# Patient Record
Sex: MALE | Race: BLACK OR AFRICAN AMERICAN | NOT HISPANIC OR LATINO | URBAN - METROPOLITAN AREA
[De-identification: names, ages, dates, MRNs, and addresses within clinical notes are randomized per-mention and may not be internally consistent; named-entity substitution may affect disease eponyms.]

---

## 2017-06-25 ENCOUNTER — EMERGENCY (EMERGENCY)
Facility: HOSPITAL | Age: 57
LOS: 1 days | Discharge: HOME | End: 2017-06-25
Attending: EMERGENCY MEDICINE | Admitting: EMERGENCY MEDICINE
Payer: MEDICAID

## 2017-06-25 VITALS
HEART RATE: 64 BPM | OXYGEN SATURATION: 100 % | DIASTOLIC BLOOD PRESSURE: 94 MMHG | SYSTOLIC BLOOD PRESSURE: 157 MMHG | RESPIRATION RATE: 18 BRPM | TEMPERATURE: 99 F | WEIGHT: 145.06 LBS

## 2017-06-25 DIAGNOSIS — S09.90XA UNSPECIFIED INJURY OF HEAD, INITIAL ENCOUNTER: ICD-10-CM

## 2017-06-25 DIAGNOSIS — J45.909 UNSPECIFIED ASTHMA, UNCOMPLICATED: ICD-10-CM

## 2017-06-25 PROCEDURE — 99284 EMERGENCY DEPT VISIT MOD MDM: CPT | Mod: 25

## 2017-06-25 PROCEDURE — 99053 MED SERV 10PM-8AM 24 HR FAC: CPT

## 2017-06-25 PROCEDURE — 70450 CT HEAD/BRAIN W/O DYE: CPT | Mod: 26

## 2017-06-25 NOTE — ED ADULT TRIAGE NOTE - CHIEF COMPLAINT QUOTE
Pt reports "my asthma is bothering me" over the past two weeks. No wheezing auscultated. Pt also reports trip and fall last night while walking. Pt reports LOC, abrasion noted on head.

## 2017-06-26 RX ORDER — ALBUTEROL 90 UG/1
2 AEROSOL, METERED ORAL ONCE
Qty: 0 | Refills: 0 | Status: COMPLETED | OUTPATIENT
Start: 2017-06-26 | End: 2017-06-26

## 2017-06-26 RX ORDER — TETANUS TOXOID, REDUCED DIPHTHERIA TOXOID AND ACELLULAR PERTUSSIS VACCINE, ADSORBED 5; 2.5; 8; 8; 2.5 [IU]/.5ML; [IU]/.5ML; UG/.5ML; UG/.5ML; UG/.5ML
0.5 SUSPENSION INTRAMUSCULAR ONCE
Qty: 0 | Refills: 0 | Status: COMPLETED | OUTPATIENT
Start: 2017-06-26 | End: 2017-06-26

## 2017-06-26 RX ADMIN — ALBUTEROL 2 PUFF(S): 90 AEROSOL, METERED ORAL at 01:00

## 2017-06-26 RX ADMIN — TETANUS TOXOID, REDUCED DIPHTHERIA TOXOID AND ACELLULAR PERTUSSIS VACCINE, ADSORBED 0.5 MILLILITER(S): 5; 2.5; 8; 8; 2.5 SUSPENSION INTRAMUSCULAR at 00:59
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
## 2017-06-26 NOTE — ED PROVIDER NOTE - CARE PLAN
Principal Discharge DX:	Abrasion of scalp, initial encounter  Secondary Diagnosis:	Head injury  Secondary Diagnosis:	Asthma

## 2017-08-30 ENCOUNTER — EMERGENCY (EMERGENCY)
Facility: HOSPITAL | Age: 57
LOS: 1 days | Discharge: HOME | End: 2017-08-30
Admitting: EMERGENCY MEDICINE
Payer: MEDICAID

## 2017-08-30 VITALS
OXYGEN SATURATION: 95 % | HEART RATE: 63 BPM | SYSTOLIC BLOOD PRESSURE: 112 MMHG | DIASTOLIC BLOOD PRESSURE: 81 MMHG | RESPIRATION RATE: 16 BRPM

## 2017-08-30 DIAGNOSIS — R06.02 SHORTNESS OF BREATH: ICD-10-CM

## 2017-08-30 DIAGNOSIS — R74.0 NONSPECIFIC ELEVATION OF LEVELS OF TRANSAMINASE AND LACTIC ACID DEHYDROGENASE [LDH]: ICD-10-CM

## 2017-08-30 DIAGNOSIS — R60.0 LOCALIZED EDEMA: ICD-10-CM

## 2017-08-30 DIAGNOSIS — J45.909 UNSPECIFIED ASTHMA, UNCOMPLICATED: ICD-10-CM

## 2017-08-30 DIAGNOSIS — J18.9 PNEUMONIA, UNSPECIFIED ORGANISM: ICD-10-CM

## 2017-08-30 LAB
ALBUMIN SERPL ELPH-MCNC: 3.6 G/DL — SIGNIFICANT CHANGE UP (ref 3.4–5)
ALP SERPL-CCNC: 87 U/L — SIGNIFICANT CHANGE UP (ref 40–120)
ALT FLD-CCNC: 53 U/L — HIGH (ref 12–42)
ANION GAP SERPL CALC-SCNC: 6 MMOL/L — LOW (ref 9–16)
AST SERPL-CCNC: 77 U/L — HIGH (ref 15–37)
BILIRUB SERPL-MCNC: 0.5 MG/DL — SIGNIFICANT CHANGE UP (ref 0.2–1.2)
BUN SERPL-MCNC: 13 MG/DL — SIGNIFICANT CHANGE UP (ref 7–23)
CALCIUM SERPL-MCNC: 9.4 MG/DL — SIGNIFICANT CHANGE UP (ref 8.5–10.5)
CHLORIDE SERPL-SCNC: 107 MMOL/L — SIGNIFICANT CHANGE UP (ref 96–108)
CK MB BLD-MCNC: 0.89 % — SIGNIFICANT CHANGE UP
CK MB CFR SERPL CALC: 3.8 NG/ML — HIGH (ref 0.5–3.6)
CO2 SERPL-SCNC: 26 MMOL/L — SIGNIFICANT CHANGE UP (ref 22–31)
CREAT SERPL-MCNC: 0.89 MG/DL — SIGNIFICANT CHANGE UP (ref 0.5–1.3)
GLUCOSE SERPL-MCNC: 100 MG/DL — HIGH (ref 70–99)
HCT VFR BLD CALC: 42.5 % — SIGNIFICANT CHANGE UP (ref 39–50)
HGB BLD-MCNC: 14 G/DL — SIGNIFICANT CHANGE UP (ref 13–17)
MCHC RBC-ENTMCNC: 30.1 PG — SIGNIFICANT CHANGE UP (ref 27–34)
MCHC RBC-ENTMCNC: 32.9 G/DL — SIGNIFICANT CHANGE UP (ref 32–36)
MCV RBC AUTO: 91.4 FL — SIGNIFICANT CHANGE UP (ref 80–100)
NT-PROBNP SERPL-SCNC: 581 PG/ML — HIGH
PLATELET # BLD AUTO: 239 K/UL — SIGNIFICANT CHANGE UP (ref 150–400)
POTASSIUM SERPL-MCNC: 4.2 MMOL/L — SIGNIFICANT CHANGE UP (ref 3.5–5.3)
POTASSIUM SERPL-SCNC: 4.2 MMOL/L — SIGNIFICANT CHANGE UP (ref 3.5–5.3)
PROT SERPL-MCNC: 8.7 G/DL — HIGH (ref 6.4–8.2)
RBC # BLD: 4.65 M/UL — SIGNIFICANT CHANGE UP (ref 4.2–5.8)
RBC # FLD: 12.8 % — SIGNIFICANT CHANGE UP (ref 10.3–16.9)
SODIUM SERPL-SCNC: 139 MMOL/L — SIGNIFICANT CHANGE UP (ref 132–145)
TROPONIN I SERPL-MCNC: <0.017 NG/ML — LOW (ref 0.02–0.06)
WBC # BLD: 3.1 K/UL — LOW (ref 3.8–10.5)
WBC # FLD AUTO: 3.1 K/UL — LOW (ref 3.8–10.5)

## 2017-08-30 PROCEDURE — 99053 MED SERV 10PM-8AM 24 HR FAC: CPT

## 2017-08-30 PROCEDURE — 93010 ELECTROCARDIOGRAM REPORT: CPT

## 2017-08-30 PROCEDURE — 71020: CPT | Mod: 26

## 2017-08-30 PROCEDURE — 99285 EMERGENCY DEPT VISIT HI MDM: CPT | Mod: 25

## 2017-08-30 RX ORDER — ALBUTEROL 90 UG/1
2 AEROSOL, METERED ORAL
Qty: 1 | Refills: 0
Start: 2017-08-30 | End: 2017-09-29

## 2017-08-30 RX ORDER — BROMPHENIRAMINE MALEATE, PSEUDOEPHEDRINE HYDROCHLORIDE, AND DEXTROMETHORPHAN HYDROBROMIDE 2; 10; 30 MG/5ML; MG/5ML; MG/5ML
5 SOLUTION ORAL
Qty: 120 | Refills: 0
Start: 2017-08-30

## 2017-08-30 RX ORDER — IPRATROPIUM/ALBUTEROL SULFATE 18-103MCG
3 AEROSOL WITH ADAPTER (GRAM) INHALATION ONCE
Qty: 0 | Refills: 0 | Status: COMPLETED | OUTPATIENT
Start: 2017-08-30 | End: 2017-08-30

## 2017-08-30 RX ADMIN — Medication 3 MILLILITER(S): at 06:49

## 2017-08-30 NOTE — ED PROVIDER NOTE - CARE PLAN
Principal Discharge DX:	Atypical pneumonia Principal Discharge DX:	Atypical pneumonia  Secondary Diagnosis:	Transaminitis

## 2017-08-30 NOTE — ED ADULT TRIAGE NOTE - CHIEF COMPLAINT QUOTE
patient here with chest pain and SOB; "I has asthma"; diminished lungs sounds in triage; does not have albuterol

## 2017-08-30 NOTE — ED ADULT NURSE NOTE - OBJECTIVE STATEMENT
Pt presents to ED with c/o SOB, misternal CP with productive cough - white sputum. Patient with diminished BS bilat, slight pedal edema.

## 2017-08-30 NOTE — ED PROVIDER NOTE - OBJECTIVE STATEMENT
56 yo M with PMHx of HIV, last CD4/VL unknown, on HAART, asthma, no h/o intubation, BIBA for SOB and CP x 2d.  Pt reports having cough productive of whitish sputum for the past two days with associated sharp pain in the mid sternal region.  Pain is rate 5/10 with no radiation and precipitated by cough and deep inspiration. Noted worsening SOB at rest today with tightness and chills.  States that "feels like my asthma is acting up." Denies chills, wheezing, hemoptysis, orthopnea, palpitations, peripheral edema, stridors, focal weakness, sore throat, tinnitus, HA, dizziness, N/V/D/C, abdominal pain, change in urinary/bowel function, night sweats, and malaise. No recent travel or sick contact noted. No prior stress test noted

## 2017-08-30 NOTE — ED PROVIDER NOTE - MEDICAL DECISION MAKING DETAILS
Pt p/w 2d of productive cough, cp, and increasing SOB, AFVSS and non-toxic appearing, EKG with sinus bradycardia, cxr with ?RML atypical infiltrate, labs unremarkable otherwise, given neb tx and doxy for atypical PNA coverage given history of HIV/immunocompromised. Results, ddx, and f/u plans discussed with pt at bedside, d/c'd home to f/u with PMD and pulmonary, strict return precautions discussed, prompt return to ER for any worsening or new sx, pt verbalized understanding. Pt p/w 2d of productive cough, cp, and increasing SOB, AFVSS and non-toxic appearing, EKG with sinus bradycardia, cxr with ?RML atypical infiltrate, labs with slight elevation in LFTs and CK, index and trop negative, given neb tx and doxy for atypical PNA coverage given history of HIV/immunocompromised. Results, ddx, and f/u plans discussed with pt at bedside, d/c'd home to f/u with PMD and pulmonary, strict return precautions discussed, prompt return to ER for any worsening or new sx, pt verbalized understanding.

## 2017-08-30 NOTE — ED PROVIDER NOTE - DIAGNOSTIC INTERPRETATION
Xray (wet reads) interpreted by TK ALBERTO   CXR - Cardiac silhouette, mediastinal and hilar contours wnl, no acute consolidation, infiltrate, effusion, or PTX. No bony abnormalities noted Xray (wet reads) interpreted by TK ALBERTO   CXR - Cardiac silhouette, mediastinal and hilar contours wnl, +RML atypical infiltrate, no consolidation, effusion, or PTX. No bony abnormalities noted

## 2017-08-30 NOTE — ED PROVIDER NOTE - PHYSICAL EXAMINATION
Gen - Unkempt M, NAD, non-toxic appearing, speaking in full sentences   Skin - warm, dry, intact  HEENT - AT/NC, PERRL, EOMI, no conjunctival injection, o/p clear with no erythema, edema, or exudate, uvula midline, airway patent, neck supple, no JVD or carotid bruits b/l, no palpable nodes   CV - S1S2, R/R/R  Resp - respiration non-labored, slightly diminished bs b/l, no r/r/w   GI - NABS, soft, ND, NT, no rebound or guarding, no CVAT b/l   MS - w/w/p, 1+ pitting edema to foot and ankle b/l, calves supple and NT, distal pulses symmetric b/l   Neuro - AxOx3, no focal neuro deficits, CN II-XII grossly intact, ambulatory without gait disturbance

## 2021-05-12 NOTE — ED ADULT NURSE NOTE - BREATH SOUNDS, MLM
Patient calling stating he was told to stop taking the Calcitriol .25 mg for a period of time but he does not know when to start retaking it or if at all. Transferred to RN   Diminished

## 2022-06-27 ENCOUNTER — EMERGENCY (EMERGENCY)
Facility: HOSPITAL | Age: 62
LOS: 1 days | Discharge: HOME | End: 2022-06-27
Attending: EMERGENCY MEDICINE | Admitting: EMERGENCY MEDICINE

## 2022-06-27 VITALS
DIASTOLIC BLOOD PRESSURE: 68 MMHG | WEIGHT: 134.48 LBS | HEART RATE: 89 BPM | HEIGHT: 66 IN | SYSTOLIC BLOOD PRESSURE: 146 MMHG | TEMPERATURE: 99 F | OXYGEN SATURATION: 99 % | RESPIRATION RATE: 17 BRPM

## 2022-06-27 PROBLEM — Z21 ASYMPTOMATIC HUMAN IMMUNODEFICIENCY VIRUS [HIV] INFECTION STATUS: Chronic | Status: ACTIVE | Noted: 2017-08-30

## 2022-06-27 PROBLEM — J45.909 UNSPECIFIED ASTHMA, UNCOMPLICATED: Chronic | Status: ACTIVE | Noted: 2017-08-30

## 2022-06-27 LAB — GLUCOSE BLDC GLUCOMTR-MCNC: 121 MG/DL — HIGH (ref 70–99)

## 2022-06-27 PROCEDURE — 99285 EMERGENCY DEPT VISIT HI MDM: CPT

## 2022-06-27 RX ORDER — SODIUM CHLORIDE 9 MG/ML
1000 INJECTION INTRAMUSCULAR; INTRAVENOUS; SUBCUTANEOUS ONCE
Refills: 0 | Status: DISCONTINUED | OUTPATIENT
Start: 2022-06-27 | End: 2022-06-27

## 2022-06-27 NOTE — ED PROVIDER NOTE - PATIENT PORTAL LINK FT
You can access the FollowMyHealth Patient Portal offered by [facility] by registering at the following website: [email or web address]. By joining [facility]’s FollowMyHealth portal, you will also be able to view your health information using other applications (apps) compatible with our system.

## 2022-06-27 NOTE — ED ADULT TRIAGE NOTE - HEIGHT IN CM
167.64 Abdomen soft, non-tender and non-distended without organomegaly or masses. Normal bowel sounds.

## 2022-06-27 NOTE — ED PROVIDER NOTE - OBJECTIVE STATEMENT
63 yo homeless male, pmhx asthma, HIV ( has not taken meds in 2 months), c/o 2 day hx of weakness. Pt states he fell yesterday in park striking head, ? LOC. Pt denies n/v/headache.

## 2022-06-27 NOTE — ED ADULT TRIAGE NOTE - CHIEF COMPLAINT QUOTE
walk in to ED states he feels weak, lightheaded, fatigue, and has not taken HIV medications in 2 months. pt also states he fell while in the park this morning and hit his head. denies LOC.

## 2022-06-28 DIAGNOSIS — R53.1 WEAKNESS: ICD-10-CM

## 2022-06-28 DIAGNOSIS — Z59.00 HOMELESSNESS UNSPECIFIED: ICD-10-CM

## 2022-06-28 DIAGNOSIS — Z21 ASYMPTOMATIC HUMAN IMMUNODEFICIENCY VIRUS [HIV] INFECTION STATUS: ICD-10-CM

## 2022-06-28 DIAGNOSIS — J45.909 UNSPECIFIED ASTHMA, UNCOMPLICATED: ICD-10-CM

## 2022-06-28 SDOH — ECONOMIC STABILITY - HOUSING INSECURITY: HOMELESSNESS UNSPECIFIED: Z59.00

## 2022-07-11 ENCOUNTER — EMERGENCY (EMERGENCY)
Facility: HOSPITAL | Age: 62
LOS: 1 days | Discharge: HOME | End: 2022-07-11
Attending: EMERGENCY MEDICINE | Admitting: EMERGENCY MEDICINE

## 2022-07-11 VITALS
TEMPERATURE: 98 F | DIASTOLIC BLOOD PRESSURE: 72 MMHG | HEART RATE: 99 BPM | WEIGHT: 119.93 LBS | OXYGEN SATURATION: 98 % | HEIGHT: 66 IN | SYSTOLIC BLOOD PRESSURE: 112 MMHG | RESPIRATION RATE: 18 BRPM

## 2022-07-11 DIAGNOSIS — R05.9 COUGH, UNSPECIFIED: ICD-10-CM

## 2022-07-11 DIAGNOSIS — I48.92 UNSPECIFIED ATRIAL FLUTTER: ICD-10-CM

## 2022-07-11 DIAGNOSIS — B20 HUMAN IMMUNODEFICIENCY VIRUS [HIV] DISEASE: ICD-10-CM

## 2022-07-11 DIAGNOSIS — Z87.891 PERSONAL HISTORY OF NICOTINE DEPENDENCE: ICD-10-CM

## 2022-07-11 DIAGNOSIS — U07.1 COVID-19: ICD-10-CM

## 2022-07-11 DIAGNOSIS — J45.909 UNSPECIFIED ASTHMA, UNCOMPLICATED: ICD-10-CM

## 2022-07-11 DIAGNOSIS — I48.91 UNSPECIFIED ATRIAL FIBRILLATION: ICD-10-CM

## 2022-07-11 LAB
ALBUMIN SERPL ELPH-MCNC: 3.2 G/DL — LOW (ref 3.4–5)
ALP SERPL-CCNC: 96 U/L — SIGNIFICANT CHANGE UP (ref 40–120)
ALT FLD-CCNC: 36 U/L — SIGNIFICANT CHANGE UP (ref 12–42)
ANION GAP SERPL CALC-SCNC: 9 MMOL/L — SIGNIFICANT CHANGE UP (ref 9–16)
AST SERPL-CCNC: 68 U/L — HIGH (ref 15–37)
BASOPHILS # BLD AUTO: 0.02 K/UL — SIGNIFICANT CHANGE UP (ref 0–0.2)
BASOPHILS NFR BLD AUTO: 0.7 % — SIGNIFICANT CHANGE UP (ref 0–2)
BILIRUB SERPL-MCNC: 0.7 MG/DL — SIGNIFICANT CHANGE UP (ref 0.2–1.2)
BUN SERPL-MCNC: 13 MG/DL — SIGNIFICANT CHANGE UP (ref 7–23)
CALCIUM SERPL-MCNC: 8.9 MG/DL — SIGNIFICANT CHANGE UP (ref 8.5–10.5)
CHLORIDE SERPL-SCNC: 100 MMOL/L — SIGNIFICANT CHANGE UP (ref 96–108)
CO2 SERPL-SCNC: 25 MMOL/L — SIGNIFICANT CHANGE UP (ref 22–31)
CREAT SERPL-MCNC: 1.06 MG/DL — SIGNIFICANT CHANGE UP (ref 0.5–1.3)
EGFR: 79 ML/MIN/1.73M2 — SIGNIFICANT CHANGE UP
EOSINOPHIL # BLD AUTO: 0.15 K/UL — SIGNIFICANT CHANGE UP (ref 0–0.5)
EOSINOPHIL NFR BLD AUTO: 4.9 % — SIGNIFICANT CHANGE UP (ref 0–6)
GIANT PLATELETS BLD QL SMEAR: PRESENT — SIGNIFICANT CHANGE UP
GLUCOSE SERPL-MCNC: 85 MG/DL — SIGNIFICANT CHANGE UP (ref 70–99)
HCT VFR BLD CALC: 31.9 % — LOW (ref 39–50)
HGB BLD-MCNC: 10.1 G/DL — LOW (ref 13–17)
HYPOCHROMIA BLD QL: SLIGHT — SIGNIFICANT CHANGE UP
IMM GRANULOCYTES NFR BLD AUTO: 0.3 % — SIGNIFICANT CHANGE UP (ref 0–1.5)
LG PLATELETS BLD QL AUTO: SLIGHT — SIGNIFICANT CHANGE UP
LYMPHOCYTES # BLD AUTO: 1.02 K/UL — SIGNIFICANT CHANGE UP (ref 1–3.3)
LYMPHOCYTES # BLD AUTO: 33.4 % — SIGNIFICANT CHANGE UP (ref 13–44)
MACROCYTES BLD QL: SLIGHT — SIGNIFICANT CHANGE UP
MANUAL SMEAR VERIFICATION: SIGNIFICANT CHANGE UP
MCHC RBC-ENTMCNC: 26 PG — LOW (ref 27–34)
MCHC RBC-ENTMCNC: 31.7 GM/DL — LOW (ref 32–36)
MCV RBC AUTO: 82 FL — SIGNIFICANT CHANGE UP (ref 80–100)
MICROCYTES BLD QL: SLIGHT — SIGNIFICANT CHANGE UP
MONOCYTES # BLD AUTO: 0.3 K/UL — SIGNIFICANT CHANGE UP (ref 0–0.9)
MONOCYTES NFR BLD AUTO: 9.8 % — SIGNIFICANT CHANGE UP (ref 2–14)
NEUTROPHILS # BLD AUTO: 1.55 K/UL — LOW (ref 1.8–7.4)
NEUTROPHILS NFR BLD AUTO: 50.9 % — SIGNIFICANT CHANGE UP (ref 43–77)
NRBC # BLD: 0 /100 WBCS — SIGNIFICANT CHANGE UP (ref 0–0)
NT-PROBNP SERPL-SCNC: 4146 PG/ML — HIGH
OVALOCYTES BLD QL SMEAR: SLIGHT — SIGNIFICANT CHANGE UP
PLAT MORPH BLD: ABNORMAL
PLATELET # BLD AUTO: 284 K/UL — SIGNIFICANT CHANGE UP (ref 150–400)
POLYCHROMASIA BLD QL SMEAR: SLIGHT — SIGNIFICANT CHANGE UP
POTASSIUM SERPL-MCNC: 4.3 MMOL/L — SIGNIFICANT CHANGE UP (ref 3.5–5.3)
POTASSIUM SERPL-SCNC: 4.3 MMOL/L — SIGNIFICANT CHANGE UP (ref 3.5–5.3)
PROT SERPL-MCNC: 8.4 G/DL — HIGH (ref 6.4–8.2)
RBC # BLD: 3.89 M/UL — LOW (ref 4.2–5.8)
RBC # FLD: 22.2 % — HIGH (ref 10.3–14.5)
RBC BLD AUTO: ABNORMAL
SARS-COV-2 RNA SPEC QL NAA+PROBE: DETECTED
SODIUM SERPL-SCNC: 134 MMOL/L — SIGNIFICANT CHANGE UP (ref 132–145)
TARGETS BLD QL SMEAR: SLIGHT — SIGNIFICANT CHANGE UP
TROPONIN I, HIGH SENSITIVITY RESULT: 21.9 NG/L — SIGNIFICANT CHANGE UP
WBC # BLD: 3.05 K/UL — LOW (ref 3.8–10.5)
WBC # FLD AUTO: 3.05 K/UL — LOW (ref 3.8–10.5)

## 2022-07-11 PROCEDURE — 71045 X-RAY EXAM CHEST 1 VIEW: CPT | Mod: 26

## 2022-07-11 PROCEDURE — 99053 MED SERV 10PM-8AM 24 HR FAC: CPT

## 2022-07-11 PROCEDURE — 99285 EMERGENCY DEPT VISIT HI MDM: CPT | Mod: 25

## 2022-07-11 PROCEDURE — 93010 ELECTROCARDIOGRAM REPORT: CPT

## 2022-07-11 RX ORDER — DEXAMETHASONE 0.5 MG/5ML
10 ELIXIR ORAL ONCE
Refills: 0 | Status: COMPLETED | OUTPATIENT
Start: 2022-07-11 | End: 2022-07-11

## 2022-07-11 RX ORDER — ASPIRIN/CALCIUM CARB/MAGNESIUM 324 MG
1 TABLET ORAL
Qty: 30 | Refills: 0
Start: 2022-07-11 | End: 2022-08-09

## 2022-07-11 RX ORDER — BICTEGRAVIR SODIUM, EMTRICITABINE, AND TENOFOVIR ALAFENAMIDE FUMARATE 30; 120; 15 MG/1; MG/1; MG/1
1 TABLET ORAL
Qty: 30 | Refills: 0
Start: 2022-07-11 | End: 2022-08-09

## 2022-07-11 RX ADMIN — Medication 100 MILLIGRAM(S): at 07:48

## 2022-07-11 RX ADMIN — Medication 10 MILLIGRAM(S): at 07:48

## 2022-07-11 NOTE — ED PROVIDER NOTE - NS ED ROS FT
Constitutional:  No fever, No chills, No night sweats  Eyes:  No visual changes, No discharge, No redness  ENMT:  No epistaxis, no nasal congestion, no throat pain, no difficulty swallowing  CV:  No chest pain, No palpitations, No peripheral edema  Resp:  +cough, +shortness of breath  GI:  No abdominal pain, No vomiting, No diarrhea  MSK:  No neck pain or stiffness, No joint swelling or pain, No back pain  Neuro: no loss of consciousness, no gait abnormality, no headache, no sensory deficits, and no weakness.  Skin:  No abrasions, no lesions, no rashes  Psych:  No known mental health issues

## 2022-07-11 NOTE — ED ADULT NURSE NOTE - NSIMPLEMENTINTERV_GEN_ALL_ED
Implemented All Universal Safety Interventions:  [place] to call system. Call bell, personal items and telephone within reach. Instruct patient to call for assistance. Room bathroom lighting operational. Non-slip footwear when patient is off stretcher. Physically safe environment: no spills, clutter or unnecessary equipment. Stretcher in lowest position, wheels locked, appropriate side rails in place.

## 2022-07-11 NOTE — ED PROVIDER NOTE - CLINICAL SUMMARY MEDICAL DECISION MAKING FREE TEXT BOX
Patient with HIV noncompliant with meds, history of smoking, presents with 2 weeks of cough.  On exam, patient is afebrile, vital signs are stable.  Patient is satting well on room air.  Coughing in ED.  No unilateral leg swelling or calf tenderness.  Differential includes pneumonia, viral URI, ACS, other.  Plan for labs, chest x-ray, reassess.

## 2022-07-11 NOTE — ED PROVIDER NOTE - OBJECTIVE STATEMENT
63yo M hx of HIV (unknown CD4 count) presents with cough x2 weeks. 63yo M hx of HIV (unknown CD4 count) presents with cough x2 weeks.  Patient reports possible fever and subjective chills.  Patient states he was told he "might have cancer from smoking."  Patient states he has not taken his HIV meds in the past 2 months because he ran out of his prescription.  Does not know CD4 count or viral load.  No hemoptysis or leg swelling.  Feels short of breath and lightheaded.

## 2022-07-11 NOTE — ED PROVIDER NOTE - PROGRESS NOTE DETAILS
Pt COVID positive.  Satting well on RA.  No indication for admission.    Out of window for MAB or paxlovid.  HIV meds refilled. Pt COVID positive.  States he is not vaccinated for COVID.  Satting well on RA.  No indication for admission.    Out of window for MAB or paxlovid.  HIV meds refilled.  Pt given PO decadron in ED.  Tolerating PO in ED.    EKG w Atrial fibrillation vs. flutter; rate wnl.  No known hx of arrhythmia.  CHADSVASC score 0-1 (BNP elevated, but no known hx of CHF; no hx of HTN, DM, CVA, or vascular dz).  Will hold off on anticoagulation for now; will start pt on baby ASA daily.

## 2022-07-11 NOTE — ED PROVIDER NOTE - CARE PLAN
1 Principal Discharge DX:	2019 novel coronavirus disease (COVID-19)  Secondary Diagnosis:	Atrial flutter

## 2022-07-11 NOTE — ED ADULT NURSE NOTE - OBJECTIVE STATEMENT
Pt seen and examined by ER provider for cough. Pt is alert and oriented x3 with no acute distress. COVID swab collected and sent. Safety and comfort measures are in place. Will continue to provide.

## 2022-07-11 NOTE — ED PROVIDER NOTE - PHYSICAL EXAMINATION
Constitutional: Chronically ill-appearing  HEENT: head normocephalic and atraumatic. moist mucous membranes  Eyes: extraocular movements intact, normal conjunctiva  Neck: supple, normal ROM  Cardiovascular: regular rate   Pulmonary: no respiratory distress, Coughing  Gastrointestinal: abdomen flat and nondistended  Skin: warm, dry, normal for ethnicity  Musculoskeletal: no edema, no deformity  Neurological: oriented x4, no focal neurologic deficit.   Psychiatric: calm and cooperative

## 2022-07-11 NOTE — ED PROVIDER NOTE - PATIENT PORTAL LINK FT
You can access the FollowMyHealth Patient Portal offered by [facility] by registering at the following website: [email or web address]. By joining [facility]’s FollowMyHealth portal, you will also be able to view your health information using other applications (apps) compatible with our system.

## 2022-07-11 NOTE — ED PROVIDER NOTE - NSFOLLOWUPINSTRUCTIONS_ED_ALL_ED_FT
You have COVID.  Return to the ER for severe shortness of breath or other concerning symptoms.    You have an abnormal heart rhythm called ATRIAL FLUTTER.  You need to take a baby aspirin daily.    Please take your HIV meds EVERY DAY.  Follow up with your primary doctor for further care.

## 2022-07-29 ENCOUNTER — INPATIENT (INPATIENT)
Facility: HOSPITAL | Age: 62
LOS: 5 days | Discharge: HOME | DRG: 202 | End: 2022-08-04
Attending: STUDENT IN AN ORGANIZED HEALTH CARE EDUCATION/TRAINING PROGRAM | Admitting: INTERNAL MEDICINE
Payer: MEDICAID

## 2022-07-29 VITALS
OXYGEN SATURATION: 98 % | HEIGHT: 66 IN | RESPIRATION RATE: 18 BRPM | HEART RATE: 82 BPM | DIASTOLIC BLOOD PRESSURE: 77 MMHG | TEMPERATURE: 98 F | WEIGHT: 139.99 LBS | SYSTOLIC BLOOD PRESSURE: 114 MMHG

## 2022-07-29 DIAGNOSIS — I48.91 UNSPECIFIED ATRIAL FIBRILLATION: ICD-10-CM

## 2022-07-29 DIAGNOSIS — R74.8 ABNORMAL LEVELS OF OTHER SERUM ENZYMES: ICD-10-CM

## 2022-07-29 DIAGNOSIS — R07.9 CHEST PAIN, UNSPECIFIED: ICD-10-CM

## 2022-07-29 DIAGNOSIS — B20 HUMAN IMMUNODEFICIENCY VIRUS [HIV] DISEASE: ICD-10-CM

## 2022-07-29 DIAGNOSIS — R05.9 COUGH, UNSPECIFIED: ICD-10-CM

## 2022-07-29 DIAGNOSIS — D64.9 ANEMIA, UNSPECIFIED: ICD-10-CM

## 2022-07-29 DIAGNOSIS — M79.89 OTHER SPECIFIED SOFT TISSUE DISORDERS: ICD-10-CM

## 2022-07-29 DIAGNOSIS — R63.8 OTHER SYMPTOMS AND SIGNS CONCERNING FOOD AND FLUID INTAKE: ICD-10-CM

## 2022-07-29 LAB
ALBUMIN SERPL ELPH-MCNC: 2.9 G/DL — LOW (ref 3.4–5)
ALP SERPL-CCNC: 132 U/L — HIGH (ref 40–120)
ALT FLD-CCNC: 25 U/L — SIGNIFICANT CHANGE UP (ref 12–42)
ANION GAP SERPL CALC-SCNC: 7 MMOL/L — LOW (ref 9–16)
ANISOCYTOSIS BLD QL: SIGNIFICANT CHANGE UP
APTT BLD: 34 SEC — SIGNIFICANT CHANGE UP (ref 27.5–35.5)
AST SERPL-CCNC: 52 U/L — HIGH (ref 15–37)
BASOPHILS # BLD AUTO: 0 K/UL — SIGNIFICANT CHANGE UP (ref 0–0.2)
BASOPHILS NFR BLD AUTO: 0 % — SIGNIFICANT CHANGE UP (ref 0–2)
BILIRUB SERPL-MCNC: 0.5 MG/DL — SIGNIFICANT CHANGE UP (ref 0.2–1.2)
BLD GP AB SCN SERPL QL: NEGATIVE — SIGNIFICANT CHANGE UP
BUN SERPL-MCNC: 11 MG/DL — SIGNIFICANT CHANGE UP (ref 7–23)
CALCIUM SERPL-MCNC: 8.4 MG/DL — LOW (ref 8.5–10.5)
CHLORIDE SERPL-SCNC: 108 MMOL/L — SIGNIFICANT CHANGE UP (ref 96–108)
CO2 SERPL-SCNC: 25 MMOL/L — SIGNIFICANT CHANGE UP (ref 22–31)
CREAT SERPL-MCNC: 1.06 MG/DL — SIGNIFICANT CHANGE UP (ref 0.5–1.3)
D DIMER BLD IA.RAPID-MCNC: 209 NG/ML DDU — SIGNIFICANT CHANGE UP
DACRYOCYTES BLD QL SMEAR: SIGNIFICANT CHANGE UP
EGFR: 79 ML/MIN/1.73M2 — SIGNIFICANT CHANGE UP
EOSINOPHIL # BLD AUTO: 0 K/UL — SIGNIFICANT CHANGE UP (ref 0–0.5)
EOSINOPHIL NFR BLD AUTO: 0 % — SIGNIFICANT CHANGE UP (ref 0–6)
FERRITIN SERPL-MCNC: 63 NG/ML — SIGNIFICANT CHANGE UP (ref 30–400)
GGT SERPL-CCNC: 302 U/L — HIGH (ref 9–50)
GLUCOSE SERPL-MCNC: 105 MG/DL — HIGH (ref 70–99)
HCT VFR BLD CALC: 27.6 % — LOW (ref 39–50)
HCT VFR BLD CALC: 28.7 % — LOW (ref 39–50)
HGB BLD-MCNC: 8.6 G/DL — LOW (ref 13–17)
HGB BLD-MCNC: 8.9 G/DL — LOW (ref 13–17)
HYPOCHROMIA BLD QL: SIGNIFICANT CHANGE UP
INR BLD: 1.21 — HIGH (ref 0.88–1.16)
IRON SATN MFR SERPL: 24 UG/DL — LOW (ref 45–165)
IRON SATN MFR SERPL: 7 % — LOW (ref 16–55)
LG PLATELETS BLD QL AUTO: SLIGHT — SIGNIFICANT CHANGE UP
LYMPHOCYTES # BLD AUTO: 2.1 K/UL — SIGNIFICANT CHANGE UP (ref 1–3.3)
LYMPHOCYTES # BLD AUTO: 55 % — HIGH (ref 13–44)
MACROCYTES BLD QL: SLIGHT — SIGNIFICANT CHANGE UP
MANUAL SMEAR VERIFICATION: SIGNIFICANT CHANGE UP
MCHC RBC-ENTMCNC: 26.7 PG — LOW (ref 27–34)
MCHC RBC-ENTMCNC: 26.9 PG — LOW (ref 27–34)
MCHC RBC-ENTMCNC: 31 GM/DL — LOW (ref 32–36)
MCHC RBC-ENTMCNC: 31.2 GM/DL — LOW (ref 32–36)
MCV RBC AUTO: 85.7 FL — SIGNIFICANT CHANGE UP (ref 80–100)
MCV RBC AUTO: 86.7 FL — SIGNIFICANT CHANGE UP (ref 80–100)
MONOCYTES # BLD AUTO: 0.15 K/UL — SIGNIFICANT CHANGE UP (ref 0–0.9)
MONOCYTES NFR BLD AUTO: 4 % — SIGNIFICANT CHANGE UP (ref 2–14)
NEUTROPHILS # BLD AUTO: 1.49 K/UL — LOW (ref 1.8–7.4)
NEUTROPHILS NFR BLD AUTO: 39 % — LOW (ref 43–77)
NRBC # BLD: 0 /100 WBCS — SIGNIFICANT CHANGE UP (ref 0–0)
NRBC # BLD: 0 /100 — SIGNIFICANT CHANGE UP (ref 0–0)
NRBC # BLD: SIGNIFICANT CHANGE UP /100 WBCS (ref 0–0)
OVALOCYTES BLD QL SMEAR: SLIGHT — SIGNIFICANT CHANGE UP
PLAT MORPH BLD: ABNORMAL
PLATELET # BLD AUTO: 220 K/UL — SIGNIFICANT CHANGE UP (ref 150–400)
PLATELET # BLD AUTO: 234 K/UL — SIGNIFICANT CHANGE UP (ref 150–400)
POLYCHROMASIA BLD QL SMEAR: SLIGHT — SIGNIFICANT CHANGE UP
POTASSIUM SERPL-MCNC: 3.7 MMOL/L — SIGNIFICANT CHANGE UP (ref 3.5–5.3)
POTASSIUM SERPL-SCNC: 3.7 MMOL/L — SIGNIFICANT CHANGE UP (ref 3.5–5.3)
PROT SERPL-MCNC: 7.3 G/DL — SIGNIFICANT CHANGE UP (ref 6.4–8.2)
PROTHROM AB SERPL-ACNC: 14.1 SEC — HIGH (ref 10.5–13.4)
RBC # BLD: 3.22 M/UL — LOW (ref 4.2–5.8)
RBC # BLD: 3.31 M/UL — LOW (ref 4.2–5.8)
RBC # FLD: 20.2 % — HIGH (ref 10.3–14.5)
RBC # FLD: 20.5 % — HIGH (ref 10.3–14.5)
RBC BLD AUTO: ABNORMAL
RH IG SCN BLD-IMP: POSITIVE — SIGNIFICANT CHANGE UP
SARS-COV-2 RNA SPEC QL NAA+PROBE: SIGNIFICANT CHANGE UP
SODIUM SERPL-SCNC: 140 MMOL/L — SIGNIFICANT CHANGE UP (ref 132–145)
TIBC SERPL-MCNC: 365 UG/DL — SIGNIFICANT CHANGE UP (ref 220–430)
TRANSFERRIN SERPL-MCNC: 313 MG/DL — SIGNIFICANT CHANGE UP (ref 200–360)
TROPONIN I, HIGH SENSITIVITY RESULT: 15.5 NG/L — SIGNIFICANT CHANGE UP
UIBC SERPL-MCNC: 341 UG/DL — SIGNIFICANT CHANGE UP (ref 110–370)
VARIANT LYMPHS # BLD: 2 % — SIGNIFICANT CHANGE UP (ref 0–6)
WBC # BLD: 3.82 K/UL — SIGNIFICANT CHANGE UP (ref 3.8–10.5)
WBC # BLD: 4.41 K/UL — SIGNIFICANT CHANGE UP (ref 3.8–10.5)
WBC # FLD AUTO: 3.82 K/UL — SIGNIFICANT CHANGE UP (ref 3.8–10.5)
WBC # FLD AUTO: 4.41 K/UL — SIGNIFICANT CHANGE UP (ref 3.8–10.5)

## 2022-07-29 PROCEDURE — 99053 MED SERV 10PM-8AM 24 HR FAC: CPT

## 2022-07-29 PROCEDURE — 93010 ELECTROCARDIOGRAM REPORT: CPT

## 2022-07-29 PROCEDURE — 99223 1ST HOSP IP/OBS HIGH 75: CPT | Mod: GC

## 2022-07-29 PROCEDURE — 76700 US EXAM ABDOM COMPLETE: CPT | Mod: 26

## 2022-07-29 PROCEDURE — 71275 CT ANGIOGRAPHY CHEST: CPT | Mod: 26

## 2022-07-29 PROCEDURE — 93971 EXTREMITY STUDY: CPT | Mod: 26,RT

## 2022-07-29 PROCEDURE — 99285 EMERGENCY DEPT VISIT HI MDM: CPT

## 2022-07-29 RX ORDER — IPRATROPIUM/ALBUTEROL SULFATE 18-103MCG
3 AEROSOL WITH ADAPTER (GRAM) INHALATION EVERY 6 HOURS
Refills: 0 | Status: DISCONTINUED | OUTPATIENT
Start: 2022-07-29 | End: 2022-07-30

## 2022-07-29 RX ORDER — ENOXAPARIN SODIUM 100 MG/ML
40 INJECTION SUBCUTANEOUS EVERY 24 HOURS
Refills: 0 | Status: DISCONTINUED | OUTPATIENT
Start: 2022-07-29 | End: 2022-07-30

## 2022-07-29 RX ADMIN — ENOXAPARIN SODIUM 40 MILLIGRAM(S): 100 INJECTION SUBCUTANEOUS at 21:55

## 2022-07-29 NOTE — ED PROVIDER NOTE - OBJECTIVE STATEMENT
61 yo m pmhx sig for HIV (non compliant with medications), recently diagnosed with lung cancer, pw acute onset of dizziness and chest pain aching mild in severity ongoing for 2 hr in duration, pt is laying in bed appears generally weak laying in bed with pulse O2 of 92%, additionally pre reports R calf swelling and pain.    I have reviewed available current nursing and previous documentation of past medical, surgical, family, and/or social history.

## 2022-07-29 NOTE — H&P ADULT - PROBLEM SELECTOR PLAN 7
F: patient tolerating PO, no IVF  E: Mg > 2, K >4  N: regular diet  DVT: lovenox 40 mg  Code: Full  Dispo: [facility] Pt with hx of HIV, inconsistent use of antiretrovirals. Unable to provide medication history, does not follow w/ PCP or HIV clinic.  - f/u viral load  - f/u T cell subset  - HIV team consult

## 2022-07-29 NOTE — H&P ADULT - PROBLEM SELECTOR PLAN 5
Pt with Hg 8.6 on admission, down from 10.1 July 11. No active source of bleeding.     - f/u iron studies  - f/u folate, B12 Elevated alk phos 132 up from 96. Distended abdomen, no rebound or guarding.    - f/u abdominal US  - f/u repeat CMP, GGT

## 2022-07-29 NOTE — ED PROVIDER NOTE - PHYSICAL EXAMINATION
Physical Exam    Vital Signs: I have reviewed the initial vital signs.  Constitutional: cachectic, appears stated age, no acute distress  Eyes: PERRLA, and symmetrical lids.  ENT: Neck supple with no adenopathy, moist MM.  Cardiovascular: regular rate, regular rhythm, well-perfused extremities  Respiratory: unlabored respiratory effort, clear to auscultation bilaterally  Gastrointestinal: soft, non-tender abdomen, no pulsatile mass  Musculoskeletal: supple neck, no lower extremity edema  Integumentary: warm, dry, no rash  Neurologic: extremities’ motor and sensory functions grossly intact  Psychiatric: A&Ox3, appropriate mood, appropriate affect

## 2022-07-29 NOTE — H&P ADULT - NSHPPHYSICALEXAM_GEN_ALL_CORE
PHYSICAL EXAM:    Vital Signs Last 24 Hrs  T(C): 36.6 (29 Jul 2022 14:20), Max: 36.7 (29 Jul 2022 06:19)  T(F): 97.9 (29 Jul 2022 14:20), Max: 98 (29 Jul 2022 06:19)  HR: 96 (29 Jul 2022 14:20) (82 - 96)  BP: 142/102 (29 Jul 2022 14:20) (114/77 - 142/102)  BP(mean): --  RR: 20 (29 Jul 2022 14:20) (16 - 20)  SpO2: 95% (29 Jul 2022 14:20) (95% - 98%)    Parameters below as of 29 Jul 2022 14:20  Patient On (Oxygen Delivery Method): room air      I&O's Summary      General: ill-appearing  HEENT: NC/AT; EOMI, PERRLA, anicteric sclera; moist mucosal membranes  Neck: supple, trachea midline  Cardiovascular: RRR, +S1/S2; NO M/R/G  Respiratory: CTA B/L; no W/R/R  Gastrointestinal: firm, distended abdomen, +BSx4, nontender to palpation  Extremities: WWP; no edema or cyanosis  Vascular: 2+ radial, DP/PT pulses B/L  Neurological: AAOx3; no focal deficits

## 2022-07-29 NOTE — H&P ADULT - HISTORY OF PRESENT ILLNESS
Pt is a 63yo w/ PMH HIV (non compliant w/ medications) asthma (non compliant w/ inhaler) p/w chest pain, cough and dizziness since Monday. Pt is a poor historian, previously undomiciled, currently residing in [facility]. He reports ?fall? today from feeling dizzy and ?hitting head? prior to presenting to ED. Pt reports a history of multiple ED admissions in the past week for these symptoms and was informed of new lung cancer diagnosis. CT Angio chest negative for PE or lung masses. He informed ED team of R calf swelling w/ pain. R LE Doppler negative for DVT.     In the ED:  Initial vital signs: T: 98F, HR: 82, BP: 114/77, R: 18, SpO2: 98% on RA  ED course:   Labs: Hg 8.6, D-dimer wnl, troponin wnl  CTA chest negative for PE or mass  EKG: atrial fibrillation  Medications:   Consults: none  Pt is a 61yo w/ PMH HIV (non compliant w/ medications) asthma (non compliant w/ inhaler) p/w chest pain, cough and dizziness since Monday. Pt is a poor historian, previously undomiciled, currently residing in [facility]. He reports ?fall? today from feeling dizzy and ?hitting head? prior to presenting to ED. Pt reports a history of multiple ED admissions in the past week for these symptoms and was informed of new lung cancer diagnosis. CT Angio chest negative for PE or lung masses. He informed ED team of R calf swelling w/ pain. R LE Doppler negative for DVT. On exam pt was now c/o of L foot swelling. Currently not experiencing chest pain or cough but c/o of weakness, dizziness which he attributes to not eating in 3 days. Pt requesting food + Ensure.    In the ED:  Initial vital signs: T: 98F, HR: 82, BP: 114/77, R: 18, SpO2: 98% on RA  ED course:   Labs: Hg 8.6, D-dimer wnl, troponin wnl  CTA chest negative for PE or mass  EKG: atrial fibrillation w/ premature ventricular or aberrant conduct complexes w/ T wave abnormality Pt is a 63yo w/ PMH HIV (non compliant w/ medications) asthma (non compliant w/ inhaler) p/w chest pain, cough and dizziness since Monday. Pt is a poor historian, previously undomiciled, currently residing in [facility]. He reports ?fall? today from feeling dizzy and ?hitting head? prior to presenting to ED. Pt reports a history of multiple ED admissions in the past week for these symptoms and was informed of new lung cancer diagnosis. CT Angio chest negative for PE or lung masses. He informed ED team of R calf swelling w/ pain. R LE Doppler negative for DVT. On exam pt was now c/o of L foot swelling. Currently not experiencing chest pain or cough but c/o of weakness, dizziness which he attributes to not eating in 3 days. Pt history and exam interrupted several times pt requesting food + Ensure.    In the ED:  Initial vital signs: T: 98F, HR: 82, BP: 114/77, R: 18, SpO2: 98% on RA  ED course:   Labs: Hg 8.6, D-dimer wnl, troponin wnl  CTA chest negative for PE or mass  EKG: atrial fibrillation w/ premature ventricular or aberrant conduct complexes w/ T wave abnormality

## 2022-07-29 NOTE — H&P ADULT - ATTENDING COMMENTS
Patient was seen and examined at bedside. Case discuss with resident.  In brief summary the pt is a  61yo w/ PMH HIV (non compliant w/ medications) asthma (non compliant w/ inhaler) p/w chest pain, cough and dizziness . Pt is a poor historian, previously undomiciled, currently residing in [facility]. Pt reports that he  was recently informed of new lung cancer diagnosis.  Pt had  a CT Angio chest negative for PE or lung masses. In addition the pt reports that he had R calf swelling w/ pain. R LE Doppler negative for DVT.       OBJECTIVE:  Vital Signs Last 24 Hrs  T(C): 36.6 (29 Jul 2022 14:20), Max: 36.7 (29 Jul 2022 06:19)  T(F): 97.9 (29 Jul 2022 14:20), Max: 98 (29 Jul 2022 06:19)  HR: 96 (29 Jul 2022 14:20) (82 - 96)  BP: 142/102 (29 Jul 2022 14:20) (114/77 - 142/102)  BP(mean): --  RR: 20 (29 Jul 2022 14:20) (16 - 20)  SpO2: 95% (29 Jul 2022 14:20) (95% - 98%)    PHYSICAL EXAM:  Gen: NAD laying in bed  CV: RRR, +S1/S2, no mumur  Pulm: CTA b/l no wheezing or crackles   Abd: soft, NTND + BS no rebound or guarding       LABS:                        8.6    3.82  )-----------( 234      ( 29 Jul 2022 06:44 )             27.6     07-29    140  |  108  |  11  ----------------------------<  105<H>  3.7   |  25  |  1.06    Ca    8.4<L>      29 Jul 2022 06:44    TPro  7.3  /  Alb  2.9<L>  /  TBili  0.5  /  DBili  x   /  AST  52<H>  /  ALT  25  /  AlkPhos  132<H>  07-29    LIVER FUNCTIONS - ( 29 Jul 2022 06:44 )  Alb: 2.9 g/dL / Pro: 7.3 g/dL / ALK PHOS: 132 U/L / ALT: 25 U/L / AST: 52 U/L / GGT: x           PT/INR - ( 29 Jul 2022 06:54 )   PT: 14.1 sec;   INR: 1.21          PTT - ( 29 Jul 2022 06:54 )  PTT:34.0 sec    CT chest: 1.  No acute pulmonary embolism.  2.  Cardiomegaly and right atrial dilatation. Mild pulmonary edema.   Findings are suggestive of right sided heart failure.  3.  Dilated main and right pulmonary arteries which can be seen in   pulmonary hypertension.    LE Doppler: negative for DVT     MEDICATIONS  enoxaparin Injectable 40 milliGRAM(s) SubCutaneous every 24 hours      A/P:  61yo w/ PMH HIV (non compliant w/ medications) asthma (non compliant w/ inhaler) p/w chest pain, cough and dizziness.   #Asthma  #Cough  - Will continue cough medication  -Will start on nebulizer    #Dizziness  -Fall precautions     #HIV  -Will check CD4 count and viral load  - HIV team consult     DISPO  -Will d/c once clinically improved

## 2022-07-29 NOTE — ED PROVIDER NOTE - COVID-19 ORDERING FACILITY
[facility] Doxycycline Counseling:  Patient counseled regarding possible photosensitivity and increased risk for sunburn.  Patient instructed to avoid sunlight, if possible.  When exposed to sunlight, patients should wear protective clothing, sunglasses, and sunscreen.  The patient was instructed to call the office immediately if the following severe adverse effects occur:  hearing changes, easy bruising/bleeding, severe headache, or vision changes.  The patient verbalized understanding of the proper use and possible adverse effects of doxycycline.  All of the patient's questions and concerns were addressed.

## 2022-07-29 NOTE — ED BEHAVIORAL HEALTH NOTE - BEHAVIORAL HEALTH NOTE
PT is a 62 year old male with PMHx for HIV (non compliant with medications) and recently diagnosed with lung cancer presented to the ED complaining of chest pain.  SW was consulted to the ED by Provider to complete an initial psychosocial assessment with PT for hospital admissions.  PT gave verbal consent to have the assessment.  The assessment was completed and submitted by SW.  PT was also provided with new ari-shirt and jogging pants from the storage room.  Team made aware and SW made available for further assistance.

## 2022-07-29 NOTE — H&P ADULT - ASSESSMENT
Pt is a 63yo w/ PMH HIV (non compliant w/ medications) asthma (non compliant w/ inhaler) p/w chest pain, cough and dizziness since Monday. Reports multiple ED admissions this week for symptoms.

## 2022-07-29 NOTE — H&P ADULT - NSHPLABSRESULTS_GEN_ALL_CORE
LABS:                         8.6    3.82  )-----------( 234      ( 29 Jul 2022 06:44 )             27.6     07-29    140  |  108  |  11  ----------------------------<  105<H>  3.7   |  25  |  1.06    Ca    8.4<L>      29 Jul 2022 06:44    TPro  7.3  /  Alb  2.9<L>  /  TBili  0.5  /  DBili  x   /  AST  52<H>  /  ALT  25  /  AlkPhos  132<H>  07-29    PT/INR - ( 29 Jul 2022 06:54 )   PT: 14.1 sec;   INR: 1.21          PTT - ( 29 Jul 2022 06:54 )  PTT:34.0 sec        CAPILLARY BLOOD GLUCOSE              RADIOLOGY, EKG & ADDITIONAL TESTS:

## 2022-07-29 NOTE — PATIENT PROFILE ADULT - FALL HARM RISK - HARM RISK INTERVENTIONS
Assistance with ambulation/Assistance OOB with selected safe patient handling equipment/Communicate Risk of Fall with Harm to all staff/Discuss with provider need for PT consult/Monitor gait and stability/Provide patient with walking aids - walker, cane, crutches/Reinforce activity limits and safety measures with patient and family/Sit up slowly, dangle for a short time, stand at bedside before walking/Tailored Fall Risk Interventions/Visual Cue: Yellow wristband and red socks/Bed in lowest position, wheels locked, appropriate side rails in place/Call bell, personal items and telephone in reach/Instruct patient to call for assistance before getting out of bed or chair/Non-slip footwear when patient is out of bed/[place] to call system/Physically safe environment - no spills, clutter or unnecessary equipment/Purposeful Proactive Rounding/Room/bathroom lighting operational, light cord in reach

## 2022-07-29 NOTE — H&P ADULT - PROBLEM SELECTOR PLAN 3
EKG reveals atrial fibrillation w/ premature ventricular or aberrant conductive complexes w/ T wave abnormlaties.  Pt not on AC, poor historian unreliable CHADVASC.   - repeat EKG in AM  - can consider cardiology consult EKG reveals atrial fibrillation w/ premature ventricular or aberrant conductive complexes w/ T wave abnormalities  Pt not on AC, poor historian unreliable CHADVASC.   - repeat EKG in AM  - can consider cardiology consult Pt c/o chronic dry cough that has been worse X 1 wk. Non productive. Afebrile. CT PE negative for consolidation/ infiltrates.   -tessalon pearls PRN for cough   -Roxanna PRN   -f/u T cell count and VL

## 2022-07-29 NOTE — H&P ADULT - NSHPPOADEEPVENOUSTHROMB_GEN_A_CORE
dx w/collj spec when pfrmd (N/A, 9/24/2018); Cardiac catheterization (09/23/2019); pacemaker placement; and Upper gastrointestinal endoscopy (N/A, 3/16/2020). CURRENT MEDICATIONS       Previous Medications    ACETAMINOPHEN (TYLENOL) 325 MG TABLET    Take 2 tablets by mouth every 4 hours as needed for Pain    AMIODARONE (CORDARONE) 200 MG TABLET    TAKE ONE TABLET BY MOUTH DAILY    BLOOD GLUCOSE TEST STRIPS (FREESTYLE TEST STRIPS) STRIP    USE ONE STRIP TO TEST DAILY AS NEEDED    BUMETANIDE (BUMEX) 1 MG TABLET    Take 1 tablet by mouth 2 times daily    CONTINUOUS BLOOD GLUC  (FREESTYLE SAYRA 14 DAY READER) PHUONG    1 each by Does not apply route daily    CONTINUOUS BLOOD GLUC SENSOR (FREESTYLE SAYRA 14 DAY SENSOR) MISC    1 each by Does not apply route daily    DICLOFENAC SODIUM 1 % GEL    Apply 2 g topically 4 times daily as needed for Pain    FAMOTIDINE (PEPCID) 20 MG TABLET    TAKE ONE TABLET BY MOUTH EVERY EVENING    INSULIN ASPART (NOVOLOG FLEXPEN) 100 UNIT/ML INJECTION PEN    Take as instructed. INSULIN PEN NEEDLE (MalharOGER PEN NEEDLES) 31G X 6 MM MISC    1 each by Does not apply route 3 times daily    LORAZEPAM (ATIVAN) 0.5 MG TABLET    TAKE ONE TABLET BY MOUTH EVERY 8 HOURS AS NEEDED FOR ANXIETY    METOCLOPRAMIDE (REGLAN) 10 MG TABLET    Take 1 tablet by mouth 3 times daily (with meals)    MIRTAZAPINE (REMERON) 7.5 MG TABLET    Take 1 tablet by mouth nightly    NITROGLYCERIN (NITROSTAT) 0.4 MG SL TABLET    Place 1 tablet under the tongue every 5 minutes as needed for Chest pain up to max of 3 total doses. If no relief after 1 dose, call 911.     ONDANSETRON (ZOFRAN ODT) 4 MG DISINTEGRATING TABLET    Take 1 tablet by mouth every 6 hours as needed for Nausea or Vomiting    POTASSIUM CHLORIDE (KLOR-CON M) 20 MEQ TBCR EXTENDED RELEASE TABLET    TAKE ONE TABLET BY MOUTH TWICE A DAY WITH MEALS    PROBIOTIC PRODUCT (PROBIOTIC DAILY) CAPS    Take 1 tablet by mouth 3 times daily    PROMETHAZINE (PHENERGAN) no

## 2022-07-29 NOTE — H&P ADULT - PROBLEM SELECTOR PLAN 1
Pt p/w chest pain associated w/ dizziness and cough for 5 days. Patient is a poor historian Inconsistent reporting of lung cancer diagnosis at [facility] negative. Unvaccinated.   Troponin and D-dimer wnl. Pt p/w chest pain associated w/ dizziness and cough for 5 days. Patient is a poor historian. Inconsistent reporting of lung cancer diagnosis at [facility] EDs this week. Suspect PE vs. malignancy vs ACS vs COVID. CTA chest negative for PE or masses. COVID negative. Unvaccinated. Troponin and D-dimer wnl. Saturating 96%O2 on RA. Pt resting comfortably.     - repeat EKG in AM  - continue to monitor

## 2022-07-29 NOTE — ED PROVIDER NOTE - NS ED ATTENDING STATEMENT MOD
This was a shared visit with the LÓPEZ. I reviewed and verified the documentation and independently performed the documented:

## 2022-07-29 NOTE — H&P ADULT - PROBLEM SELECTOR PLAN 2
Pt reported R leg swelling to ED staff. On exam pt c/o of L leg pain and swelling. Pulses intact throughout, no swelling or erythema noted. R LE doppler negative for DVT.   - continue to monitor

## 2022-07-29 NOTE — H&P ADULT - PROBLEM SELECTOR PLAN 6
Pt with hx of HIV, inconsistent use of antiretrovirals. Unable to provide medication history, does not follow w/ PCP or HIV clinic.  - f/u viral load  - f/u T cell subset  - HIV team consult Pt with Hg 8.6 on admission, down from 10.1 July 11. No active source of bleeding. Most likely 2/2 nutritional deficiency     - f/u iron studies  - f/u folate, B12  - Nutrition/SW consult

## 2022-07-29 NOTE — ED PROVIDER NOTE - ATTENDING APP SHARED VISIT CONTRIBUTION OF CARE
Pt is a 61yo M with a h/o HIV (currently off HAART) and recent dx of lung CA.  P/w acute onset dizziness and CP.  Sxs x 2 hours pta.    PE - agree with above.  Pt appears malaised and pulse ox concerning for 92% on RA.      A/P - General weakness, hypoxia, HIV with med non-compliance.  Untreated lung CA.   Unilateral RLE swelling.  Dimer negative.  Recommend DUplex US either here or in patient.

## 2022-07-29 NOTE — H&P ADULT - PROBLEM SELECTOR PLAN 8
F: patient tolerating PO, no IVF  E: Mg > 2, K >4  N: regular diet  DVT: lovenox 40 mg  Code: Full  Dispo: [facility]

## 2022-07-29 NOTE — H&P ADULT - PROBLEM SELECTOR PLAN 4
Elevated alk phos 132 up from 96. Distended abdomen, no rebound or guarding.    - f/u abdominal US  - f/u repeat CMP EKG reveals atrial fibrillation w/ premature ventricular or aberrant conductive complexes w/ T wave abnormalities  Pt not on AC, poor historian unreliable CHADVASC.   - repeat EKG in AM  - can consider cardiology consult

## 2022-07-29 NOTE — ED PROVIDER NOTE - CLINICAL SUMMARY MEDICAL DECISION MAKING FREE TEXT BOX
pt cachetic appearing HIV+ and recently diagnosed with lung cancer pw near syncope with chest pain shortly pta with mild hypoxia on room air 92% on room air w/o distress. Rectal temp 97F. Plan: cbc, bmp, ptt/pt/inr, trop, d-dimer, cta chest pt cachetic appearing HIV+ and recently diagnosed with lung cancer pw near syncope with chest pain shortly pta with mild hypoxia on room air 92% on room air w/o distress. Rectal temp 97F. Plan: cbc, bmp, ptt/pt/inr, trop, d-dimer, cta chest    Pt signed out to me pending admission for hypoxia and near syncope  ct chest neg  us right lower ext pending   right leg noted to be swollen / warm    pt admitted to [facility]

## 2022-07-30 DIAGNOSIS — J45.901 UNSPECIFIED ASTHMA WITH (ACUTE) EXACERBATION: ICD-10-CM

## 2022-07-30 LAB
ALBUMIN SERPL ELPH-MCNC: 3 G/DL — LOW (ref 3.3–5)
ALP SERPL-CCNC: 136 U/L — HIGH (ref 40–120)
ALT FLD-CCNC: 19 U/L — SIGNIFICANT CHANGE UP (ref 10–45)
ANION GAP SERPL CALC-SCNC: 8 MMOL/L — SIGNIFICANT CHANGE UP (ref 5–17)
ANISOCYTOSIS BLD QL: SLIGHT — SIGNIFICANT CHANGE UP
AST SERPL-CCNC: 45 U/L — HIGH (ref 10–40)
BASOPHILS # BLD AUTO: 0.03 K/UL — SIGNIFICANT CHANGE UP (ref 0–0.2)
BASOPHILS NFR BLD AUTO: 0.9 % — SIGNIFICANT CHANGE UP (ref 0–2)
BILIRUB SERPL-MCNC: 0.6 MG/DL — SIGNIFICANT CHANGE UP (ref 0.2–1.2)
BUN SERPL-MCNC: 10 MG/DL — SIGNIFICANT CHANGE UP (ref 7–23)
BURR CELLS BLD QL SMEAR: PRESENT — SIGNIFICANT CHANGE UP
C PNEUM DNA SPEC QL NAA+PROBE: SIGNIFICANT CHANGE UP
CALCIUM SERPL-MCNC: 8 MG/DL — LOW (ref 8.4–10.5)
CHLORIDE SERPL-SCNC: 102 MMOL/L — SIGNIFICANT CHANGE UP (ref 96–108)
CO2 SERPL-SCNC: 23 MMOL/L — SIGNIFICANT CHANGE UP (ref 22–31)
CREAT SERPL-MCNC: 0.84 MG/DL — SIGNIFICANT CHANGE UP (ref 0.5–1.3)
DACRYOCYTES BLD QL SMEAR: SLIGHT — SIGNIFICANT CHANGE UP
EGFR: 98 ML/MIN/1.73M2 — SIGNIFICANT CHANGE UP
EOSINOPHIL # BLD AUTO: 0 K/UL — SIGNIFICANT CHANGE UP (ref 0–0.5)
EOSINOPHIL NFR BLD AUTO: 0 % — SIGNIFICANT CHANGE UP (ref 0–6)
FLUAV H1 2009 PAND RNA SPEC QL NAA+PROBE: SIGNIFICANT CHANGE UP
FLUAV H1 RNA SPEC QL NAA+PROBE: SIGNIFICANT CHANGE UP
FLUAV H3 RNA SPEC QL NAA+PROBE: SIGNIFICANT CHANGE UP
FLUAV SUBTYP SPEC NAA+PROBE: SIGNIFICANT CHANGE UP
FLUBV RNA SPEC QL NAA+PROBE: SIGNIFICANT CHANGE UP
FOLATE SERPL-MCNC: 16.8 NG/ML — SIGNIFICANT CHANGE UP
GIANT PLATELETS BLD QL SMEAR: PRESENT — SIGNIFICANT CHANGE UP
GLUCOSE SERPL-MCNC: 86 MG/DL — SIGNIFICANT CHANGE UP (ref 70–99)
HADV DNA SPEC QL NAA+PROBE: SIGNIFICANT CHANGE UP
HCOV 229E RNA SPEC QL NAA+PROBE: SIGNIFICANT CHANGE UP
HCOV HKU1 RNA SPEC QL NAA+PROBE: SIGNIFICANT CHANGE UP
HCOV NL63 RNA SPEC QL NAA+PROBE: SIGNIFICANT CHANGE UP
HCOV OC43 RNA SPEC QL NAA+PROBE: SIGNIFICANT CHANGE UP
HCOV PNL SPEC NAA+PROBE: SIGNIFICANT CHANGE UP
HCT VFR BLD CALC: 30 % — LOW (ref 39–50)
HGB BLD-MCNC: 9.5 G/DL — LOW (ref 13–17)
HMPV RNA SPEC QL NAA+PROBE: SIGNIFICANT CHANGE UP
HPIV1 RNA SPEC QL NAA+PROBE: SIGNIFICANT CHANGE UP
HPIV2 RNA SPEC QL NAA+PROBE: SIGNIFICANT CHANGE UP
HPIV3 RNA SPEC QL NAA+PROBE: SIGNIFICANT CHANGE UP
HPIV4 RNA SPEC QL NAA+PROBE: SIGNIFICANT CHANGE UP
HYPOCHROMIA BLD QL: SLIGHT — SIGNIFICANT CHANGE UP
LYMPHOCYTES # BLD AUTO: 1.04 K/UL — SIGNIFICANT CHANGE UP (ref 1–3.3)
LYMPHOCYTES # BLD AUTO: 28 % — SIGNIFICANT CHANGE UP (ref 13–44)
MACROCYTES BLD QL: SLIGHT — SIGNIFICANT CHANGE UP
MAGNESIUM SERPL-MCNC: 1.6 MG/DL — SIGNIFICANT CHANGE UP (ref 1.6–2.6)
MANUAL SMEAR VERIFICATION: SIGNIFICANT CHANGE UP
MCHC RBC-ENTMCNC: 26.5 PG — LOW (ref 27–34)
MCHC RBC-ENTMCNC: 31.7 GM/DL — LOW (ref 32–36)
MCV RBC AUTO: 83.8 FL — SIGNIFICANT CHANGE UP (ref 80–100)
MICROCYTES BLD QL: SLIGHT — SIGNIFICANT CHANGE UP
MONOCYTES # BLD AUTO: 0.28 K/UL — SIGNIFICANT CHANGE UP (ref 0–0.9)
MONOCYTES NFR BLD AUTO: 7.5 % — SIGNIFICANT CHANGE UP (ref 2–14)
NEUTROPHILS # BLD AUTO: 2.36 K/UL — SIGNIFICANT CHANGE UP (ref 1.8–7.4)
NEUTROPHILS NFR BLD AUTO: 63.6 % — SIGNIFICANT CHANGE UP (ref 43–77)
OVALOCYTES BLD QL SMEAR: SLIGHT — SIGNIFICANT CHANGE UP
PHOSPHATE SERPL-MCNC: 3.1 MG/DL — SIGNIFICANT CHANGE UP (ref 2.5–4.5)
PLAT MORPH BLD: ABNORMAL
PLATELET # BLD AUTO: 284 K/UL — SIGNIFICANT CHANGE UP (ref 150–400)
POIKILOCYTOSIS BLD QL AUTO: SIGNIFICANT CHANGE UP
POLYCHROMASIA BLD QL SMEAR: SIGNIFICANT CHANGE UP
POTASSIUM SERPL-MCNC: 3.6 MMOL/L — SIGNIFICANT CHANGE UP (ref 3.5–5.3)
POTASSIUM SERPL-SCNC: 3.6 MMOL/L — SIGNIFICANT CHANGE UP (ref 3.5–5.3)
PROT SERPL-MCNC: 6.6 G/DL — SIGNIFICANT CHANGE UP (ref 6–8.3)
RAPID RVP RESULT: SIGNIFICANT CHANGE UP
RBC # BLD: 3.58 M/UL — LOW (ref 4.2–5.8)
RBC # FLD: 20.2 % — HIGH (ref 10.3–14.5)
RBC BLD AUTO: ABNORMAL
RSV RNA SPEC QL NAA+PROBE: SIGNIFICANT CHANGE UP
RV+EV RNA SPEC QL NAA+PROBE: SIGNIFICANT CHANGE UP
SARS-COV-2 RNA SPEC QL NAA+PROBE: SIGNIFICANT CHANGE UP
SCHISTOCYTES BLD QL AUTO: SLIGHT — SIGNIFICANT CHANGE UP
SMUDGE CELLS # BLD: PRESENT — SIGNIFICANT CHANGE UP
SODIUM SERPL-SCNC: 133 MMOL/L — LOW (ref 135–145)
SPHEROCYTES BLD QL SMEAR: SLIGHT — SIGNIFICANT CHANGE UP
VIT B12 SERPL-MCNC: 216 PG/ML — LOW (ref 232–1245)
WBC # BLD: 3.71 K/UL — LOW (ref 3.8–10.5)
WBC # FLD AUTO: 3.71 K/UL — LOW (ref 3.8–10.5)

## 2022-07-30 PROCEDURE — 93010 ELECTROCARDIOGRAM REPORT: CPT

## 2022-07-30 PROCEDURE — 99233 SBSQ HOSP IP/OBS HIGH 50: CPT | Mod: GC

## 2022-07-30 RX ORDER — DIPHENHYDRAMINE HCL 50 MG
25 CAPSULE ORAL ONCE
Refills: 0 | Status: COMPLETED | OUTPATIENT
Start: 2022-07-30 | End: 2022-07-30

## 2022-07-30 RX ORDER — MAGNESIUM SULFATE 500 MG/ML
4 VIAL (ML) INJECTION ONCE
Refills: 0 | Status: COMPLETED | OUTPATIENT
Start: 2022-07-30 | End: 2022-07-30

## 2022-07-30 RX ORDER — ACETAMINOPHEN 500 MG
650 TABLET ORAL EVERY 6 HOURS
Refills: 0 | Status: DISCONTINUED | OUTPATIENT
Start: 2022-07-30 | End: 2022-08-04

## 2022-07-30 RX ORDER — IPRATROPIUM/ALBUTEROL SULFATE 18-103MCG
3 AEROSOL WITH ADAPTER (GRAM) INHALATION EVERY 6 HOURS
Refills: 0 | Status: DISCONTINUED | OUTPATIENT
Start: 2022-07-30 | End: 2022-08-02

## 2022-07-30 RX ORDER — APIXABAN 2.5 MG/1
5 TABLET, FILM COATED ORAL EVERY 12 HOURS
Refills: 0 | Status: DISCONTINUED | OUTPATIENT
Start: 2022-07-30 | End: 2022-08-04

## 2022-07-30 RX ADMIN — APIXABAN 5 MILLIGRAM(S): 2.5 TABLET, FILM COATED ORAL at 09:33

## 2022-07-30 RX ADMIN — Medication 650 MILLIGRAM(S): at 09:33

## 2022-07-30 RX ADMIN — APIXABAN 5 MILLIGRAM(S): 2.5 TABLET, FILM COATED ORAL at 22:02

## 2022-07-30 RX ADMIN — Medication 40 MILLIGRAM(S): at 09:33

## 2022-07-30 RX ADMIN — Medication 3 MILLILITER(S): at 22:02

## 2022-07-30 RX ADMIN — Medication 650 MILLIGRAM(S): at 10:33

## 2022-07-30 RX ADMIN — Medication 25 MILLIGRAM(S): at 22:19

## 2022-07-30 RX ADMIN — Medication 25 GRAM(S): at 11:35

## 2022-07-30 RX ADMIN — Medication 3 MILLILITER(S): at 09:33

## 2022-07-30 NOTE — PROGRESS NOTE ADULT - SUBJECTIVE AND OBJECTIVE BOX
Hospital Course: Pt is a 63yo w/ PMH HIV (non compliant w/ medications) asthma (non compliant w/ inhaler) p/w chest pain, cough and dizziness since Monday. Pt is a poor historian, previously undomiciled, currently residing in [facility]. He reports possible fall today from feeling dizzy prior to presenting to ED. Pt reports a history of multiple ED admissions in the past week for these symptoms and was informed of new lung cancer diagnosis, however no mention of lung CA can be found on chart review. CT Angio chest negative for PE or lung masses. He informed ED team of R calf swelling w/ pain. R LE Doppler negative for DVT. Currently not experiencing chest pain or cough but c/o of weakness, dizziness which he attributes to not eating in 3 days. Pt history and exam interrupted several times pt requesting food + Ensure. EKG showed atrial fibrillation.     SUBJECTIVE:  Patient seen and examined at bedside. Pt reports no shortness of breath while at rest, however dyspnea on any exertion. He reports chest pain associated with cough, worse while coughing. He denies orthopnea, fever, chills, nausea, vomiting, diarrhea.     All other ROS negative unless otherwise indicated above.    Vital Signs Last 12 Hrs  T(F): 99.9 (07-30-22 @ 05:57), Max: 99.9 (07-30-22 @ 05:57)  HR: 80 (07-30-22 @ 05:57) (80 - 80)  BP: 149/89 (07-30-22 @ 05:57) (149/89 - 149/89)  BP(mean): --  RR: 18 (07-30-22 @ 05:57) (18 - 18)  SpO2: 97% (07-30-22 @ 05:57) (97% - 97%)  I&O's Summary      PHYSICAL EXAM:  Constitutional: Frail, NAD, comfortable in bed.  HEENT: NC/AT, EOMI, no conjunctival pallor or scleral icterus, MMM  Neck: Supple, no JVD  Respiratory: CTA B/L. No w/r/r.   Cardiovascular: irregular rhythm, regular rate, normal S1 and S2, no m/r/g.   Gastrointestinal: firm, NTND, no guarding or rebound tenderness, no palpable masses   Extremities: wwp; no cyanosis, clubbing or edema.   Neurological: AAOx3, no CN deficits, strength and sensation intact throughout.   Skin: No gross skin abnormalities or rashes        LABS:                        9.5    3.71  )-----------( 284      ( 30 Jul 2022 06:04 )             30.0     07-30    133<L>  |  102  |  10  ----------------------------<  86  3.6   |  23  |  0.84    Ca    8.0<L>      30 Jul 2022 06:04  Phos  3.1     07-30  Mg     1.6     07-30    TPro  6.6  /  Alb  3.0<L>  /  TBili  0.6  /  DBili  x   /  AST  45<H>  /  ALT  19  /  AlkPhos  136<H>  07-30    PT/INR - ( 29 Jul 2022 06:54 )   PT: 14.1 sec;   INR: 1.21          PTT - ( 29 Jul 2022 06:54 )  PTT:34.0 sec        RADIOLOGY & ADDITIONAL TESTS:    MEDICATIONS  (STANDING):  albuterol/ipratropium for Nebulization 3 milliLiter(s) Nebulizer every 6 hours  apixaban 5 milliGRAM(s) Oral every 12 hours  magnesium sulfate  IVPB 4 Gram(s) IV Intermittent once  predniSONE   Tablet 40 milliGRAM(s) Oral every 24 hours    MEDICATIONS  (PRN):  acetaminophen     Tablet .. 650 milliGRAM(s) Oral every 6 hours PRN Temp greater or equal to 38C (100.4F), Mild Pain (1 - 3)  benzonatate 100 milliGRAM(s) Oral three times a day PRN Cough

## 2022-07-30 NOTE — PROGRESS NOTE ADULT - PROBLEM SELECTOR PLAN 6
Pt with Hg 8.6 on admission, down from 10.1 July 11. No active source of bleeding. Most likely 2/2 nutritional deficiency     - f/u iron studies  - f/u folate, B12  - Nutrition/SW consult Pt with Hg 8.6 on admission, down from 10.1 July 11. No active source of bleeding. Most likely 2/2 nutritional deficiency. B12 slightly low (216), normal folate, normal ferritin.   - Nutrition consult  -  consult

## 2022-07-30 NOTE — PROGRESS NOTE ADULT - PROBLEM SELECTOR PLAN 7
Pt with hx of HIV, inconsistent use of antiretrovirals. Unable to provide medication history, does not follow w/ PCP or HIV clinic.  - f/u viral load  - f/u T cell subset  - HIV team consult Pt reported R leg swelling to ED staff. On exam pt c/o of L leg pain and swelling. Pulses intact throughout, no swelling or erythema noted. R LE doppler negative for DVT.   - continue to monitor

## 2022-07-30 NOTE — DIETITIAN INITIAL EVALUATION ADULT - NS FNS DIET ORDER
Diet, Regular:   Supplement Feeding Modality:  Oral  Ensure Enlive Cans or Servings Per Day:  3       Frequency:  Daily (07-29-22 @ 18:39)

## 2022-07-30 NOTE — PHYSICAL THERAPY INITIAL EVALUATION ADULT - PERTINENT HX OF CURRENT PROBLEM, REHAB EVAL
61yo w/ PMH HIV (non compliant w/ medications) asthma (non compliant w/ inhaler) p/w chest pain, cough and dizziness since Monday. Reports multiple ED admissions this week for symptoms.

## 2022-07-30 NOTE — DIETITIAN INITIAL EVALUATION ADULT - OTHER CALCULATIONS
%IBW: 99; ABW used to calculate estimated needs d/t pt being between 80 and 120% if IBW.  Adjusted for PCM and HIV.

## 2022-07-30 NOTE — DIETITIAN INITIAL EVALUATION ADULT - ADD RECOMMEND
1. Continue with regular diet, continue with Ensure TID (1050kcal, 60g pro)   >>Monitor %PO Intake, continue to encourage PO intake   2. Monitor lytes, replete prn. Monitor glucose.   3. Monitor weekly wts, skin, GI distress   4. Pain/BM regimen per team   5. Diet edu reinforcement prn.   6. Recommend MVI daily   7. RD to remain available for additional nutrition interventions as needed.

## 2022-07-30 NOTE — PROGRESS NOTE ADULT - PROBLEM SELECTOR PLAN 5
Elevated alk phos 132 up from 96. Distended abdomen, no rebound or guarding.    - f/u abdominal US  - f/u repeat CMP, GGT EKG reveals atrial fibrillation w/ premature ventricular or aberrant conductive complexes w/ T wave abnormalities. Trop neg. Pt not on AC, but per chart review was previously prescribed Eliquis.  - F/u repeat EKG  - Restart Eliquis? EKG reveals atrial fibrillation w/ premature ventricular or aberrant conductive complexes w/ T wave abnormalities. Trop neg. Pt not on AC, but per chart review was previously prescribed Eliquis.  - F/u repeat EKG  - Eliquis 5mg bid

## 2022-07-30 NOTE — PROGRESS NOTE ADULT - ASSESSMENT
Pt is a 61yo w/ PMH HIV (non compliant w/ medications) asthma (non compliant w/ inhaler) p/w chest pain, cough and dizziness since Monday. Reports multiple ED admissions this week for symptoms.

## 2022-07-30 NOTE — DIETITIAN NUTRITION RISK NOTIFICATION - TREATMENT: THE FOLLOWING DIET HAS BEEN RECOMMENDED
Diet, Regular:   Supplement Feeding Modality:  Oral  Ensure Enlive Cans or Servings Per Day:  3       Frequency:  Daily (07-29-22 @ 18:39) [Active]      **Please see RD note from 7/30   Sharri Cabrales RD

## 2022-07-30 NOTE — PROGRESS NOTE ADULT - PROBLEM SELECTOR PLAN 1
Pt p/w chest pain associated w/ dizziness and cough for 5 days. Patient is a poor historian. Inconsistent reporting of lung cancer diagnosis at [facility] EDs this week. CTA chest negative for PE or masses. COVID negative. Unvaccinated. Troponin and D-dimer wnl. Saturating 96%O2 on RA. Pt resting comfortably. EKG showing atrial fibrillation w/ premature ventricular or aberrant conductive complexes w/ T wave abnormalities. Suspect musculoskeletal origin, pain reproducible on exam and associated with increased coughing for the last week.   - Management of asthma exacerbation as below

## 2022-07-30 NOTE — DIETITIAN INITIAL EVALUATION ADULT - FUNCTIONAL SCREEN CURRENT LEVEL: SWALLOWING (IF SCORE 2 OR MORE FOR ANY ITEM, CONSULT REHAB SERVICES), MLM)
0 = swallows foods/liquids without difficulty Alert and oriented, no focal deficits, no motor or sensory deficits.

## 2022-07-30 NOTE — PROGRESS NOTE ADULT - PROBLEM SELECTOR PLAN 4
EKG reveals atrial fibrillation w/ premature ventricular or aberrant conductive complexes w/ T wave abnormalities  Pt not on AC, poor historian unreliable CHADVASC.   - F/u repeat EKG Elevated alk phos 132 up from 96. Elevated GGT. Distended abdomen, no rebound or guarding.  RUQ US: Signs of right heart failure, including dilated inferior vena cava and hepatic veins, small ascites, and gallbladder wall thickening. This is likely causing a congestive hepatopathy, which may account for the abnormal liver function tests.  - TTE?

## 2022-07-30 NOTE — DIETITIAN INITIAL EVALUATION ADULT - PERTINENT MEDS FT
MEDICATIONS  (STANDING):  albuterol/ipratropium for Nebulization 3 milliLiter(s) Nebulizer every 6 hours  apixaban 5 milliGRAM(s) Oral every 12 hours  predniSONE   Tablet 40 milliGRAM(s) Oral every 24 hours    MEDICATIONS  (PRN):  acetaminophen     Tablet .. 650 milliGRAM(s) Oral every 6 hours PRN Temp greater or equal to 38C (100.4F), Mild Pain (1 - 3)  benzonatate 100 milliGRAM(s) Oral three times a day PRN Cough

## 2022-07-30 NOTE — DIETITIAN INITIAL EVALUATION ADULT - PROBLEM/PLAN-7
No Repair - Repaired With Adjacent Surgical Defect Text (Leave Blank If You Do Not Want): After the excision the defect was repaired concurrently with another surgical defect which was in close approximation. Island Pedicle Flap Text: The defect edges were debeveled with a #15 scalpel blade.  Given the location of the defect, shape of the defect and the proximity to free margins an island pedicle advancement flap was deemed most appropriate.  Using a sterile surgical marker, an appropriate advancement flap was drawn incorporating the defect, outlining the appropriate donor tissue and placing the expected incisions within the relaxed skin tension lines where possible.    The area thus outlined was incised deep to adipose tissue with a #15 scalpel blade.  The skin margins were undermined to an appropriate distance in all directions around the primary defect and laterally outward around the island pedicle utilizing iris scissors.  There was minimal undermining beneath the pedicle flap. Complex Repair And Skin Substitute Graft Text: The defect edges were debeveled with a #15 scalpel blade.  The primary defect was closed partially with a complex linear closure.  Given the location of the remaining defect, shape of the defect and the proximity to free margins a skin substitute graft was deemed most appropriate to repair the remaining defect.  The graft was trimmed to fit the size of the remaining defect.  The graft was then placed in the primary defect, oriented appropriately, and sutured into place. Epidermal Closure: running Complex Repair And Ftsg Text: The defect edges were debeveled with a #15 scalpel blade.  The primary defect was closed partially with a complex linear closure.  Given the location of the defect, shape of the defect and the proximity to free margins a full thickness skin graft was deemed most appropriate to repair the remaining defect.  The graft was trimmed to fit the size of the remaining defect.  The graft was then placed in the primary defect, oriented appropriately, and sutured into place. Tissue Cultured Epidermal Autograft Text: The defect edges were debeveled with a #15 scalpel blade.  Given the location of the defect, shape of the defect and the proximity to free margins a tissue cultured epidermal autograft was deemed most appropriate.  The graft was then trimmed to fit the size of the defect.  The graft was then placed in the primary defect and oriented appropriately. X Size Of Lesion In Cm (Optional): 0.5 Rhombic Flap Text: The defect edges were debeveled with a #15 scalpel blade.  Given the location of the defect and the proximity to free margins a rhombic flap was deemed most appropriate.  Using a sterile surgical marker, an appropriate rhombic flap was drawn incorporating the defect.    The area thus outlined was incised deep to adipose tissue with a #15 scalpel blade.  The skin margins were undermined to an appropriate distance in all directions utilizing iris scissors. Muscle Hinge Flap Text: The defect edges were debeveled with a #15 scalpel blade.  Given the size, depth and location of the defect and the proximity to free margins a muscle hinge flap was deemed most appropriate.  Using a sterile surgical marker, an appropriate hinge flap was drawn incorporating the defect. The area thus outlined was incised with a #15 scalpel blade.  The skin margins were undermined to an appropriate distance in all directions utilizing iris scissors. Dressing: steri-strips and pressure dressing Saucerization Excision Additional Text (Leave Blank If You Do Not Want): The margin was drawn around the clinically apparent lesion.  Incisions were then made along these lines, in a tangential fashion, to the appropriate tissue plane and the lesion was extirpated. Bill For Surgical Tray: no Billing Type: Third-Party Bill Complex Repair And W Plasty Text: The defect edges were debeveled with a #15 scalpel blade.  The primary defect was closed partially with a complex linear closure.  Given the location of the remaining defect, shape of the defect and the proximity to free margins a W plasty was deemed most appropriate for complete closure of the defect.  Using a sterile surgical marker, an appropriate advancement flap was drawn incorporating the defect and placing the expected incisions within the relaxed skin tension lines where possible.    The area thus outlined was incised deep to adipose tissue with a #15 scalpel blade.  The skin margins were undermined to an appropriate distance in all directions utilizing iris scissors. Previous Accession (Optional): [id number] Secondary Defect Length (In Cm): 0 Complex Repair And Split-Thickness Skin Graft Text: The defect edges were debeveled with a #15 scalpel blade.  The primary defect was closed partially with a complex linear closure.  Given the location of the defect, shape of the defect and the proximity to free margins a split thickness skin graft was deemed most appropriate to repair the remaining defect.  The graft was trimmed to fit the size of the remaining defect.  The graft was then placed in the primary defect, oriented appropriately, and sutured into place. Helical Rim Advancement Flap Text: The defect edges were debeveled with a #15 blade scalpel.  Given the location of the defect and the proximity to free margins (helical rim) a double helical rim advancement flap was deemed most appropriate.  Using a sterile surgical marker, the appropriate advancement flaps were drawn incorporating the defect and placing the expected incisions between the helical rim and antihelix where possible.  The area thus outlined was incised through and through with a #15 scalpel blade.  With a skin hook and iris scissors, the flaps were gently and sharply undermined and freed up. Intermediate Repair Preamble Text (Leave Blank If You Do Not Want): Undermining was performed with blunt dissection. Melolabial Transposition Flap Text: The defect edges were debeveled with a #15 scalpel blade.  Given the location of the defect and the proximity to free margins a melolabial flap was deemed most appropriate.  Using a sterile surgical marker, an appropriate melolabial transposition flap was drawn incorporating the defect.    The area thus outlined was incised deep to adipose tissue with a #15 scalpel blade.  The skin margins were undermined to an appropriate distance in all directions utilizing iris scissors. DISPLAY PLAN FREE TEXT Primary Defect Length (In Cm): 4.5 Curvilinear Excision Additional Text (Leave Blank If You Do Not Want): The margin was drawn around the clinically apparent lesion.  A curvilinear shape was then drawn on the skin incorporating the lesion and margins.  Incisions were then made along these lines to the appropriate tissue plane and the lesion was extirpated. Advancement Flap (Single) Text: The defect edges were debeveled with a #15 scalpel blade.  Given the location of the defect and the proximity to free margins a single advancement flap was deemed most appropriate.  Using a sterile surgical marker, an appropriate advancement flap was drawn incorporating the defect and placing the expected incisions within the relaxed skin tension lines where possible.    The area thus outlined was incised deep to adipose tissue with a #15 scalpel blade.  The skin margins were undermined to an appropriate distance in all directions utilizing iris scissors. Cartilage Graft Text: The defect edges were debeveled with a #15 scalpel blade.  Given the location of the defect, shape of the defect, the fact the defect involved a full thickness cartilage defect a cartilage graft was deemed most appropriate.  An appropriate donor site was identified, cleansed, and anesthetized. The cartilage graft was then harvested and transferred to the recipient site, oriented appropriately and then sutured into place.  The secondary defect was then repaired using a primary closure. Lip Wedge Excision Repair Text: Given the location of the defect and the proximity to free margins a full thickness wedge repair was deemed most appropriate.  Using a sterile surgical marker, the appropriate repair was drawn incorporating the defect and placing the expected incisions perpendicular to the vermillion border.  The vermillion border was also meticulously outlined to ensure appropriate reapproximation during the repair.  The area thus outlined was incised through and through with a #15 scalpel blade.  The muscularis and dermis were reaproximated with deep sutures following hemostasis. Care was taken to realign the vermillion border before proceeding with the superficial closure.  Once the vermillion was realigned the superfical and mucosal closure was finished. Cheek-To-Nose Interpolation Flap Text: A decision was made to reconstruct the defect utilizing an interpolation axial flap and a staged reconstruction.  A telfa template was made of the defect.  This telfa template was then used to outline the Cheek-To-Nose Interpolation flap.  The donor area for the pedicle flap was then injected with anesthesia.  The flap was excised through the skin and subcutaneous tissue down to the layer of the underlying musculature.  The interpolation flap was carefully excised within this deep plane to maintain its blood supply.  The edges of the donor site were undermined.   The donor site was closed in a primary fashion.  The pedicle was then rotated into position and sutured.  Once the tube was sutured into place, adequate blood supply was confirmed with blanching and refill.  The pedicle was then wrapped with xeroform gauze and dressed appropriately with a telfa and gauze bandage to ensure continued blood supply and protect the attached pedicle. Complex Repair And Bilobe Flap Text: The defect edges were debeveled with a #15 scalpel blade.  The primary defect was closed partially with a complex linear closure.  Given the location of the remaining defect, shape of the defect and the proximity to free margins a bilobe flap was deemed most appropriate for complete closure of the defect.  Using a sterile surgical marker, an appropriate advancement flap was drawn incorporating the defect and placing the expected incisions within the relaxed skin tension lines where possible.    The area thus outlined was incised deep to adipose tissue with a #15 scalpel blade.  The skin margins were undermined to an appropriate distance in all directions utilizing iris scissors. Bilobed Flap Text: The defect edges were debeveled with a #15 scalpel blade.  Given the location of the defect and the proximity to free margins a bilobe flap was deemed most appropriate.  Using a sterile surgical marker, an appropriate bilobe flap drawn around the defect.    The area thus outlined was incised deep to adipose tissue with a #15 scalpel blade.  The skin margins were undermined to an appropriate distance in all directions utilizing iris scissors. Interpolation Flap Text: A decision was made to reconstruct the defect utilizing an interpolation axial flap and a staged reconstruction.  A telfa template was made of the defect.  This telfa template was then used to outline the interpolation flap.  The donor area for the pedicle flap was then injected with anesthesia.  The flap was excised through the skin and subcutaneous tissue down to the layer of the underlying musculature.  The interpolation flap was carefully excised within this deep plane to maintain its blood supply.  The edges of the donor site were undermined.   The donor site was closed in a primary fashion.  The pedicle was then rotated into position and sutured.  Once the tube was sutured into place, adequate blood supply was confirmed with blanching and refill.  The pedicle was then wrapped with xeroform gauze and dressed appropriately with a telfa and gauze bandage to ensure continued blood supply and protect the attached pedicle. Ear Star Wedge Flap Text: The defect edges were debeveled with a #15 blade scalpel.  Given the location of the defect and the proximity to free margins (helical rim) an ear star wedge flap was deemed most appropriate.  Using a sterile surgical marker, the appropriate flap was drawn incorporating the defect and placing the expected incisions between the helical rim and antihelix where possible.  The area thus outlined was incised through and through with a #15 scalpel blade. Spiral Flap Text: The defect edges were debeveled with a #15 scalpel blade.  Given the location of the defect, shape of the defect and the proximity to free margins a spiral flap was deemed most appropriate.  Using a sterile surgical marker, an appropriate rotation flap was drawn incorporating the defect and placing the expected incisions within the relaxed skin tension lines where possible. The area thus outlined was incised deep to adipose tissue with a #15 scalpel blade.  The skin margins were undermined to an appropriate distance in all directions utilizing iris scissors. H Plasty Text: Given the location of the defect, shape of the defect and the proximity to free margins a H-plasty was deemed most appropriate for repair.  Using a sterile surgical marker, the appropriate advancement arms of the H-plasty were drawn incorporating the defect and placing the expected incisions within the relaxed skin tension lines where possible. The area thus outlined was incised deep to adipose tissue with a #15 scalpel blade. The skin margins were undermined to an appropriate distance in all directions utilizing iris scissors.  The opposing advancement arms were then advanced into place in opposite direction and anchored with interrupted buried subcutaneous sutures. Consent was obtained from the patient. The risks and benefits to therapy were discussed in detail. Specifically, the risks of infection, scarring, bleeding, prolonged wound healing, incomplete removal, allergy to anesthesia, nerve injury and recurrence were addressed. Prior to the procedure, the treatment site was clearly identified and confirmed by the patient. All components of Universal Protocol/PAUSE Rule completed. Anesthesia Type: 1% lidocaine with 1:100,000 epinephrine Trilobed Flap Text: The defect edges were debeveled with a #15 scalpel blade.  Given the location of the defect and the proximity to free margins a trilobed flap was deemed most appropriate.  Using a sterile surgical marker, an appropriate trilobed flap drawn around the defect.    The area thus outlined was incised deep to adipose tissue with a #15 scalpel blade.  The skin margins were undermined to an appropriate distance in all directions utilizing iris scissors. Epidermal Autograft Text: The defect edges were debeveled with a #15 scalpel blade.  Given the location of the defect, shape of the defect and the proximity to free margins an epidermal autograft was deemed most appropriate.  Using a sterile surgical marker, the primary defect shape was transferred to the donor site. The epidermal graft was then harvested.  The skin graft was then placed in the primary defect and oriented appropriately. Complex Repair And Tissue Cultured Epidermal Autograft Text: The defect edges were debeveled with a #15 scalpel blade.  The primary defect was closed partially with a complex linear closure.  Given the location of the defect, shape of the defect and the proximity to free margins an tissue cultured epidermal autograft was deemed most appropriate to repair the remaining defect.  The graft was trimmed to fit the size of the remaining defect.  The graft was then placed in the primary defect, oriented appropriately, and sutured into place. Perilesional Excision Additional Text (Leave Blank If You Do Not Want): The margin was drawn around the clinically apparent lesion. Incisions were then made along these lines to the appropriate tissue plane and the lesion was extirpated. Complex Repair And Single Advancement Flap Text: The defect edges were debeveled with a #15 scalpel blade.  The primary defect was closed partially with a complex linear closure.  Given the location of the remaining defect, shape of the defect and the proximity to free margins a single advancement flap was deemed most appropriate for complete closure of the defect.  Using a sterile surgical marker, an appropriate advancement flap was drawn incorporating the defect and placing the expected incisions within the relaxed skin tension lines where possible.    The area thus outlined was incised deep to adipose tissue with a #15 scalpel blade.  The skin margins were undermined to an appropriate distance in all directions utilizing iris scissors. A-T Advancement Flap Text: The defect edges were debeveled with a #15 scalpel blade.  Given the location of the defect, shape of the defect and the proximity to free margins an A-T advancement flap was deemed most appropriate.  Using a sterile surgical marker, an appropriate advancement flap was drawn incorporating the defect and placing the expected incisions within the relaxed skin tension lines where possible.    The area thus outlined was incised deep to adipose tissue with a #15 scalpel blade.  The skin margins were undermined to an appropriate distance in all directions utilizing iris scissors. Skin Substitute Text: The defect edges were debeveled with a #15 scalpel blade.  Given the location of the defect, shape of the defect and the proximity to free margins a skin substitute graft was deemed most appropriate.  The graft material was trimmed to fit the size of the defect. The graft was then placed in the primary defect and oriented appropriately. Complex Repair And Epidermal Autograft Text: The defect edges were debeveled with a #15 scalpel blade.  The primary defect was closed partially with a complex linear closure.  Given the location of the defect, shape of the defect and the proximity to free margins an epidermal autograft was deemed most appropriate to repair the remaining defect.  The graft was trimmed to fit the size of the remaining defect.  The graft was then placed in the primary defect, oriented appropriately, and sutured into place. Mucosal Advancement Flap Text: Given the location of the defect, shape of the defect and the proximity to free margins a mucosal advancement flap was deemed most appropriate. Incisions were made with a 15 blade scalpel in the appropriate fashion along the cutaneous vermillion border and the mucosal lip. The remaining actinically damaged mucosal tissue was excised.  The mucosal advancement flap was then elevated to the gingival sulcus with care taken to preserve the neurovascular structures and advanced into the primary defect. Care was taken to ensure that precise realignment of the vermillion border was achieved. Path Notes (To The Dermatopathologist): Please check margins. Excision Depth: adipose tissue Crescentic Advancement Flap Text: The defect edges were debeveled with a #15 scalpel blade.  Given the location of the defect and the proximity to free margins a crescentic advancement flap was deemed most appropriate.  Using a sterile surgical marker, the appropriate advancement flap was drawn incorporating the defect and placing the expected incisions within the relaxed skin tension lines where possible.    The area thus outlined was incised deep to adipose tissue with a #15 scalpel blade.  The skin margins were undermined to an appropriate distance in all directions utilizing iris scissors. Double Island Pedicle Flap Text: The defect edges were debeveled with a #15 scalpel blade.  Given the location of the defect, shape of the defect and the proximity to free margins a double island pedicle advancement flap was deemed most appropriate.  Using a sterile surgical marker, an appropriate advancement flap was drawn incorporating the defect, outlining the appropriate donor tissue and placing the expected incisions within the relaxed skin tension lines where possible.    The area thus outlined was incised deep to adipose tissue with a #15 scalpel blade.  The skin margins were undermined to an appropriate distance in all directions around the primary defect and laterally outward around the island pedicle utilizing iris scissors.  There was minimal undermining beneath the pedicle flap. Complex Repair And V-Y Plasty Text: The defect edges were debeveled with a #15 scalpel blade.  The primary defect was closed partially with a complex linear closure.  Given the location of the remaining defect, shape of the defect and the proximity to free margins a V-Y plasty was deemed most appropriate for complete closure of the defect.  Using a sterile surgical marker, an appropriate advancement flap was drawn incorporating the defect and placing the expected incisions within the relaxed skin tension lines where possible.    The area thus outlined was incised deep to adipose tissue with a #15 scalpel blade.  The skin margins were undermined to an appropriate distance in all directions utilizing iris scissors. Keystone Flap Text: The defect edges were debeveled with a #15 scalpel blade.  Given the location of the defect, shape of the defect a keystone flap was deemed most appropriate.  Using a sterile surgical marker, an appropriate keystone flap was drawn incorporating the defect, outlining the appropriate donor tissue and placing the expected incisions within the relaxed skin tension lines where possible. The area thus outlined was incised deep to adipose tissue with a #15 scalpel blade.  The skin margins were undermined to an appropriate distance in all directions around the primary defect and laterally outward around the flap utilizing iris scissors. Complex Repair And Xenograft Text: The defect edges were debeveled with a #15 scalpel blade.  The primary defect was closed partially with a complex linear closure.  Given the location of the defect, shape of the defect and the proximity to free margins an tissue cultured epidermal autograft was deemed most appropriate to repair the remaining defect.  The graft was trimmed to fit the size of the remaining defect.  The graft was then placed in the primary defect, oriented appropriately, and sutured into place. Fusiform Excision Additional Text (Leave Blank If You Do Not Want): The margin was drawn around the clinically apparent lesion.  A fusiform shape was then drawn on the skin incorporating the lesion and margins.  Incisions were then made along these lines to the appropriate tissue plane and the lesion was extirpated. Repair Type: Intermediate Dorsal Nasal Flap Text: The defect edges were debeveled with a #15 scalpel blade.  Given the location of the defect and the proximity to free margins a dorsal nasal flap was deemed most appropriate.  Using a sterile surgical marker, an appropriate dorsal nasal flap was drawn around the defect.    The area thus outlined was incised deep to adipose tissue with a #15 scalpel blade.  The skin margins were undermined to an appropriate distance in all directions utilizing iris scissors. Star Wedge Flap Text: The defect edges were debeveled with a #15 scalpel blade.  Given the location of the defect, shape of the defect and the proximity to free margins a star wedge flap was deemed most appropriate.  Using a sterile surgical marker, an appropriate rotation flap was drawn incorporating the defect and placing the expected incisions within the relaxed skin tension lines where possible. The area thus outlined was incised deep to adipose tissue with a #15 scalpel blade.  The skin margins were undermined to an appropriate distance in all directions utilizing iris scissors. Advancement-Rotation Flap Text: The defect edges were debeveled with a #15 scalpel blade.  Given the location of the defect, shape of the defect and the proximity to free margins an advancement-rotation flap was deemed most appropriate.  Using a sterile surgical marker, an appropriate flap was drawn incorporating the defect and placing the expected incisions within the relaxed skin tension lines where possible. The area thus outlined was incised deep to adipose tissue with a #15 scalpel blade.  The skin margins were undermined to an appropriate distance in all directions utilizing iris scissors. O-Z Plasty Text: The defect edges were debeveled with a #15 scalpel blade.  Given the location of the defect, shape of the defect and the proximity to free margins an O-Z plasty (double transposition flap) was deemed most appropriate.  Using a sterile surgical marker, the appropriate transposition flaps were drawn incorporating the defect and placing the expected incisions within the relaxed skin tension lines where possible.    The area thus outlined was incised deep to adipose tissue with a #15 scalpel blade.  The skin margins were undermined to an appropriate distance in all directions utilizing iris scissors.  Hemostasis was achieved with electrocautery.  The flaps were then transposed into place, one clockwise and the other counterclockwise, and anchored with interrupted buried subcutaneous sutures. Complex Repair And Transposition Flap Text: The defect edges were debeveled with a #15 scalpel blade.  The primary defect was closed partially with a complex linear closure.  Given the location of the remaining defect, shape of the defect and the proximity to free margins a transposition flap was deemed most appropriate for complete closure of the defect.  Using a sterile surgical marker, an appropriate advancement flap was drawn incorporating the defect and placing the expected incisions within the relaxed skin tension lines where possible.    The area thus outlined was incised deep to adipose tissue with a #15 scalpel blade.  The skin margins were undermined to an appropriate distance in all directions utilizing iris scissors. Complex Repair Preamble Text (Leave Blank If You Do Not Want): Extensive wide undermining was performed. Complex Repair And Dermal Autograft Text: The defect edges were debeveled with a #15 scalpel blade.  The primary defect was closed partially with a complex linear closure.  Given the location of the defect, shape of the defect and the proximity to free margins an dermal autograft was deemed most appropriate to repair the remaining defect.  The graft was trimmed to fit the size of the remaining defect.  The graft was then placed in the primary defect, oriented appropriately, and sutured into place. Primary Defect Width (In Cm): 1.5 Home Suture Removal Text: Patient was provided a home suture removal kit and will remove their sutures at home.  If they have any questions or difficulties they will call the office. Melolabial Interpolation Flap Text: A decision was made to reconstruct the defect utilizing an interpolation axial flap and a staged reconstruction.  A telfa template was made of the defect.  This telfa template was then used to outline the melolabial interpolation flap.  The donor area for the pedicle flap was then injected with anesthesia.  The flap was excised through the skin and subcutaneous tissue down to the layer of the underlying musculature.  The pedicle flap was carefully excised within this deep plane to maintain its blood supply.  The edges of the donor site were undermined.   The donor site was closed in a primary fashion.  The pedicle was then rotated into position and sutured.  Once the tube was sutured into place, adequate blood supply was confirmed with blanching and refill.  The pedicle was then wrapped with xeroform gauze and dressed appropriately with a telfa and gauze bandage to ensure continued blood supply and protect the attached pedicle. Epidermal Sutures: 4-0 Ethilon Bi-Rhombic Flap Text: The defect edges were debeveled with a #15 scalpel blade.  Given the location of the defect and the proximity to free margins a bi-rhombic flap was deemed most appropriate.  Using a sterile surgical marker, an appropriate rhombic flap was drawn incorporating the defect. The area thus outlined was incised deep to adipose tissue with a #15 scalpel blade.  The skin margins were undermined to an appropriate distance in all directions utilizing iris scissors. Xenograft Text: The defect edges were debeveled with a #15 scalpel blade.  Given the location of the defect, shape of the defect and the proximity to free margins a xenograft was deemed most appropriate.  The graft was then trimmed to fit the size of the defect.  The graft was then placed in the primary defect and oriented appropriately. V-Y Plasty Text: The defect edges were debeveled with a #15 scalpel blade.  Given the location of the defect, shape of the defect and the proximity to free margins an V-Y advancement flap was deemed most appropriate.  Using a sterile surgical marker, an appropriate advancement flap was drawn incorporating the defect and placing the expected incisions within the relaxed skin tension lines where possible.    The area thus outlined was incised deep to adipose tissue with a #15 scalpel blade.  The skin margins were undermined to an appropriate distance in all directions utilizing iris scissors. Body Location Override (Optional - Billing Will Still Be Based On Selected Body Map Location If Applicable): left upper arm Complex Repair And Double Advancement Flap Text: The defect edges were debeveled with a #15 scalpel blade.  The primary defect was closed partially with a complex linear closure.  Given the location of the remaining defect, shape of the defect and the proximity to free margins a double advancement flap was deemed most appropriate for complete closure of the defect.  Using a sterile surgical marker, an appropriate advancement flap was drawn incorporating the defect and placing the expected incisions within the relaxed skin tension lines where possible.    The area thus outlined was incised deep to adipose tissue with a #15 scalpel blade.  The skin margins were undermined to an appropriate distance in all directions utilizing iris scissors. Complex Repair And Rotation Flap Text: The defect edges were debeveled with a #15 scalpel blade.  The primary defect was closed partially with a complex linear closure.  Given the location of the remaining defect, shape of the defect and the proximity to free margins a rotation flap was deemed most appropriate for complete closure of the defect.  Using a sterile surgical marker, an appropriate advancement flap was drawn incorporating the defect and placing the expected incisions within the relaxed skin tension lines where possible.    The area thus outlined was incised deep to adipose tissue with a #15 scalpel blade.  The skin margins were undermined to an appropriate distance in all directions utilizing iris scissors. Bilobed Transposition Flap Text: The defect edges were debeveled with a #15 scalpel blade.  Given the location of the defect and the proximity to free margins a bilobed transposition flap was deemed most appropriate.  Using a sterile surgical marker, an appropriate bilobe flap drawn around the defect.    The area thus outlined was incised deep to adipose tissue with a #15 scalpel blade.  The skin margins were undermined to an appropriate distance in all directions utilizing iris scissors. Anesthesia Type: 1% lidocaine with epinephrine Deep Sutures: 4-0 Vicryl Complex Repair And Z Plasty Text: The defect edges were debeveled with a #15 scalpel blade.  The primary defect was closed partially with a complex linear closure.  Given the location of the remaining defect, shape of the defect and the proximity to free margins a Z plasty was deemed most appropriate for complete closure of the defect.  Using a sterile surgical marker, an appropriate advancement flap was drawn incorporating the defect and placing the expected incisions within the relaxed skin tension lines where possible.    The area thus outlined was incised deep to adipose tissue with a #15 scalpel blade.  The skin margins were undermined to an appropriate distance in all directions utilizing iris scissors. Hemostasis: Electrocautery and suture ligation O-T Advancement Flap Text: The defect edges were debeveled with a #15 scalpel blade.  Given the location of the defect, shape of the defect and the proximity to free margins an O-T advancement flap was deemed most appropriate.  Using a sterile surgical marker, an appropriate advancement flap was drawn incorporating the defect and placing the expected incisions within the relaxed skin tension lines where possible.    The area thus outlined was incised deep to adipose tissue with a #15 scalpel blade.  The skin margins were undermined to an appropriate distance in all directions utilizing iris scissors. Estimated Blood Loss (Cc): minimal Composite Graft Text: The defect edges were debeveled with a #15 scalpel blade.  Given the location of the defect, shape of the defect, the proximity to free margins and the fact the defect was full thickness a composite graft was deemed most appropriate.  The defect was outline and then transferred to the donor site.  A full thickness graft was then excised from the donor site. The graft was then placed in the primary defect, oriented appropriately and then sutured into place.  The secondary defect was then repaired using a primary closure. O-T Plasty Text: The defect edges were debeveled with a #15 scalpel blade.  Given the location of the defect, shape of the defect and the proximity to free margins an O-T plasty was deemed most appropriate.  Using a sterile surgical marker, an appropriate O-T plasty was drawn incorporating the defect and placing the expected incisions within the relaxed skin tension lines where possible.    The area thus outlined was incised deep to adipose tissue with a #15 scalpel blade.  The skin margins were undermined to an appropriate distance in all directions utilizing iris scissors. Complex Repair And Melolabial Flap Text: The defect edges were debeveled with a #15 scalpel blade.  The primary defect was closed partially with a complex linear closure.  Given the location of the remaining defect, shape of the defect and the proximity to free margins a melolabial flap was deemed most appropriate for complete closure of the defect.  Using a sterile surgical marker, an appropriate advancement flap was drawn incorporating the defect and placing the expected incisions within the relaxed skin tension lines where possible.    The area thus outlined was incised deep to adipose tissue with a #15 scalpel blade.  The skin margins were undermined to an appropriate distance in all directions utilizing iris scissors. Rotation Flap Text: The defect edges were debeveled with a #15 scalpel blade.  Given the location of the defect, shape of the defect and the proximity to free margins a rotation flap was deemed most appropriate.  Using a sterile surgical marker, an appropriate rotation flap was drawn incorporating the defect and placing the expected incisions within the relaxed skin tension lines where possible.    The area thus outlined was incised deep to adipose tissue with a #15 scalpel blade.  The skin margins were undermined to an appropriate distance in all directions utilizing iris scissors. Lab Facility: [id number] Island Pedicle Flap-Requiring Vessel Identification Text: The defect edges were debeveled with a #15 scalpel blade.  Given the location of the defect, shape of the defect and the proximity to free margins an island pedicle advancement flap was deemed most appropriate.  Using a sterile surgical marker, an appropriate advancement flap was drawn, based on the axial vessel mentioned above, incorporating the defect, outlining the appropriate donor tissue and placing the expected incisions within the relaxed skin tension lines where possible.    The area thus outlined was incised deep to adipose tissue with a #15 scalpel blade.  The skin margins were undermined to an appropriate distance in all directions around the primary defect and laterally outward around the island pedicle utilizing iris scissors.  There was minimal undermining beneath the pedicle flap. Transposition Flap Text: The defect edges were debeveled with a #15 scalpel blade.  Given the location of the defect and the proximity to free margins a transposition flap was deemed most appropriate.  Using a sterile surgical marker, an appropriate transposition flap was drawn incorporating the defect.    The area thus outlined was incised deep to adipose tissue with a #15 scalpel blade.  The skin margins were undermined to an appropriate distance in all directions utilizing iris scissors. Posterior Auricular Interpolation Flap Text: A decision was made to reconstruct the defect utilizing an interpolation axial flap and a staged reconstruction.  A telfa template was made of the defect.  This telfa template was then used to outline the posterior auricular interpolation flap.  The donor area for the pedicle flap was then injected with anesthesia.  The flap was excised through the skin and subcutaneous tissue down to the layer of the underlying musculature.  The pedicle flap was carefully excised within this deep plane to maintain its blood supply.  The edges of the donor site were undermined.   The donor site was closed in a primary fashion.  The pedicle was then rotated into position and sutured.  Once the tube was sutured into place, adequate blood supply was confirmed with blanching and refill.  The pedicle was then wrapped with xeroform gauze and dressed appropriately with a telfa and gauze bandage to ensure continued blood supply and protect the attached pedicle. Slit Excision Additional Text (Leave Blank If You Do Not Want): A linear line was drawn on the skin overlying the lesion. An incision was made slowly until the lesion was visualized.  Once visualized, the lesion was removed with blunt dissection. Detail Level: Detailed Excisional Biopsy Additional Text (Leave Blank If You Do Not Want): The margin was drawn around the clinically apparent lesion. An elliptical shape was then drawn on the skin incorporating the lesion and margins.  Incisions were then made along these lines to the appropriate tissue plane and the lesion was extirpated. Suture Removal: 14 days Purse String (Intermediate) Text: Given the location of the defect and the characteristics of the surrounding skin a pursestring intermediate closure was deemed most appropriate.  Undermining was performed circumfirentially around the surgical defect.  A purstring suture was then placed and tightened. Scalpel Size: 15 blade Complex Repair And Rhombic Flap Text: The defect edges were debeveled with a #15 scalpel blade.  The primary defect was closed partially with a complex linear closure.  Given the location of the remaining defect, shape of the defect and the proximity to free margins a rhombic flap was deemed most appropriate for complete closure of the defect.  Using a sterile surgical marker, an appropriate advancement flap was drawn incorporating the defect and placing the expected incisions within the relaxed skin tension lines where possible.    The area thus outlined was incised deep to adipose tissue with a #15 scalpel blade.  The skin margins were undermined to an appropriate distance in all directions utilizing iris scissors. Cheek Interpolation Flap Text: A decision was made to reconstruct the defect utilizing an interpolation axial flap and a staged reconstruction.  A telfa template was made of the defect.  This telfa template was then used to outline the Cheek Interpolation flap.  The donor area for the pedicle flap was then injected with anesthesia.  The flap was excised through the skin and subcutaneous tissue down to the layer of the underlying musculature.  The interpolation flap was carefully excised within this deep plane to maintain its blood supply.  The edges of the donor site were undermined.   The donor site was closed in a primary fashion.  The pedicle was then rotated into position and sutured.  Once the tube was sutured into place, adequate blood supply was confirmed with blanching and refill.  The pedicle was then wrapped with xeroform gauze and dressed appropriately with a telfa and gauze bandage to ensure continued blood supply and protect the attached pedicle. Partial Purse String (Intermediate) Text: Given the location of the defect and the characteristics of the surrounding skin an intermediate purse string closure was deemed most appropriate.  Undermining was performed circumferentially around the surgical defect.  A purse string suture was then placed and tightened. Wound tension of the circular defect prevented complete closure of the wound. Complex Repair And O-T Advancement Flap Text: The defect edges were debeveled with a #15 scalpel blade.  The primary defect was closed partially with a complex linear closure.  Given the location of the remaining defect, shape of the defect and the proximity to free margins an O-T advancement flap was deemed most appropriate for complete closure of the defect.  Using a sterile surgical marker, an appropriate advancement flap was drawn incorporating the defect and placing the expected incisions within the relaxed skin tension lines where possible.    The area thus outlined was incised deep to adipose tissue with a #15 scalpel blade.  The skin margins were undermined to an appropriate distance in all directions utilizing iris scissors. Advancement Flap (Double) Text: The defect edges were debeveled with a #15 scalpel blade.  Given the location of the defect and the proximity to free margins a double advancement flap was deemed most appropriate.  Using a sterile surgical marker, the appropriate advancement flaps were drawn incorporating the defect and placing the expected incisions within the relaxed skin tension lines where possible.    The area thus outlined was incised deep to adipose tissue with a #15 scalpel blade.  The skin margins were undermined to an appropriate distance in all directions utilizing iris scissors. Complex Repair And Dorsal Nasal Flap Text: The defect edges were debeveled with a #15 scalpel blade.  The primary defect was closed partially with a complex linear closure.  Given the location of the remaining defect, shape of the defect and the proximity to free margins a dorsal nasal flap was deemed most appropriate for complete closure of the defect.  Using a sterile surgical marker, an appropriate flap was drawn incorporating the defect and placing the expected incisions within the relaxed skin tension lines where possible.    The area thus outlined was incised deep to adipose tissue with a #15 scalpel blade.  The skin margins were undermined to an appropriate distance in all directions utilizing iris scissors. Pre-Excision Curettage Text (Leave Blank If You Do Not Want): Prior to drawing the surgical margin the visible lesion was removed with electrodesiccation and curettage to clearly define the lesion size. Complex Repair And Double M Plasty Text: The defect edges were debeveled with a #15 scalpel blade.  The primary defect was closed partially with a complex linear closure.  Given the location of the remaining defect, shape of the defect and the proximity to free margins a double M plasty was deemed most appropriate for complete closure of the defect.  Using a sterile surgical marker, an appropriate advancement flap was drawn incorporating the defect and placing the expected incisions within the relaxed skin tension lines where possible.    The area thus outlined was incised deep to adipose tissue with a #15 scalpel blade.  The skin margins were undermined to an appropriate distance in all directions utilizing iris scissors. Lab: [id number] Mastoid Interpolation Flap Text: A decision was made to reconstruct the defect utilizing an interpolation axial flap and a staged reconstruction.  A telfa template was made of the defect.  This telfa template was then used to outline the mastoid interpolation flap.  The donor area for the pedicle flap was then injected with anesthesia.  The flap was excised through the skin and subcutaneous tissue down to the layer of the underlying musculature.  The pedicle flap was carefully excised within this deep plane to maintain its blood supply.  The edges of the donor site were undermined.   The donor site was closed in a primary fashion.  The pedicle was then rotated into position and sutured.  Once the tube was sutured into place, adequate blood supply was confirmed with blanching and refill.  The pedicle was then wrapped with xeroform gauze and dressed appropriately with a telfa and gauze bandage to ensure continued blood supply and protect the attached pedicle. Excision Method: Elliptical Post-Care Instructions: I reviewed with the patient in detail post-care instructions. Patient is not to engage in any heavy lifting, exercise, or swimming for the next 14 days. Should the patient develop any fevers, chills, bleeding, severe pain patient will contact the office immediately. Number Of Deep Sutures (Optional): 3 Complex Repair And Modified Advancement Flap Text: The defect edges were debeveled with a #15 scalpel blade.  The primary defect was closed partially with a complex linear closure.  Given the location of the remaining defect, shape of the defect and the proximity to free margins a modified advancement flap was deemed most appropriate for complete closure of the defect.  Using a sterile surgical marker, an appropriate advancement flap was drawn incorporating the defect and placing the expected incisions within the relaxed skin tension lines where possible.    The area thus outlined was incised deep to adipose tissue with a #15 scalpel blade.  The skin margins were undermined to an appropriate distance in all directions utilizing iris scissors. Paramedian Forehead Flap Text: A decision was made to reconstruct the defect utilizing an interpolation axial flap and a staged reconstruction.  A telfa template was made of the defect.  This telfa template was then used to outline the paramedian forehead pedicle flap.  The donor area for the pedicle flap was then injected with anesthesia.  The flap was excised through the skin and subcutaneous tissue down to the layer of the underlying musculature.  The pedicle flap was carefully excised within this deep plane to maintain its blood supply.  The edges of the donor site were undermined.   The donor site was closed in a primary fashion.  The pedicle was then rotated into position and sutured.  Once the tube was sutured into place, adequate blood supply was confirmed with blanching and refill.  The pedicle was then wrapped with xeroform gauze and dressed appropriately with a telfa and gauze bandage to ensure continued blood supply and protect the attached pedicle. Dermal Autograft Text: The defect edges were debeveled with a #15 scalpel blade.  Given the location of the defect, shape of the defect and the proximity to free margins a dermal autograft was deemed most appropriate.  Using a sterile surgical marker, the primary defect shape was transferred to the donor site. The area thus outlined was incised deep to adipose tissue with a #15 scalpel blade.  The harvested graft was then trimmed of adipose and epidermal tissue until only dermis was left.  The skin graft was then placed in the primary defect and oriented appropriately. Complex Repair And O-L Flap Text: The defect edges were debeveled with a #15 scalpel blade.  The primary defect was closed partially with a complex linear closure.  Given the location of the remaining defect, shape of the defect and the proximity to free margins an O-L flap was deemed most appropriate for complete closure of the defect.  Using a sterile surgical marker, an appropriate flap was drawn incorporating the defect and placing the expected incisions within the relaxed skin tension lines where possible.    The area thus outlined was incised deep to adipose tissue with a #15 scalpel blade.  The skin margins were undermined to an appropriate distance in all directions utilizing iris scissors. Bilateral Helical Rim Advancement Flap Text: The defect edges were debeveled with a #15 blade scalpel.  Given the location of the defect and the proximity to free margins (helical rim) a bilateral helical rim advancement flap was deemed most appropriate.  Using a sterile surgical marker, the appropriate advancement flaps were drawn incorporating the defect and placing the expected incisions between the helical rim and antihelix where possible.  The area thus outlined was incised through and through with a #15 scalpel blade.  With a skin hook and iris scissors, the flaps were gently and sharply undermined and freed up. Biopsy Photograph Reviewed: Yes Hatchet Flap Text: The defect edges were debeveled with a #15 scalpel blade.  Given the location of the defect, shape of the defect and the proximity to free margins a hatchet flap was deemed most appropriate.  Using a sterile surgical marker, an appropriate hatchet flap was drawn incorporating the defect and placing the expected incisions within the relaxed skin tension lines where possible.    The area thus outlined was incised deep to adipose tissue with a #15 scalpel blade.  The skin margins were undermined to an appropriate distance in all directions utilizing iris scissors. Burow's Advancement Flap Text: The defect edges were debeveled with a #15 scalpel blade.  Given the location of the defect and the proximity to free margins a Burow's advancement flap was deemed most appropriate.  Using a sterile surgical marker, the appropriate advancement flap was drawn incorporating the defect and placing the expected incisions within the relaxed skin tension lines where possible.    The area thus outlined was incised deep to adipose tissue with a #15 scalpel blade.  The skin margins were undermined to an appropriate distance in all directions utilizing iris scissors. Island Pedicle Flap With Canthal Suspension Text: The defect edges were debeveled with a #15 scalpel blade.  Given the location of the defect, shape of the defect and the proximity to free margins an island pedicle advancement flap was deemed most appropriate.  Using a sterile surgical marker, an appropriate advancement flap was drawn incorporating the defect, outlining the appropriate donor tissue and placing the expected incisions within the relaxed skin tension lines where possible. The area thus outlined was incised deep to adipose tissue with a #15 scalpel blade.  The skin margins were undermined to an appropriate distance in all directions around the primary defect and laterally outward around the island pedicle utilizing iris scissors.  There was minimal undermining beneath the pedicle flap. A suspension suture was placed in the canthal tendon to prevent tension and prevent ectropion. V-Y Flap Text: The defect edges were debeveled with a #15 scalpel blade.  Given the location of the defect, shape of the defect and the proximity to free margins a V-Y flap was deemed most appropriate.  Using a sterile surgical marker, an appropriate advancement flap was drawn incorporating the defect and placing the expected incisions within the relaxed skin tension lines where possible.    The area thus outlined was incised deep to adipose tissue with a #15 scalpel blade.  The skin margins were undermined to an appropriate distance in all directions utilizing iris scissors. O-L Flap Text: The defect edges were debeveled with a #15 scalpel blade.  Given the location of the defect, shape of the defect and the proximity to free margins an O-L flap was deemed most appropriate.  Using a sterile surgical marker, an appropriate advancement flap was drawn incorporating the defect and placing the expected incisions within the relaxed skin tension lines where possible.    The area thus outlined was incised deep to adipose tissue with a #15 scalpel blade.  The skin margins were undermined to an appropriate distance in all directions utilizing iris scissors. Ftsg Text: The defect edges were debeveled with a #15 scalpel blade.  Given the location of the defect, shape of the defect and the proximity to free margins a full thickness skin graft was deemed most appropriate.  Using a sterile surgical marker, the primary defect shape was transferred to the donor site. The area thus outlined was incised deep to adipose tissue with a #15 scalpel blade.  The harvested graft was then trimmed of adipose tissue until only dermis and epidermis was left.  The skin margins of the secondary defect were undermined to an appropriate distance in all directions utilizing iris scissors.  The secondary defect was closed with interrupted buried subcutaneous sutures.  The skin edges were then re-apposed with running  sutures.  The skin graft was then placed in the primary defect and oriented appropriately. Partial Purse String (Simple) Text: Given the location of the defect and the characteristics of the surrounding skin a simple purse string closure was deemed most appropriate.  Undermining was performed circumferentially around the surgical defect.  A purse string suture was then placed and tightened. Wound tension of the circular defect prevented complete closure of the wound. Z Plasty Text: The lesion was extirpated to the level of the fat with a #15 scalpel blade.  Given the location of the defect, shape of the defect and the proximity to free margins a Z-plasty was deemed most appropriate for repair.  Using a sterile surgical marker, the appropriate transposition arms of the Z-plasty were drawn incorporating the defect and placing the expected incisions within the relaxed skin tension lines where possible.    The area thus outlined was incised deep to adipose tissue with a #15 scalpel blade.  The skin margins were undermined to an appropriate distance in all directions utilizing iris scissors.  The opposing transposition arms were then transposed into place in opposite direction and anchored with interrupted buried subcutaneous sutures. Purse String (Simple) Text: Given the location of the defect and the characteristics of the surrounding skin a purse string simple closure was deemed most appropriate.  Undermining was performed circumferentially around the surgical defect.  A purse string suture was then placed and tightened. Elliptical Excision Additional Text (Leave Blank If You Do Not Want): The margin was drawn around the clinically apparent lesion.  An elliptical shape was then drawn on the skin incorporating the lesion and margins.  Incisions were then made along these lines to the appropriate tissue plane and the lesion was extirpated. Split-Thickness Skin Graft Text: The defect edges were debeveled with a #15 scalpel blade.  Given the location of the defect, shape of the defect and the proximity to free margins a split thickness skin graft was deemed most appropriate.  Using a sterile surgical marker, the primary defect shape was transferred to the donor site. The split thickness graft was then harvested.  The skin graft was then placed in the primary defect and oriented appropriately. W Plasty Text: The lesion was extirpated to the level of the fat with a #15 scalpel blade.  Given the location of the defect, shape of the defect and the proximity to free margins a W-plasty was deemed most appropriate for repair.  Using a sterile surgical marker, the appropriate transposition arms of the W-plasty were drawn incorporating the defect and placing the expected incisions within the relaxed skin tension lines where possible.    The area thus outlined was incised deep to adipose tissue with a #15 scalpel blade.  The skin margins were undermined to an appropriate distance in all directions utilizing iris scissors.  The opposing transposition arms were then transposed into place in opposite direction and anchored with interrupted buried subcutaneous sutures. Modified Advancement Flap Text: The defect edges were debeveled with a #15 scalpel blade.  Given the location of the defect, shape of the defect and the proximity to free margins a modified advancement flap was deemed most appropriate.  Using a sterile surgical marker, an appropriate advancement flap was drawn incorporating the defect and placing the expected incisions within the relaxed skin tension lines where possible.    The area thus outlined was incised deep to adipose tissue with a #15 scalpel blade.  The skin margins were undermined to an appropriate distance in all directions utilizing iris scissors. Complex Repair And A-T Advancement Flap Text: The defect edges were debeveled with a #15 scalpel blade.  The primary defect was closed partially with a complex linear closure.  Given the location of the remaining defect, shape of the defect and the proximity to free margins an A-T advancement flap was deemed most appropriate for complete closure of the defect.  Using a sterile surgical marker, an appropriate advancement flap was drawn incorporating the defect and placing the expected incisions within the relaxed skin tension lines where possible.    The area thus outlined was incised deep to adipose tissue with a #15 scalpel blade.  The skin margins were undermined to an appropriate distance in all directions utilizing iris scissors. Alar Island Pedicle Flap Text: The defect edges were debeveled with a #15 scalpel blade.  Given the location of the defect, shape of the defect and the proximity to the alar rim an island pedicle advancement flap was deemed most appropriate.  Using a sterile surgical marker, an appropriate advancement flap was drawn incorporating the defect, outlining the appropriate donor tissue and placing the expected incisions within the nasal ala running parallel to the alar rim. The area thus outlined was incised with a #15 scalpel blade.  The skin margins were undermined minimally to an appropriate distance in all directions around the primary defect and laterally outward around the island pedicle utilizing iris scissors.  There was minimal undermining beneath the pedicle flap. Anesthesia Volume In Cc: 3.5 S Plasty Text: Given the location and shape of the defect, and the orientation of relaxed skin tension lines, an S-plasty was deemed most appropriate for repair.  Using a sterile surgical marker, the appropriate outline of the S-plasty was drawn, incorporating the defect and placing the expected incisions within the relaxed skin tension lines where possible.  The area thus outlined was incised deep to adipose tissue with a #15 scalpel blade.  The skin margins were undermined to an appropriate distance in all directions utilizing iris scissors. The skin flaps were advanced over the defect.  The opposing margins were then approximated with interrupted buried subcutaneous sutures. Complex Repair And M Plasty Text: The defect edges were debeveled with a #15 scalpel blade.  The primary defect was closed partially with a complex linear closure.  Given the location of the remaining defect, shape of the defect and the proximity to free margins an M plasty was deemed most appropriate for complete closure of the defect.  Using a sterile surgical marker, an appropriate advancement flap was drawn incorporating the defect and placing the expected incisions within the relaxed skin tension lines where possible.    The area thus outlined was incised deep to adipose tissue with a #15 scalpel blade.  The skin margins were undermined to an appropriate distance in all directions utilizing iris scissors.

## 2022-07-30 NOTE — PROGRESS NOTE ADULT - PROBLEM SELECTOR PLAN 2
Pt reports hx of asthma, uses albuterol inhaler BID but ran out approximately 1 week ago. Since then, pt reports increased coughing and SOB.   - Duonebs standing q6  - Prednisone 40mg po daily  - Tessalone Perles TID prn cough

## 2022-07-30 NOTE — PROGRESS NOTE ADULT - PROBLEM SELECTOR PLAN 3
Pt reported R leg swelling to ED staff. On exam pt c/o of L leg pain and swelling. Pulses intact throughout, no swelling or erythema noted. R LE doppler negative for DVT.   - continue to monitor Pt with hx of HIV, inconsistent use of antiretrovirals. Unable to provide medication history, does not follow w/ PCP or HIV clinic. Per outpatient pharmacy records, appears as though he was receiving Biktarvy monthly several months ago, but has not filled the prescription recently.   - f/u viral load  - f/u CD4 count  - f/u fungitell, galactomannin  - HIV team consult

## 2022-07-30 NOTE — PROGRESS NOTE ADULT - PROBLEM SELECTOR PLAN 8
F: patient tolerating PO, no IVF  E: Mg > 2, K >4  N: regular diet  DVT: lovenox 40 mg  Code: Full  Dispo: [facility]

## 2022-07-30 NOTE — DIETITIAN INITIAL EVALUATION ADULT - OTHER INFO
Pt is a 61yo w/ PMH HIV (non compliant w/ medications) asthma (non compliant w/ inhaler) p/w chest pain, cough and dizziness since Monday. Reports multiple ED admissions this week for symptoms.     Pt seen at bedside for initial assessment. Denies nausea/vomiting at this time. Reports last bowel movement PTA. NKFA as per EMR. Denies change in appetite PTA; however, endorses only 1 meal/day at home. Pt endorses recent weight loss, though he is unable to provide amounts/timeframe. No recent information of pt's weight in chart. Bilateral ankle edema 2+. No pressure ulcers documented at this time. Neeraj score=18. Nutrition focused physical exam significant for severe orbital/temporal wasting, and moderate clavicle wasting. Based on ASPEN guidelines, pt meets the criteria for malnutrition at this time. Discussed current diet order; provided pt with diet education regarding importance of PO intake and protein intake; amenable to education. The pt currently gets his food from a food pantry and a grocery store. We discussed foods that are high in calories/protein and accessible. Pt was agreeable to changes. He also notes he gets ensure at the pharmacy, but hasn't been doing so as of late. However, he was agreeable to incorporate ensure shakes into his daily routine upon discharge. Pt reports feeling hungry during hospital stay, able to complete 100% of meals. Made aware RD remains available. RD to follow up per protocol. See nutrition recommendations below.

## 2022-07-30 NOTE — DIETITIAN INITIAL EVALUATION ADULT - PERTINENT LABORATORY DATA
07-30    133<L>  |  102  |  10  ----------------------------<  86  3.6   |  23  |  0.84    Ca    8.0<L>      30 Jul 2022 06:04  Phos  3.1     07-30  Mg     1.6     07-30    TPro  6.6  /  Alb  3.0<L>  /  TBili  0.6  /  DBili  x   /  AST  45<H>  /  ALT  19  /  AlkPhos  136<H>  07-30

## 2022-07-31 DIAGNOSIS — E43 UNSPECIFIED SEVERE PROTEIN-CALORIE MALNUTRITION: ICD-10-CM

## 2022-07-31 LAB
ANION GAP SERPL CALC-SCNC: 7 MMOL/L — SIGNIFICANT CHANGE UP (ref 5–17)
ANISOCYTOSIS BLD QL: SLIGHT — SIGNIFICANT CHANGE UP
BASOPHILS # BLD AUTO: 0 K/UL — SIGNIFICANT CHANGE UP (ref 0–0.2)
BASOPHILS NFR BLD AUTO: 0 % — SIGNIFICANT CHANGE UP (ref 0–2)
BUN SERPL-MCNC: 19 MG/DL — SIGNIFICANT CHANGE UP (ref 7–23)
BURR CELLS BLD QL SMEAR: PRESENT — SIGNIFICANT CHANGE UP
CALCIUM SERPL-MCNC: 8.8 MG/DL — SIGNIFICANT CHANGE UP (ref 8.4–10.5)
CHLORIDE SERPL-SCNC: 104 MMOL/L — SIGNIFICANT CHANGE UP (ref 96–108)
CO2 SERPL-SCNC: 26 MMOL/L — SIGNIFICANT CHANGE UP (ref 22–31)
CREAT SERPL-MCNC: 0.93 MG/DL — SIGNIFICANT CHANGE UP (ref 0.5–1.3)
EGFR: 92 ML/MIN/1.73M2 — SIGNIFICANT CHANGE UP
EOSINOPHIL # BLD AUTO: 0 K/UL — SIGNIFICANT CHANGE UP (ref 0–0.5)
EOSINOPHIL NFR BLD AUTO: 0 % — SIGNIFICANT CHANGE UP (ref 0–6)
GIANT PLATELETS BLD QL SMEAR: PRESENT — SIGNIFICANT CHANGE UP
GLUCOSE SERPL-MCNC: 113 MG/DL — HIGH (ref 70–99)
HCT VFR BLD CALC: 32.5 % — LOW (ref 39–50)
HGB BLD-MCNC: 10.2 G/DL — LOW (ref 13–17)
HYPOCHROMIA BLD QL: SIGNIFICANT CHANGE UP
LYMPHOCYTES # BLD AUTO: 1.4 K/UL — SIGNIFICANT CHANGE UP (ref 1–3.3)
LYMPHOCYTES # BLD AUTO: 30.4 % — SIGNIFICANT CHANGE UP (ref 13–44)
MACROCYTES BLD QL: SLIGHT — SIGNIFICANT CHANGE UP
MAGNESIUM SERPL-MCNC: 2.5 MG/DL — SIGNIFICANT CHANGE UP (ref 1.6–2.6)
MANUAL SMEAR VERIFICATION: SIGNIFICANT CHANGE UP
MCHC RBC-ENTMCNC: 26.8 PG — LOW (ref 27–34)
MCHC RBC-ENTMCNC: 31.4 GM/DL — LOW (ref 32–36)
MCV RBC AUTO: 85.3 FL — SIGNIFICANT CHANGE UP (ref 80–100)
MONOCYTES # BLD AUTO: 0.52 K/UL — SIGNIFICANT CHANGE UP (ref 0–0.9)
MONOCYTES NFR BLD AUTO: 11.3 % — SIGNIFICANT CHANGE UP (ref 2–14)
NEUTROPHILS # BLD AUTO: 2.69 K/UL — SIGNIFICANT CHANGE UP (ref 1.8–7.4)
NEUTROPHILS NFR BLD AUTO: 58.3 % — SIGNIFICANT CHANGE UP (ref 43–77)
OVALOCYTES BLD QL SMEAR: SLIGHT — SIGNIFICANT CHANGE UP
PHOSPHATE SERPL-MCNC: 3.2 MG/DL — SIGNIFICANT CHANGE UP (ref 2.5–4.5)
PLAT MORPH BLD: ABNORMAL
PLATELET # BLD AUTO: 337 K/UL — SIGNIFICANT CHANGE UP (ref 150–400)
POIKILOCYTOSIS BLD QL AUTO: SLIGHT — SIGNIFICANT CHANGE UP
POLYCHROMASIA BLD QL SMEAR: SIGNIFICANT CHANGE UP
POTASSIUM SERPL-MCNC: 3.7 MMOL/L — SIGNIFICANT CHANGE UP (ref 3.5–5.3)
POTASSIUM SERPL-SCNC: 3.7 MMOL/L — SIGNIFICANT CHANGE UP (ref 3.5–5.3)
RBC # BLD: 3.81 M/UL — LOW (ref 4.2–5.8)
RBC # FLD: 19.9 % — HIGH (ref 10.3–14.5)
RBC BLD AUTO: ABNORMAL
SCHISTOCYTES BLD QL AUTO: SLIGHT — SIGNIFICANT CHANGE UP
SMUDGE CELLS # BLD: PRESENT — SIGNIFICANT CHANGE UP
SODIUM SERPL-SCNC: 137 MMOL/L — SIGNIFICANT CHANGE UP (ref 135–145)
SPHEROCYTES BLD QL SMEAR: SLIGHT — SIGNIFICANT CHANGE UP
TARGETS BLD QL SMEAR: SLIGHT — SIGNIFICANT CHANGE UP
WBC # BLD: 4.62 K/UL — SIGNIFICANT CHANGE UP (ref 3.8–10.5)
WBC # FLD AUTO: 4.62 K/UL — SIGNIFICANT CHANGE UP (ref 3.8–10.5)

## 2022-07-31 PROCEDURE — 99233 SBSQ HOSP IP/OBS HIGH 50: CPT | Mod: GC

## 2022-07-31 PROCEDURE — 93010 ELECTROCARDIOGRAM REPORT: CPT

## 2022-07-31 RX ORDER — DIPHENHYDRAMINE HCL 50 MG
25 CAPSULE ORAL ONCE
Refills: 0 | Status: COMPLETED | OUTPATIENT
Start: 2022-07-31 | End: 2022-07-31

## 2022-07-31 RX ORDER — POTASSIUM CHLORIDE 20 MEQ
40 PACKET (EA) ORAL ONCE
Refills: 0 | Status: COMPLETED | OUTPATIENT
Start: 2022-07-31 | End: 2022-07-31

## 2022-07-31 RX ADMIN — APIXABAN 5 MILLIGRAM(S): 2.5 TABLET, FILM COATED ORAL at 21:13

## 2022-07-31 RX ADMIN — Medication 650 MILLIGRAM(S): at 22:15

## 2022-07-31 RX ADMIN — Medication 3 MILLILITER(S): at 04:48

## 2022-07-31 RX ADMIN — Medication 650 MILLIGRAM(S): at 21:13

## 2022-07-31 RX ADMIN — Medication 25 MILLIGRAM(S): at 12:34

## 2022-07-31 RX ADMIN — Medication 3 MILLILITER(S): at 16:54

## 2022-07-31 RX ADMIN — Medication 40 MILLIGRAM(S): at 10:36

## 2022-07-31 RX ADMIN — Medication 3 MILLILITER(S): at 10:37

## 2022-07-31 RX ADMIN — Medication 3 MILLILITER(S): at 22:51

## 2022-07-31 RX ADMIN — APIXABAN 5 MILLIGRAM(S): 2.5 TABLET, FILM COATED ORAL at 09:07

## 2022-07-31 RX ADMIN — Medication 40 MILLIEQUIVALENT(S): at 10:36

## 2022-07-31 NOTE — PROGRESS NOTE ADULT - PROBLEM SELECTOR PLAN 5
Elevated alk phos 132 up from 96. Elevated GGT. Distended abdomen, no rebound or guarding.  RUQ US: Signs of right heart failure, including dilated inferior vena cava and hepatic veins, small ascites, and gallbladder wall thickening. This is likely causing a congestive hepatopathy, which may account for the abnormal liver function tests.  - TTE

## 2022-07-31 NOTE — PROGRESS NOTE ADULT - PROBLEM SELECTOR PLAN 6
EKG reveals atrial fibrillation w/ premature ventricular or aberrant conductive complexes w/ T wave abnormalities. Trop neg. Pt not on AC, but per chart review was previously prescribed Eliquis.  - F/u repeat EKG  - Eliquis 5mg bid

## 2022-07-31 NOTE — PROGRESS NOTE ADULT - SUBJECTIVE AND OBJECTIVE BOX
O/N Events: [place]  Subjective/ROS: B/L Arm itching, No CP today. Denies HA, CP, SOB, n/v, changes in bowel/urinary habits.  12pt ROS otherwise negative.    VITALS  Vital Signs Last 24 Hrs  T(C): 36.6 (31 Jul 2022 12:09), Max: 36.7 (30 Jul 2022 13:23)  T(F): 97.9 (31 Jul 2022 12:09), Max: 98.1 (30 Jul 2022 13:23)  HR: 98 (31 Jul 2022 12:09) (84 - 98)  BP: 152/99 (31 Jul 2022 12:09) (127/85 - 152/99)  BP(mean): --  RR: 18 (31 Jul 2022 12:09) (18 - 18)  SpO2: 98% (31 Jul 2022 12:09) (95% - 98%)    Parameters below as of 31 Jul 2022 12:09  Patient On (Oxygen Delivery Method): room air        I&O's Summary      CAPILLARY BLOOD GLUCOSE          PHYSICAL EXAM  Constitutional: Frail, NAD, comfortable in bed.  HEENT: NC/AT, EOMI, no conjunctival pallor or scleral icterus, MMM  Neck: Supple, no JVD  Respiratory: CTA B/L. No w/r/r.   Cardiovascular: irregular rhythm, regular rate, normal S1 and S2, no m/r/g.   Gastrointestinal: firm, NTND, no guarding or rebound tenderness, no palpable masses   Extremities: wwp; no cyanosis, clubbing or edema.   Neurological: AAOx3, no CN deficits, strength and sensation intact throughout.   Skin: No gross skin abnormalities or rashes    MEDICATIONS  (STANDING):  albuterol/ipratropium for Nebulization 3 milliLiter(s) Nebulizer every 6 hours  apixaban 5 milliGRAM(s) Oral every 12 hours  predniSONE   Tablet 40 milliGRAM(s) Oral every 24 hours    MEDICATIONS  (PRN):  acetaminophen     Tablet .. 650 milliGRAM(s) Oral every 6 hours PRN Temp greater or equal to 38C (100.4F), Mild Pain (1 - 3)  benzonatate 100 milliGRAM(s) Oral three times a day PRN Cough      No Known Allergies      LABS                        10.2   4.62  )-----------( 337      ( 31 Jul 2022 05:30 )             32.5     07-31    137  |  104  |  19  ----------------------------<  113<H>  3.7   |  26  |  0.93    Ca    8.8      31 Jul 2022 05:30  Phos  3.2     07-31  Mg     2.5     07-31    TPro  6.6  /  Alb  3.0<L>  /  TBili  0.6  /  DBili  x   /  AST  45<H>  /  ALT  19  /  AlkPhos  136<H>  07-30              IMAGING/EKG/ETC: reviewed

## 2022-07-31 NOTE — PROGRESS NOTE ADULT - PROBLEM SELECTOR PLAN 7
Pt with Hg 8.6 on admission, down from 10.1 July 11. No active source of bleeding. Most likely 2/2 nutritional deficiency. B12 slightly low (216), normal folate, normal ferritin.   - Nutrition consult  -  consult

## 2022-07-31 NOTE — PROGRESS NOTE ADULT - PROBLEM SELECTOR PLAN 1
DDx: Costochondritis vs PJP PNA vs atypical Anginal pain  -Awaiting serum fungitel to aid in PJP rule out  -Improved

## 2022-07-31 NOTE — PROGRESS NOTE ADULT - PROBLEM SELECTOR PLAN 4
Pt with hx of HIV, inconsistent use of antiretrovirals. Unable to provide medication history, does not follow w/ PCP or HIV clinic. Per outpatient pharmacy records, appears as though he was receiving Biktarvy monthly several months ago, but has not filled the prescription recently.   - f/u viral load  - f/u CD4 count  - f/u fungitell, galactomannin  - HIV team consult

## 2022-08-01 DIAGNOSIS — B20 HUMAN IMMUNODEFICIENCY VIRUS [HIV] DISEASE: ICD-10-CM

## 2022-08-01 DIAGNOSIS — B37.0 CANDIDAL STOMATITIS: ICD-10-CM

## 2022-08-01 LAB
4/8 RATIO: 0.1 RATIO — LOW (ref 0.9–3.6)
ABS CD8: 936 /UL — HIGH (ref 142–740)
ALBUMIN SERPL ELPH-MCNC: 3.5 G/DL — SIGNIFICANT CHANGE UP (ref 3.3–5)
ALP SERPL-CCNC: 144 U/L — HIGH (ref 40–120)
ALT FLD-CCNC: 16 U/L — SIGNIFICANT CHANGE UP (ref 10–45)
ANION GAP SERPL CALC-SCNC: 12 MMOL/L — SIGNIFICANT CHANGE UP (ref 5–17)
AST SERPL-CCNC: 34 U/L — SIGNIFICANT CHANGE UP (ref 10–40)
BASOPHILS # BLD AUTO: 0.01 K/UL — SIGNIFICANT CHANGE UP (ref 0–0.2)
BASOPHILS NFR BLD AUTO: 0.2 % — SIGNIFICANT CHANGE UP (ref 0–2)
BILIRUB SERPL-MCNC: 0.4 MG/DL — SIGNIFICANT CHANGE UP (ref 0.2–1.2)
BUN SERPL-MCNC: 17 MG/DL — SIGNIFICANT CHANGE UP (ref 7–23)
CALCIUM SERPL-MCNC: 9.4 MG/DL — SIGNIFICANT CHANGE UP (ref 8.4–10.5)
CD3 BLASTS SPEC-ACNC: 1074 /UL — SIGNIFICANT CHANGE UP (ref 672–1870)
CD3 BLASTS SPEC-ACNC: 84 % — HIGH (ref 59–83)
CD4 %: 7 % — LOW (ref 30–62)
CD8 %: 73 % — HIGH (ref 12–36)
CHLORIDE SERPL-SCNC: 103 MMOL/L — SIGNIFICANT CHANGE UP (ref 96–108)
CO2 SERPL-SCNC: 23 MMOL/L — SIGNIFICANT CHANGE UP (ref 22–31)
CREAT SERPL-MCNC: 0.82 MG/DL — SIGNIFICANT CHANGE UP (ref 0.5–1.3)
EGFR: 99 ML/MIN/1.73M2 — SIGNIFICANT CHANGE UP
EOSINOPHIL # BLD AUTO: 0 K/UL — SIGNIFICANT CHANGE UP (ref 0–0.5)
EOSINOPHIL NFR BLD AUTO: 0 % — SIGNIFICANT CHANGE UP (ref 0–6)
GLUCOSE SERPL-MCNC: 107 MG/DL — HIGH (ref 70–99)
HCT VFR BLD CALC: 31.9 % — LOW (ref 39–50)
HGB BLD-MCNC: 9.8 G/DL — LOW (ref 13–17)
HIV-1 VIRAL LOAD RESULT: ABNORMAL
HIV1 RNA # SERPL NAA+PROBE: SIGNIFICANT CHANGE UP
HIV1 RNA SER-IMP: SIGNIFICANT CHANGE UP
HIV1 RNA SERPL NAA+PROBE-ACNC: ABNORMAL
HIV1 RNA SERPL NAA+PROBE-LOG#: 5.61 — SIGNIFICANT CHANGE UP
IMM GRANULOCYTES NFR BLD AUTO: 0.7 % — SIGNIFICANT CHANGE UP (ref 0–1.5)
LYMPHOCYTES # BLD AUTO: 1.71 K/UL — SIGNIFICANT CHANGE UP (ref 1–3.3)
LYMPHOCYTES # BLD AUTO: 30.3 % — SIGNIFICANT CHANGE UP (ref 13–44)
MAGNESIUM SERPL-MCNC: 1.8 MG/DL — SIGNIFICANT CHANGE UP (ref 1.6–2.6)
MCHC RBC-ENTMCNC: 26.6 PG — LOW (ref 27–34)
MCHC RBC-ENTMCNC: 30.7 GM/DL — LOW (ref 32–36)
MCV RBC AUTO: 86.4 FL — SIGNIFICANT CHANGE UP (ref 80–100)
MONOCYTES # BLD AUTO: 0.48 K/UL — SIGNIFICANT CHANGE UP (ref 0–0.9)
MONOCYTES NFR BLD AUTO: 8.5 % — SIGNIFICANT CHANGE UP (ref 2–14)
NEUTROPHILS # BLD AUTO: 3.41 K/UL — SIGNIFICANT CHANGE UP (ref 1.8–7.4)
NEUTROPHILS NFR BLD AUTO: 60.3 % — SIGNIFICANT CHANGE UP (ref 43–77)
NRBC # BLD: 0 /100 WBCS — SIGNIFICANT CHANGE UP (ref 0–0)
PHOSPHATE SERPL-MCNC: 3 MG/DL — SIGNIFICANT CHANGE UP (ref 2.5–4.5)
PLATELET # BLD AUTO: 347 K/UL — SIGNIFICANT CHANGE UP (ref 150–400)
POTASSIUM SERPL-MCNC: 4.1 MMOL/L — SIGNIFICANT CHANGE UP (ref 3.5–5.3)
POTASSIUM SERPL-SCNC: 4.1 MMOL/L — SIGNIFICANT CHANGE UP (ref 3.5–5.3)
PROT SERPL-MCNC: 7.4 G/DL — SIGNIFICANT CHANGE UP (ref 6–8.3)
RBC # BLD: 3.69 M/UL — LOW (ref 4.2–5.8)
RBC # FLD: 20.1 % — HIGH (ref 10.3–14.5)
SODIUM SERPL-SCNC: 138 MMOL/L — SIGNIFICANT CHANGE UP (ref 135–145)
T-CELL CD4 SUBSET PNL BLD: 96 /UL — LOW (ref 489–1457)
WBC # BLD: 5.65 K/UL — SIGNIFICANT CHANGE UP (ref 3.8–10.5)
WBC # FLD AUTO: 5.65 K/UL — SIGNIFICANT CHANGE UP (ref 3.8–10.5)

## 2022-08-01 PROCEDURE — 99233 SBSQ HOSP IP/OBS HIGH 50: CPT | Mod: GC

## 2022-08-01 RX ORDER — SENNA PLUS 8.6 MG/1
2 TABLET ORAL ONCE
Refills: 0 | Status: COMPLETED | OUTPATIENT
Start: 2022-08-01 | End: 2022-08-01

## 2022-08-01 RX ORDER — MAGNESIUM SULFATE 500 MG/ML
2 VIAL (ML) INJECTION ONCE
Refills: 0 | Status: COMPLETED | OUTPATIENT
Start: 2022-08-01 | End: 2022-08-01

## 2022-08-01 RX ORDER — MAGNESIUM SULFATE 500 MG/ML
1 VIAL (ML) INJECTION ONCE
Refills: 0 | Status: DISCONTINUED | OUTPATIENT
Start: 2022-08-01 | End: 2022-08-01

## 2022-08-01 RX ORDER — NYSTATIN 500MM UNIT
500000 POWDER (EA) MISCELLANEOUS EVERY 6 HOURS
Refills: 0 | Status: DISCONTINUED | OUTPATIENT
Start: 2022-08-01 | End: 2022-08-01

## 2022-08-01 RX ORDER — NYSTATIN 500MM UNIT
500000 POWDER (EA) MISCELLANEOUS EVERY 6 HOURS
Refills: 0 | Status: DISCONTINUED | OUTPATIENT
Start: 2022-08-01 | End: 2022-08-02

## 2022-08-01 RX ADMIN — Medication 500000 UNIT(S): at 14:49

## 2022-08-01 RX ADMIN — Medication 500000 UNIT(S): at 23:59

## 2022-08-01 RX ADMIN — SENNA PLUS 2 TABLET(S): 8.6 TABLET ORAL at 22:31

## 2022-08-01 RX ADMIN — APIXABAN 5 MILLIGRAM(S): 2.5 TABLET, FILM COATED ORAL at 08:50

## 2022-08-01 RX ADMIN — APIXABAN 5 MILLIGRAM(S): 2.5 TABLET, FILM COATED ORAL at 21:57

## 2022-08-01 RX ADMIN — Medication 1 TABLET(S): at 08:50

## 2022-08-01 RX ADMIN — Medication 500000 UNIT(S): at 19:14

## 2022-08-01 RX ADMIN — Medication 3 MILLILITER(S): at 16:57

## 2022-08-01 RX ADMIN — Medication 3 MILLILITER(S): at 04:27

## 2022-08-01 NOTE — PROGRESS NOTE ADULT - PROBLEM SELECTOR PLAN 3
Pt with hx of HIV, inconsistent use of antiretrovirals. Unable to provide medication history, does not follow w/ PCP or HIV clinic. Per outpatient pharmacy records, appears as though he was receiving Biktarvy monthly several months ago, but has not filled the prescription recently.   - f/u viral load  - f/u CD4 count  - f/u fungitell, galactomannin  - HIV team consult Pt reports hx of asthma, uses albuterol inhaler BID but ran out approximately 1 week ago. Since then, pt reports increased coughing and SOB.   - Duonebs standing q6  - Prednisone 40mg po daily  - Tessalone Perles TID prn cough - Nystatin rinse q6

## 2022-08-01 NOTE — PROGRESS NOTE ADULT - PROBLEM SELECTOR PLAN 6
Pt with Hg 8.6 on admission, down from 10.1 July 11. No active source of bleeding. Most likely 2/2 nutritional deficiency. B12 slightly low (216), normal folate, normal ferritin.   - Nutrition consult  -  consult Pt with Hg 8.6 on admission, down from 10.1 July 11. No active source of bleeding. Most likely 2/2 nutritional deficiency. B12 slightly low (216), normal folate, normal ferritin.   - Nutrition consulted, appreciate recs  - SW consult EKG reveals atrial fibrillation w/ premature ventricular or aberrant conductive complexes w/ T wave abnormalities. Trop neg. Pt not on AC, but per chart review was previously prescribed Eliquis.  - Eliquis 5mg bid

## 2022-08-01 NOTE — PROGRESS NOTE ADULT - PROBLEM SELECTOR PLAN 7
Pt reported R leg swelling to ED staff. On exam pt c/o of L leg pain and swelling. Pulses intact throughout, no swelling or erythema noted. R LE doppler negative for DVT.   - continue to monitor - Nutrition following, appreciate recs  - C/w Ensure TID Pt with Hg 8.6 on admission, down from 10.1 July 11. No active source of bleeding. Most likely 2/2 nutritional deficiency. B12 slightly low (216), normal folate, normal ferritin.   - Nutrition consulted, appreciate recs  - SW consult

## 2022-08-01 NOTE — PROGRESS NOTE ADULT - PROBLEM SELECTOR PLAN 8
F: patient tolerating PO, no IVF  E: Mg > 2, K >4  N: regular diet  DVT: lovenox 40 mg  Code: Full  Dispo: [facility] - Nutrition following, appreciate recs  - C/w Ensure TID

## 2022-08-01 NOTE — CHART NOTE - NSCHARTNOTEFT_GEN_A_CORE
HIV Consult Chart Review Note    62M w/ HIV presents s/p questionable fall w/ cough.  ?lung Ca dx    Not on ARVs.    HD stable, no hypoxia  Notes, images, labs reviewed by me.    CD4 low w/ low CD4%  VL pending - no protein gap noted.  Anemic but other cell lines are good.  No hepatosplenomegaly noted on CT or US.  Congestive hepatopathy and RA enlargement noted from possible RH failure - unclear cause  LLL solid nodule on CT, no noted GOO.  No evidence of PE/DVT      Fungitell pending  Can send off sputum PCP PCR and consider bronchoscopy.  Low concern for DON  Low concern for cryptococcal meningitis but can send cryptococcal serum Ag  No indication for GI OI screening.     Hold ARVs at this time.  Once OI screening complete will consider Biktarvy.  On Bactrim for PCP ppx.    HIV team will continue to follow. CHIEF COMPLAINT: Chest pain & cough    HPI:  62M    Outpatient HIV Provider: At [facility] (unsure of name)  Year of HIV Diagnosis:  T cell shaniqua:  Highest Viral Load:  Current ARV regimen:  ARV adherence:  Previous ARV regimens:  Hx of Past Oppurtunistic Infections:    PAST MEDICAL & SURGICAL HISTORY:  HIV (human immunodeficiency virus infection)      Asthma      No significant past surgical history          Social Hx:    Sexual History:    Sexual Activity:  Condom Use:  Number of Current Partners:  Number of Lifetime Partners:  History of STI's and Treatments:     REVIEW OF SYSTEMS:  Constitutional: [ ] fevers [ ] chills [ ] fatigue [ ] malaise [ ] myalgias [ ] arthralgias [ ] weight loss  Eyes: [ ] double vision [ ] eye pain  [ ] visual changes  ENT: [ ] sore throat [ ] odynophagia [ ] mouth pain [ ] rhinorrhea  CV: [ ] chest pain [ ] shortness of breath  [ ] edema  Respiratory:  [ ] cough [ ] sputum production [ ] wheezing [ ] shortness of breath  GI: [ ] abdominal pain [ ] nausea [ ] vomiting [ ]  constipation [ ] diarrhea  : [ ] suprapubic pain [ ] dysuria [ ] polyuria [ ] penile/vaginal discharge [ ] genital lesions  Heme/Lymph: [ ] easy bleeding [ ] swollen lymph nodes  Skin: [ ] rashes [ ] skin lesions  Neuro: [ ] headache [ ] neck tenderness [ ] focal motor weakness [ ] sensory changes [ ] paresthesias    PHYSICAL EXAM:    Vital Signs Last 24 Hrs  T(C): 36.5 (01 Aug 2022 06:09), Max: 36.5 (01 Aug 2022 06:09)  T(F): 97.7 (01 Aug 2022 06:09), Max: 97.7 (01 Aug 2022 06:09)  HR: 100 (01 Aug 2022 06:09) (99 - 100)  BP: 145/104 (01 Aug 2022 06:09) (144/87 - 145/104)  BP(mean): --  RR: 17 (01 Aug 2022 06:09) (17 - 18)  SpO2: 99% (01 Aug 2022 06:09) (98% - 99%)    Parameters below as of 01 Aug 2022 06:09  Patient On (Oxygen Delivery Method): room air        General: AO x 3, NAD, Comfortable, Pleasant, Anxious, Agitated, Ill, Frail, Cachectic, Resp distress  HEENT: PERRL/ EOMI, no scleral icterus, MMM, no JVD, no thyromegaly, good dentition, no oropharyngeal lesions, no thrush  Respiratory: CTA b/l, no wheezes, rales or rhonchi  Cardiovascular: Regular rate and rhythm, +S1 + S2, no murmurs rubs or gallops  Abdomen: Soft, NTND, normoactive bowel sounds, no rebound, no guarding, no suprapubic tenderness  : No warts, vesicles, ulcerations, genital lesions, urethral discharge  Rectal: No anal warts, external hemorrhoids, good rectal tone, prostate normal in size and consistency, Stool normal in color and consistency, blood in the vault.  Extremities: No cyanosis, no clubbing, no edema, pulses equal, no calf tenderness  Skin: No rashes, skin lesions, palmar rash, or plantar rash  Lymphatic: No cervical/supraclavicular LAD  Lymph: No lymphadenopathy, enlargement or tenderness detected in the submandibular nodes, anterior cervical nodes, posterior cervical nodes, supraclavicular node, axillary nodes, inguinal nodes  Neurological: CN II-XII grossly intact, follows commands, moves all extremities        MEDICATIONS  (STANDING):  albuterol/ipratropium for Nebulization 3 milliLiter(s) Nebulizer every 6 hours  apixaban 5 milliGRAM(s) Oral every 12 hours  nystatin    Suspension 492126 Unit(s) Oral every 6 hours  predniSONE   Tablet 40 milliGRAM(s) Oral every 24 hours  trimethoprim  160 mG/sulfamethoxazole 800 mG 1 Tablet(s) Oral every 24 hours    MEDICATIONS  (PRN):  acetaminophen     Tablet .. 650 milliGRAM(s) Oral every 6 hours PRN Temp greater or equal to 38C (100.4F), Mild Pain (1 - 3)  benzonatate 100 milliGRAM(s) Oral three times a day PRN Cough      Allergies    No Known Allergies    Intolerances      LABS:                        9.8    5.65  )-----------( 347      ( 01 Aug 2022 05:30 )             31.9                           9.8    5.65  )-----------( 347      ( 01 Aug 2022 05:30 )             31.9     Neutrophils:60.3   Bands:--   Lymphocytes:30.3   Monocytes:8.5   Eosinophils:0.0   Basophils:0.2   08-01 @ 05:30    08-01    138  |  103  |  17  ----------------------------<  107<H>  4.1   |  23  |  0.82    Ca    9.4      01 Aug 2022 05:30  Phos  3.0     08-01  Mg     1.8     08-01    TPro  7.4  /  Alb  3.5  /  TBili  0.4  /  DBili  x   /  AST  34  /  ALT  16  /  AlkPhos  144<H>  08-01          7 %  96 /uL      MICROBIOLOGY:      RADIOLOGY:          HIV Consult Chart Review Note    62M w/ HIV presents s/p questionable fall w/ cough.  ?lung Ca dx    Not on ARVs.    HD stable, no hypoxia  Notes, images, labs reviewed by me.    CD4 low w/ low CD4%  VL pending - no protein gap noted.  Anemic but other cell lines are good.  No hepatosplenomegaly noted on CT or US.  Congestive hepatopathy and RA enlargement noted from possible RH failure - unclear cause  LLL solid nodule on CT, no noted GOO.  No evidence of PE/DVT      Fungitell pending  Can send off sputum PCP PCR and consider bronchoscopy.  Low concern for DON  Low concern for cryptococcal meningitis but can send cryptococcal serum Ag  No indication for GI OI screening.     Hold ARVs at this time.  Once OI screening complete will consider Biktarvy.  On Bactrim for PCP ppx.    HIV team will continue to follow. CHIEF COMPLAINT: Chest pain, cough & ?new lung Ca    HPI: 62M PMHx HIV (denies hx OI, not adherent to Biktarvy for 3-4mths, CD4 96 (7%), pending VL), Asthma (nonadherent to Albuterol inhaler). C/o chest pain & cough x2weeks with multiple ED visits this week and last signed out AMA from [facility]. During these recent hospital visits he was told he possibly has new lung cancer. Since leaving [place] he experienced increased dizziness and LE swelling and presented to [facility] ED. EKG showed AFib w PVCs & TWA. CTPE & LE Dopplers was negative for PE/DVT, but CTPE showed dilated pulmonary arteries suggestive of pHTN c/f RHF. No lung mass on CXR. RVP negative. Abd US suggestive of congestive hepatopathy c/w RHF. VSS during admission. OI w/u pending. HIV service consulted for AIDS, medication non-adherence, p/w cough.    Outpatient HIV Provider: At [facility] (unsure of name, last saw 2 months ago)  Year of HIV Diagnosis: Unknown  T cell shaniqua: Unknown  Highest Viral Load: Unknown  Current ARV regimen: Biktarvy  ARV adherence: Nonadherent for 3-4months d/t pharmacy issues  Previous ARV regimens: Unknown  Hx of Past Oppurtunistic Infections: Denies    PAST MEDICAL & SURGICAL HISTORY:  HIV (human immunodeficiency virus infection)  Asthma  No significant past surgical history    Social Hx: Patient refused to discuss. From chart review previously undomiciled, currently residing in [facility]    Sexual History: Patient refused to discuss  Sexual Activity: Unknown  Condom Use: Unknown  Number of Current Partners: Unknown  Number of Lifetime Partners: Unknown  History of STI's and Treatments: Unknown    REVIEW OF SYSTEMS: Patient refused to answer  Constitutional: [ ] fevers [ ] chills [ ] fatigue [ ] malaise [ ] myalgias [ ] arthralgias [ ] weight loss  Eyes: [ ] double vision [ ] eye pain  [ ] visual changes  ENT: [ ] sore throat [ ] odynophagia [ ] mouth pain [ ] rhinorrhea  CV: [ ] chest pain [ ] shortness of breath  [ ] edema  Respiratory:  [ ] cough [ ] sputum production [ ] wheezing [ ] shortness of breath  GI: [ ] abdominal pain [ ] nausea [ ] vomiting [ ]  constipation [ ] diarrhea  : [ ] suprapubic pain [ ] dysuria [ ] polyuria [ ] penile/vaginal discharge [ ] genital lesions  Heme/Lymph: [ ] easy bleeding [ ] swollen lymph nodes  Skin: [ ] rashes [ ] skin lesions  Neuro: [ ] headache [ ] neck tenderness [ ] focal motor weakness [ ] sensory changes [ ] paresthesias    Vital Signs Last 24 Hrs  T(C): 36.5 (01 Aug 2022 06:09), Max: 36.5 (01 Aug 2022 06:09)  T(F): 97.7 (01 Aug 2022 06:09), Max: 97.7 (01 Aug 2022 06:09)  HR: 100 (01 Aug 2022 06:09) (99 - 100)  BP: 145/104 (01 Aug 2022 06:09) (144/87 - 145/104)  BP(mean): --  RR: 17 (01 Aug 2022 06:09) (17 - 18)  SpO2: 99% (01 Aug 2022 06:09) (98% - 99%)    Parameters below as of 01 Aug 2022 06:09  Patient On (Oxygen Delivery Method): room air    PHYSICAL EXAM:  General: AOx3, NAD, Agitated  HEENT: PERRL/ EOMI, no scleral icterus, MMM, no JVD, no thyromegaly, poor dentition, no oropharyngeal lesions, no thrush  Respiratory: CTA b/l, no wheezes, rales or rhonchi  Cardiovascular: Regular rate and rhythm, +S1 + S2, no murmurs rubs or gallops  Abdomen: Soft, NTND, normoactive bowel sounds, no rebound, no guarding, no suprapubic tenderness  Extremities: No cyanosis, no clubbing, no edema, pulses equal, no calf tenderness  Skin: No rashes, skin lesions, palmar rash, or plantar rash  Lymphatic: No cervical/supraclavicular LAD  Lymph: No lymphadenopathy, enlargement or tenderness detected in the submandibular nodes, anterior cervical nodes, posterior cervical nodes, supraclavicular node, axillary nodes, inguinal nodes  Neurological: CN II-XII grossly intact, follows commands, moves all extremities    MEDICATIONS  (STANDING):  albuterol/ipratropium for Nebulization 3 milliLiter(s) Nebulizer every 6 hours  apixaban 5 milliGRAM(s) Oral every 12 hours  nystatin    Suspension 046645 Unit(s) Oral every 6 hours  predniSONE   Tablet 40 milliGRAM(s) Oral every 24 hours  trimethoprim  160 mG/sulfamethoxazole 800 mG 1 Tablet(s) Oral every 24 hours    MEDICATIONS  (PRN):  acetaminophen     Tablet .. 650 milliGRAM(s) Oral every 6 hours PRN Temp greater or equal to 38C (100.4F), Mild Pain (1 - 3)  benzonatate 100 milliGRAM(s) Oral three times a day PRN Cough    No Known Allergies or Intolerances    LABS:                        9.8    5.65  )-----------( 347      ( 01 Aug 2022 05:30 )             31.9                           9.8    5.65  )-----------( 347      ( 01 Aug 2022 05:30 )             31.9     Neutrophils:60.3   Bands:--   Lymphocytes:30.3   Monocytes:8.5   Eosinophils:0.0   Basophils:0.2   08-01 @ 05:30    08-01    138  |  103  |  17  ----------------------------<  107<H>  4.1   |  23  |  0.82    Ca    9.4      01 Aug 2022 05:30  Phos  3.0     08-01  Mg     1.8     08-01    TPro  7.4  /  Alb  3.5  /  TBili  0.4  /  DBili  x   /  AST  34  /  ALT  16  /  AlkPhos  144<H>  08-01  7 %  96 /uL    ASSESSMENT & PLAN:  62M PMHx HIV (denies hx OI, not adherent to Biktarvy for 3-4mths, CD4 96 (7%), pending VL), Asthma (nonadherent to Albuterol inhaler). P/w multiple ED visits this week for cough & chest pain x2weeks w/ increased dizziness and LE swelling and was recently told he has new lung cancer. CTPE showed 5.8 x 5.9 mm irregular solid nodule posterior segment left lower lobe & dilated pulmonary arteries suggestive of pHTN c/f RHF. OI w/u pending. HIV service consulted for AIDS, medication non-adherence, p/w cough.    #HIV/AIDS  Denies hx OI, previously undomiciled, not adherent to Biktarvy for 3-4mths d/t pharmacy issues, pt unaware of CD4/VL, last saw PCP at [facility] 2 months ago. At this admission p/w cough, CD4 96 (7%), pending VL. CTPE: 5.8 x 5.9 mm irregular solid nodule posterior segment left lower lobe. VSS on RA.  - c/w Nystatin for thrush & Bactrim 800mg q24h for PCP ppx  - f/u PCP PCR  - If OI w/u negative, resume home Biktarvy      HIV Consult Chart Review Note    62M w/ HIV presents s/p questionable fall w/ cough.  ?lung Ca dx    Not on ARVs.    HD stable, no hypoxia  Notes, images, labs reviewed by me.    CD4 low w/ low CD4%  VL pending - no protein gap noted.  Anemic but other cell lines are good.  No hepatosplenomegaly noted on CT or US.  Congestive hepatopathy and RA enlargement noted from possible RH failure - unclear cause  LLL solid nodule on CT, no noted GOO.  No evidence of PE/DVT      Fungitell pending  Can send off sputum PCP PCR and consider bronchoscopy.  Low concern for DON  Low concern for cryptococcal meningitis but can send cryptococcal serum Ag  No indication for GI OI screening.     Hold ARVs at this time.  Once OI screening complete will consider Biktarvy.  On Bactrim for PCP ppx.    HIV team will continue to follow. CHIEF COMPLAINT: Chest pain, cough & ?new lung Ca    HPI: 62M PMHx HIV (denies hx OI, not adherent to Biktarvy for 3-4mths, CD4 96 (7%), pending VL), Asthma (nonadherent to Albuterol inhaler). C/o chest pain & cough x2weeks with multiple ED visits this week and last signed out AMA from [facility]. During these recent hospital visits he was told he possibly has new lung cancer. Since leaving [place] he experienced increased dizziness and LE swelling and presented to [facility] ED. EKG showed AFib w PVCs & TWA. CTPE & LE Dopplers was negative for PE/DVT, but CTPE showed dilated pulmonary arteries suggestive of pHTN c/f RHF. No lung mass on CXR. RVP negative. Abd US suggestive of congestive hepatopathy c/w RHF. VSS during admission. OI w/u pending. HIV service consulted for AIDS, medication non-adherence, p/w cough.    Outpatient HIV Provider: At [facility] (unsure of name, last saw 2 months ago)  Year of HIV Diagnosis: Unknown  T cell shaniqua: Unknown  Highest Viral Load: Unknown  Current ARV regimen: Biktarvy  ARV adherence: Nonadherent for 3-4months d/t pharmacy issues  Previous ARV regimens: Unknown  Hx of Past Oppurtunistic Infections: Denies    PAST MEDICAL & SURGICAL HISTORY:  HIV (human immunodeficiency virus infection)  Asthma  No significant past surgical history    Social Hx: Patient refused to discuss. From chart review previously undomiciled, currently residing in [facility]    Sexual History: Patient refused to discuss  Sexual Activity: Unknown  Condom Use: Unknown  Number of Current Partners: Unknown  Number of Lifetime Partners: Unknown  History of STI's and Treatments: Unknown    REVIEW OF SYSTEMS: Patient refused to answer  Constitutional: [ ] fevers [ ] chills [ ] fatigue [ ] malaise [ ] myalgias [ ] arthralgias [ ] weight loss  Eyes: [ ] double vision [ ] eye pain  [ ] visual changes  ENT: [ ] sore throat [ ] odynophagia [ ] mouth pain [ ] rhinorrhea  CV: [ ] chest pain [ ] shortness of breath  [ ] edema  Respiratory:  [ ] cough [ ] sputum production [ ] wheezing [ ] shortness of breath  GI: [ ] abdominal pain [ ] nausea [ ] vomiting [ ]  constipation [ ] diarrhea  : [ ] suprapubic pain [ ] dysuria [ ] polyuria [ ] penile/vaginal discharge [ ] genital lesions  Heme/Lymph: [ ] easy bleeding [ ] swollen lymph nodes  Skin: [ ] rashes [ ] skin lesions  Neuro: [ ] headache [ ] neck tenderness [ ] focal motor weakness [ ] sensory changes [ ] paresthesias    Vital Signs Last 24 Hrs  T(C): 36.5 (01 Aug 2022 06:09), Max: 36.5 (01 Aug 2022 06:09)  T(F): 97.7 (01 Aug 2022 06:09), Max: 97.7 (01 Aug 2022 06:09)  HR: 100 (01 Aug 2022 06:09) (99 - 100)  BP: 145/104 (01 Aug 2022 06:09) (144/87 - 145/104)  BP(mean): --  RR: 17 (01 Aug 2022 06:09) (17 - 18)  SpO2: 99% (01 Aug 2022 06:09) (98% - 99%)    Parameters below as of 01 Aug 2022 06:09  Patient On (Oxygen Delivery Method): room air    PHYSICAL EXAM:  General: AOx3, NAD, Agitated  HEENT: PERRL/ EOMI, no scleral icterus, MMM, no JVD, no thyromegaly, poor dentition, no oropharyngeal lesions, no thrush  Respiratory: CTA b/l, no wheezes, rales or rhonchi  Cardiovascular: Regular rate and rhythm, +S1 + S2, no murmurs rubs or gallops  Abdomen: Soft, NTND, normoactive bowel sounds, no rebound, no guarding, no suprapubic tenderness  Extremities: No cyanosis, no clubbing, no edema, pulses equal, no calf tenderness  Skin: No rashes, skin lesions, palmar rash, or plantar rash  Lymphatic: No cervical/supraclavicular LAD  Lymph: No lymphadenopathy, enlargement or tenderness detected in the submandibular nodes, anterior cervical nodes, posterior cervical nodes, supraclavicular node, axillary nodes, inguinal nodes  Neurological: CN II-XII grossly intact, follows commands, moves all extremities    MEDICATIONS  (STANDING):  albuterol/ipratropium for Nebulization 3 milliLiter(s) Nebulizer every 6 hours  apixaban 5 milliGRAM(s) Oral every 12 hours  nystatin    Suspension 304390 Unit(s) Oral every 6 hours  predniSONE   Tablet 40 milliGRAM(s) Oral every 24 hours  trimethoprim  160 mG/sulfamethoxazole 800 mG 1 Tablet(s) Oral every 24 hours    MEDICATIONS  (PRN):  acetaminophen     Tablet .. 650 milliGRAM(s) Oral every 6 hours PRN Temp greater or equal to 38C (100.4F), Mild Pain (1 - 3)  benzonatate 100 milliGRAM(s) Oral three times a day PRN Cough    No Known Allergies or Intolerances    LABS:                        9.8    5.65  )-----------( 347      ( 01 Aug 2022 05:30 )             31.9                           9.8    5.65  )-----------( 347      ( 01 Aug 2022 05:30 )             31.9     Neutrophils:60.3   Bands:--   Lymphocytes:30.3   Monocytes:8.5   Eosinophils:0.0   Basophils:0.2   08-01 @ 05:30    08-01    138  |  103  |  17  ----------------------------<  107<H>  4.1   |  23  |  0.82    Ca    9.4      01 Aug 2022 05:30  Phos  3.0     08-01  Mg     1.8     08-01    TPro  7.4  /  Alb  3.5  /  TBili  0.4  /  DBili  x   /  AST  34  /  ALT  16  /  AlkPhos  144<H>  08-01  7 %  96 /uL    ASSESSMENT & PLAN:  62M PMHx HIV (denies hx OI, not adherent to Biktarvy for 3-4mths, CD4 96 (7%), pending VL), Asthma (nonadherent to Albuterol inhaler). P/w multiple ED visits this week for cough & chest pain x2weeks w/ increased dizziness and LE swelling and was recently told he has new lung cancer. CTPE showed 5.8 x 5.9 mm irregular solid nodule posterior segment left lower lobe & dilated pulmonary arteries suggestive of pHTN c/f RHF. OI w/u pending. HIV service consulted for AIDS, medication non-adherence, p/w cough.    #HIV/AIDS  Denies hx OI, previously undomiciled, not adherent to Biktarvy for 3-4mths d/t pharmacy issues, pt unaware of CD4/VL, last saw PCP at [facility] 2 months ago. At this admission p/w cough, CD4 96 (7%), pending VL. CTPE: 5.8 x 5.9 mm irregular solid nodule posterior segment left lower lobe. VSS on RA.  - c/w PO Nystatin 500kU q6h for thrush & PO Bactrim 800mg q24h for PCP ppx  - r/o OI: obtain sputum PCP PCR, f/u Cryptococcal Ag & Fungitell  - If OI w/u negative, resume home Biktarvy      HIV Consult Chart Review Note    62M w/ HIV presents s/p questionable fall w/ cough.  ?lung Ca dx    Not on ARVs.    HD stable, no hypoxia  Notes, images, labs reviewed by me.    CD4 low w/ low CD4%  VL pending - no protein gap noted.  Anemic but other cell lines are good.  No hepatosplenomegaly noted on CT or US.  Congestive hepatopathy and RA enlargement noted from possible RH failure - unclear cause  LLL solid nodule on CT, no noted GOO.  No evidence of PE/DVT      Fungitell pending  Can send off sputum PCP PCR and consider bronchoscopy.  Low concern for DON  Low concern for cryptococcal meningitis but can send cryptococcal serum Ag  No indication for GI OI screening.     Hold ARVs at this time.  Once OI screening complete will consider Biktarvy.  On Bactrim for PCP ppx.    HIV team will continue to follow. CHIEF COMPLAINT: Chest pain, cough & ?new lung Ca    HPI: 62M PMHx HIV (denies hx OI, not adherent to Biktarvy for 3-4mths, CD4 96 (7%), pending VL), Asthma (nonadherent to Albuterol inhaler). C/o chest pain & cough x2weeks with multiple ED visits this week and last signed out AMA from [facility]. During these recent hospital visits he was told he possibly has new lung cancer. Since leaving [place] he experienced increased dizziness and LE swelling and presented to [facility] ED. EKG showed AFib w PVCs & TWA. CTPE & LE Dopplers was negative for PE/DVT, but CTPE showed 5.8 x 5.9 mm irregular solid nodule posterior segment left lower lobe & dilated pulmonary arteries suggestive of pHTN c/f RHF. No lung mass on CXR. RVP negative. Abd US suggestive of congestive hepatopathy c/w RHF. VSS during admission. OI w/u pending. HIV service consulted for AIDS, medication non-adherence, p/w cough.    Outpatient HIV Provider: At [facility] (unsure of name, last saw 2 months ago)  Year of HIV Diagnosis: Unknown  T cell shaniqua: Unknown  Highest Viral Load: Unknown  Current ARV regimen: Biktarvy  ARV adherence: Nonadherent for 3-4months d/t pharmacy issues  Previous ARV regimens: Unknown  Hx of Past Oppurtunistic Infections: Denies    PAST MEDICAL & SURGICAL HISTORY:  HIV (human immunodeficiency virus infection)  Asthma  No significant past surgical history    Social Hx: Patient refused to discuss. From chart review previously undomiciled, currently residing in [facility]    Sexual History: Patient refused to discuss  Sexual Activity: Unknown  Condom Use: Unknown  Number of Current Partners: Unknown  Number of Lifetime Partners: Unknown  History of STI's and Treatments: Unknown    REVIEW OF SYSTEMS: Patient refused to answer  Constitutional: [ ] fevers [ ] chills [ ] fatigue [ ] malaise [ ] myalgias [ ] arthralgias [ ] weight loss  Eyes: [ ] double vision [ ] eye pain  [ ] visual changes  ENT: [ ] sore throat [ ] odynophagia [ ] mouth pain [ ] rhinorrhea  CV: [ ] chest pain [ ] shortness of breath  [ ] edema  Respiratory:  [ ] cough [ ] sputum production [ ] wheezing [ ] shortness of breath  GI: [ ] abdominal pain [ ] nausea [ ] vomiting [ ]  constipation [ ] diarrhea  : [ ] suprapubic pain [ ] dysuria [ ] polyuria [ ] penile/vaginal discharge [ ] genital lesions  Heme/Lymph: [ ] easy bleeding [ ] swollen lymph nodes  Skin: [ ] rashes [ ] skin lesions  Neuro: [ ] headache [ ] neck tenderness [ ] focal motor weakness [ ] sensory changes [ ] paresthesias    Vital Signs Last 24 Hrs  T(C): 36.5 (01 Aug 2022 06:09), Max: 36.5 (01 Aug 2022 06:09)  T(F): 97.7 (01 Aug 2022 06:09), Max: 97.7 (01 Aug 2022 06:09)  HR: 100 (01 Aug 2022 06:09) (99 - 100)  BP: 145/104 (01 Aug 2022 06:09) (144/87 - 145/104)  BP(mean): --  RR: 17 (01 Aug 2022 06:09) (17 - 18)  SpO2: 99% (01 Aug 2022 06:09) (98% - 99%)    Parameters below as of 01 Aug 2022 06:09  Patient On (Oxygen Delivery Method): room air    PHYSICAL EXAM:  General: AOx3, NAD, Agitated  HEENT: PERRL/ EOMI, no scleral icterus, MMM, no JVD, no thyromegaly, poor dentition, no oropharyngeal lesions, no thrush  Respiratory: CTA b/l, no wheezes, rales or rhonchi  Cardiovascular: Regular rate and rhythm, +S1 + S2, no murmurs rubs or gallops  Abdomen: Soft, NTND, normoactive bowel sounds, no rebound, no guarding, no suprapubic tenderness  Extremities: No cyanosis, no clubbing, no edema, pulses equal, no calf tenderness  Skin: No rashes, skin lesions, palmar rash, or plantar rash  Lymphatic: No cervical/supraclavicular LAD  Lymph: No lymphadenopathy, enlargement or tenderness detected in the submandibular nodes, anterior cervical nodes, posterior cervical nodes, supraclavicular node, axillary nodes, inguinal nodes  Neurological: CN II-XII grossly intact, follows commands, moves all extremities    MEDICATIONS  (STANDING):  albuterol/ipratropium for Nebulization 3 milliLiter(s) Nebulizer every 6 hours  apixaban 5 milliGRAM(s) Oral every 12 hours  nystatin    Suspension 710294 Unit(s) Oral every 6 hours  predniSONE   Tablet 40 milliGRAM(s) Oral every 24 hours  trimethoprim  160 mG/sulfamethoxazole 800 mG 1 Tablet(s) Oral every 24 hours    MEDICATIONS  (PRN):  acetaminophen     Tablet .. 650 milliGRAM(s) Oral every 6 hours PRN Temp greater or equal to 38C (100.4F), Mild Pain (1 - 3)  benzonatate 100 milliGRAM(s) Oral three times a day PRN Cough    No Known Allergies or Intolerances    LABS:                        9.8    5.65  )-----------( 347      ( 01 Aug 2022 05:30 )             31.9                           9.8    5.65  )-----------( 347      ( 01 Aug 2022 05:30 )             31.9     Neutrophils:60.3   Bands:--   Lymphocytes:30.3   Monocytes:8.5   Eosinophils:0.0   Basophils:0.2   08-01 @ 05:30    08-01    138  |  103  |  17  ----------------------------<  107<H>  4.1   |  23  |  0.82    Ca    9.4      01 Aug 2022 05:30  Phos  3.0     08-01  Mg     1.8     08-01    TPro  7.4  /  Alb  3.5  /  TBili  0.4  /  DBili  x   /  AST  34  /  ALT  16  /  AlkPhos  144<H>  08-01  7 %  96 /uL    ASSESSMENT & PLAN:  62M PMHx HIV (denies hx OI, not adherent to Biktarvy for 3-4mths, CD4 96 (7%), pending VL), Asthma (nonadherent to Albuterol inhaler). P/w multiple ED visits this week for cough & chest pain x2weeks w/ increased dizziness and LE swelling and was recently told he has new lung cancer. CTPE showed 5.8 x 5.9 mm irregular solid nodule posterior segment left lower lobe & dilated pulmonary arteries suggestive of pHTN c/f RHF. OI w/u pending. HIV service consulted for AIDS, medication non-adherence, p/w cough.    #HIV/AIDS  Denies hx OI, previously undomiciled, not adherent to Biktarvy for 3-4mths d/t pharmacy issues, pt unaware of CD4/VL, last saw PCP at [facility] 2 months ago. At this admission p/w cough, CD4 96 (7%), pending VL. CTPE: 5.8 x 5.9 mm irregular solid nodule posterior segment left lower lobe. VSS on RA.  - c/w PO Nystatin 500kU q6h for thrush & PO Bactrim 800mg q24h for PCP ppx  - r/o OI: obtain sputum PCP PCR, f/u Cryptococcal Ag & Fungitell  - If OI w/u negative, resume home Biktarvy      HIV Consult Chart Review Note    62M w/ HIV presents s/p questionable fall w/ cough.  ?lung Ca dx    Not on ARVs.    HD stable, no hypoxia  Notes, images, labs reviewed by me.    CD4 low w/ low CD4%  VL pending - no protein gap noted.  Anemic but other cell lines are good.  No hepatosplenomegaly noted on CT or US.  Congestive hepatopathy and RA enlargement noted from possible RH failure - unclear cause  LLL solid nodule on CT, no noted GOO.  No evidence of PE/DVT      Fungitell pending  Can send off sputum PCP PCR and consider bronchoscopy.  Low concern for DON  Low concern for cryptococcal meningitis but can send cryptococcal serum Ag  No indication for GI OI screening.     Hold ARVs at this time.  Once OI screening complete will consider Biktarvy.  On Bactrim for PCP ppx.    HIV team will continue to follow. CHIEF COMPLAINT: Chest pain, cough & ?new lung Ca    HPI: 62M PMHx HIV (denies hx OI, not adherent to Biktarvy for 3-4mths, CD4 96 (7%), pending VL), Asthma (nonadherent to Albuterol inhaler). C/o chest pain & cough x2weeks with multiple ED visits this week and last signed out AMA from [facility]. During these recent hospital visits he was told he possibly has new lung cancer. Since leaving [place] he experienced increased dizziness and LE swelling and presented to [facility] ED. EKG showed AFib w PVCs & TWA. CTPE & LE Dopplers was negative for PE/DVT, but CTPE showed 5.8 x 5.9 mm irregular solid nodule posterior segment left lower lobe & dilated pulmonary arteries suggestive of pHTN c/f RHF. No lung mass on CXR. RVP negative. Abd US suggestive of congestive hepatopathy c/w RHF. VSS during admission. OI w/u pending. HIV service consulted for AIDS, medication non-adherence, p/w cough.    Outpatient HIV Provider: At [facility] (unsure of name, last saw 2 months ago)  Year of HIV Diagnosis: Unknown  T cell shaniqua: Unknown  Highest Viral Load: Unknown  Current ARV regimen: Biktarvy  ARV adherence: Nonadherent for 3-4months d/t pharmacy issues  Previous ARV regimens: Unknown  Hx of Past Oppurtunistic Infections: Denies    PAST MEDICAL & SURGICAL HISTORY:  HIV (human immunodeficiency virus infection)  Asthma  No significant past surgical history    Social Hx: Patient refused to discuss. From chart review previously undomiciled, currently residing in [facility]    Sexual History: Patient refused to discuss  Sexual Activity: Unknown  Condom Use: Unknown  Number of Current Partners: Unknown  Number of Lifetime Partners: Unknown  History of STI's and Treatments: Unknown    REVIEW OF SYSTEMS: Patient refused to answer  Constitutional: [ ] fevers [ ] chills [ ] fatigue [ ] malaise [ ] myalgias [ ] arthralgias [ ] weight loss  Eyes: [ ] double vision [ ] eye pain  [ ] visual changes  ENT: [ ] sore throat [ ] odynophagia [ ] mouth pain [ ] rhinorrhea  CV: [ ] chest pain [ ] shortness of breath  [ ] edema  Respiratory:  [ ] cough [ ] sputum production [ ] wheezing [ ] shortness of breath  GI: [ ] abdominal pain [ ] nausea [ ] vomiting [ ]  constipation [ ] diarrhea  : [ ] suprapubic pain [ ] dysuria [ ] polyuria [ ] penile/vaginal discharge [ ] genital lesions  Heme/Lymph: [ ] easy bleeding [ ] swollen lymph nodes  Skin: [ ] rashes [ ] skin lesions  Neuro: [ ] headache [ ] neck tenderness [ ] focal motor weakness [ ] sensory changes [ ] paresthesias    Vital Signs Last 24 Hrs  T(C): 36.5 (01 Aug 2022 06:09), Max: 36.5 (01 Aug 2022 06:09)  T(F): 97.7 (01 Aug 2022 06:09), Max: 97.7 (01 Aug 2022 06:09)  HR: 100 (01 Aug 2022 06:09) (99 - 100)  BP: 145/104 (01 Aug 2022 06:09) (144/87 - 145/104)  BP(mean): --  RR: 17 (01 Aug 2022 06:09) (17 - 18)  SpO2: 99% (01 Aug 2022 06:09) (98% - 99%)    Parameters below as of 01 Aug 2022 06:09  Patient On (Oxygen Delivery Method): room air    PHYSICAL EXAM:  General: AOx3, NAD, Agitated  HEENT: PERRL/ EOMI, no scleral icterus, MMM, no JVD, no thyromegaly, poor dentition, no oropharyngeal lesions, no thrush  Respiratory: CTA b/l, no wheezes, rales or rhonchi  Cardiovascular: Regular rate and rhythm, +S1 + S2, no murmurs rubs or gallops  Abdomen: Soft, NTND, normoactive bowel sounds, no rebound, no guarding, no suprapubic tenderness  Extremities: No cyanosis, no clubbing, no edema, pulses equal, no calf tenderness  Skin: No rashes, skin lesions, palmar rash, or plantar rash  Lymphatic: No cervical/supraclavicular LAD  Lymph: No lymphadenopathy, enlargement or tenderness detected in the submandibular nodes, anterior cervical nodes, posterior cervical nodes, supraclavicular node, axillary nodes, inguinal nodes  Neurological: CN II-XII grossly intact, follows commands, moves all extremities    MEDICATIONS  (STANDING):  albuterol/ipratropium for Nebulization 3 milliLiter(s) Nebulizer every 6 hours  apixaban 5 milliGRAM(s) Oral every 12 hours  nystatin    Suspension 942351 Unit(s) Oral every 6 hours  predniSONE   Tablet 40 milliGRAM(s) Oral every 24 hours  trimethoprim  160 mG/sulfamethoxazole 800 mG 1 Tablet(s) Oral every 24 hours    MEDICATIONS  (PRN):  acetaminophen     Tablet .. 650 milliGRAM(s) Oral every 6 hours PRN Temp greater or equal to 38C (100.4F), Mild Pain (1 - 3)  benzonatate 100 milliGRAM(s) Oral three times a day PRN Cough    No Known Allergies or Intolerances    LABS:                        9.8    5.65  )-----------( 347      ( 01 Aug 2022 05:30 )             31.9                           9.8    5.65  )-----------( 347      ( 01 Aug 2022 05:30 )             31.9     Neutrophils:60.3   Bands:--   Lymphocytes:30.3   Monocytes:8.5   Eosinophils:0.0   Basophils:0.2   08-01 @ 05:30    08-01    138  |  103  |  17  ----------------------------<  107<H>  4.1   |  23  |  0.82    Ca    9.4      01 Aug 2022 05:30  Phos  3.0     08-01  Mg     1.8     08-01    TPro  7.4  /  Alb  3.5  /  TBili  0.4  /  DBili  x   /  AST  34  /  ALT  16  /  AlkPhos  144<H>  08-01  7 %  96 /uL    ASSESSMENT & PLAN:  62M PMHx HIV (denies hx OI, not adherent to Biktarvy for 3-4mths, CD4 96 (7%), pending VL), Asthma (nonadherent to Albuterol inhaler). P/w multiple ED visits this week for cough & chest pain x2weeks w/ increased dizziness and LE swelling and was recently told he has new lung cancer. CTPE showed 5.8 x 5.9 mm irregular solid nodule posterior segment left lower lobe & dilated pulmonary arteries suggestive of pHTN c/f RHF. OI w/u pending. HIV service consulted for AIDS, medication non-adherence, p/w cough.    #HIV/AIDS  Denies hx OI, previously undomiciled, not adherent to Biktarvy for 3-4mths d/t pharmacy issues, pt unaware of CD4/VL, last saw PCP at [facility] 2 months ago. At this admission p/w cough, CD4 96 (7%), pending VL. CTPE: 5.8 x 5.9 mm irregular solid nodule posterior segment left lower lobe. VSS on RA.  - c/w PO Nystatin 500kU q6h for thrush & PO Bactrim 800mg q24h for PCP ppx  - r/o OI: obtain sputum PCP PCR, f/u Cryptococcal Ag & Fungitell  - If OI w/u negative, resume home Biktarvy      Attending attestation:  Notes, images, labs reviewed by me.    CD4 low w/ low CD4%  VL pending - no protein gap noted.  Anemic but other cell lines are good.  No hepatosplenomegaly noted on CT or US.  Congestive hepatopathy and RA enlargement noted from possible RH failure - unclear cause  LLL solid nodule on CT, no noted GOO.  No evidence of PE/DVT      Fungitell pending  Can send off sputum PCP PCR and consider bronchoscopy.  Low concern for DON  Low concern for cryptococcal meningitis but can send cryptococcal serum Ag  No indication for GI OI screening.     Hold ARVs at this time.  Once OI screening complete will consider Biktarvy.  On Bactrim for PCP ppx.    HIV team will continue to follow.

## 2022-08-01 NOTE — PROGRESS NOTE ADULT - PROBLEM SELECTOR PLAN 4
Elevated alk phos 132 up from 96. Elevated GGT. Distended abdomen, no rebound or guarding.  RUQ US: Signs of right heart failure, including dilated inferior vena cava and hepatic veins, small ascites, and gallbladder wall thickening. This is likely causing a congestive hepatopathy, which may account for the abnormal liver function tests.  - TTE? Elevated alk phos 132 up from 96. Elevated GGT. Distended abdomen, no rebound or guarding.  RUQ US: Signs of right heart failure, including dilated inferior vena cava and hepatic veins, small ascites, and gallbladder wall thickening. This is likely causing a congestive hepatopathy, which may account for the abnormal liver function tests.  - F/u TTE Pt reports hx of asthma, uses albuterol inhaler BID but ran out approximately 1 week ago. Since then, pt reports increased coughing and SOB.   - Duonebs standing q6  - Prednisone 40mg po daily  - Tessalone Perles TID prn cough

## 2022-08-01 NOTE — PROGRESS NOTE ADULT - PROBLEM SELECTOR PLAN 1
Pt p/w chest pain associated w/ dizziness and cough for 5 days. Patient is a poor historian. Inconsistent reporting of lung cancer diagnosis at [facility] EDs this week. CTA chest negative for PE or masses. COVID negative. Unvaccinated. Troponin and D-dimer wnl. Saturating 96%O2 on RA. Pt resting comfortably. EKG showing atrial fibrillation w/ premature ventricular or aberrant conductive complexes w/ T wave abnormalities. Suspect musculoskeletal origin, pain reproducible on exam and associated with increased coughing for the last week.   - Management of asthma exacerbation as below Pt p/w chest pain associated w/ dizziness and cough for 5 days. CTA chest negative for PE or masses. COVID negative. Unvaccinated. Troponin and D-dimer wnl. EKG showing atrial fibrillation. DDx: costochondritis 2/2 coughing, vs high suspicion for PJP pneumonia given pt's HIV status/noncompliant w HAART  - tylenol prn pain   - PJP ppx as below

## 2022-08-01 NOTE — PROGRESS NOTE ADULT - PROBLEM SELECTOR PLAN 5
EKG reveals atrial fibrillation w/ premature ventricular or aberrant conductive complexes w/ T wave abnormalities. Trop neg. Pt not on AC, but per chart review was previously prescribed Eliquis.  - F/u repeat EKG  - Eliquis 5mg bid EKG reveals atrial fibrillation w/ premature ventricular or aberrant conductive complexes w/ T wave abnormalities. Trop neg. Pt not on AC, but per chart review was previously prescribed Eliquis.  - Eliquis 5mg bid Elevated alk phos 132 up from 96. Elevated GGT. Distended abdomen, no rebound or guarding.  RUQ US: Signs of right heart failure, including dilated inferior vena cava and hepatic veins, small ascites, and gallbladder wall thickening. This is likely causing a congestive hepatopathy, which may account for the abnormal liver function tests.  - F/u TTE

## 2022-08-01 NOTE — PROGRESS NOTE ADULT - SUBJECTIVE AND OBJECTIVE BOX
**INCOMPLETE NOTE    OVERNIGHT EVENTS:    SUBJECTIVE:  Patient seen and examined at bedside.    Vital Signs Last 12 Hrs  T(F): 97.7 (08-01-22 @ 06:09), Max: 97.7 (08-01-22 @ 06:09)  HR: 100 (08-01-22 @ 06:09) (100 - 100)  BP: 145/104 (08-01-22 @ 06:09) (145/104 - 145/104)  BP(mean): --  RR: 17 (08-01-22 @ 06:09) (17 - 17)  SpO2: 99% (08-01-22 @ 06:09) (99% - 99%)  I&O's Summary      PHYSICAL EXAM:  Constitutional: NAD, comfortable in bed.  HEENT: NC/AT, PERRLA, EOMI, no conjunctival pallor or scleral icterus, MMM  Neck: Supple, no JVD  Respiratory: CTA B/L. No w/r/r.   Cardiovascular: RRR, normal S1 and S2, no m/r/g.   Gastrointestinal: +BS, soft NTND, no guarding or rebound tenderness, no palpable masses   Extremities: wwp; no cyanosis, clubbing or edema.   Vascular: Pulses equal and strong throughout.   Neurological: AAOx3, no CN deficits, strength and sensation intact throughout.   Skin: No gross skin abnormalities or rashes        LABS:                        9.8    5.65  )-----------( 347      ( 01 Aug 2022 05:30 )             31.9     08-01    138  |  103  |  17  ----------------------------<  107<H>  4.1   |  23  |  0.82    Ca    9.4      01 Aug 2022 05:30  Phos  3.0     08-01  Mg     1.8     08-01    TPro  7.4  /  Alb  3.5  /  TBili  0.4  /  DBili  x   /  AST  34  /  ALT  16  /  AlkPhos  144<H>  08-01            RADIOLOGY & ADDITIONAL TESTS:    MEDICATIONS  (STANDING):  albuterol/ipratropium for Nebulization 3 milliLiter(s) Nebulizer every 6 hours  apixaban 5 milliGRAM(s) Oral every 12 hours  magnesium sulfate  IVPB 2 Gram(s) IV Intermittent once  nystatin    Suspension 755664 Unit(s) Oral every 6 hours  predniSONE   Tablet 40 milliGRAM(s) Oral every 24 hours  trimethoprim  160 mG/sulfamethoxazole 800 mG 1 Tablet(s) Oral every 24 hours    MEDICATIONS  (PRN):  acetaminophen     Tablet .. 650 milliGRAM(s) Oral every 6 hours PRN Temp greater or equal to 38C (100.4F), Mild Pain (1 - 3)  benzonatate 100 milliGRAM(s) Oral three times a day PRN Cough   Hospital Course: Pt is a 63yo w/ PMH HIV (non compliant w/ medications) asthma (non compliant w/ inhaler) p/w chest pain, cough and dizziness since Monday. Pt is a poor historian, previously undomiciled, currently residing in [facility]. He reports possible fall today from feeling dizzy prior to presenting to ED. Pt reports a history of multiple ED admissions in the past week for these symptoms and was informed of new lung cancer diagnosis, however no mention of lung CA can be found on chart review. CT Angio chest negative for PE or lung masses. Currently not experiencing chest pain or cough but c/o of weakness, dizziness which he attributes to not eating in 3 days. Pt history and exam interrupted several times pt requesting food + Ensure. EKG showed atrial fibrillation.     OVERNIGHT EVENTS: [place]    SUBJECTIVE:  Patient seen and examined at bedside. Pt reports tooth/gum ache as well as mild headache located behind his R eye. He also reports back itching.     All other ROS negative unless otherwise indicated above.     Vital Signs Last 12 Hrs  T(F): 97.7 (08-01-22 @ 06:09), Max: 97.7 (08-01-22 @ 06:09)  HR: 100 (08-01-22 @ 06:09) (100 - 100)  BP: 145/104 (08-01-22 @ 06:09) (145/104 - 145/104)  BP(mean): --  RR: 17 (08-01-22 @ 06:09) (17 - 17)  SpO2: 99% (08-01-22 @ 06:09) (99% - 99%)  I&O's Summary      PHYSICAL EXAM:  Constitutional: Frail, NAD, comfortable in bed.  HEENT: NC/AT, EOMI, no conjunctival pallor or scleral icterus, MMM, small white plaques on oral mucosa, poor dentition   Neck: Supple, no JVD  Respiratory: CTA B/L. No w/r/r.   Cardiovascular: irregular rhythm, regular rate, normal S1 and S2, no m/r/g.   Gastrointestinal: slightly distended and firm, nontender, no guarding or rebound tenderness, no palpable masses   Extremities: wwp; no cyanosis, clubbing or edema.   Neurological: AAOx3, no CN deficits, strength and sensation intact throughout.   Skin: No gross skin abnormalities or rashes        LABS:                        9.8    5.65  )-----------( 347      ( 01 Aug 2022 05:30 )             31.9     08-01    138  |  103  |  17  ----------------------------<  107<H>  4.1   |  23  |  0.82    Ca    9.4      01 Aug 2022 05:30  Phos  3.0     08-01  Mg     1.8     08-01    TPro  7.4  /  Alb  3.5  /  TBili  0.4  /  DBili  x   /  AST  34  /  ALT  16  /  AlkPhos  144<H>  08-01            RADIOLOGY & ADDITIONAL TESTS:    MEDICATIONS  (STANDING):  albuterol/ipratropium for Nebulization 3 milliLiter(s) Nebulizer every 6 hours  apixaban 5 milliGRAM(s) Oral every 12 hours  magnesium sulfate  IVPB 2 Gram(s) IV Intermittent once  nystatin    Suspension [id number] Unit(s) Oral every 6 hours  predniSONE   Tablet 40 milliGRAM(s) Oral every 24 hours  trimethoprim  160 mG/sulfamethoxazole 800 mG 1 Tablet(s) Oral every 24 hours    MEDICATIONS  (PRN):  acetaminophen     Tablet .. 650 milliGRAM(s) Oral every 6 hours PRN Temp greater or equal to 38C (100.4F), Mild Pain (1 - 3)  benzonatate 100 milliGRAM(s) Oral three times a day PRN Cough

## 2022-08-01 NOTE — PROGRESS NOTE ADULT - PROBLEM SELECTOR PLAN 2
Pt reports hx of asthma, uses albuterol inhaler BID but ran out approximately 1 week ago. Since then, pt reports increased coughing and SOB.   - Duonebs standing q6  - Prednisone 40mg po daily  - Tessalone Perles TID prn cough Pt with hx of HIV, inconsistent use of ART. Unable to provide medication history, does not follow w/ PCP or HIV clinic. Per outpatient pharmacy records, appears as though he was receiving Biktarvy monthly several months ago, but has not filled the prescription recently. CD4 96.   - f/u viral load  - f/u fungitell, galactomannin  - HIV team on board, appreciate recs  - If pt produces sputum, will send for PCP PCR  - Consider bronchoscopy  - Hold ARVs until OI screening complete Pt with hx of HIV, inconsistent use of ART. Unable to provide medication history, does not follow w/ PCP or HIV clinic. Per outpatient pharmacy records, appears as though he was receiving Biktarvy monthly several months ago, but has not filled the prescription recently. CD4 96.   - f/u viral load  - f/u fungitell, galactomannin  - Bactrim po daily for PJP ppx  - HIV team on board, appreciate recs  - If pt produces sputum, will send for PCP PCR  - Consider bronchoscopy  - Hold ARVs until OI screening complete

## 2022-08-02 LAB
ANION GAP SERPL CALC-SCNC: 10 MMOL/L — SIGNIFICANT CHANGE UP (ref 5–17)
BASOPHILS # BLD AUTO: 0.01 K/UL — SIGNIFICANT CHANGE UP (ref 0–0.2)
BASOPHILS NFR BLD AUTO: 0.2 % — SIGNIFICANT CHANGE UP (ref 0–2)
BUN SERPL-MCNC: 14 MG/DL — SIGNIFICANT CHANGE UP (ref 7–23)
CALCIUM SERPL-MCNC: 9.2 MG/DL — SIGNIFICANT CHANGE UP (ref 8.4–10.5)
CHLORIDE SERPL-SCNC: 102 MMOL/L — SIGNIFICANT CHANGE UP (ref 96–108)
CO2 SERPL-SCNC: 24 MMOL/L — SIGNIFICANT CHANGE UP (ref 22–31)
CREAT SERPL-MCNC: 0.78 MG/DL — SIGNIFICANT CHANGE UP (ref 0.5–1.3)
CRYPTOC AG FLD QL: NEGATIVE — SIGNIFICANT CHANGE UP
EGFR: 100 ML/MIN/1.73M2 — SIGNIFICANT CHANGE UP
EOSINOPHIL # BLD AUTO: 0.18 K/UL — SIGNIFICANT CHANGE UP (ref 0–0.5)
EOSINOPHIL NFR BLD AUTO: 4.1 % — SIGNIFICANT CHANGE UP (ref 0–6)
FUNGITELL: 161 PG/ML — HIGH
GLUCOSE SERPL-MCNC: 88 MG/DL — SIGNIFICANT CHANGE UP (ref 70–99)
HCT VFR BLD CALC: 30.8 % — LOW (ref 39–50)
HGB BLD-MCNC: 9.3 G/DL — LOW (ref 13–17)
IMM GRANULOCYTES NFR BLD AUTO: 0.2 % — SIGNIFICANT CHANGE UP (ref 0–1.5)
LYMPHOCYTES # BLD AUTO: 1.59 K/UL — SIGNIFICANT CHANGE UP (ref 1–3.3)
LYMPHOCYTES # BLD AUTO: 36.6 % — SIGNIFICANT CHANGE UP (ref 13–44)
MAGNESIUM SERPL-MCNC: 1.8 MG/DL — SIGNIFICANT CHANGE UP (ref 1.6–2.6)
MCHC RBC-ENTMCNC: 26.5 PG — LOW (ref 27–34)
MCHC RBC-ENTMCNC: 30.2 GM/DL — LOW (ref 32–36)
MCV RBC AUTO: 87.7 FL — SIGNIFICANT CHANGE UP (ref 80–100)
MONOCYTES # BLD AUTO: 0.28 K/UL — SIGNIFICANT CHANGE UP (ref 0–0.9)
MONOCYTES NFR BLD AUTO: 6.5 % — SIGNIFICANT CHANGE UP (ref 2–14)
NEUTROPHILS # BLD AUTO: 2.27 K/UL — SIGNIFICANT CHANGE UP (ref 1.8–7.4)
NEUTROPHILS NFR BLD AUTO: 52.4 % — SIGNIFICANT CHANGE UP (ref 43–77)
NRBC # BLD: 0 /100 WBCS — SIGNIFICANT CHANGE UP (ref 0–0)
PHOSPHATE SERPL-MCNC: 3.6 MG/DL — SIGNIFICANT CHANGE UP (ref 2.5–4.5)
PLATELET # BLD AUTO: 296 K/UL — SIGNIFICANT CHANGE UP (ref 150–400)
POTASSIUM SERPL-MCNC: 4.1 MMOL/L — SIGNIFICANT CHANGE UP (ref 3.5–5.3)
POTASSIUM SERPL-SCNC: 4.1 MMOL/L — SIGNIFICANT CHANGE UP (ref 3.5–5.3)
RBC # BLD: 3.51 M/UL — LOW (ref 4.2–5.8)
RBC # FLD: 20.2 % — HIGH (ref 10.3–14.5)
SODIUM SERPL-SCNC: 136 MMOL/L — SIGNIFICANT CHANGE UP (ref 135–145)
WBC # BLD: 4.34 K/UL — SIGNIFICANT CHANGE UP (ref 3.8–10.5)
WBC # FLD AUTO: 4.34 K/UL — SIGNIFICANT CHANGE UP (ref 3.8–10.5)

## 2022-08-02 PROCEDURE — 99221 1ST HOSP IP/OBS SF/LOW 40: CPT

## 2022-08-02 PROCEDURE — 99233 SBSQ HOSP IP/OBS HIGH 50: CPT | Mod: GC

## 2022-08-02 PROCEDURE — 99223 1ST HOSP IP/OBS HIGH 75: CPT | Mod: GC

## 2022-08-02 RX ORDER — SODIUM CHLORIDE 9 MG/ML
4 INJECTION INTRAMUSCULAR; INTRAVENOUS; SUBCUTANEOUS EVERY 12 HOURS
Refills: 0 | Status: DISCONTINUED | OUTPATIENT
Start: 2022-08-02 | End: 2022-08-04

## 2022-08-02 RX ORDER — POLYETHYLENE GLYCOL 3350 17 G/17G
17 POWDER, FOR SOLUTION ORAL EVERY 12 HOURS
Refills: 0 | Status: DISCONTINUED | OUTPATIENT
Start: 2022-08-02 | End: 2022-08-04

## 2022-08-02 RX ORDER — DIPHENHYDRAMINE HCL 50 MG
25 CAPSULE ORAL EVERY 6 HOURS
Refills: 0 | Status: DISCONTINUED | OUTPATIENT
Start: 2022-08-02 | End: 2022-08-04

## 2022-08-02 RX ORDER — IPRATROPIUM/ALBUTEROL SULFATE 18-103MCG
3 AEROSOL WITH ADAPTER (GRAM) INHALATION EVERY 6 HOURS
Refills: 0 | Status: DISCONTINUED | OUTPATIENT
Start: 2022-08-02 | End: 2022-08-04

## 2022-08-02 RX ORDER — SENNA PLUS 8.6 MG/1
2 TABLET ORAL AT BEDTIME
Refills: 0 | Status: DISCONTINUED | OUTPATIENT
Start: 2022-08-02 | End: 2022-08-04

## 2022-08-02 RX ORDER — MAGNESIUM SULFATE 500 MG/ML
1 VIAL (ML) INJECTION ONCE
Refills: 0 | Status: COMPLETED | OUTPATIENT
Start: 2022-08-02 | End: 2022-08-02

## 2022-08-02 RX ORDER — FLUCONAZOLE 150 MG/1
400 TABLET ORAL EVERY 24 HOURS
Refills: 0 | Status: DISCONTINUED | OUTPATIENT
Start: 2022-08-02 | End: 2022-08-04

## 2022-08-02 RX ADMIN — SENNA PLUS 2 TABLET(S): 8.6 TABLET ORAL at 21:35

## 2022-08-02 RX ADMIN — SODIUM CHLORIDE 4 MILLILITER(S): 9 INJECTION INTRAMUSCULAR; INTRAVENOUS; SUBCUTANEOUS at 13:41

## 2022-08-02 RX ADMIN — APIXABAN 5 MILLIGRAM(S): 2.5 TABLET, FILM COATED ORAL at 21:35

## 2022-08-02 RX ADMIN — Medication 3 MILLILITER(S): at 04:23

## 2022-08-02 RX ADMIN — Medication 500000 UNIT(S): at 05:45

## 2022-08-02 RX ADMIN — APIXABAN 5 MILLIGRAM(S): 2.5 TABLET, FILM COATED ORAL at 10:51

## 2022-08-02 RX ADMIN — FLUCONAZOLE 400 MILLIGRAM(S): 150 TABLET ORAL at 13:40

## 2022-08-02 RX ADMIN — Medication 1 TABLET(S): at 10:52

## 2022-08-02 RX ADMIN — Medication 40 MILLIGRAM(S): at 10:52

## 2022-08-02 NOTE — PROGRESS NOTE ADULT - PROBLEM SELECTOR PLAN 6
EKG reveals atrial fibrillation w/ premature ventricular or aberrant conductive complexes w/ T wave abnormalities. Trop neg. Pt not on AC, but per chart review was previously prescribed Eliquis.  - Eliquis 5mg bid

## 2022-08-02 NOTE — PROGRESS NOTE ADULT - PROBLEM SELECTOR PLAN 9
F: patient tolerating PO, no IVF  E: Mg > 2, K >4  N: regular diet  DVT: lovenox 40 mg  Code: Full  Dispo: [facility]
F: patient tolerating PO, no IVF  E: Mg > 2, K >4  N: regular diet  DVT: lovenox 40 mg  Code: Full  Dispo: [facility]
F: patient tolerating PO, no IVF  E: Mg > 2, K >4  N: regular diet  DVT: lovenox 40 mg  Code: Full  Dispo: [facility]

## 2022-08-02 NOTE — PROGRESS NOTE ADULT - PROBLEM SELECTOR PLAN 1
Pt p/w chest pain associated w/ dizziness and cough for 5 days. CTA chest negative for PE or masses. COVID negative. Unvaccinated. Troponin and D-dimer wnl. EKG showing atrial fibrillation. DDx: costochondritis 2/2 coughing, vs high suspicion for PCP pneumonia given pt's HIV status/noncompliant w HAART  - tylenol prn pain   - Sputum induction and PCP PCR

## 2022-08-02 NOTE — CONSULT NOTE ADULT - SUBJECTIVE AND OBJECTIVE BOX
PULMONARY SERVICE INITIAL CONSULT NOTE    HPI:  Pt is a 61yo w/ PMH HIV (non compliant w/ medications) asthma (non compliant w/ inhaler) p/w chest pain, cough and dizziness since Monday. Pt is a poor historian, previously undomiciled, currently residing in [facility]. He reports ?fall? today from feeling dizzy and ?hitting head? prior to presenting to ED. Pt reports a history of multiple ED admissions in the past week for these symptoms and was informed of new lung cancer diagnosis. CT Angio chest negative for PE or lung masses. He informed ED team of R calf swelling w/ pain. R LE Doppler negative for DVT. On exam pt was now c/o of L foot swelling. Currently not experiencing chest pain or cough but c/o of weakness, dizziness which he attributes to not eating in 3 days. Pt history and exam interrupted several times pt requesting food + Ensure.    In the ED:  Initial vital signs: T: 98F, HR: 82, BP: 114/77, R: 18, SpO2: 98% on RA  ED course:   Labs: Hg 8.6, D-dimer wnl, troponin wnl  CTA chest negative for PE or mass  EKG: atrial fibrillation w/ premature ventricular or aberrant conduct complexes w/ T wave abnormality (29 Jul 2022 15:37)      REVIEW OF SYSTEMS:  Constitutional: No fever, weight loss or fatigue  Eyes: No eye pain, visual disturbances, or discharge  ENMT:  No difficulty hearing, tinnitus, vertigo; No sinus or throat pain  Neck: No pain, stiffness or neck swelling  Respiratory: see HPI  Cardiovascular: No chest pain, palpitations, dizziness or leg swelling  Gastrointestinal: No abdominal or epigastric pain. No nausea, vomiting or hematemesis; No diarrhea or constipation. No melena or hematochezia.  Genitourinary: No dysuria, frequency, hematuria or incontinence  Neurological: No headaches, memory loss, loss of strength, numbness or tremors  Skin: No itching, burning, rashes or lesions   Lymph Nodes: No enlarged glands  Endocrine: No heat or cold intolerance; No hair loss  Musculoskeletal: No joint pain or swelling; No muscle, back or extremity pain  Psychiatric: No depression, anxiety, mood swings or difficulty sleeping  Heme/Lymph: No easy bruising or bleeding gums  Allergy and Immunologic: No hives or eczema    PAST MEDICAL & SURGICAL HISTORY:  HIV (human immunodeficiency virus infection)      Asthma      No significant past surgical history          FAMILY HISTORY:      SOCIAL HISTORY:  Smoking Status: [ ] Current, [ ] Former, [ ] Never  Pack Years:    MEDICATIONS:  Pulmonary:  albuterol/ipratropium for Nebulization 3 milliLiter(s) Nebulizer every 6 hours PRN  benzonatate 100 milliGRAM(s) Oral three times a day PRN  sodium chloride 3%  Inhalation 4 milliLiter(s) Inhalation every 12 hours    Antimicrobials:  fluconAZOLE   Tablet 400 milliGRAM(s) Oral every 24 hours  trimethoprim  160 mG/sulfamethoxazole 800 mG 1 Tablet(s) Oral every 24 hours    Anticoagulants:  apixaban 5 milliGRAM(s) Oral every 12 hours    Onc:    GI/:  polyethylene glycol 3350 17 Gram(s) Oral every 12 hours PRN  senna 2 Tablet(s) Oral at bedtime    Endocrine:  predniSONE   Tablet 40 milliGRAM(s) Oral every 24 hours    Cardiac:    Other Medications:  acetaminophen     Tablet .. 650 milliGRAM(s) Oral every 6 hours PRN  diphenhydrAMINE 25 milliGRAM(s) Oral every 6 hours PRN      Allergies    No Known Allergies    Intolerances        Vital Signs Last 24 Hrs  T(C): 36.4 (02 Aug 2022 06:08), Max: 36.7 (01 Aug 2022 21:05)  T(F): 97.5 (02 Aug 2022 06:08), Max: 98 (01 Aug 2022 21:05)  HR: 100 (02 Aug 2022 06:08) (97 - 100)  BP: 141/100 (02 Aug 2022 06:08) (140/85 - 148/105)  BP(mean): --  RR: 18 (02 Aug 2022 06:08) (18 - 18)  SpO2: 98% (02 Aug 2022 06:08) (98% - 99%)    Parameters below as of 01 Aug 2022 21:05  Patient On (Oxygen Delivery Method): room air            PHYSICAL EXAM:  Constitutional: WD  Head: NC/AT  EENT: anicteric sclera; oropharynx clear, MMM  Neck: supple, no JVD  Respiratory: CTA B/L; no W/R/R  Cardiovascular: +S1/S2, RRR  Gastrointestinal: soft, NT/ND  Extremities: WWP; no clubbing or cyanosis; no edema  Vascular: 2+ radial and pedal pulses  Neurological: alert, oriented    LABS:      CBC Full  -  ( 02 Aug 2022 05:30 )  WBC Count : 4.34 K/uL  RBC Count : 3.51 M/uL  Hemoglobin : 9.3 g/dL  Hematocrit : 30.8 %  Platelet Count - Automated : 296 K/uL  Mean Cell Volume : 87.7 fl  Mean Cell Hemoglobin : 26.5 pg  Mean Cell Hemoglobin Concentration : 30.2 gm/dL  Auto Neutrophil # : 2.27 K/uL  Auto Lymphocyte # : 1.59 K/uL  Auto Monocyte # : 0.28 K/uL  Auto Eosinophil # : 0.18 K/uL  Auto Basophil # : 0.01 K/uL  Auto Neutrophil % : 52.4 %  Auto Lymphocyte % : 36.6 %  Auto Monocyte % : 6.5 %  Auto Eosinophil % : 4.1 %  Auto Basophil % : 0.2 %    08-02    136  |  102  |  14  ----------------------------<  88  4.1   |  24  |  0.78    Ca    9.2      02 Aug 2022 05:30  Phos  3.6     08-02  Mg     1.8     08-02    TPro  7.4  /  Alb  3.5  /  TBili  0.4  /  DBili  x   /  AST  34  /  ALT  16  /  AlkPhos  144<H>  08-01          RADIOLOGY & ADDITIONAL STUDIES: Personally reviewed.    PULMONARY ANGIOGRAM: Satisfactory opacification of pulmonary artery. No filling defects in the pulmonary arteries. The main and right pulmonary arteries are enlarged measuring 3.1 cm and 2.6 cm respectively.    LYMPH NODES: No prominent mediastinal, hilar or axillary lymphadenopathy is seen.    HEART/VASCULATURE: Cardiomegaly. The right atrium is enlarged and there is reflux of contrast into the IVC.    AIRWAYS/LUNGS/PLEURA: Evaluation of the lung parenchyma is limited by motion artifact. Minimal paraseptal emphysema in the right apex and lingula. Mild smooth interlobular septal thickening in the upper and lower lobes bilaterally. Linear atelectasis in the right middle and left lower lobes. 5.8 x 5.9 mm irregular solid nodule posterior segment left lower lobe (series 3, image 269).    UPPER ABDOMEN: No abnormality.    BONES/SOFT TISSUES: Chronic fracture of the lateral left ninth ribs.   PULMONARY SERVICE INITIAL CONSULT NOTE    HPI:  Pt is a 61yo w/ PMH HIV (non compliant w/ medications) asthma (on albuterol) who presented with chest pain, cough and dizziness since Monday. Pt is a poor historian, currently residing in [facility]. He reported prior to presenting to ED. Pt reports a history of multiple ED admissions in the past week for these symptoms and was informed of new lung cancer diagnosis. CT Angio chest negative for PE or lung masses. He informed ED team of R calf swelling w/ pain. R LE Doppler negative for DVT. Pulmonology consulted for possible bronchoscopy to r/o PCP PNA. On exam patient reports a history of asthma and has prn albuterol but states he's rarely had to use them. Denies any complaints at this time.     In the ED:  Initial vital signs: T: 98F, HR: 82, BP: 114/77, R: 18, SpO2: 98% on RA  ED course:   Labs: Hg 8.6, D-dimer wnl, troponin wnl  CTA chest negative for PE or mass  EKG: atrial fibrillation w/ premature ventricular or aberrant conduct complexes w/ T wave abnormality (29 Jul 2022 15:37)      REVIEW OF SYSTEMS:  Constitutional: No fever, weight loss or fatigue  Eyes: No eye pain, visual disturbances, or discharge  ENMT:  No difficulty hearing, tinnitus, vertigo; No sinus or throat pain  Neck: No pain, stiffness or neck swelling  Respiratory: see HPI  Cardiovascular: No chest pain, palpitations, dizziness or leg swelling  Gastrointestinal: No abdominal or epigastric pain. No nausea, vomiting or hematemesis; No diarrhea or constipation. No melena or hematochezia.  Genitourinary: No dysuria, frequency, hematuria or incontinence  Neurological: No headaches, memory loss, loss of strength, numbness or tremors  Skin: No itching, burning, rashes or lesions   Lymph Nodes: No enlarged glands  Endocrine: No heat or cold intolerance; No hair loss  Musculoskeletal: No joint pain or swelling; No muscle, back or extremity pain  Psychiatric: No depression, anxiety, mood swings or difficulty sleeping  Heme/Lymph: No easy bruising or bleeding gums  Allergy and Immunologic: No hives or eczema    PAST MEDICAL & SURGICAL HISTORY:  HIV (human immunodeficiency virus infection)    Asthma    No significant past surgical history      FAMILY HISTORY:      SOCIAL HISTORY:  Smoking Status: [ ] Current, [ ] Former, [ ] Never  Pack Years:    MEDICATIONS:  Pulmonary:  albuterol/ipratropium for Nebulization 3 milliLiter(s) Nebulizer every 6 hours PRN  benzonatate 100 milliGRAM(s) Oral three times a day PRN  sodium chloride 3%  Inhalation 4 milliLiter(s) Inhalation every 12 hours    Antimicrobials:  fluconAZOLE   Tablet 400 milliGRAM(s) Oral every 24 hours  trimethoprim  160 mG/sulfamethoxazole 800 mG 1 Tablet(s) Oral every 24 hours    Anticoagulants:  apixaban 5 milliGRAM(s) Oral every 12 hours    Onc:    GI/:  polyethylene glycol 3350 17 Gram(s) Oral every 12 hours PRN  senna 2 Tablet(s) Oral at bedtime    Endocrine:  predniSONE   Tablet 40 milliGRAM(s) Oral every 24 hours    Cardiac:    Other Medications:  acetaminophen     Tablet .. 650 milliGRAM(s) Oral every 6 hours PRN  diphenhydrAMINE 25 milliGRAM(s) Oral every 6 hours PRN      Allergies    No Known Allergies    Intolerances        Vital Signs Last 24 Hrs  T(C): 36.4 (02 Aug 2022 06:08), Max: 36.7 (01 Aug 2022 21:05)  T(F): 97.5 (02 Aug 2022 06:08), Max: 98 (01 Aug 2022 21:05)  HR: 100 (02 Aug 2022 06:08) (97 - 100)  BP: 141/100 (02 Aug 2022 06:08) (140/85 - 148/105)  BP(mean): --  RR: 18 (02 Aug 2022 06:08) (18 - 18)  SpO2: 98% (02 Aug 2022 06:08) (98% - 99%)    Parameters below as of 01 Aug 2022 21:05  Patient On (Oxygen Delivery Method): room air            PHYSICAL EXAM:  Constitutional: WD  Head: NC/AT  EENT: anicteric sclera; oropharynx clear, MMM  Neck: supple, no JVD  Respiratory: CTA B/L; no W/R/R  Cardiovascular: +S1/S2, RRR  Gastrointestinal: soft, NT/ND  Extremities: WWP; no clubbing or cyanosis; no edema  Vascular: 2+ radial and pedal pulses  Neurological: alert, oriented    LABS:      CBC Full  -  ( 02 Aug 2022 05:30 )  WBC Count : 4.34 K/uL  RBC Count : 3.51 M/uL  Hemoglobin : 9.3 g/dL  Hematocrit : 30.8 %  Platelet Count - Automated : 296 K/uL  Mean Cell Volume : 87.7 fl  Mean Cell Hemoglobin : 26.5 pg  Mean Cell Hemoglobin Concentration : 30.2 gm/dL  Auto Neutrophil # : 2.27 K/uL  Auto Lymphocyte # : 1.59 K/uL  Auto Monocyte # : 0.28 K/uL  Auto Eosinophil # : 0.18 K/uL  Auto Basophil # : 0.01 K/uL  Auto Neutrophil % : 52.4 %  Auto Lymphocyte % : 36.6 %  Auto Monocyte % : 6.5 %  Auto Eosinophil % : 4.1 %  Auto Basophil % : 0.2 %    08-02    136  |  102  |  14  ----------------------------<  88  4.1   |  24  |  0.78    Ca    9.2      02 Aug 2022 05:30  Phos  3.6     08-02  Mg     1.8     08-02    TPro  7.4  /  Alb  3.5  /  TBili  0.4  /  DBili  x   /  AST  34  /  ALT  16  /  AlkPhos  144<H>  08-01          RADIOLOGY & ADDITIONAL STUDIES: Personally reviewed.    PULMONARY ANGIOGRAM: Satisfactory opacification of pulmonary artery. No filling defects in the pulmonary arteries. The main and right pulmonary arteries are enlarged measuring 3.1 cm and 2.6 cm respectively.    LYMPH NODES: No prominent mediastinal, hilar or axillary lymphadenopathy is seen.    HEART/VASCULATURE: Cardiomegaly. The right atrium is enlarged and there is reflux of contrast into the IVC.    AIRWAYS/LUNGS/PLEURA: Evaluation of the lung parenchyma is limited by motion artifact. Minimal paraseptal emphysema in the right apex and lingula. Mild smooth interlobular septal thickening in the upper and lower lobes bilaterally. Linear atelectasis in the right middle and left lower lobes. 5.8 x 5.9 mm irregular solid nodule posterior segment left lower lobe (series 3, image 269).    UPPER ABDOMEN: No abnormality.    BONES/SOFT TISSUES: Chronic fracture of the lateral left ninth ribs.

## 2022-08-02 NOTE — CONSULT NOTE ADULT - ATTENDING COMMENTS
Monitor room has new box available. Placed on pt and pt NSR at 71. 61 yo M with HIV (non adherant to meds), asthma (on albuterol PRN) presented with chest pain and dizziness, acs rules out pulmonary consulted for abnromal ct. CT chest has motion artificant, minimal gournd glass opacities in and some areas inter lovbular septal thickening, the pulmonary artery and right ventricle are enlarged. these ct findings suggest possible fluid overload an PH althoguht on exam appears euvolemic. this is less likely an infectious process and the elevated fungitel may be explained by candidiasis. pending ECHO.

## 2022-08-02 NOTE — CHART NOTE - NSCHARTNOTEFT_GEN_A_CORE
O/N Events: LAMONT    Subjective/ROS: Denies HA, CP, SOB, n/v, changes in bowel/urinary habits.  12pt ROS otherwise negative.  +Cough  +thrush w/ dysphagia    VITALS  Vital Signs Last 24 Hrs  T(C): 36.4 (02 Aug 2022 06:08), Max: 36.7 (01 Aug 2022 21:05)  T(F): 97.5 (02 Aug 2022 06:08), Max: 98 (01 Aug 2022 21:05)  HR: 100 (02 Aug 2022 06:08) (97 - 100)  BP: 141/100 (02 Aug 2022 06:08) (140/85 - 148/105)  BP(mean): --  RR: 18 (02 Aug 2022 06:08) (18 - 18)  SpO2: 98% (02 Aug 2022 06:08) (98% - 99%)    Parameters below as of 01 Aug 2022 21:05  Patient On (Oxygen Delivery Method): room air    PHYSICAL EXAM  General: A&Ox3; NAD  Head: NC/AT; MMM; PERRL; EOMI; poor dentition; +thrush  Neck: Supple; no JVD  Respiratory: CTA B/L; no wheezes/crackles   Cardiovascular: Irregular rhythm   Gastrointestinal: Soft; NTND; normoactive BS  Extremities: WWP; no edema/cyanosis  Neurological:  CNII-XII grossly intact; no obvious focal deficits    MEDICATIONS  (STANDING):  apixaban 5 milliGRAM(s) Oral every 12 hours  fluconAZOLE   Tablet 400 milliGRAM(s) Oral every 24 hours  predniSONE   Tablet 40 milliGRAM(s) Oral every 24 hours  senna 2 Tablet(s) Oral at bedtime  sodium chloride 3%  Inhalation 4 milliLiter(s) Inhalation every 12 hours  trimethoprim  160 mG/sulfamethoxazole 800 mG 1 Tablet(s) Oral every 24 hours    MEDICATIONS  (PRN):  acetaminophen     Tablet .. 650 milliGRAM(s) Oral every 6 hours PRN Temp greater or equal to 38C (100.4F), Mild Pain (1 - 3)  albuterol/ipratropium for Nebulization 3 milliLiter(s) Nebulizer every 6 hours PRN Shortness of Breath and/or Wheezing  benzonatate 100 milliGRAM(s) Oral three times a day PRN Cough  diphenhydrAMINE 25 milliGRAM(s) Oral every 6 hours PRN Rash and/or Itching  polyethylene glycol 3350 17 Gram(s) Oral every 12 hours PRN Constipation      No Known Allergies      LABS                        9.3    4.34  )-----------( 296      ( 02 Aug 2022 05:30 )             30.8     08-02    136  |  102  |  14  ----------------------------<  88  4.1   |  24  |  0.78    Ca    9.2      02 Aug 2022 05:30  Phos  3.6     08-02  Mg     1.8     08-02    TPro  7.4  /  Alb  3.5  /  TBili  0.4  /  DBili  x   /  AST  34  /  ALT  16  /  AlkPhos  144<H>  08-01      IMAGING/EKG/ETC: Reviewed    Notes, images, labs reviewed by me.    62M w/ AIDs presents w/ cough and chest pain    CD4 low w/ low CD4%  VL >400k  Anemic but other cell lines are good.  No hepatosplenomegaly noted on CT or US.  Congestive hepatopathy and RA enlargement noted from possible RH failure - unclear cause  LLL solid nodule on CT, no noted GOO.  No evidence of PE/DVT    Fungitell mildly elevated  Pending sputum PCP PCR and consider bronchoscopy if non-diagnostic  Low concern for DON  F/u serum crypt Ag  No indication for GI OI screening.     Hold ARVs at this time.  Once OI screening complete will consider Biktarvy.  On Bactrim for PCP ppx.    HIV team will continue to follow. O/N Events: LAMONT    Subjective/ROS: Denies HA, CP, SOB, n/v, changes in bowel/urinary habits.  12pt ROS otherwise negative.  +Cough  +thrush w/ dysphagia    VITALS  Vital Signs Last 24 Hrs  T(C): 36.4 (02 Aug 2022 06:08), Max: 36.7 (01 Aug 2022 21:05)  T(F): 97.5 (02 Aug 2022 06:08), Max: 98 (01 Aug 2022 21:05)  HR: 100 (02 Aug 2022 06:08) (97 - 100)  BP: 141/100 (02 Aug 2022 06:08) (140/85 - 148/105)  BP(mean): --  RR: 18 (02 Aug 2022 06:08) (18 - 18)  SpO2: 98% (02 Aug 2022 06:08) (98% - 99%)    Parameters below as of 01 Aug 2022 21:05  Patient On (Oxygen Delivery Method): room air    PHYSICAL EXAM  General: A&Ox3; NAD  Head: NC/AT; MMM; PERRL; EOMI; poor dentition; +thrush  Neck: Supple; no JVD  Respiratory: CTA B/L; no wheezes/crackles   Cardiovascular: Irregular rhythm   Gastrointestinal: Soft; NTND; normoactive BS  Extremities: WWP; no edema/cyanosis  Neurological:  CNII-XII grossly intact; no obvious focal deficits    MEDICATIONS  (STANDING):  apixaban 5 milliGRAM(s) Oral every 12 hours  fluconAZOLE   Tablet 400 milliGRAM(s) Oral every 24 hours  predniSONE   Tablet 40 milliGRAM(s) Oral every 24 hours  senna 2 Tablet(s) Oral at bedtime  sodium chloride 3%  Inhalation 4 milliLiter(s) Inhalation every 12 hours  trimethoprim  160 mG/sulfamethoxazole 800 mG 1 Tablet(s) Oral every 24 hours    MEDICATIONS  (PRN):  acetaminophen     Tablet .. 650 milliGRAM(s) Oral every 6 hours PRN Temp greater or equal to 38C (100.4F), Mild Pain (1 - 3)  albuterol/ipratropium for Nebulization 3 milliLiter(s) Nebulizer every 6 hours PRN Shortness of Breath and/or Wheezing  benzonatate 100 milliGRAM(s) Oral three times a day PRN Cough  diphenhydrAMINE 25 milliGRAM(s) Oral every 6 hours PRN Rash and/or Itching  polyethylene glycol 3350 17 Gram(s) Oral every 12 hours PRN Constipation      No Known Allergies      LABS                        9.3    4.34  )-----------( 296      ( 02 Aug 2022 05:30 )             30.8     08-02    136  |  102  |  14  ----------------------------<  88  4.1   |  24  |  0.78    Ca    9.2      02 Aug 2022 05:30  Phos  3.6     08-02  Mg     1.8     08-02    TPro  7.4  /  Alb  3.5  /  TBili  0.4  /  DBili  x   /  AST  34  /  ALT  16  /  AlkPhos  144<H>  08-01      IMAGING/EKG/ETC: Reviewed    Notes, images, labs reviewed by me.    62M w/ AIDs presents w/ cough and chest pain    CD4 low w/ low CD4%  VL >400k  Anemic but other cell lines are good.  No hepatosplenomegaly noted on CT or US.  Congestive hepatopathy and RA enlargement noted from possible RH failure - unclear cause  LLL solid nodule on CT, no noted GOO.  No evidence of PE/DVT    Fungitell mildly elevated  Lower concern for PCP PNA but to r/o would need bronch.  Pending sputum PCP PCR and consider bronchoscopy if non-diagnostic.  Low concern for DON given lack of BM suppression and no hepatosplenomegaly  F/u serum crypt Ag  No indication for GI OI screening.     Hold ARVs at this time.  Once OI screening complete will consider Biktarvy.  On Bactrim for PCP ppx.    HIV team will continue to follow.

## 2022-08-02 NOTE — PROGRESS NOTE ADULT - PROBLEM SELECTOR PLAN 4
Pt reports hx of asthma, uses albuterol inhaler BID but ran out approximately 1 week ago. Since then, pt reports increased coughing and SOB.   - Duonebs q6 prn  - Prednisone 40mg po daily  - Tessalone Perles TID prn cough

## 2022-08-02 NOTE — PROGRESS NOTE ADULT - PROBLEM SELECTOR PLAN 2
Pt with hx of HIV, inconsistent use of ART. Does not follow w/ PCP or HIV clinic. Per outpatient pharmacy records, appears as though he was receiving Biktarvy monthly several months ago, but has not filled the prescription recently. CD4 96, VL 400k, Pos fungitell  - f/u galactomannin  - f/u sputum induction and PCP PCR to determine if need to start PCP treatment vs prophylaxis  - Bactrim po daily for PCP ppx   - HIV team on board, appreciate recs  - Consider bronchoscopy  - Hold ARVs until OI screening complete Pt with hx of HIV, inconsistent use of ART. Does not follow w/ PCP or HIV clinic. Per outpatient pharmacy records, appears as though he was receiving Biktarvy monthly several months ago, but has not filled the prescription recently. CD4 96, VL 400k, Pos fungitell  - f/u galactomannin  - f/u sputum induction and PCP PCR to determine if need to start PCP treatment vs prophylaxis  - Bactrim po daily for PCP ppx   - HIV team on board, appreciate recs  - Pulmonary consulted for bronchoscopy to obtain BAL for PCP PCR  - Hold ARVs until OI screening complete

## 2022-08-02 NOTE — PROGRESS NOTE ADULT - PROBLEM SELECTOR PLAN 7
Pt with Hg 8.6 on admission, down from 10.1 July 11. No active source of bleeding. Most likely 2/2 nutritional deficiency. B12 slightly low (216), normal folate, normal ferritin.   - Nutrition consulted, appreciate recs  - SW consult

## 2022-08-02 NOTE — PROGRESS NOTE ADULT - SUBJECTIVE AND OBJECTIVE BOX
H ospital Course: Pt is a 61yo w/ PMH HIV (CD4 96, VL 400k 7/30/22) asthma (non compliant w/ inhaler) p/w chest pain, cough and dizziness since Monday. Pt is a poor historian, previously undomiciled, currently residing in [facility]. He reports possible fall today from feeling dizzy prior to presenting to ED. Pt reports a history of multiple ED admissions in the past week for these symptoms and was informed of new lung cancer diagnosis, however no mention of lung CA can be found on chart review. CT Angio chest negative for PE or lung masses.     OVERNIGHT EVENTS: LAMONT    SUBJECTIVE:  Patient seen and examined at bedside. Pt reports needing to strain to have a BM, he also reports full body itching.     All other ROS negative unless otherwise indicated above.    Vital Signs Last 12 Hrs  T(F): 97.5 (08-02-22 @ 06:08), Max: 97.5 (08-02-22 @ 06:08)  HR: 100 (08-02-22 @ 06:08) (100 - 100)  BP: 141/100 (08-02-22 @ 06:08) (141/100 - 141/100)  BP(mean): --  RR: 18 (08-02-22 @ 06:08) (18 - 18)  SpO2: 98% (08-02-22 @ 06:08) (98% - 98%)  I&O's Summary    PHYSICAL EXAM:  Constitutional: Frail, NAD, comfortable in bed.  HEENT: NC/AT, EOMI, no conjunctival pallor or scleral icterus, MMM, small white plaques on oral mucosa, poor dentition   Neck: Supple, no JVD  Respiratory: CTA B/L. No w/r/r.   Cardiovascular: irregular rhythm, regular rate, normal S1 and S2, no m/r/g.   Gastrointestinal: slightly distended and firm, nontender, no guarding or rebound tenderness, no palpable masses   Extremities: wwp; no cyanosis, clubbing or edema.   Neurological: AAOx3, no CN deficits, strength and sensation intact throughout.   Skin: No gross skin abnormalities or rashes      LABS:                        9.3    4.34  )-----------( 296      ( 02 Aug 2022 05:30 )             30.8     08-02    136  |  102  |  14  ----------------------------<  88  4.1   |  24  |  0.78    Ca    9.2      02 Aug 2022 05:30  Phos  3.6     08-02  Mg     1.8     08-02    TPro  7.4  /  Alb  3.5  /  TBili  0.4  /  DBili  x   /  AST  34  /  ALT  16  /  AlkPhos  144<H>  08-01            RADIOLOGY & ADDITIONAL TESTS:    MEDICATIONS  (STANDING):  apixaban 5 milliGRAM(s) Oral every 12 hours  nystatin    Suspension 185146 Unit(s) Oral every 6 hours  predniSONE   Tablet 40 milliGRAM(s) Oral every 24 hours  senna 2 Tablet(s) Oral at bedtime  sodium chloride 3%  Inhalation 4 milliLiter(s) Inhalation every 12 hours  trimethoprim  160 mG/sulfamethoxazole 800 mG 1 Tablet(s) Oral every 24 hours    MEDICATIONS  (PRN):  acetaminophen     Tablet .. 650 milliGRAM(s) Oral every 6 hours PRN Temp greater or equal to 38C (100.4F), Mild Pain (1 - 3)  albuterol/ipratropium for Nebulization 3 milliLiter(s) Nebulizer every 6 hours PRN Shortness of Breath and/or Wheezing  benzonatate 100 milliGRAM(s) Oral three times a day PRN Cough  diphenhydrAMINE 25 milliGRAM(s) Oral every 6 hours PRN Rash and/or Itching  polyethylene glycol 3350 17 Gram(s) Oral every 12 hours PRN Constipation   Hospital Course: Pt is a 63yo w/ PMH HIV (CD4 96, VL 400k 7/30/22) asthma (non compliant w/ inhaler) p/w chest pain, cough and dizziness since Monday. Pt is a poor historian, previously undomiciled, currently residing in [facility]. He reports possible fall today from feeling dizzy prior to presenting to ED. Pt reports a history of multiple ED admissions in the past week for these symptoms and was informed of new lung cancer diagnosis, however no mention of lung CA can be found on chart review. CT Angio chest negative for PE or lung masses.     OVERNIGHT EVENTS: LAMONT    SUBJECTIVE:  Patient seen and examined at bedside. Pt reports needing to strain to have a BM, he also reports full body itching.     All other ROS negative unless otherwise indicated above.    Vital Signs Last 12 Hrs  T(F): 97.5 (08-02-22 @ 06:08), Max: 97.5 (08-02-22 @ 06:08)  HR: 100 (08-02-22 @ 06:08) (100 - 100)  BP: 141/100 (08-02-22 @ 06:08) (141/100 - 141/100)  BP(mean): --  RR: 18 (08-02-22 @ 06:08) (18 - 18)  SpO2: 98% (08-02-22 @ 06:08) (98% - 98%)  I&O's Summary    PHYSICAL EXAM:  Constitutional: Frail, NAD, comfortable in bed.  HEENT: NC/AT, EOMI, no conjunctival pallor or scleral icterus, MMM, small white plaques on oral mucosa, poor dentition   Neck: Supple, no JVD  Respiratory: CTA B/L. No w/r/r.   Cardiovascular: irregular rhythm, regular rate, normal S1 and S2, no m/r/g.   Gastrointestinal: slightly distended and firm, nontender, no guarding or rebound tenderness, no palpable masses   Extremities: wwp; no cyanosis, clubbing or edema.   Neurological: AAOx3, no CN deficits, strength and sensation intact throughout.   Skin: No gross skin abnormalities or rashes      LABS:                        9.3    4.34  )-----------( 296      ( 02 Aug 2022 05:30 )             30.8     08-02    136  |  102  |  14  ----------------------------<  88  4.1   |  24  |  0.78    Ca    9.2      02 Aug 2022 05:30  Phos  3.6     08-02  Mg     1.8     08-02    TPro  7.4  /  Alb  3.5  /  TBili  0.4  /  DBili  x   /  AST  34  /  ALT  16  /  AlkPhos  144<H>  08-01            RADIOLOGY & ADDITIONAL TESTS:    MEDICATIONS  (STANDING):  apixaban 5 milliGRAM(s) Oral every 12 hours  nystatin    Suspension 936436 Unit(s) Oral every 6 hours  predniSONE   Tablet 40 milliGRAM(s) Oral every 24 hours  senna 2 Tablet(s) Oral at bedtime  sodium chloride 3%  Inhalation 4 milliLiter(s) Inhalation every 12 hours  trimethoprim  160 mG/sulfamethoxazole 800 mG 1 Tablet(s) Oral every 24 hours    MEDICATIONS  (PRN):  acetaminophen     Tablet .. 650 milliGRAM(s) Oral every 6 hours PRN Temp greater or equal to 38C (100.4F), Mild Pain (1 - 3)  albuterol/ipratropium for Nebulization 3 milliLiter(s) Nebulizer every 6 hours PRN Shortness of Breath and/or Wheezing  benzonatate 100 milliGRAM(s) Oral three times a day PRN Cough  diphenhydrAMINE 25 milliGRAM(s) Oral every 6 hours PRN Rash and/or Itching  polyethylene glycol 3350 17 Gram(s) Oral every 12 hours PRN Constipation

## 2022-08-02 NOTE — PROGRESS NOTE ADULT - PROBLEM SELECTOR PLAN 5
Elevated alk phos 132 up from 96. Elevated GGT. Distended abdomen, no rebound or guarding.  RUQ US: Signs of right heart failure, including dilated inferior vena cava and hepatic veins, small ascites, and gallbladder wall thickening. This is likely causing a congestive hepatopathy, which may account for the abnormal liver function tests.  - Pt refused TTE

## 2022-08-03 ENCOUNTER — TRANSCRIPTION ENCOUNTER (OUTPATIENT)
Age: 62
End: 2022-08-03

## 2022-08-03 DIAGNOSIS — R55 SYNCOPE AND COLLAPSE: ICD-10-CM

## 2022-08-03 LAB
ANION GAP SERPL CALC-SCNC: 8 MMOL/L — SIGNIFICANT CHANGE UP (ref 5–17)
BASOPHILS # BLD AUTO: 0 K/UL — SIGNIFICANT CHANGE UP (ref 0–0.2)
BASOPHILS NFR BLD AUTO: 0 % — SIGNIFICANT CHANGE UP (ref 0–2)
BUN SERPL-MCNC: 16 MG/DL — SIGNIFICANT CHANGE UP (ref 7–23)
CALCIUM SERPL-MCNC: 9.6 MG/DL — SIGNIFICANT CHANGE UP (ref 8.4–10.5)
CHLORIDE SERPL-SCNC: 100 MMOL/L — SIGNIFICANT CHANGE UP (ref 96–108)
CO2 SERPL-SCNC: 26 MMOL/L — SIGNIFICANT CHANGE UP (ref 22–31)
CREAT SERPL-MCNC: 0.86 MG/DL — SIGNIFICANT CHANGE UP (ref 0.5–1.3)
EGFR: 97 ML/MIN/1.73M2 — SIGNIFICANT CHANGE UP
EOSINOPHIL # BLD AUTO: 0 K/UL — SIGNIFICANT CHANGE UP (ref 0–0.5)
EOSINOPHIL NFR BLD AUTO: 0 % — SIGNIFICANT CHANGE UP (ref 0–6)
GLUCOSE SERPL-MCNC: 100 MG/DL — HIGH (ref 70–99)
HCT VFR BLD CALC: 29.1 % — LOW (ref 39–50)
HGB BLD-MCNC: 8.9 G/DL — LOW (ref 13–17)
IMM GRANULOCYTES NFR BLD AUTO: 0.5 % — SIGNIFICANT CHANGE UP (ref 0–1.5)
LYMPHOCYTES # BLD AUTO: 1.56 K/UL — SIGNIFICANT CHANGE UP (ref 1–3.3)
LYMPHOCYTES # BLD AUTO: 36.7 % — SIGNIFICANT CHANGE UP (ref 13–44)
MAGNESIUM SERPL-MCNC: 1.7 MG/DL — SIGNIFICANT CHANGE UP (ref 1.6–2.6)
MCHC RBC-ENTMCNC: 26.5 PG — LOW (ref 27–34)
MCHC RBC-ENTMCNC: 30.6 GM/DL — LOW (ref 32–36)
MCV RBC AUTO: 86.6 FL — SIGNIFICANT CHANGE UP (ref 80–100)
MONOCYTES # BLD AUTO: 0.39 K/UL — SIGNIFICANT CHANGE UP (ref 0–0.9)
MONOCYTES NFR BLD AUTO: 9.2 % — SIGNIFICANT CHANGE UP (ref 2–14)
NEUTROPHILS # BLD AUTO: 2.28 K/UL — SIGNIFICANT CHANGE UP (ref 1.8–7.4)
NEUTROPHILS NFR BLD AUTO: 53.6 % — SIGNIFICANT CHANGE UP (ref 43–77)
NRBC # BLD: 0 /100 WBCS — SIGNIFICANT CHANGE UP (ref 0–0)
PLATELET # BLD AUTO: 300 K/UL — SIGNIFICANT CHANGE UP (ref 150–400)
POTASSIUM SERPL-MCNC: 4.7 MMOL/L — SIGNIFICANT CHANGE UP (ref 3.5–5.3)
POTASSIUM SERPL-SCNC: 4.7 MMOL/L — SIGNIFICANT CHANGE UP (ref 3.5–5.3)
RBC # BLD: 3.36 M/UL — LOW (ref 4.2–5.8)
RBC # FLD: 20 % — HIGH (ref 10.3–14.5)
SODIUM SERPL-SCNC: 134 MMOL/L — LOW (ref 135–145)
WBC # BLD: 4.25 K/UL — SIGNIFICANT CHANGE UP (ref 3.8–10.5)
WBC # FLD AUTO: 4.25 K/UL — SIGNIFICANT CHANGE UP (ref 3.8–10.5)

## 2022-08-03 PROCEDURE — 71045 X-RAY EXAM CHEST 1 VIEW: CPT | Mod: 26

## 2022-08-03 PROCEDURE — 99232 SBSQ HOSP IP/OBS MODERATE 35: CPT

## 2022-08-03 PROCEDURE — 99233 SBSQ HOSP IP/OBS HIGH 50: CPT | Mod: GC

## 2022-08-03 PROCEDURE — 93306 TTE W/DOPPLER COMPLETE: CPT | Mod: 26

## 2022-08-03 RX ORDER — BICTEGRAVIR SODIUM, EMTRICITABINE, AND TENOFOVIR ALAFENAMIDE FUMARATE 30; 120; 15 MG/1; MG/1; MG/1
1 TABLET ORAL
Qty: 30 | Refills: 0
Start: 2022-08-03 | End: 2022-09-01

## 2022-08-03 RX ORDER — BICTEGRAVIR SODIUM, EMTRICITABINE, AND TENOFOVIR ALAFENAMIDE FUMARATE 30; 120; 15 MG/1; MG/1; MG/1
1 TABLET ORAL EVERY 24 HOURS
Refills: 0 | Status: DISCONTINUED | OUTPATIENT
Start: 2022-08-03 | End: 2022-08-04

## 2022-08-03 RX ORDER — LANOLIN ALCOHOL/MO/W.PET/CERES
3 CREAM (GRAM) TOPICAL AT BEDTIME
Refills: 0 | Status: DISCONTINUED | OUTPATIENT
Start: 2022-08-03 | End: 2022-08-04

## 2022-08-03 RX ORDER — FLUCONAZOLE 150 MG/1
2 TABLET ORAL
Qty: 24 | Refills: 0
Start: 2022-08-03 | End: 2022-08-14

## 2022-08-03 RX ORDER — MAGNESIUM SULFATE 500 MG/ML
2 VIAL (ML) INJECTION ONCE
Refills: 0 | Status: COMPLETED | OUTPATIENT
Start: 2022-08-03 | End: 2022-08-03

## 2022-08-03 RX ORDER — SODIUM CHLORIDE 0.65 %
1 AEROSOL, SPRAY (ML) NASAL
Refills: 0 | Status: DISCONTINUED | OUTPATIENT
Start: 2022-08-03 | End: 2022-08-04

## 2022-08-03 RX ORDER — APIXABAN 2.5 MG/1
1 TABLET, FILM COATED ORAL
Qty: 60 | Refills: 0
Start: 2022-08-03 | End: 2022-09-01

## 2022-08-03 RX ADMIN — Medication 40 MILLIGRAM(S): at 09:22

## 2022-08-03 RX ADMIN — Medication 1 TABLET(S): at 09:22

## 2022-08-03 RX ADMIN — APIXABAN 5 MILLIGRAM(S): 2.5 TABLET, FILM COATED ORAL at 09:22

## 2022-08-03 RX ADMIN — Medication 3 MILLIGRAM(S): at 00:39

## 2022-08-03 RX ADMIN — Medication 3 MILLIGRAM(S): at 21:51

## 2022-08-03 RX ADMIN — APIXABAN 5 MILLIGRAM(S): 2.5 TABLET, FILM COATED ORAL at 21:51

## 2022-08-03 RX ADMIN — SODIUM CHLORIDE 4 MILLILITER(S): 9 INJECTION INTRAMUSCULAR; INTRAVENOUS; SUBCUTANEOUS at 06:42

## 2022-08-03 RX ADMIN — Medication 3 MILLILITER(S): at 07:17

## 2022-08-03 RX ADMIN — SENNA PLUS 2 TABLET(S): 8.6 TABLET ORAL at 21:51

## 2022-08-03 RX ADMIN — FLUCONAZOLE 400 MILLIGRAM(S): 150 TABLET ORAL at 12:09

## 2022-08-03 RX ADMIN — POLYETHYLENE GLYCOL 3350 17 GRAM(S): 17 POWDER, FOR SOLUTION ORAL at 19:44

## 2022-08-03 RX ADMIN — BICTEGRAVIR SODIUM, EMTRICITABINE, AND TENOFOVIR ALAFENAMIDE FUMARATE 1 TABLET(S): 30; 120; 15 TABLET ORAL at 13:25

## 2022-08-03 NOTE — CHART NOTE - NSCHARTNOTEFT_GEN_A_CORE
62-year-old male with a PMHx of HIV (non-compliant with HAART) and asthma (non-compliant with inhalers) who presented with CP, cough and dizziness.   Initial concern for PCP given low CD4 count and non-specific respiratory symptoms, but pulmonology does not feel the patient has PCP pneumonia.  Plan for start PCP PPx and restart HAART as per ID recommendations.  Lastly, will obtain TTE given concern for right HF and congestive hepatopathy.      #Cough/Chest Pain (resolved)    -etiology possibly related to costochondritis and volume overload     #Concern for Right Heart Failure   #Congestive Hepatopathy    -obtain TTE for further evaluation      #HIV   -restart Biktarvy today and continue with Bactrim for PCP PPx      #Candidiasis   -continue PO Fluconazole to complete 14 day course       #Asthma   -can d/c prednisone as per pulmonary recommendations    -continue with duonebs      #Afib   -continue with Eliquis    -outpatient cardiology follow up       Rest of plan as per resident note     Dispo: home today pending TTE 62-year-old male with a PMHx of HIV (non-compliant with HAART) and asthma (non-compliant with inhalers) who presented with CP, cough and dizziness.   Initial concern for PCP given low CD4 count and non-specific respiratory symptoms, but pulmonology does not feel the patient has PCP pneumonia.  Plan for start PCP PPx and restart HAART as per ID recommendations.  Lastly, will obtain TTE given concern for right sided HF and congestive hepatopathy.      #Cough/Chest Pain (resolved)    -etiology possibly related to costochondritis and volume overload     #Concern for Right Heart Failure   #Congestive Hepatopathy     -obtain TTE for further evaluation      #HIV   -restart Biktarvy today and continue with Bactrim for PCP PPx      #Candidiasis   -continue PO Fluconazole to complete 14 day course       #Asthma   -can d/c prednisone as per pulmonary recommendations    -continue with duonebs      #Afib   -continue with Eliquis    -outpatient cardiology follow up       Rest of plan as per resident note     Dispo: home today pending TTE

## 2022-08-03 NOTE — PROGRESS NOTE ADULT - ASSESSMENT
Pt is a 63yo w/ PMH HIV (non compliant w/ medications) asthma (on albuterol) who presented with chest pain, cough and dizziness since Monday. He was found to have CD4 count of 96 during this admission as well as elevated Fungitell at 161. Pulmonology consulted for bronchoscopy for BAL to r/o PCP. CT scan demonstrating enlarged PA at 3.1 cm, enlarged RV, and smooth interlobular septal thickening bilaterally primarily of the upper and lower lobes. Abdominal US also demonstrating findings concerning for congestive hepatopathy. In this setting, right sided HF is higher on the differential. Low suspicion for infection as fungatell is nonspecific and CT findings are not typical of PCP.     #RightHeartFailure  #AIDS  #Asthma    - F/u TTE and PA pressures  - NT BNP elevated at 4146  - Appears euvolemic on exam  - Low suspicion for PCP infection given CT findings  - Possible thoracic aortic calcification noted on CXR  	- Consider checking syphilis serologies   - DVT US negative  - Further recommendations pending TTE results     Case s/e/d with Dr. Nelson

## 2022-08-03 NOTE — DISCHARGE NOTE PROVIDER - NSDCFUADDAPPT_GEN_ALL_CORE_FT
Please follow up at [facility] for further care. It is very important that you visit [place] as you will need further care for heart failure and atrial fibrillation.

## 2022-08-03 NOTE — CHART NOTE - NSCHARTNOTEFT_GEN_A_CORE
Pt informed intern "can't breathe"--went to assess patient at bedside and states it has been ongoing this whole week when he lays down. On Auscultation lungs CTAB and patient 100% on RA in no respiratory distress. Informed patient he needed to get that echo that he refused earlier this week and said he refused because "I'm not going to miss my breakfast". Reordered echo and CXR ordered, but lungs CTAB so no breathing tx given and good O2. Ordered PRN nasal spray as patient stated nose is stuffy.     Will continue to monitor.

## 2022-08-03 NOTE — CHART NOTE - NSCHARTNOTEFT_GEN_A_CORE
HIV Consult    VITALS  Vital Signs Last 24 Hrs  T(C): 36.5 (03 Aug 2022 06:02), Max: 36.8 (02 Aug 2022 20:50)  T(F): 97.7 (03 Aug 2022 06:02), Max: 98.3 (02 Aug 2022 20:50)  HR: 82 (03 Aug 2022 06:02) (82 - 96)  BP: 133/94 (03 Aug 2022 06:02) (133/94 - 139/84)  BP(mean): --  RR: 18 (03 Aug 2022 06:02) (18 - 18)  SpO2: 99% (03 Aug 2022 06:02) (95% - 99%)    LABS                        8.9    4.25  )-----------( 300      ( 03 Aug 2022 06:59 )             29.1     08-03    134<L>  |  100  |  16  ----------------------------<  100<H>  4.7   |  26  |  0.86    Ca    9.6      03 Aug 2022 06:59  Phos  3.6     08-02  Mg     1.7     08-03    Notes, images, labs reviewed by me.    62M w/ AIDs presents w/ cough and chest pain    CD4 low w/ low CD4%  VL >400k  Anemic but other cell lines are good.  No hepatosplenomegaly noted on CT or US.  Congestive hepatopathy and RA enlargement noted from possible RH failure - unclear cause  LLL solid nodule on CT, no noted GOO.  No evidence of PE/DVT    Fungitell mildly elevated, but non-specific.  Lower concern for PCP PNA but to r/o would need bronch.  Pulm now following.  Low concern for DON given lack of BM suppression and no hepatosplenomegaly  Crypt Ag negative     Can restart Biktarvy today.  C/w Bactrim for PCP ppx.    Patient to follow up w/ his provider but RDC information provided if interested in transferring care.

## 2022-08-03 NOTE — DISCHARGE NOTE PROVIDER - NSDCMRMEDTOKEN_GEN_ALL_CORE_FT
dapagliflozin 10 mg oral tablet: 1 tab(s) orally every 24 hours  furosemide 20 mg oral tablet: 1 tab(s) orally once a day  metoprolol succinate 25 mg oral tablet, extended release: 1 tab(s) orally once a day  valsartan 40 mg oral tablet: 1 tab(s) orally once a day   apixaban 5 mg oral tablet: 1 tab(s) orally every 12 hours  bictegravir/emtricitabine/tenofovir 50 mg-200 mg-25 mg oral tablet: 1 tab(s) orally every 24 hours  dapagliflozin 10 mg oral tablet: 1 tab(s) orally every 24 hours  fluconazole 200 mg oral tablet: 2 tab(s) orally every 24 hours  furosemide 20 mg oral tablet: 1 tab(s) orally once a day  metoprolol succinate 25 mg oral tablet, extended release: 1 tab(s) orally once a day  sulfamethoxazole-trimethoprim 800 mg-160 mg oral tablet: 1 tab(s) orally every 24 hours  valsartan 40 mg oral tablet: 1 tab(s) orally once a day

## 2022-08-03 NOTE — DISCHARGE NOTE PROVIDER - NSDCCPTREATMENT_GEN_ALL_CORE_FT
PRINCIPAL PROCEDURE  Procedure: Transthoracic echocardiography (TTE)  Findings and Treatment: CONCLUSIONS:   1. Mild symmetric left ventricular hypertrophy.   2. Severely reduced left ventricular systolic function.   3. Normal right ventricular size.   4. Normal right ventricular systolic function.   5. Biatrial enlargement.   6. Moderate-to-severe mitral regurgitation.   7. Moderate tricuspid regurgitation.   8. Pulmonary hypertension present, pulmonary artery systolic pressure is   50 mmHg.   9. No pericardial effusion.  10. No prior echo is available for comparison.  11. Recommend cardiology consult / follow up, if clinically indicated.        SECONDARY PROCEDURE  Procedure: CT chest wo con  Findings and Treatment:   IMPRESSION:  1.  No acute pulmonary embolism.  2.  Cardiomegaly and right atrial dilatation. Mild pulmonary edema.   Findings are suggestive of right sided heart failure.  3.  Dilated main and right pulmonary arteries which can be seen in   pulmonary hypertension.

## 2022-08-03 NOTE — DISCHARGE NOTE PROVIDER - ATTENDING DISCHARGE PHYSICAL EXAMINATION:
-Gen: NAD, resting in bed, pleasant  -HEENT: poor dentition, white plaques on mucosa, no JVD  -CV: irregularly irregular, no murmurs appreciated   -Lungs: CTABL, normal respiratory effort on RA  -Ab: soft NT, ND, normal BS  -Ext: no LE edema, no rashes  -Neuro: A&O x 3, no focal deficits

## 2022-08-03 NOTE — DISCHARGE NOTE PROVIDER - HOSPITAL COURSE
Hospital Course: Pt is a 63yo w/ PMH HIV (CD4 96, VL 400k 7/30/22) asthma (non compliant w/ inhaler) p/w chest pain, cough and dizziness since Monday. Pt is a poor historian, previously undomiciled, currently residing in [facility]. He reports possible fall today from feeling dizzy prior to presenting to ED. CT Angio chest negative for PE or lung masses. RUQ showed congestive hepatopathy, suggesting R heart failure. Currently pending TTE. HIV team consulted, patient started on Bactrim for PCP prophylaxis and restarted on Biktarvy. Chest pain greatly improved since admission.     # Chest pain  RESOLVED. Pt p/w chest pain associated w/ dizziness and cough for 5 days. CTA chest negative for PE or masses. COVID negative. Unvaccinated. Troponin and D-dimer wnl. EKG showing atrial fibrillation. DDx: costochondritis 2/2 coughing vs PCP pneumonia   - PCP PCR pending, follow up with HIV clinic outpatient     # AIDS   STABLE. Pt with hx of HIV, inconsistent use of ART. Does not follow w/ PCP or HIV clinic. CD4 96, VL 400k, Pos fungitell  - f/u galactomannin  - f/u PCP PCR   - C/w Bactrim po daily for PCP ppx   - Pulmonary consulted for possible bronch to obtain sputum for PCP, pulm feels bronch not necessary at this time as fungitell is nonspecific, feel pt's sx are more likely 2/2 RHF, recommend f/u TTE  - C/w Biktarvy po daily  - Follow up with HIV clinic outpatient     # Esophageal candidiasis  STABLE.  - C/w Fluconazole 400mg po daily x 14 days (8/2-8/16).    # Acute asthma exacerbation   RESOLVED. Pt reports hx of asthma, uses albuterol inhaler BID but ran out approximately 1 week ago. Since then, pt reports increased coughing and SOB.   - C/w albuterol rescue inhaler prn shortness of breath/wheezing    # Elevated alkaline phosphatase level   STABLE. Elevated alk phos 132 up from 96. Elevated GGT. Distended abdomen, no rebound or guarding.  RUQ US: Signs of right heart failure, including dilated inferior vena cava and hepatic veins, small ascites, and gallbladder wall thickening. This is likely causing a congestive hepatopathy.  - F/u TTE    # Atrial fibrillation  STABLE. EKG reveals atrial fibrillation w/ premature ventricular or aberrant conductive complexes w/ T wave abnormalities. Trop neg. Pt not on AC, but per chart review was previously prescribed Eliquis.  - Eliquis 5mg bid.    #Anemia  STABLE. Pt with Hg 8.6 on admission, down from 10.1 July 11. No active source of bleeding. Most likely 2/2 nutritional deficiency. B12 slightly low (216), normal folate, normal ferritin.   - F/u with PCP     # Severe protein-calorie malnutrition  - C/w Ensure TID.     Hospital Course: Pt is a 63yo w/ PMH HIV (CD4 96, VL 400k 7/30/22) asthma (non compliant w/ inhaler) p/w chest pain, cough and dizziness since Monday. Pt is a poor historian, previously undomiciled, currently residing in [facility]. He reports possible fall today from feeling dizzy prior to presenting to ED. CT Angio chest negative for PE or lung masses. RUQ showed congestive hepatopathy, suggesting R heart failure. Currently pending TTE. HIV team consulted, patient started on Bactrim for PCP prophylaxis and restarted on Biktarvy. Chest pain greatly improved since admission.     # Chest pain  RESOLVED. Pt p/w chest pain associated w/ dizziness and cough for 5 days. CTA chest negative for PE or masses. COVID negative. Unvaccinated. Troponin and D-dimer wnl. EKG showing atrial fibrillation. DDx: costochondritis 2/2 coughing vs PCP pneumonia   - PCP PCR pending, follow up with HIV clinic outpatient     # Newly Diagnosed HFrEF       # AIDS   STABLE. Pt with hx of HIV, inconsistent use of ART. Does not follow w/ PCP or HIV clinic. CD4 96, VL 400k, Pos fungitell  - f/u galactomannin  - f/u PCP PCR   - C/w Bactrim po daily for PCP ppx   - Pulmonary consulted for possible bronch to obtain sputum for PCP, pulm feels bronch not necessary at this time as fungitell is nonspecific, feel pt's sx are more likely 2/2 RHF, recommend f/u TTE  - C/w Biktarvy po daily  - Follow up with HIV clinic outpatient     # Esophageal candidiasis  STABLE.  - C/w Fluconazole 400mg po daily x 14 days (8/2-8/16).    # Acute asthma exacerbation   RESOLVED. Pt reports hx of asthma, uses albuterol inhaler BID but ran out approximately 1 week ago. Since then, pt reports increased coughing and SOB.   - C/w albuterol rescue inhaler prn shortness of breath/wheezing    # Elevated alkaline phosphatase level   STABLE. Elevated alk phos 132 up from 96. Elevated GGT. Distended abdomen, no rebound or guarding.  RUQ US: Signs of right heart failure, including dilated inferior vena cava and hepatic veins, small ascites, and gallbladder wall thickening. This is likely causing a congestive hepatopathy.  - F/u TTE    # Atrial fibrillation  STABLE. EKG reveals atrial fibrillation w/ premature ventricular or aberrant conductive complexes w/ T wave abnormalities. Trop neg. Pt not on AC, but per chart review was previously prescribed Eliquis.  - Eliquis 5mg bid.    #Anemia  STABLE. Pt with Hg 8.6 on admission, down from 10.1 July 11. No active source of bleeding. Most likely 2/2 nutritional deficiency. B12 slightly low (216), normal folate, normal ferritin.   - F/u with PCP     # Severe protein-calorie malnutrition  - C/w Ensure TID.     Hospital Course: Pt is a 61yo w/ PMH HIV (CD4 96, VL 400k 7/30/22) asthma (non compliant w/ inhaler) p/w chest pain, cough and dizziness since Monday. Pt is a poor historian, previously undomiciled, currently residing in [facility]. He reports possible fall today from feeling dizzy prior to presenting to ED. CT Angio chest negative for PE or lung masses. RUQ showed congestive hepatopathy, suggesting R heart failure. Currently pending TTE. HIV team consulted, patient started on Bactrim for PCP prophylaxis and restarted on Biktarvy. Chest pain greatly improved since admission.     # Chest pain  RESOLVED. Pt p/w chest pain associated w/ dizziness and cough for 5 days. CTA chest negative for PE or masses. COVID negative. Unvaccinated. Troponin and D-dimer wnl. EKG showing atrial fibrillation. DDx: costochondritis 2/2 coughing vs PCP pneumonia   - PCP PCR pending, follow up with HIV clinic outpatient     # Newly Diagnosed HFrEF   Patient with new diagnosis of HFrEF EF of 35%   -Cardiology eval, rec GDMT  -c/w Valsartan 40   -c/w Furosemide 20   -c/w Farxiga 10   -c/w Toprol 25    follow up outpatient cardiology    # AIDS   STABLE. Pt with hx of HIV, inconsistent use of ART. Does not follow w/ PCP or HIV clinic. CD4 96, VL 400k, Pos fungitell  - f/u galactomannin  - f/u PCP PCR   - C/w Bactrim po daily for PCP ppx   - Pulmonary consulted for possible bronch to obtain sputum for PCP, pulm feels bronch not necessary at this time as fungitell is nonspecific, feel pt's sx are more likely 2/2 RHF, recommend f/u TTE  - C/w Biktarvy po daily  - Follow up with HIV clinic outpatient     # Esophageal candidiasis  STABLE.  - C/w Fluconazole 400mg po daily x 14 days (8/2-8/16).    # Acute asthma exacerbation   RESOLVED. Pt reports hx of asthma, uses albuterol inhaler BID but ran out approximately 1 week ago. Since then, pt reports increased coughing and SOB.   - C/w albuterol rescue inhaler prn shortness of breath/wheezing    # Elevated alkaline phosphatase level   STABLE. Elevated alk phos 132 up from 96. Elevated GGT. Distended abdomen, no rebound or guarding.  RUQ US: Signs of right heart failure, including dilated inferior vena cava and hepatic veins, small ascites, and gallbladder wall thickening. This is likely causing a congestive hepatopathy.  - F/u TTE    # Atrial fibrillation  STABLE. EKG reveals atrial fibrillation w/ premature ventricular or aberrant conductive complexes w/ T wave abnormalities. Trop neg. Pt not on AC, but per chart review was previously prescribed Eliquis.  - Eliquis 5mg bid.    #Anemia  STABLE. Pt with Hg 8.6 on admission, down from 10.1 July 11. No active source of bleeding. Most likely 2/2 nutritional deficiency. B12 slightly low (216), normal folate, normal ferritin.   - F/u with PCP     # Severe protein-calorie malnutrition  - C/w Ensure TID.

## 2022-08-03 NOTE — DISCHARGE NOTE PROVIDER - NSDCCPCAREPLAN_GEN_ALL_CORE_FT
PRINCIPAL DISCHARGE DIAGNOSIS  Diagnosis: Chest pain  Assessment and Plan of Treatment: You came into the hospital for chest pain. We performed a CT scan of your chest to look for a clot called a Pulmonary Embolism, and the scan was negative, meaning there is no clot in your lungs. Another cause of your chest pain may have been an asthma attack. We treated you with inhaled medications to help you breathe easier and ease your cough. At this time, we are glad you are not having any more chest pain. If you have chest pain, palpitations, or sweating, these could be signs of a serious heart problem, so you should go to the Emergency Department immediately if you experience any of those symptoms. Continue to take all of your medications as prescribed at home including inhalers for your asthma.      SECONDARY DISCHARGE DIAGNOSES  Diagnosis: AIDS  Assessment and Plan of Treatment: AIDS (acquired immunodeficiency syndrome) occurs when your body's disease-fighting system (immune system) is badly damaged and no longer protects you from infections and other health problems. This condition is caused by HIV (human immunodeficiency virus) infection.  Although your immune system is weakened when you are living with AIDS, your health care provider will give you instructions about how to stay as healthy as possible.  Follow these instructions at home:  Medicines   •Take over-the-counter and prescription medicines only as told by your health care provider. Do not change the dose or the time of day that you take the medicine without first getting approval from your health care provider. Taking medicines may help to prevent spreading HIV and AIDS to others.  •Tell your health care provider about all over-the-counter or prescription medicines you are taking, including vitamins, dietary supplements, herbs, eye drops, creams, and ointments.  •If you were prescribed an antibiotic medicine, take it as told by your health care provider. Do not stop taking the antibiotic even if you start to feel better.  General instructions   •Make sure you are up to date on recommended vaccinations. Ask your health care provider what vaccines are recommended for you.  •Keep all follow-up visits. This is important.  Where to find more information  •Centers for Disease Control and Prevention: cdc.gov/hiv  •[place] Department of Health and Human Services: hivinfo.nih.gov  Contact a health care provider if:  •You miss a dose of medicine.  •You have new side effects from your medicine.  Please continue to take Biktarvy daily as prescribed, as well as Bactrim daily to prevent a lung infection called PCP Pneumonia. Follow up with your HIV doctor outpatient to keep up to date on your treatment.      Diagnosis: Near syncope  Assessment and Plan of Treatment:      PRINCIPAL DISCHARGE DIAGNOSIS  Diagnosis: Chest pain  Assessment and Plan of Treatment: You came into the hospital for chest pain. We performed a CT scan of your chest to look for a clot called a Pulmonary Embolism, and the scan was negative, meaning there is no clot in your lungs. Another cause of your chest pain may have been an asthma attack. We treated you with inhaled medications to help you breathe easier and ease your cough. At this time, we are glad you are not having any more chest pain. If you have chest pain, palpitations, or sweating, these could be signs of a serious heart problem, so you should go to the Emergency Department immediately if you experience any of those symptoms. Continue to take all of your medications as prescribed at home including inhalers for your asthma.      SECONDARY DISCHARGE DIAGNOSES  Diagnosis: AIDS  Assessment and Plan of Treatment: AIDS (acquired immunodeficiency syndrome) occurs when your body's disease-fighting system (immune system) is badly damaged and no longer protects you from infections and other health problems. This condition is caused by HIV (human immunodeficiency virus) infection.  Although your immune system is weakened when you are living with AIDS, your health care provider will give you instructions about how to stay as healthy as possible.  Follow these instructions at home:  Medicines   •Take over-the-counter and prescription medicines only as told by your health care provider. Do not change the dose or the time of day that you take the medicine without first getting approval from your health care provider. Taking medicines may help to prevent spreading HIV and AIDS to others.  •Tell your health care provider about all over-the-counter or prescription medicines you are taking, including vitamins, dietary supplements, herbs, eye drops, creams, and ointments.  •If you were prescribed an antibiotic medicine, take it as told by your health care provider. Do not stop taking the antibiotic even if you start to feel better.  General instructions   •Make sure you are up to date on recommended vaccinations. Ask your health care provider what vaccines are recommended for you.  •Keep all follow-up visits. This is important.  Where to find more information  •Centers for Disease Control and Prevention: cdc.gov/hiv  •U.S. Department of Health and Human Services: hivinfo.nih.gov  Contact a health care provider if:  •You miss a dose of medicine.  •You have new side effects from your medicine.  Please continue to take Biktarvy daily as prescribed, as well as Bactrim daily to prevent a lung infection called PCP Pneumonia. Follow up with your HIV doctor outpatient to keep up to date on your treatment.      Diagnosis: Chronic systolic congestive heart failure  Assessment and Plan of Treatment: You were diagnosed with heart failure and seen by our cardiology team. They reccomended you take 20mg Lasix daily, 40mg valsartan daily, Toprol 25 daily, and 10mg Farxiga daily. Please follow up at [facility] as they take your insurance and you will need further care for your medical problems.    Diagnosis: Chronic atrial fibrillation  Assessment and Plan of Treatment: You have a history of atrial fibrilation or an abnormal heart rhythm. A fib can increase your risk of a stroke so you are being prescribed Eliquis 5mg twice a day. Please continue taking your blood thinner and Toprol 25mg and follow up at [place] for further care.

## 2022-08-03 NOTE — DISCHARGE NOTE PROVIDER - DETAILS OF MALNUTRITION DIAGNOSIS/DIAGNOSES
This patient has been assessed with a concern for Malnutrition and was treated during this hospitalization for the following Nutrition diagnosis/diagnoses:     -  07/30/2022: Severe protein-calorie malnutrition

## 2022-08-03 NOTE — PROGRESS NOTE ADULT - SUBJECTIVE AND OBJECTIVE BOX
PULMONARY CONSULT SERVICE FOLLOW-UP NOTE    INTERVAL HPI:  Reviewed chart and overnight events; patient seen and examined at bedside. He states that he barely slept because he had throat pain and throat tightness that was making it difficult for him to breath. He admits to a productive cough with white sputum. He reports shortness of breath whenever he moves around and states that it has not improved since admission.     MEDICATIONS:  Pulmonary:  albuterol/ipratropium for Nebulization 3 milliLiter(s) Nebulizer every 6 hours PRN  benzonatate 100 milliGRAM(s) Oral three times a day PRN  sodium chloride 3%  Inhalation 4 milliLiter(s) Inhalation every 12 hours    Antimicrobials:  bictegravir 50 mG/emtricitabine 200 mG/tenofovir alafenamide 25 mG (BIKTARVY) 1 Tablet(s) Oral every 24 hours  fluconAZOLE   Tablet 400 milliGRAM(s) Oral every 24 hours  trimethoprim  160 mG/sulfamethoxazole 800 mG 1 Tablet(s) Oral every 24 hours    Anticoagulants:  apixaban 5 milliGRAM(s) Oral every 12 hours    Cardiac:      Allergies    No Known Allergies    Intolerances        Vital Signs Last 24 Hrs  T(C): 36.3 (03 Aug 2022 12:39), Max: 36.8 (02 Aug 2022 20:50)  T(F): 97.4 (03 Aug 2022 12:39), Max: 98.3 (02 Aug 2022 20:50)  HR: 84 (03 Aug 2022 12:39) (82 - 96)  BP: 140/103 (03 Aug 2022 12:39) (133/94 - 140/103)  BP(mean): --  RR: 18 (03 Aug 2022 12:39) (18 - 18)  SpO2: 97% (03 Aug 2022 12:39) (95% - 99%)    Parameters below as of 03 Aug 2022 12:39  Patient On (Oxygen Delivery Method): room air            PHYSICAL EXAM:  Constitutional: WD  HEENT: NC/AT; PERRL, anicteric sclera; MMM  Neck: supple  Lymph: No supraclavical LAD or cervical chain LAD  Cardiovascular: +S1/S2, RRR  Respiratory: CTA B/L; no W/R/R  Gastrointestinal: soft, NT/ND  Extremities: WWP; no edema, clubbing or cyanosis  Vascular: 2+ radial and pedal pulses  Neurological: AAOx3; no focal deficits    LABS:      CBC Full  -  ( 03 Aug 2022 06:59 )  WBC Count : 4.25 K/uL  RBC Count : 3.36 M/uL  Hemoglobin : 8.9 g/dL  Hematocrit : 29.1 %  Platelet Count - Automated : 300 K/uL  Mean Cell Volume : 86.6 fl  Mean Cell Hemoglobin : 26.5 pg  Mean Cell Hemoglobin Concentration : 30.6 gm/dL  Auto Neutrophil # : 2.28 K/uL  Auto Lymphocyte # : 1.56 K/uL  Auto Monocyte # : 0.39 K/uL  Auto Eosinophil # : 0.00 K/uL  Auto Basophil # : 0.00 K/uL  Auto Neutrophil % : 53.6 %  Auto Lymphocyte % : 36.7 %  Auto Monocyte % : 9.2 %  Auto Eosinophil % : 0.0 %  Auto Basophil % : 0.0 %    08-03    134<L>  |  100  |  16  ----------------------------<  100<H>  4.7   |  26  |  0.86    Ca    9.6      03 Aug 2022 06:59  Phos  3.6     08-02  Mg     1.7     08-03      RADIOLOGY & ADDITIONAL STUDIES:    PROCEDURE DATE:  07/29/2022          INTERPRETATION:  CLINICAL INFORMATION: Chest pain, rule out PE. Recent   lung cancer diagnosis.    COMPARISON: None.    CONTRAST/COMPLICATIONS:  IV Contrast: Omnipaque 350  100 cc administered   0 cc discarded  Oral Contrast: NONE  Complications: None reported at time of study completion    PROCEDURE:  CT Angiogram of the chest was obtained with intravenous contrast. Three   dimensional maximum intensity projection (MIP) images were generated.    FINDINGS:    PULMONARY ANGIOGRAM: Satisfactory opacification of pulmonary artery. No   filling defects in the pulmonary arteries. The main and right pulmonary   arteries are enlarged measuring 3.1 cm and 2.6 cm respectively.    LYMPH NODES: No prominent mediastinal, hilar or axillary lymphadenopathy   is seen.    HEART/VASCULATURE: Cardiomegaly. The right atrium is enlarged and there   is reflux of contrast into the IVC.    AIRWAYS/LUNGS/PLEURA: Evaluation of the lung parenchyma is limited by   motion artifact. Minimal paraseptal emphysema in the right apex and   lingula. Mild smooth interlobular septal thickening in the upper and   lower lobes bilaterally. Linear atelectasis in the right middle and left   lower lobes. 5.8 x 5.9 mm irregular solid nodule posterior segment left   lower lobe (series 3, image 269).    UPPER ABDOMEN: No abnormality.    BONES/SOFT TISSUES: Chronic fracture of the lateral left ninth ribs.    IMPRESSION:    1.  No acute pulmonary embolism.  2.  Cardiomegaly and right atrial dilatation. Mild pulmonary edema.   Findings are suggestive of right sided heart failure.  3.  Dilated main and right pulmonary arteries which can be seen in   pulmonary hypertension.

## 2022-08-04 ENCOUNTER — TRANSCRIPTION ENCOUNTER (OUTPATIENT)
Age: 62
End: 2022-08-04

## 2022-08-04 VITALS
HEART RATE: 80 BPM | DIASTOLIC BLOOD PRESSURE: 90 MMHG | SYSTOLIC BLOOD PRESSURE: 132 MMHG | OXYGEN SATURATION: 97 % | RESPIRATION RATE: 17 BRPM | TEMPERATURE: 98 F

## 2022-08-04 LAB
ANION GAP SERPL CALC-SCNC: 9 MMOL/L — SIGNIFICANT CHANGE UP (ref 5–17)
BASOPHILS # BLD AUTO: 0.01 K/UL — SIGNIFICANT CHANGE UP (ref 0–0.2)
BASOPHILS NFR BLD AUTO: 0.2 % — SIGNIFICANT CHANGE UP (ref 0–2)
BUN SERPL-MCNC: 15 MG/DL — SIGNIFICANT CHANGE UP (ref 7–23)
CALCIUM SERPL-MCNC: 9.4 MG/DL — SIGNIFICANT CHANGE UP (ref 8.4–10.5)
CHLORIDE SERPL-SCNC: 101 MMOL/L — SIGNIFICANT CHANGE UP (ref 96–108)
CO2 SERPL-SCNC: 25 MMOL/L — SIGNIFICANT CHANGE UP (ref 22–31)
CREAT SERPL-MCNC: 0.77 MG/DL — SIGNIFICANT CHANGE UP (ref 0.5–1.3)
EGFR: 101 ML/MIN/1.73M2 — SIGNIFICANT CHANGE UP
EOSINOPHIL # BLD AUTO: 0 K/UL — SIGNIFICANT CHANGE UP (ref 0–0.5)
EOSINOPHIL NFR BLD AUTO: 0 % — SIGNIFICANT CHANGE UP (ref 0–6)
GLUCOSE SERPL-MCNC: 129 MG/DL — HIGH (ref 70–99)
HCT VFR BLD CALC: 29.5 % — LOW (ref 39–50)
HGB BLD-MCNC: 9.2 G/DL — LOW (ref 13–17)
IMM GRANULOCYTES NFR BLD AUTO: 0.8 % — SIGNIFICANT CHANGE UP (ref 0–1.5)
LYMPHOCYTES # BLD AUTO: 1.66 K/UL — SIGNIFICANT CHANGE UP (ref 1–3.3)
LYMPHOCYTES # BLD AUTO: 32.4 % — SIGNIFICANT CHANGE UP (ref 13–44)
MAGNESIUM SERPL-MCNC: 1.7 MG/DL — SIGNIFICANT CHANGE UP (ref 1.6–2.6)
MCHC RBC-ENTMCNC: 26.7 PG — LOW (ref 27–34)
MCHC RBC-ENTMCNC: 31.2 GM/DL — LOW (ref 32–36)
MCV RBC AUTO: 85.5 FL — SIGNIFICANT CHANGE UP (ref 80–100)
MONOCYTES # BLD AUTO: 0.43 K/UL — SIGNIFICANT CHANGE UP (ref 0–0.9)
MONOCYTES NFR BLD AUTO: 8.4 % — SIGNIFICANT CHANGE UP (ref 2–14)
NEUTROPHILS # BLD AUTO: 2.99 K/UL — SIGNIFICANT CHANGE UP (ref 1.8–7.4)
NEUTROPHILS NFR BLD AUTO: 58.2 % — SIGNIFICANT CHANGE UP (ref 43–77)
NRBC # BLD: 0 /100 WBCS — SIGNIFICANT CHANGE UP (ref 0–0)
PLATELET # BLD AUTO: 297 K/UL — SIGNIFICANT CHANGE UP (ref 150–400)
POTASSIUM SERPL-MCNC: 4.2 MMOL/L — SIGNIFICANT CHANGE UP (ref 3.5–5.3)
POTASSIUM SERPL-SCNC: 4.2 MMOL/L — SIGNIFICANT CHANGE UP (ref 3.5–5.3)
RBC # BLD: 3.45 M/UL — LOW (ref 4.2–5.8)
RBC # FLD: 20.1 % — HIGH (ref 10.3–14.5)
SODIUM SERPL-SCNC: 135 MMOL/L — SIGNIFICANT CHANGE UP (ref 135–145)
WBC # BLD: 5.13 K/UL — SIGNIFICANT CHANGE UP (ref 3.8–10.5)
WBC # FLD AUTO: 5.13 K/UL — SIGNIFICANT CHANGE UP (ref 3.8–10.5)

## 2022-08-04 PROCEDURE — 86850 RBC ANTIBODY SCREEN: CPT

## 2022-08-04 PROCEDURE — 99238 HOSP IP/OBS DSCHRG MGMT 30/<: CPT | Mod: GC

## 2022-08-04 PROCEDURE — 85379 FIBRIN DEGRADATION QUANT: CPT

## 2022-08-04 PROCEDURE — 80048 BASIC METABOLIC PNL TOTAL CA: CPT

## 2022-08-04 PROCEDURE — 87594 PNEUMCYSTS JIROVECII AMP PRB: CPT

## 2022-08-04 PROCEDURE — 87536 HIV-1 QUANT&REVRSE TRNSCRPJ: CPT

## 2022-08-04 PROCEDURE — 36415 COLL VENOUS BLD VENIPUNCTURE: CPT

## 2022-08-04 PROCEDURE — 87449 NOS EACH ORGANISM AG IA: CPT

## 2022-08-04 PROCEDURE — 86359 T CELLS TOTAL COUNT: CPT

## 2022-08-04 PROCEDURE — 85025 COMPLETE CBC W/AUTO DIFF WBC: CPT

## 2022-08-04 PROCEDURE — 86780 TREPONEMA PALLIDUM: CPT

## 2022-08-04 PROCEDURE — 84484 ASSAY OF TROPONIN QUANT: CPT

## 2022-08-04 PROCEDURE — 86900 BLOOD TYPING SEROLOGIC ABO: CPT

## 2022-08-04 PROCEDURE — 82977 ASSAY OF GGT: CPT

## 2022-08-04 PROCEDURE — 82746 ASSAY OF FOLIC ACID SERUM: CPT

## 2022-08-04 PROCEDURE — 93005 ELECTROCARDIOGRAM TRACING: CPT

## 2022-08-04 PROCEDURE — 97161 PT EVAL LOW COMPLEX 20 MIN: CPT

## 2022-08-04 PROCEDURE — 85610 PROTHROMBIN TIME: CPT

## 2022-08-04 PROCEDURE — 93306 TTE W/DOPPLER COMPLETE: CPT

## 2022-08-04 PROCEDURE — 85027 COMPLETE CBC AUTOMATED: CPT

## 2022-08-04 PROCEDURE — 93971 EXTREMITY STUDY: CPT

## 2022-08-04 PROCEDURE — 86360 T CELL ABSOLUTE COUNT/RATIO: CPT

## 2022-08-04 PROCEDURE — 0225U NFCT DS DNA&RNA 21 SARSCOV2: CPT

## 2022-08-04 PROCEDURE — 82728 ASSAY OF FERRITIN: CPT

## 2022-08-04 PROCEDURE — 76700 US EXAM ABDOM COMPLETE: CPT

## 2022-08-04 PROCEDURE — 83735 ASSAY OF MAGNESIUM: CPT

## 2022-08-04 PROCEDURE — 99232 SBSQ HOSP IP/OBS MODERATE 35: CPT | Mod: GC

## 2022-08-04 PROCEDURE — 71275 CT ANGIOGRAPHY CHEST: CPT

## 2022-08-04 PROCEDURE — 99221 1ST HOSP IP/OBS SF/LOW 40: CPT

## 2022-08-04 PROCEDURE — 82607 VITAMIN B-12: CPT

## 2022-08-04 PROCEDURE — 87635 SARS-COV-2 COVID-19 AMP PRB: CPT

## 2022-08-04 PROCEDURE — 83540 ASSAY OF IRON: CPT

## 2022-08-04 PROCEDURE — 86403 PARTICLE AGGLUT ANTBDY SCRN: CPT

## 2022-08-04 PROCEDURE — 83550 IRON BINDING TEST: CPT

## 2022-08-04 PROCEDURE — 80053 COMPREHEN METABOLIC PANEL: CPT

## 2022-08-04 PROCEDURE — 84466 ASSAY OF TRANSFERRIN: CPT

## 2022-08-04 PROCEDURE — 85730 THROMBOPLASTIN TIME PARTIAL: CPT

## 2022-08-04 PROCEDURE — 99285 EMERGENCY DEPT VISIT HI MDM: CPT | Mod: 25

## 2022-08-04 PROCEDURE — 71045 X-RAY EXAM CHEST 1 VIEW: CPT

## 2022-08-04 PROCEDURE — 94640 AIRWAY INHALATION TREATMENT: CPT

## 2022-08-04 PROCEDURE — 84100 ASSAY OF PHOSPHORUS: CPT

## 2022-08-04 PROCEDURE — 86901 BLOOD TYPING SEROLOGIC RH(D): CPT

## 2022-08-04 PROCEDURE — 86593 SYPHILIS TEST NON-TREP QUANT: CPT

## 2022-08-04 PROCEDURE — 86592 SYPHILIS TEST NON-TREP QUAL: CPT

## 2022-08-04 RX ORDER — METOPROLOL TARTRATE 50 MG
1 TABLET ORAL
Qty: 90 | Refills: 0
Start: 2022-08-04 | End: 2022-11-01

## 2022-08-04 RX ORDER — VALSARTAN 80 MG/1
40 TABLET ORAL DAILY
Refills: 0 | Status: DISCONTINUED | OUTPATIENT
Start: 2022-08-04 | End: 2022-08-04

## 2022-08-04 RX ORDER — METOPROLOL TARTRATE 50 MG
25 TABLET ORAL DAILY
Refills: 0 | Status: DISCONTINUED | OUTPATIENT
Start: 2022-08-04 | End: 2022-08-04

## 2022-08-04 RX ORDER — DAPAGLIFLOZIN 10 MG/1
10 TABLET, FILM COATED ORAL EVERY 24 HOURS
Refills: 0 | Status: DISCONTINUED | OUTPATIENT
Start: 2022-08-04 | End: 2022-08-04

## 2022-08-04 RX ORDER — FUROSEMIDE 40 MG
1 TABLET ORAL
Qty: 90 | Refills: 0
Start: 2022-08-04 | End: 2022-11-01

## 2022-08-04 RX ORDER — FUROSEMIDE 40 MG
20 TABLET ORAL DAILY
Refills: 0 | Status: DISCONTINUED | OUTPATIENT
Start: 2022-08-04 | End: 2022-08-04

## 2022-08-04 RX ORDER — MAGNESIUM SULFATE 500 MG/ML
2 VIAL (ML) INJECTION ONCE
Refills: 0 | Status: DISCONTINUED | OUTPATIENT
Start: 2022-08-04 | End: 2022-08-04

## 2022-08-04 RX ORDER — VALSARTAN 80 MG/1
1 TABLET ORAL
Qty: 90 | Refills: 0
Start: 2022-08-04 | End: 2022-11-01

## 2022-08-04 RX ORDER — DAPAGLIFLOZIN 10 MG/1
1 TABLET, FILM COATED ORAL
Qty: 90 | Refills: 0
Start: 2022-08-04 | End: 2022-11-01

## 2022-08-04 RX ADMIN — Medication 1 TABLET(S): at 08:44

## 2022-08-04 RX ADMIN — FLUCONAZOLE 400 MILLIGRAM(S): 150 TABLET ORAL at 10:50

## 2022-08-04 RX ADMIN — Medication 20 MILLIGRAM(S): at 14:55

## 2022-08-04 RX ADMIN — BICTEGRAVIR SODIUM, EMTRICITABINE, AND TENOFOVIR ALAFENAMIDE FUMARATE 1 TABLET(S): 30; 120; 15 TABLET ORAL at 12:56

## 2022-08-04 RX ADMIN — Medication 3 MILLILITER(S): at 03:18

## 2022-08-04 RX ADMIN — Medication 25 MILLIGRAM(S): at 14:54

## 2022-08-04 RX ADMIN — APIXABAN 5 MILLIGRAM(S): 2.5 TABLET, FILM COATED ORAL at 08:44

## 2022-08-04 NOTE — PROGRESS NOTE ADULT - PROVIDER SPECIALTY LIST ADULT
Internal Medicine
Pulmonology
Pulmonology
Internal Medicine
Internal Medicine
Hospitalist
Internal Medicine

## 2022-08-04 NOTE — DISCHARGE NOTE NURSING/CASE MANAGEMENT/SOCIAL WORK - NSDCPEWEB_GEN_ALL_CORE
[facility] for Tobacco Control website --- [email or web address] --- [email or web address]

## 2022-08-04 NOTE — PROGRESS NOTE ADULT - SUBJECTIVE AND OBJECTIVE BOX
PULMONARY CONSULT SERVICE FOLLOW-UP NOTE    INTERVAL HPI:  Reviewed chart and overnight events; patient seen and examined at bedside.    MEDICATIONS:  Pulmonary:  albuterol/ipratropium for Nebulization 3 milliLiter(s) Nebulizer every 6 hours PRN  benzonatate 100 milliGRAM(s) Oral three times a day PRN  sodium chloride 3%  Inhalation 4 milliLiter(s) Inhalation every 12 hours    Antimicrobials:  bictegravir 50 mG/emtricitabine 200 mG/tenofovir alafenamide 25 mG (BIKTARVY) 1 Tablet(s) Oral every 24 hours  fluconAZOLE   Tablet 400 milliGRAM(s) Oral every 24 hours  trimethoprim  160 mG/sulfamethoxazole 800 mG 1 Tablet(s) Oral every 24 hours    Anticoagulants:  apixaban 5 milliGRAM(s) Oral every 12 hours    Cardiac:      Allergies    No Known Allergies    Intolerances        Vital Signs Last 24 Hrs  T(C): 37.1 (04 Aug 2022 05:21), Max: 37.1 (04 Aug 2022 05:21)  T(F): 98.7 (04 Aug 2022 05:21), Max: 98.7 (04 Aug 2022 05:21)  HR: 91 (04 Aug 2022 05:21) (84 - 93)  BP: 132/95 (04 Aug 2022 05:21) (132/95 - 151/90)  BP(mean): --  RR: 18 (04 Aug 2022 05:21) (18 - 18)  SpO2: 98% (04 Aug 2022 05:21) (97% - 99%)    Parameters below as of 04 Aug 2022 05:21  Patient On (Oxygen Delivery Method): room air            PHYSICAL EXAM:  Constitutional: WD  HEENT: NC/AT; PERRL, anicteric sclera; MMM  Neck: supple  Lymph: No supraclavical LAD or cervical chain LAD  Cardiovascular: +S1/S2, RRR  Respiratory: CTA B/L; no W/R/R  Gastrointestinal: soft, NT/ND  Extremities: WWP; no edema, clubbing or cyanosis  Vascular: 2+ radial and pedal pulses  Neurological: AAOx3; no focal deficits    LABS:      CBC Full  -  ( 04 Aug 2022 05:30 )  WBC Count : 5.13 K/uL  RBC Count : 3.45 M/uL  Hemoglobin : 9.2 g/dL  Hematocrit : 29.5 %  Platelet Count - Automated : 297 K/uL  Mean Cell Volume : 85.5 fl  Mean Cell Hemoglobin : 26.7 pg  Mean Cell Hemoglobin Concentration : 31.2 gm/dL  Auto Neutrophil # : 2.99 K/uL  Auto Lymphocyte # : 1.66 K/uL  Auto Monocyte # : 0.43 K/uL  Auto Eosinophil # : 0.00 K/uL  Auto Basophil # : 0.01 K/uL  Auto Neutrophil % : 58.2 %  Auto Lymphocyte % : 32.4 %  Auto Monocyte % : 8.4 %  Auto Eosinophil % : 0.0 %  Auto Basophil % : 0.2 %    08-04    135  |  101  |  15  ----------------------------<  129<H>  4.2   |  25  |  0.77    Ca    9.4      04 Aug 2022 05:30  Mg     1.7     08-04        RADIOLOGY & ADDITIONAL STUDIES:    ACC: [id number] EXAM:  XR CHEST PORTABLE IMMED 1V                          PROCEDURE DATE:  08/03/2022          INTERPRETATION:  Clinical history/reason for exam: Shortness of breath.    Frontal chest.    Wrist: July 11, 2022.    Findings/  impression: Cardiomegaly, thoracic aortic calcification. Pulmonary   vascular congestion   PULMONARY CONSULT SERVICE FOLLOW-UP NOTE    INTERVAL HPI:  Reviewed chart and overnight events; patient seen and examined at bedside. He states his breathing has improved and he feels well.     MEDICATIONS:  Pulmonary:  albuterol/ipratropium for Nebulization 3 milliLiter(s) Nebulizer every 6 hours PRN  benzonatate 100 milliGRAM(s) Oral three times a day PRN  sodium chloride 3%  Inhalation 4 milliLiter(s) Inhalation every 12 hours    Antimicrobials:  bictegravir 50 mG/emtricitabine 200 mG/tenofovir alafenamide 25 mG (BIKTARVY) 1 Tablet(s) Oral every 24 hours  fluconAZOLE   Tablet 400 milliGRAM(s) Oral every 24 hours  trimethoprim  160 mG/sulfamethoxazole 800 mG 1 Tablet(s) Oral every 24 hours    Anticoagulants:  apixaban 5 milliGRAM(s) Oral every 12 hours    Cardiac:      Allergies    No Known Allergies    Intolerances        Vital Signs Last 24 Hrs  T(C): 37.1 (04 Aug 2022 05:21), Max: 37.1 (04 Aug 2022 05:21)  T(F): 98.7 (04 Aug 2022 05:21), Max: 98.7 (04 Aug 2022 05:21)  HR: 91 (04 Aug 2022 05:21) (84 - 93)  BP: 132/95 (04 Aug 2022 05:21) (132/95 - 151/90)  BP(mean): --  RR: 18 (04 Aug 2022 05:21) (18 - 18)  SpO2: 98% (04 Aug 2022 05:21) (97% - 99%)    Parameters below as of 04 Aug 2022 05:21  Patient On (Oxygen Delivery Method): room air            PHYSICAL EXAM:  Constitutional: WD  HEENT: NC/AT; PERRL, anicteric sclera; MMM  Neck: supple  Lymph: No supraclavical LAD or cervical chain LAD  Cardiovascular: +S1/S2, RRR  Respiratory: CTA B/L; no W/R/R  Gastrointestinal: soft, NT/ND  Extremities: WWP; no edema, clubbing or cyanosis  Vascular: 2+ radial and pedal pulses  Neurological: AAOx3; no focal deficits    LABS:      CBC Full  -  ( 04 Aug 2022 05:30 )  WBC Count : 5.13 K/uL  RBC Count : 3.45 M/uL  Hemoglobin : 9.2 g/dL  Hematocrit : 29.5 %  Platelet Count - Automated : 297 K/uL  Mean Cell Volume : 85.5 fl  Mean Cell Hemoglobin : 26.7 pg  Mean Cell Hemoglobin Concentration : 31.2 gm/dL  Auto Neutrophil # : 2.99 K/uL  Auto Lymphocyte # : 1.66 K/uL  Auto Monocyte # : 0.43 K/uL  Auto Eosinophil # : 0.00 K/uL  Auto Basophil # : 0.01 K/uL  Auto Neutrophil % : 58.2 %  Auto Lymphocyte % : 32.4 %  Auto Monocyte % : 8.4 %  Auto Eosinophil % : 0.0 %  Auto Basophil % : 0.2 %    08-04    135  |  101  |  15  ----------------------------<  129<H>  4.2   |  25  |  0.77    Ca    9.4      04 Aug 2022 05:30  Mg     1.7     08-04        RADIOLOGY & ADDITIONAL STUDIES:    ACC: [id number] EXAM:  XR CHEST PORTABLE IMMED 1V                          PROCEDURE DATE:  08/03/2022          INTERPRETATION:  Clinical history/reason for exam: Shortness of breath.    Frontal chest.    Wrist: July 11, 2022.    Findings/  impression: Cardiomegaly, thoracic aortic calcification. Pulmonary   vascular congestion

## 2022-08-04 NOTE — PROGRESS NOTE ADULT - ATTENDING COMMENTS
62-year-old male with a PMHx of HIV (non-compliant with HAART) and asthma (non-compliant with inhalers) who presented with CP, cough and dizziness. Initial concern for PCP given low CD4 count and non-specific respiratory symptoms, but pulmonology does not feel the patient has PCP pneumonia. Plan for start PCP PPx and restart HAART as per ID recommendations.   TTE showed EF 34% (without RWMA) and moderate-severe MR.      #HFrEF  -TTE EF 34% (without RWMA) and moderate-severe MR  -cardiology consulted, f/u recommendations     #Cough/Chest Pain (resolved)    -etiology possibly related to costochondritis and volume overload       #HIV   -restart Biktarvy today and continue with Bactrim for PCP PPx      #Candidiasis   -continue PO Fluconazole to complete 14 day course       #Asthma   -can d/c prednisone as per pulmonary recommendations    -continue with duonebs      #Afib   -continue with Eliquis    -outpatient cardiology follow up       Rest of plan as per resident note
all his symptoms explained by candidiasis. would stop prendisone. pending TTE. c/w primary team
ECHO with severely reduced EF and elevated PASP likely 2/2 group II PH. needs cards optimization. no further pulm work up at this time. please call back as needed.
61yo w/ PMH HIV (non compliant w/ medications) asthma (non compliant w/ inhaler) p/w chest pain, cough and dizziness since Monday. Reports multiple ED admissions this week for symptoms. Not really producing sputum but complaining of cough. Also noted to have oral thrush.     Labs and imaging reviewed    Problem List  #Chest Pain, pleuritic vs costochondritis vs infectious - PJP PNA, awaiting sputum induction, asking pulm if patient would be candidate for Bronch for BAL  #HIV vs AIDS - OI work up, HIV consult, if OI negative would resume Biktarvy pending approval from HIV  #Asthma, poorly controlled - improving, continue regimen  #Severe protein calorie malnutrition - Nutrition consult  #Right Heart failure - patient refused TTE, unable to assess cardiac status fully. Concern for congestive hepatic disease  #Oral Candidiasis - Diflucan    Rest of problems as above.
63yo w/ PMH HIV (non compliant w/ medications) asthma (non compliant w/ inhaler) p/w chest pain, cough and dizziness since Monday. Reports multiple ED admissions this week for symptoms. Not really producing sputum but complaining of cough.     Labs and imaging reviewed    Problem List  #Chest Pain, pleuritic vs costochondritis - biggest concern is it could be PJP PNA given lack of adherence to HAART, awaiting infectious work up  #HIV vs AIDS - OI work up, HIV consult  #Asthma, poorly controlled - standing duonebs, standing steroids  #Severe protein calorie malnutrition - Nutrition consult    Rest of problems as above.
63yo w/ PMH HIV (non compliant w/ medications) asthma (non compliant w/ inhaler) p/w chest pain, cough and dizziness since Monday. Reports multiple ED admissions this week for symptoms. Not really producing sputum but complaining of cough. Also noted to have oral thrush.     Labs and imaging reviewed    Problem List  #Chest Pain, pleuritic vs costochondritis vs infectious - biggest concern is it could be PJP PNA given lack of adherence to HAART, awaiting fungitell, attempt sputum induction and send for PCR  #HIV vs AIDS - OI work up, HIV consult, if OI negative would resume Biktarvy  #Asthma, poorly controlled - improving, continue regimen  #Severe protein calorie malnutrition - Nutrition consult  #Right Heart failure - patient refused TTE, unable to assess cardiac status fully. Concern for congestive hepatic disease  #Oral Candidiasis     Rest of problems as above.

## 2022-08-04 NOTE — DISCHARGE NOTE NURSING/CASE MANAGEMENT/SOCIAL WORK - NSDCFUADDAPPT_GEN_ALL_CORE_FT
Please follow up at [facility] for further care. It is very important that you visit [place] as you will need further care for heart failure and atrial fibrillation.

## 2022-08-04 NOTE — PROGRESS NOTE ADULT - NUTRITIONAL ASSESSMENT
This patient has been assessed with a concern for Malnutrition and has been determined to have a diagnosis/diagnoses of Severe protein-calorie malnutrition.    This patient is being managed with:   Diet Regular-  Supplement Feeding Modality:  Oral  Ensure Enlive Cans or Servings Per Day:  3       Frequency:  Daily  Entered: Jul 29 2022  6:39PM    
scheduled for Friday 12/11

## 2022-08-04 NOTE — DISCHARGE NOTE NURSING/CASE MANAGEMENT/SOCIAL WORK - PATIENT PORTAL LINK FT
You can access the FollowMyHealth Patient Portal offered by [facility] by registering at the following website: [email or web address]. By joining "[email or web address]’s FollowMyHealth portal, you will also be able to view your health information using other applications (apps) compatible with our system.

## 2022-08-04 NOTE — CONSULT NOTE ADULT - ASSESSMENT
Pt is a 61yo w/ PMH HIV (non compliant w/ medications) asthma (on albuterol) who presented with chest pain, cough and dizziness since Monday. He was found to have CD4 count of 96 during this admission as well as elevated Fungitell at 161. Pulmonology consulted for bronchoscopy for BAL to r/o PCP. CT scan demonstrating enlarged PA at 3.1 cm, enlarged RV, and smooth interlobular septal thickening bilaterally primarily of the upper and lower lobes. Abdominal US also demonstrating findings concerning for congestive hepatopathy. In this setting, right sided HF is higher on the differential. Low suspicion for infection as fungatell is nonspecific and CT findings are not typical of PCP.     #RightHeartFailure  #AIDS  #Asthma    - F/u TTE and PA pressures  - NT BNP elevated at 4146  - Appears euvolemic on exam  - DVT US negative  - Further recommendations pending TTE results     Case s/e/d with Dr. Nelson
62M PMHx of HIV (non-compliant with HAART) and asthma (non-compliant with inhalers) who presented with CP, cough and dizziness. Initial concern for PCP given low CD4 count and non-specific respiratory symptoms, TTE showed EF 34% (without RWMA) and moderate-severe MR for which cardiology is consulted  - Patient noted to be noncompliant with medications, would not pursue ischemic workup at this time  - Follow up as outpatient for continued management.  - Recommend discharge on:      Toprol 25 mg QD,       Valsartan 40 mg QD       Lasix 20 mg QD   - Can fu with Dr Galvan for outpatient cardiology fu [phone]      Plan discussed with cardiology consult attending - cardiology will sign off, please re-consult with any questions

## 2022-08-04 NOTE — DISCHARGE NOTE NURSING/CASE MANAGEMENT/SOCIAL WORK - TOBACCO CESSATION EDUCATION/COUNSELLING(PROVIDED IF TOBACCO USED IN THE PAST 30 DAYS) NON CORE MEASURE SITES
Patient swabbed for COVID-19 testing.  Grace Anthony LPN on 7/11/2022 at 4:31 PM      
Offered and provided

## 2022-08-04 NOTE — PROGRESS NOTE ADULT - ASSESSMENT
Pt is a 63yo w/ PMH HIV (non compliant w/ medications) asthma (on albuterol) who presented with chest pain, cough and dizziness since Monday. He was found to have CD4 count of 96 during this admission as well as elevated Fungitell at 161. Pulmonology consulted for bronchoscopy for BAL to r/o PCP. CT scan demonstrating enlarged PA at 3.1 cm, enlarged RV, and smooth interlobular septal thickening bilaterally primarily of the upper and lower lobes. Abdominal US also demonstrating findings concerning for congestive hepatopathy. In this setting, right sided HF is higher on the differential. Low suspicion for infection as fungatell is nonspecific and CT findings are not typical of PCP.     #RightHeartFailure  #AIDS  #Asthma    - F/u TTE and PA pressures  - NT BNP elevated at 4146  - Appears euvolemic on exam  - Low suspicion for PCP infection given CT findings  - Possible thoracic aortic calcification noted on CXR  	- Consider checking syphilis serologies   - DVT US negative  - Further recommendations pending TTE results     Case s/e/d with Dr. Nelson Pt is a 63yo w/ PMH HIV (non compliant w/ medications) asthma (on albuterol) who presented with chest pain, cough and dizziness since Monday. He was found to have CD4 count of 96 during this admission as well as elevated Fungitell at 161. Pulmonology consulted for bronchoscopy for BAL to r/o PCP. CT scan demonstrating enlarged PA at 3.1 cm, enlarged RV, and smooth interlobular septal thickening bilaterally primarily of the upper and lower lobes. Abdominal US also demonstrating findings concerning for congestive hepatopathy. In this setting, right sided HF is higher on the differential. Low suspicion for infection as fungatell is nonspecific and CT findings are not typical of PCP.     #RightHeartFailure  #AIDS  #Asthma    - TTE with severely reduced LVEF, biatrial enlargement, and PASP of 50 consistent with pulmonary HTN (likely group 2)  - NT BNP elevated at 4146  - Appears euvolemic on exam  - Low suspicion for PCP infection given CT findings  - Optimize from a heart failure standpoint   - Recommend following up as an outpatient in 1 month for PFTs and asthma workup     Pulmonology will sign off, please call back with any questions or concerns    Case s/e/d with Dr. Nelson 61yo w/ PMH HIV (non compliant w/ medications) asthma (on albuterol) who presented with chest pain, cough and dizziness since Monday. He was found to have CD4 count of 96 during this admission as well as elevated Fungitell at 161. Pulmonology consulted for bronchoscopy for BAL to r/o PCP. CT scan demonstrating enlarged PA at 3.1 cm, enlarged RV, and smooth interlobular septal thickening bilaterally primarily of the upper and lower lobes. Abdominal US also demonstrating findings concerning for congestive hepatopathy. In this setting, right sided HF is higher on the differential. Low suspicion for infection as fungatell is nonspecific and CT findings are not typical of PCP.     #HFrEF  #AIDS  #Asthma    - TTE with severely reduced LVEF, biatrial enlargement, and PASP of 50 consistent with pulmonary HTN (likely group 2)  - NT BNP elevated at 4146  - Appears euvolemic on exam  - Low suspicion for PCP infection given CT findings  - Optimize from a heart failure standpoint   - Recommend following up as an outpatient in 1 month for PFTs and asthma workup     Pulmonology will sign off, please call back with any questions or concerns    Case s/e/d with Dr. Nelson

## 2022-08-04 NOTE — DISCHARGE NOTE NURSING/CASE MANAGEMENT/SOCIAL WORK - NSDCVIVACCINE_GEN_ALL_CORE_FT
Tdap; 26-Jun-2017 00:59; Oj Rowley); [employer]; [id number]; IntraMuscular; Deltoid Left.; 0.5 milliLiter(s); VIS (VIS Published: 09-May-2013, VIS Presented: 26-Jun-2017);

## 2022-08-04 NOTE — DISCHARGE NOTE NURSING/CASE MANAGEMENT/SOCIAL WORK - NSDCPEEMAIL_GEN_ALL_CORE
[facility] email [email or web address]

## 2022-08-04 NOTE — CONSULT NOTE ADULT - SUBJECTIVE AND OBJECTIVE BOX
Patient is a 62y old  Male who presents with a chief complaint of chest pain (04 Aug 2022 08:25)      HPI:  Pt is a 63yo w/ PMH HIV (non compliant w/ medications) asthma (non compliant w/ inhaler) p/w chest pain, cough and dizziness since Monday. Pt is a poor historian, previously undomiciled, currently residing in [facility]. He reports ?fall? today from feeling dizzy and ?hitting head? prior to presenting to ED. Pt reports a history of multiple ED admissions in the past week for these symptoms and was informed of new lung cancer diagnosis. CT Angio chest negative for PE or lung masses. He informed ED team of R calf swelling w/ pain. R LE Doppler negative for DVT. On exam pt was now c/o of L foot swelling. Currently not experiencing chest pain or cough but c/o of weakness, dizziness which he attributes to not eating in 3 days. Pt history and exam interrupted several times pt requesting food + Ensure.    In the ED:  Initial vital signs: T: 98F, HR: 82, BP: 114/77, R: 18, SpO2: 98% on RA  ED course:   Labs: Hg 8.6, D-dimer wnl, troponin wnl  CTA chest negative for PE or mass  EKG: atrial fibrillation w/ premature ventricular or aberrant conduct complexes w/ T wave abnormality (29 Jul 2022 15:37)      PAST MEDICAL & SURGICAL HISTORY:  HIV (human immunodeficiency virus infection)      Asthma      No significant past surgical history          HPI:                PREVIOUS DIAGNOSTIC TESTING:      ECHO  FINDINGS:    STRESS  FINDINGS:    CATHETERIZATION  FINDINGS:    MEDICATIONS  (STANDING):  apixaban 5 milliGRAM(s) Oral every 12 hours  bictegravir 50 mG/emtricitabine 200 mG/tenofovir alafenamide 25 mG (BIKTARVY) 1 Tablet(s) Oral every 24 hours  fluconAZOLE   Tablet 400 milliGRAM(s) Oral every 24 hours  melatonin 3 milliGRAM(s) Oral at bedtime  senna 2 Tablet(s) Oral at bedtime  sodium chloride 3%  Inhalation 4 milliLiter(s) Inhalation every 12 hours  trimethoprim  160 mG/sulfamethoxazole 800 mG 1 Tablet(s) Oral every 24 hours    MEDICATIONS  (PRN):  acetaminophen     Tablet .. 650 milliGRAM(s) Oral every 6 hours PRN Temp greater or equal to 38C (100.4F), Mild Pain (1 - 3)  albuterol/ipratropium for Nebulization 3 milliLiter(s) Nebulizer every 6 hours PRN Shortness of Breath and/or Wheezing  benzonatate 100 milliGRAM(s) Oral three times a day PRN Cough  diphenhydrAMINE 25 milliGRAM(s) Oral every 6 hours PRN Rash and/or Itching  polyethylene glycol 3350 17 Gram(s) Oral every 12 hours PRN Constipation  sodium chloride 0.65% Nasal 1 Spray(s) Both Nostrils two times a day PRN Nasal Congestion      FAMILY HISTORY:      SOCIAL HISTORY:    CIGARETTES:    ALCOHOL:    REVIEW OF SYSTEMS      General:	    Skin/Breast:  	  Ophthalmologic:  	  ENMT:	    Respiratory and Thorax:  	  Cardiovascular:	    Gastrointestinal:	    Genitourinary:	    Musculoskeletal:	    Neurological:	    Psychiatric:	    Hematology/Lymphatics:	    Endocrine:	    Allergic/Immunologic:	    Vital Signs Last 24 Hrs  T(C): 37.1 (04 Aug 2022 05:21), Max: 37.1 (04 Aug 2022 05:21)  T(F): 98.7 (04 Aug 2022 05:21), Max: 98.7 (04 Aug 2022 05:21)  HR: 91 (04 Aug 2022 05:21) (84 - 93)  BP: 132/95 (04 Aug 2022 05:21) (132/95 - 151/90)  BP(mean): --  RR: 18 (04 Aug 2022 05:21) (18 - 18)  SpO2: 98% (04 Aug 2022 05:21) (97% - 99%)    Parameters below as of 04 Aug 2022 05:21  Patient On (Oxygen Delivery Method): room air        PHYSICAL EXAM:      Constitutional:    Eyes:    ENMT:    Neck:    Breasts:    Back:    Respiratory:    Cardiovascular:    Gastrointestinal:    Genitourinary:    Rectal:    Extremities:    Vascular:    Neurological:    Skin:    Lymph Nodes:    Musculoskeletal:    Psychiatric:            INTERPRETATION OF TELEMETRY:    ECG:    I&O's Detail      LABS:                        9.2    5.13  )-----------( 297      ( 04 Aug 2022 05:30 )             29.5     08-04    135  |  101  |  15  ----------------------------<  129<H>  4.2   |  25  |  0.77    Ca    9.4      04 Aug 2022 05:30  Mg     1.7     08-04              I&O's Summary    BNP  RADIOLOGY & ADDITIONAL STUDIES: Hospital Course    62M w/ PMH HIV (CD4 96, VL 400k 7/30/22) asthma (non compliant w/ inhaler) p/w chest pain, cough and dizziness since Monday. Pt is a poor historian, previously undomiciled, currently residing in [facility] admitted after a reported fall from feeling dizzy prior to presenting to ED. CT Angio chest negative for PE or lung masses. RUQ showed congestive hepatopathy, suggesting R heart failure. Chest pain greatly improved since admission. TTE found to have newly reduced EF and Cardiology consulted. Hospital course complicated by concern for PCP PNA - HIV team consulted, patient started on Bactrim for PCP prophylaxis and restarted on Biktarvy.      PREVIOUS DIAGNOSTIC TESTING:      ECHO  FINDINGS:TTE Echo(08.03.22)   1. Mild symmetric left ventricular hypertrophy.   2. Severely reduced left ventricular systolic function.   3. Normal right ventricular size.   4. Normal right ventricular systolic function.   5. Biatrial enlargement.   6. Moderate-to-severe mitral regurgitation.   7. Moderate tricuspid regurgitation.   8. Pulmonary hypertension present, pulmonary artery systolic pressure is   50 mmHg.   9. No pericardial effusion.  10. No prior echo is available for comparison.  11. Recommend cardiology consult / follow up, if clinically indicated.        STRESS  FINDINGS: None    CATHETERIZATION  FINDINGS: None    MEDICATIONS  (STANDING):  apixaban 5 milliGRAM(s) Oral every 12 hours  bictegravir 50 mG/emtricitabine 200 mG/tenofovir alafenamide 25 mG (BIKTARVY) 1 Tablet(s) Oral every 24 hours  fluconAZOLE   Tablet 400 milliGRAM(s) Oral every 24 hours  melatonin 3 milliGRAM(s) Oral at bedtime  senna 2 Tablet(s) Oral at bedtime  sodium chloride 3%  Inhalation 4 milliLiter(s) Inhalation every 12 hours  trimethoprim  160 mG/sulfamethoxazole 800 mG 1 Tablet(s) Oral every 24 hours    MEDICATIONS  (PRN):  acetaminophen     Tablet .. 650 milliGRAM(s) Oral every 6 hours PRN Temp greater or equal to 38C (100.4F), Mild Pain (1 - 3)  albuterol/ipratropium for Nebulization 3 milliLiter(s) Nebulizer every 6 hours PRN Shortness of Breath and/or Wheezing  benzonatate 100 milliGRAM(s) Oral three times a day PRN Cough  diphenhydrAMINE 25 milliGRAM(s) Oral every 6 hours PRN Rash and/or Itching  polyethylene glycol 3350 17 Gram(s) Oral every 12 hours PRN Constipation  sodium chloride 0.65% Nasal 1 Spray(s) Both Nostrils two times a day PRN Nasal Congestion      Vital Signs Last 24 Hrs  T(C): 37.1 (04 Aug 2022 05:21), Max: 37.1 (04 Aug 2022 05:21)  T(F): 98.7 (04 Aug 2022 05:21), Max: 98.7 (04 Aug 2022 05:21)  HR: 91 (04 Aug 2022 05:21) (84 - 93)  BP: 132/95 (04 Aug 2022 05:21) (132/95 - 151/90)  BP(mean): --  RR: 18 (04 Aug 2022 05:21) (18 - 18)  SpO2: 98% (04 Aug 2022 05:21) (97% - 99%)    Parameters below as of 04 Aug 2022 05:21  Patient On (Oxygen Delivery Method): room air        PHYSICAL EXAM:    GENERAL: Middle aged AA male in NAD, speaks in full sentences, no signs of respiratory distress  HEAD:  Atraumatic, Normocephalic  EYES: EOMI, PERRLA, conjunctiva and sclera clear  NECK: Supple, No JVD  CHEST/LUNG: Bibasilar crackles; No wheeze; No crackles; No accessory muscles used  HEART: Regular rate and rhythm; No murmurs;   ABDOMEN: Soft, Nontender, Nondistended; Bowel sounds present; No guarding  EXTREMITIES:  2+ Peripheral Pulses, No cyanosis or edema  PSYCH: AAOx3  NEUROLOGY: non-focal  SKIN: No rashes or lesions    INTERPRETATION OF TELEMETRY: None      I&O's Detail      LABS:                        9.2    5.13  )-----------( 297      ( 04 Aug 2022 05:30 )             29.5     08-04    135  |  101  |  15  ----------------------------<  129<H>  4.2   |  25  |  0.77    Ca    9.4      04 Aug 2022 05:30  Mg     1.7     08-04              I&O's Summary    BNP  RADIOLOGY & ADDITIONAL STUDIES:

## 2022-08-04 NOTE — CONSULT NOTE ADULT - PROVIDER SPECIALTY LIST ADULT
Cardiology
Pulmonology
Erythromycin Counseling:  I discussed with the patient the risks of erythromycin including but not limited to GI upset, allergic reaction, drug rash, diarrhea, increase in liver enzymes, and yeast infections.

## 2022-08-04 NOTE — CONSULT NOTE ADULT - ATTENDING COMMENTS
Initial attending contact date 8/4/22     . See fellow note written above for details. I reviewed the fellow documentation. I have personally seen and examined this patient. I reviewed vitals, labs, medications, cardiac studies, and additional imaging. I agree with the above fellow's findings and plans as written above with the following additions/statements.    62-year-old male with a PMHx of HIV (non-compliant with HAART) and asthma (non-compliant with inhalers) who presented with CP, cough and dizziness. Initial concern for PCP given low CD4 count and non-specific respiratory symptoms, TTE showed EF 34% (without RWMA) and moderate-severe MR for which cardiology is consulted  -On exam pt with mild bibasilar rales, denies CP, SOB, fatigue or dizziness at rest or with exertion  -Given hx non compliance with meds would not pursue ischemic work up at this time until he proves compliance  -Can dc on toprol 25 mg qd, valsartan 40 mg qd, lasix 20 mg po qd   -Can fu with Dr Galvan for outpatient cardiology  [phone]

## 2022-08-06 LAB
P JIROVECII DNA L RESP QL NAA+NON-PROBE: NEGATIVE — SIGNIFICANT CHANGE UP
RPR SER-TITR: SIGNIFICANT CHANGE UP
RPR SERPL-ACNC: SIGNIFICANT CHANGE UP
SPECIMEN SOURCE: SIGNIFICANT CHANGE UP
T PALLIDUM AB TITR SER: POSITIVE
T PALLIDUM IGG SER QL IF: REACTIVE

## 2022-08-12 DIAGNOSIS — B20 HUMAN IMMUNODEFICIENCY VIRUS [HIV] DISEASE: ICD-10-CM

## 2022-08-12 DIAGNOSIS — I50.22 CHRONIC SYSTOLIC (CONGESTIVE) HEART FAILURE: ICD-10-CM

## 2022-08-12 DIAGNOSIS — R09.02 HYPOXEMIA: ICD-10-CM

## 2022-08-12 DIAGNOSIS — E43 UNSPECIFIED SEVERE PROTEIN-CALORIE MALNUTRITION: ICD-10-CM

## 2022-08-12 DIAGNOSIS — K76.1 CHRONIC PASSIVE CONGESTION OF LIVER: ICD-10-CM

## 2022-08-12 DIAGNOSIS — I34.0 NONRHEUMATIC MITRAL (VALVE) INSUFFICIENCY: ICD-10-CM

## 2022-08-12 DIAGNOSIS — Z20.822 CONTACT WITH AND (SUSPECTED) EXPOSURE TO COVID-19: ICD-10-CM

## 2022-08-12 DIAGNOSIS — R07.9 CHEST PAIN, UNSPECIFIED: ICD-10-CM

## 2022-08-12 DIAGNOSIS — Z91.14 PATIENT'S OTHER NONCOMPLIANCE WITH MEDICATION REGIMEN: ICD-10-CM

## 2022-08-12 DIAGNOSIS — Z79.2 LONG TERM (CURRENT) USE OF ANTIBIOTICS: ICD-10-CM

## 2022-08-12 DIAGNOSIS — M94.0 CHONDROCOSTAL JUNCTION SYNDROME [TIETZE]: ICD-10-CM

## 2022-08-12 DIAGNOSIS — J45.901 UNSPECIFIED ASTHMA WITH (ACUTE) EXACERBATION: ICD-10-CM

## 2022-08-12 DIAGNOSIS — I27.20 PULMONARY HYPERTENSION, UNSPECIFIED: ICD-10-CM

## 2022-08-12 DIAGNOSIS — B37.0 CANDIDAL STOMATITIS: ICD-10-CM

## 2022-08-12 DIAGNOSIS — Z28.310 UNVACCINATED FOR COVID-19: ICD-10-CM

## 2022-08-12 DIAGNOSIS — I50.810 RIGHT HEART FAILURE, UNSPECIFIED: ICD-10-CM

## 2022-08-12 DIAGNOSIS — Z79.899 OTHER LONG TERM (CURRENT) DRUG THERAPY: ICD-10-CM

## 2022-08-12 DIAGNOSIS — I48.20 CHRONIC ATRIAL FIBRILLATION, UNSPECIFIED: ICD-10-CM

## 2022-08-12 DIAGNOSIS — R13.10 DYSPHAGIA, UNSPECIFIED: ICD-10-CM

## 2022-08-12 DIAGNOSIS — Z79.82 LONG TERM (CURRENT) USE OF ASPIRIN: ICD-10-CM

## 2022-08-12 DIAGNOSIS — D53.9 NUTRITIONAL ANEMIA, UNSPECIFIED: ICD-10-CM

## 2022-11-11 NOTE — ED PROVIDER NOTE - SKIN TEMP
.What type of form? Public Housing Form   What day did you drop off your forms? 11/11/2022  Is there a due date? ASAP   How would you like to receive these forms? Please call the patient when completed ([phone])  Which clinic was the form dropped off at? [facility]    What is the best number to contact you? Cell ([phone])  What time works best to contact you with in 4 hrs? Any  Is it okay to leave a message? Yes    Micaela Hendricks         warm

## 2022-11-29 ENCOUNTER — INPATIENT (INPATIENT)
Facility: HOSPITAL | Age: 62
LOS: 1 days | Discharge: ANOTHER HOSPITAL | DRG: 313 | End: 2022-12-01
Attending: INTERNAL MEDICINE | Admitting: INTERNAL MEDICINE
Payer: MEDICAID

## 2022-11-29 VITALS
TEMPERATURE: 98 F | OXYGEN SATURATION: 96 % | HEART RATE: 121 BPM | HEIGHT: 66 IN | DIASTOLIC BLOOD PRESSURE: 78 MMHG | WEIGHT: 139.99 LBS | SYSTOLIC BLOOD PRESSURE: 114 MMHG | RESPIRATION RATE: 20 BRPM

## 2022-11-29 LAB
ALBUMIN SERPL ELPH-MCNC: 3.5 G/DL — SIGNIFICANT CHANGE UP (ref 3.4–5)
ALP SERPL-CCNC: 112 U/L — SIGNIFICANT CHANGE UP (ref 40–120)
ALT FLD-CCNC: 35 U/L — SIGNIFICANT CHANGE UP (ref 12–42)
ANION GAP SERPL CALC-SCNC: 6 MMOL/L — LOW (ref 9–16)
AST SERPL-CCNC: 54 U/L — HIGH (ref 15–37)
BASOPHILS # BLD AUTO: 0.02 K/UL — SIGNIFICANT CHANGE UP (ref 0–0.2)
BASOPHILS NFR BLD AUTO: 0.5 % — SIGNIFICANT CHANGE UP (ref 0–2)
BILIRUB SERPL-MCNC: 1.1 MG/DL — SIGNIFICANT CHANGE UP (ref 0.2–1.2)
BUN SERPL-MCNC: 9 MG/DL — SIGNIFICANT CHANGE UP (ref 7–23)
CALCIUM SERPL-MCNC: 9.6 MG/DL — SIGNIFICANT CHANGE UP (ref 8.5–10.5)
CHLORIDE SERPL-SCNC: 105 MMOL/L — SIGNIFICANT CHANGE UP (ref 96–108)
CO2 SERPL-SCNC: 31 MMOL/L — SIGNIFICANT CHANGE UP (ref 22–31)
CREAT SERPL-MCNC: 0.75 MG/DL — SIGNIFICANT CHANGE UP (ref 0.5–1.3)
EGFR: 101 ML/MIN/1.73M2 — SIGNIFICANT CHANGE UP
EOSINOPHIL # BLD AUTO: 0.17 K/UL — SIGNIFICANT CHANGE UP (ref 0–0.5)
EOSINOPHIL NFR BLD AUTO: 4.7 % — SIGNIFICANT CHANGE UP (ref 0–6)
FLUAV AG NPH QL: SIGNIFICANT CHANGE UP
FLUBV AG NPH QL: SIGNIFICANT CHANGE UP
GLUCOSE SERPL-MCNC: 119 MG/DL — HIGH (ref 70–99)
HCT VFR BLD CALC: 33.4 % — LOW (ref 39–50)
HGB BLD-MCNC: 10.4 G/DL — LOW (ref 13–17)
HYPOCHROMIA BLD QL: SLIGHT — SIGNIFICANT CHANGE UP
IMM GRANULOCYTES NFR BLD AUTO: 0.8 % — SIGNIFICANT CHANGE UP (ref 0–0.9)
LYMPHOCYTES # BLD AUTO: 0.93 K/UL — LOW (ref 1–3.3)
LYMPHOCYTES # BLD AUTO: 25.5 % — SIGNIFICANT CHANGE UP (ref 13–44)
MACROCYTES BLD QL: SIGNIFICANT CHANGE UP
MANUAL SMEAR VERIFICATION: SIGNIFICANT CHANGE UP
MCHC RBC-ENTMCNC: 26.5 PG — LOW (ref 27–34)
MCHC RBC-ENTMCNC: 31.1 GM/DL — LOW (ref 32–36)
MCV RBC AUTO: 85 FL — SIGNIFICANT CHANGE UP (ref 80–100)
MONOCYTES # BLD AUTO: 0.48 K/UL — SIGNIFICANT CHANGE UP (ref 0–0.9)
MONOCYTES NFR BLD AUTO: 13.2 % — SIGNIFICANT CHANGE UP (ref 2–14)
NEUTROPHILS # BLD AUTO: 2.01 K/UL — SIGNIFICANT CHANGE UP (ref 1.8–7.4)
NEUTROPHILS NFR BLD AUTO: 55.3 % — SIGNIFICANT CHANGE UP (ref 43–77)
NRBC # BLD: 0 /100 WBCS — SIGNIFICANT CHANGE UP (ref 0–0)
PLAT MORPH BLD: NORMAL — SIGNIFICANT CHANGE UP
PLATELET # BLD AUTO: 237 K/UL — SIGNIFICANT CHANGE UP (ref 150–400)
POLYCHROMASIA BLD QL SMEAR: SLIGHT — SIGNIFICANT CHANGE UP
POTASSIUM SERPL-MCNC: 3.9 MMOL/L — SIGNIFICANT CHANGE UP (ref 3.5–5.3)
POTASSIUM SERPL-SCNC: 3.9 MMOL/L — SIGNIFICANT CHANGE UP (ref 3.5–5.3)
PROT SERPL-MCNC: 8.8 G/DL — HIGH (ref 6.4–8.2)
RBC # BLD: 3.93 M/UL — LOW (ref 4.2–5.8)
RBC # FLD: 20.7 % — HIGH (ref 10.3–14.5)
RBC BLD AUTO: ABNORMAL
RSV RNA NPH QL NAA+NON-PROBE: SIGNIFICANT CHANGE UP
SARS-COV-2 RNA SPEC QL NAA+PROBE: SIGNIFICANT CHANGE UP
SODIUM SERPL-SCNC: 142 MMOL/L — SIGNIFICANT CHANGE UP (ref 132–145)
TROPONIN I, HIGH SENSITIVITY RESULT: 19.9 NG/L — SIGNIFICANT CHANGE UP
TROPONIN I, HIGH SENSITIVITY RESULT: 25.2 NG/L — SIGNIFICANT CHANGE UP
WBC # BLD: 3.64 K/UL — LOW (ref 3.8–10.5)
WBC # FLD AUTO: 3.64 K/UL — LOW (ref 3.8–10.5)

## 2022-11-29 PROCEDURE — 99220: CPT

## 2022-11-29 PROCEDURE — 71275 CT ANGIOGRAPHY CHEST: CPT | Mod: 26

## 2022-11-29 PROCEDURE — 93010 ELECTROCARDIOGRAM REPORT: CPT

## 2022-11-29 PROCEDURE — 71045 X-RAY EXAM CHEST 1 VIEW: CPT | Mod: 26

## 2022-11-29 PROCEDURE — 70450 CT HEAD/BRAIN W/O DYE: CPT | Mod: 26

## 2022-11-29 PROCEDURE — 73610 X-RAY EXAM OF ANKLE: CPT | Mod: 26,LT

## 2022-11-29 RX ORDER — METOPROLOL TARTRATE 50 MG
25 TABLET ORAL ONCE
Refills: 0 | Status: COMPLETED | OUTPATIENT
Start: 2022-11-29 | End: 2022-11-29

## 2022-11-29 RX ORDER — ACETAMINOPHEN 500 MG
650 TABLET ORAL ONCE
Refills: 0 | Status: COMPLETED | OUTPATIENT
Start: 2022-11-29 | End: 2022-11-29

## 2022-11-29 RX ORDER — IPRATROPIUM/ALBUTEROL SULFATE 18-103MCG
3 AEROSOL WITH ADAPTER (GRAM) INHALATION
Refills: 0 | Status: COMPLETED | OUTPATIENT
Start: 2022-11-29 | End: 2022-11-29

## 2022-11-29 RX ORDER — ASPIRIN/CALCIUM CARB/MAGNESIUM 324 MG
81 TABLET ORAL ONCE
Refills: 0 | Status: COMPLETED | OUTPATIENT
Start: 2022-11-29 | End: 2022-11-29

## 2022-11-29 RX ADMIN — Medication 25 MILLIGRAM(S): at 17:08

## 2022-11-29 RX ADMIN — Medication 81 MILLIGRAM(S): at 17:55

## 2022-11-29 RX ADMIN — Medication 3 MILLILITER(S): at 16:09

## 2022-11-29 RX ADMIN — Medication 3 MILLILITER(S): at 16:00

## 2022-11-29 RX ADMIN — Medication 3 MILLILITER(S): at 16:19

## 2022-11-29 RX ADMIN — Medication 60 MILLIGRAM(S): at 21:51

## 2022-11-29 RX ADMIN — Medication 60 MILLIGRAM(S): at 15:38

## 2022-11-29 RX ADMIN — Medication 650 MILLIGRAM(S): at 17:10

## 2022-11-29 RX ADMIN — Medication 650 MILLIGRAM(S): at 16:10

## 2022-11-29 NOTE — ED ADULT NURSE NOTE - OBJECTIVE STATEMENT
Pt axo x3,  walked in c/o of asthma exacerbation, chest pain, and syncope last night with head strike. No bleeding/wounds noted to the head. Speaking in full clear sentences. Pt states h/o intubation. Pt tachycardic at triage.

## 2022-11-29 NOTE — ED CDU PROVIDER DISPOSITION NOTE - CLINICAL COURSE
Patient with persistent chest pain and shortness of breath. History of CHF and afib. HIgh risk for poor outcome. will admit for cardiology evaluation.

## 2022-11-29 NOTE — ED ADULT NURSE NOTE - TEMPLATE LIST FOR HEAD TO TOE ASSESSMENT
History and Physical    Venus Jimenez is a 21 y.o. female  -Para:     Gestational Age:  38w6d  Admitted for:   Induction of Labor  Admitted to  [facility].  [facility]    HPI: Patient is admitted with the above mentioned Chief Complaint and States   Loss of fluid:   negative  Abdominal Pain:  negative  Uterine Contractions:  negative  Vaginal Bleeding:  negative  Fetal Movement:  normal  Patient denies fever, chills, nausea, vomiting , headache, visual disturbance, or dysuria  No LMP recorded. Patient is pregnant.  Estimated Date of Delivery: 18  Final RAJESH: 2018, by Ultrasound    Patient Active Problem List    Diagnosis Date Noted   • Group beta Strep positive 2018   • Abnormal fetal ultrasound 2018   • Elevated BP  2018   • Supervision of other high risk pregnancy, antepartum 2018       Admitting DX: Pregnancy  IOL  Labor and delivery indication for care or intervention  Pregnancy Complications:  Hypertension in pregnancy, High Risk  Fetal Cardiac Defect: ASD     OB Risk Factors:   group B strep colonizer  Labor State:    Not in labor.    History:   has a past medical history of Hypertension and Supervision of other high risk pregnancy, antepartum (2018). She also has no past medical history of Blood transfusion without reported diagnosis.     has no past surgical history on file.    OB History    Para Term  AB Living   1             SAB TAB Ectopic Molar Multiple Live Births                    # Outcome Date GA Lbr Raul/2nd Weight Sex Delivery Anes PTL Lv   1 Current                   Medications:  No current facility-administered medications on file prior to encounter.      Current Outpatient Prescriptions on File Prior to Encounter   Medication Sig Dispense Refill   • PRENATAL VIT-DOCUSATE-IRON-FA PO Take  by mouth.         Allergies:  Patient has no known allergies.    ROS:   Neuro: negative   "  Cardiovascular: negative  Gastro intestinal: negative  Genitourinary: negative            Physical Exam:  /69   Pulse 75   Temp 36.6 °C (97.8 °F) (Temporal)   Ht 1.549 m (5' 1\")   Wt 89.8 kg (198 lb)   BMI 37.41 kg/m²   Constitutional: healthy-appearing, Well-developed, well-nourished  HEENT: PERRLA  Breast Exam: negative  Cardio: regular rate and rhythm, S1, S2 normal, no murmur, click, rub or gallop  Lung: unlabored respirations, no intercostal retractions or accessory muscle use  Abdomen: abdomen is soft without significant tenderness, masses, organomegaly or guarding  Extremity: extremities, peripheral pulses and reflexes normal    Cervical Exam: 0%  Cervix Dilatation: 1  Station: negative 3  Pelvis: Normal  Fetal Assessment: Fetal heart variability: moderate  Estimated Fetal Weight: 2500 - 3000g      Labs:  Recent Labs      07/20/18   0935   WBC  8.9   RBC  6.06*   HEMOGLOBIN  11.4*   HEMATOCRIT  36.8*   MCV  60.7*   MCH  18.8*   MCHC  31.0*   RDW  34.0*   PLATELETCT  333   MPV  10.5     Prenatal Results     1st Trimester     Test Value Date Time    ABO O  06/07/18 1012    RH POS  06/07/18 1012    Antibody NEG  06/07/18 1012    CBC/PLT/DIFF       HGB 11.4 g/dL (L) 07/20/18 0935    Platelets 333 K/uL 07/20/18 0935    HGB A1C        1 Hr GCT 98 mg/dL 06/07/18 1132    3 Hr GTT       Rubella 11.80 IU/mL 06/07/18 1011    RPR       Urine Culture Mixed skin karina 10-50,000 cfu/mL  06/07/18 1011    24 Urine Protein  156.0 mg/24 Hr (H) 06/25/18 0615    24 Urine Creatinine        HBsAg Negative  06/07/18 1011    Hep CAB        HIV       Gonorrhea       Chlamydia       TSH        Free T4         TB       Pap       SYPHILUS TREP QUAL [id number] [[id number]][             2nd Trimester     Test Value Date Time    HCT 36.8 % (L) 07/20/18 0935    HGB 11.4 g/dL (L) 07/20/18 0935    1 Hr GCT 98 mg/dL 06/07/18 1132    3 Hr GTT             24-28 Weeks     Test Value Date Time    1 Hr GCT  98 mg/dL 06/07/18 1132    TSH        " Free T4        24 Urine Protein 156.0 mg/24 Hr (H) 18 0615    24 Urine Creatinine       BUN 7 mg/dL (L) 18 0935    Creatinine 0.42 mg/dL (L) 18 0935    GFR >60 mL/min/1.73 m 2 18 0935    AST 19 U/L 18 0935    ALT 16 U/L 18 0935    Uric Acid 4.6 mg/dL 18 0935    LDH             3rd Trimester     Test Value Date Time    HCT 36.8 % (L) 18 0935    HGB 11.4 g/dL (L) 18 0935    TSH       Free T4       24 Hr Urine Protein 156.0 mg/24 Hr (H) 18 0615    24 Hr Urine Creatinine       SYPHILUS TREP QUAL Non Reactive  18 1011          35-37 Weeks     Test Value Date Time    GBS PCR LB POSITIVE  (A) 18 1021    GBS PCR POSITIVE  (A) 18 1021          Genetic Screening     Test Value Date Time    Cystic Fibrosis       AFP Quad       Sickle Cell                     Assessment:  Gestational Age:  38w6d  Labor State:   Not in labor   Risk Factors:   group B strep colonizer  Pregnancy Complications: pregnancy induced hypertension    Patient Active Problem List    Diagnosis Date Noted   • Group beta Strep positive 2018   • Abnormal fetal ultrasound 2018   • Elevated BP  2018   • Supervision of other high risk pregnancy, antepartum 2018       Plan:   Admitted for: Induction of Labor 2/2 hypertension during pregnancy  - We will begin the induction with dinoprostone gel placement  - Ordered CMP and uric acid to have all PIH labs   - Patient is GBS positive, will start Penicillin for prophylaxis   - Anticipate   - Following delivery, follow up with prenatal finding of High Risk Fetal Cardiac Defect: ASD     Lucy Rudd M.D.                 Cardiac

## 2022-11-29 NOTE — ED ADULT TRIAGE NOTE - CHIEF COMPLAINT QUOTE
Pt walked in c/o of asthma exacerbation, chest pain, and syncope last night with head strike. No bleeding/wounds noted to the head. Speaking in full clear sentences. Reports history of intubation. Arrives tachycardic. Denies drug/etoh use

## 2022-11-29 NOTE — ED CDU PROVIDER INITIAL DAY NOTE - MEDICAL DECISION MAKING DETAILS
Patient with syncope, chest pain. negative CTA chest and labs. Insists it is due to his asthma and wants asthma treated. will give duonebs, steroids, reassess.

## 2022-11-29 NOTE — ED PROVIDER NOTE - PHYSICAL EXAMINATION
Gen: Ill-appeaing, mild distress due to pain, VS as noted by nursing. HEENT: NCAT, mmm   Chest: RRR, nl S1 and S2, no m/r/g. Resp: CTAB, no w/r/r. No respiratory distress.   Abd: nl BS, soft, nt/nd. Ext: Warm, dry  Neuro: CN II-XII intact, normal and equal strength, sensation, and reflexes bilaterally, normal gait  Psych: AAOx3

## 2022-11-29 NOTE — ED PROVIDER NOTE - NSICDXPASTMEDICALHX_GEN_ALL_CORE_FT
PAST MEDICAL HISTORY:  Asthma     Atrial fibrillation     HIV (human immunodeficiency virus infection)

## 2022-11-29 NOTE — ED CDU PROVIDER INITIAL DAY NOTE - PROGRESS NOTE DETAILS
Patient feels unchanged after asthma treatment. will admit. spoke with Dr. Morel at [facility]

## 2022-11-29 NOTE — ED PROVIDER NOTE - PROGRESS NOTE DETAILS
Patient appears moderately improved.  However he reports that he still feels short of breath and has chest pain.  Although patient has no wheezing please and says this feels like his asthma so I will give him duo nebs and steroids and reassess.  We will also send repeat troponin. Patient appears moderately improved.  However he reports that he still feels short of breath and has chest pain.  Although patient has no wheezing please and says this feels like his asthma so I will give him duo nebs and steroids and reassess.  We will also send repeat troponin. Reviewed prior hospitalization - in July 2022 patient had EF 35% and CD4 count 96. PCP pneumonia unlikely as patient is oxygenating well.

## 2022-11-29 NOTE — ED PROVIDER NOTE - CLINICAL SUMMARY MEDICAL DECISION MAKING FREE TEXT BOX
Patient with chest pain and shortness of breath. concern for PE due to syncope and tachycardia. Will get CTA chest, labs, reassess.

## 2022-11-29 NOTE — ED PROVIDER NOTE - OBJECTIVE STATEMENT
Patient reports 2 weeks of chest pain and shortness of breath worse with exertion.  Chest pain is also worse with deep breath.  Patient said this feels like his asthma.  He passed out around 11 PM last night and hit the back of his head, also twisted left ankle.  No fever, cough, abdominal pain, nausea, vomiting, diarrhea.  Patient reports he has not taken his HIV meds in 4 months does not know last viral load.

## 2022-11-30 ENCOUNTER — TRANSCRIPTION ENCOUNTER (OUTPATIENT)
Age: 62
End: 2022-11-30

## 2022-11-30 DIAGNOSIS — I50.22 CHRONIC SYSTOLIC (CONGESTIVE) HEART FAILURE: ICD-10-CM

## 2022-11-30 DIAGNOSIS — Z21 ASYMPTOMATIC HUMAN IMMUNODEFICIENCY VIRUS [HIV] INFECTION STATUS: ICD-10-CM

## 2022-11-30 DIAGNOSIS — R07.9 CHEST PAIN, UNSPECIFIED: ICD-10-CM

## 2022-11-30 DIAGNOSIS — I48.91 UNSPECIFIED ATRIAL FIBRILLATION: ICD-10-CM

## 2022-11-30 DIAGNOSIS — J45.909 UNSPECIFIED ASTHMA, UNCOMPLICATED: ICD-10-CM

## 2022-11-30 LAB
A1C WITH ESTIMATED AVERAGE GLUCOSE RESULT: 5.9 % — HIGH (ref 4–5.6)
ANION GAP SERPL CALC-SCNC: 12 MMOL/L — SIGNIFICANT CHANGE UP (ref 5–17)
APTT BLD: 31.2 SEC — SIGNIFICANT CHANGE UP (ref 27.5–35.5)
BUN SERPL-MCNC: 13 MG/DL — SIGNIFICANT CHANGE UP (ref 7–23)
CALCIUM SERPL-MCNC: 9.4 MG/DL — SIGNIFICANT CHANGE UP (ref 8.4–10.5)
CHLORIDE SERPL-SCNC: 106 MMOL/L — SIGNIFICANT CHANGE UP (ref 96–108)
CK MB CFR SERPL CALC: 2.8 NG/ML — SIGNIFICANT CHANGE UP (ref 0–6.7)
CK SERPL-CCNC: 185 U/L — SIGNIFICANT CHANGE UP (ref 30–200)
CO2 SERPL-SCNC: 20 MMOL/L — LOW (ref 22–31)
CREAT SERPL-MCNC: 0.65 MG/DL — SIGNIFICANT CHANGE UP (ref 0.5–1.3)
EGFR: 106 ML/MIN/1.73M2 — SIGNIFICANT CHANGE UP
ESTIMATED AVERAGE GLUCOSE: 123 MG/DL — HIGH (ref 68–114)
GLUCOSE SERPL-MCNC: 168 MG/DL — HIGH (ref 70–99)
HCV AB S/CO SERPL IA: 43.05 S/CO — HIGH
HCV AB SERPL-IMP: REACTIVE
INR BLD: 1.22 — HIGH (ref 0.88–1.16)
MAGNESIUM SERPL-MCNC: 1.6 MG/DL — SIGNIFICANT CHANGE UP (ref 1.6–2.6)
NT-PROBNP SERPL-SCNC: 5236 PG/ML — HIGH (ref 0–300)
PHOSPHATE SERPL-MCNC: 3.3 MG/DL — SIGNIFICANT CHANGE UP (ref 2.5–4.5)
POTASSIUM SERPL-MCNC: 4.1 MMOL/L — SIGNIFICANT CHANGE UP (ref 3.5–5.3)
POTASSIUM SERPL-SCNC: 4.1 MMOL/L — SIGNIFICANT CHANGE UP (ref 3.5–5.3)
PROTHROM AB SERPL-ACNC: 14.6 SEC — HIGH (ref 10.5–13.4)
SODIUM SERPL-SCNC: 138 MMOL/L — SIGNIFICANT CHANGE UP (ref 135–145)
TSH SERPL-MCNC: 0.38 UIU/ML — SIGNIFICANT CHANGE UP (ref 0.27–4.2)

## 2022-11-30 PROCEDURE — 93306 TTE W/DOPPLER COMPLETE: CPT | Mod: 26

## 2022-11-30 PROCEDURE — 99222 1ST HOSP IP/OBS MODERATE 55: CPT

## 2022-11-30 RX ORDER — FUROSEMIDE 40 MG
20 TABLET ORAL DAILY
Refills: 0 | Status: DISCONTINUED | OUTPATIENT
Start: 2022-11-30 | End: 2022-12-01

## 2022-11-30 RX ORDER — INFLUENZA VIRUS VACCINE 15; 15; 15; 15 UG/.5ML; UG/.5ML; UG/.5ML; UG/.5ML
0.5 SUSPENSION INTRAMUSCULAR ONCE
Refills: 0 | Status: DISCONTINUED | OUTPATIENT
Start: 2022-11-30 | End: 2022-12-01

## 2022-11-30 RX ORDER — DAPAGLIFLOZIN 10 MG/1
10 TABLET, FILM COATED ORAL EVERY 24 HOURS
Refills: 0 | Status: DISCONTINUED | OUTPATIENT
Start: 2022-11-30 | End: 2022-12-01

## 2022-11-30 RX ORDER — BICTEGRAVIR SODIUM, EMTRICITABINE, AND TENOFOVIR ALAFENAMIDE FUMARATE 30; 120; 15 MG/1; MG/1; MG/1
1 TABLET ORAL DAILY
Refills: 0 | Status: DISCONTINUED | OUTPATIENT
Start: 2022-11-30 | End: 2022-12-01

## 2022-11-30 RX ORDER — IPRATROPIUM/ALBUTEROL SULFATE 18-103MCG
3 AEROSOL WITH ADAPTER (GRAM) INHALATION EVERY 6 HOURS
Refills: 0 | Status: DISCONTINUED | OUTPATIENT
Start: 2022-11-30 | End: 2022-12-01

## 2022-11-30 RX ORDER — ACETAMINOPHEN 500 MG
650 TABLET ORAL EVERY 6 HOURS
Refills: 0 | Status: DISCONTINUED | OUTPATIENT
Start: 2022-11-30 | End: 2022-12-01

## 2022-11-30 RX ORDER — APIXABAN 2.5 MG/1
5 TABLET, FILM COATED ORAL EVERY 12 HOURS
Refills: 0 | Status: DISCONTINUED | OUTPATIENT
Start: 2022-11-30 | End: 2022-12-01

## 2022-11-30 RX ORDER — BENZOCAINE AND MENTHOL 5; 1 G/100ML; G/100ML
1 LIQUID ORAL
Refills: 0 | Status: DISCONTINUED | OUTPATIENT
Start: 2022-11-30 | End: 2022-12-01

## 2022-11-30 RX ORDER — METOPROLOL TARTRATE 50 MG
25 TABLET ORAL DAILY
Refills: 0 | Status: DISCONTINUED | OUTPATIENT
Start: 2022-11-30 | End: 2022-12-01

## 2022-11-30 RX ORDER — BNT162B2 ORIGINAL AND OMICRON BA.4/BA.5 .1125; .1125 MG/2.25ML; MG/2.25ML
0.3 INJECTION, SUSPENSION INTRAMUSCULAR ONCE
Refills: 0 | Status: DISCONTINUED | OUTPATIENT
Start: 2022-11-30 | End: 2022-12-01

## 2022-11-30 RX ADMIN — Medication 25 MILLIGRAM(S): at 06:55

## 2022-11-30 RX ADMIN — APIXABAN 5 MILLIGRAM(S): 2.5 TABLET, FILM COATED ORAL at 19:19

## 2022-11-30 RX ADMIN — Medication 650 MILLIGRAM(S): at 07:57

## 2022-11-30 RX ADMIN — Medication 100 MILLIGRAM(S): at 22:16

## 2022-11-30 RX ADMIN — Medication 20 MILLIGRAM(S): at 06:55

## 2022-11-30 RX ADMIN — Medication 650 MILLIGRAM(S): at 06:57

## 2022-11-30 RX ADMIN — Medication 650 MILLIGRAM(S): at 16:22

## 2022-11-30 RX ADMIN — BENZOCAINE AND MENTHOL 1 LOZENGE: 5; 1 LIQUID ORAL at 22:16

## 2022-11-30 RX ADMIN — Medication 60 MILLIGRAM(S): at 22:16

## 2022-11-30 RX ADMIN — BICTEGRAVIR SODIUM, EMTRICITABINE, AND TENOFOVIR ALAFENAMIDE FUMARATE 1 TABLET(S): 30; 120; 15 TABLET ORAL at 19:19

## 2022-11-30 RX ADMIN — DAPAGLIFLOZIN 10 MILLIGRAM(S): 10 TABLET, FILM COATED ORAL at 15:23

## 2022-11-30 RX ADMIN — Medication 60 MILLIGRAM(S): at 06:54

## 2022-11-30 RX ADMIN — Medication 650 MILLIGRAM(S): at 15:22

## 2022-11-30 RX ADMIN — APIXABAN 5 MILLIGRAM(S): 2.5 TABLET, FILM COATED ORAL at 06:55

## 2022-11-30 RX ADMIN — Medication 60 MILLIGRAM(S): at 15:23

## 2022-11-30 RX ADMIN — Medication 1 TABLET(S): at 06:54

## 2022-11-30 NOTE — DIETITIAN INITIAL EVALUATION ADULT - PERTINENT LABORATORY DATA
11-30    138  |  106  |  13  ----------------------------<  168<H>  4.1   |  20<L>  |  0.65    Ca    9.4      30 Nov 2022 05:30  Phos  3.3     11-30  Mg     1.6     11-30    TPro  8.8<H>  /  Alb  3.5  /  TBili  1.1  /  DBili  x   /  AST  54<H>  /  ALT  35  /  AlkPhos  112  11-29  A1C with Estimated Average Glucose Result: 5.9 % (11-30-22 @ 05:30)

## 2022-11-30 NOTE — DISCHARGE NOTE PROVIDER - HOSPITAL COURSE
INCOMPLETE      62M undomiciled/poor historian resides in [facility] with PMHx of HIV (noncompliant on HAART), asthma (non compliant w/ inhaler) and CHF (EF 34% 08/2022) who had recent admission [facility] 7/29-8/3/2022 c/o CP, cough, dizziness ?syncope . TTE revealed newly reduced EF 34% (without RWMA) and moderate-severe MR. HIV team consulted, patient started on Bactrim for PCP ppx and restarted on Biktarvy. Chest pain improved since admission, ischemic eval deferred given noncompliance. Recommended to follow up with [place] outpatient.    Patient presented to [facility] 11/29 with similiar complaints as prior admission: c/o 2 weeks of CP/ RUEDA and syncope with head strike, also twisted L ankle. Patient feels like symptoms are similiar to prior asthma exacerbations. Also reports he has not taken his HIV meds in 4 months (since discharge) and does not know viral load.     At [facility]: BP 11/78, , SpO2 96% on RA. EKG Afib with T wave inv II, III, avF, V6 (simliar from prior). Labs notable for: TropI 25> 19. WBC 3.6 (baseline). CTH negative for bleed, infarct, mass effect. CTA Chest negative PE. L ankle xray negative for fracture. CXR no pleural effusions or PTX. s/p solumedrol, duonebs, metoprolol and ASA.     Now transferred to [facility] for further evaluation and management. 61 y/o male, undomicilied and poor historian, resides at [facility], w/ PMHx CHF (EF 34% in 08/2022), HIV (non-compliant w/ HAART), Asthma (non-compliant w/ inhaler), and recent admission to [facility] from 07/29/2022 to 08/03/2022 for chest pain, cough, dizziness, and questionable syncope who presented to [facility] on 11/29/2022 w/ similar symptoms from prior admission including chest pain and RUEDA and syncopal event w/ reported head strike and twisted left ankle. During prior admission, Echocardiogram revealed EF 34% w/o regional wall motion abnormalities and moderate to severe MR, HIV team consulted and patient was initiated on Bactrim for PCP prophylaxis and restarted on Biktarvy, and ischemic evaluation was deferred due to patient's non-compliance and patient was instructed to follow up at [place]. Upon interview on this admission, patient reported that his symptoms felt similar to his prior asthma exacerbations and stated that he had not been taking his HIV medications since being discharged. At [facility], VS stable and EKG showed A-fib at 109 w/ T wave inversion in leads II, III, aVF, and V6 (similar from prior admissions). Labs at [facility] significant for Trop I 25 --> 19. Chest X-ray showed no pleural effusion or pneumothorax. Left Ankle X-ray negative for fracture. CT Head negative for bleed, infarct, and mass effect. CTA Chest negative for PE. Patient received Solumedrol, Duonebs, Metoprolol, and Aspirin at [facility] and subsequently transferred to [facility] for further evaluation and management. Patient underwent a repeat Echocardiogram on 11/30/2022 which was w/o significant changes since 08/2022. Patient also was noted to not have been compliant w/ recommended outpatient follow up and further diagnostic procedures; thus, additional ischemic evaluation was once again deferred. Patient was seen and examined at bedside on 12/01/2022 and ______. Labs were reviewed. No significant events were noted overnight on telemetry. Patient has now been medically cleared for discharge as per Dr. Morel. Patient was given appropriate discharge instructions including medication regimena and follow up. ALL prescriptions havee been e-prescribed to patient's preferred pharmacy.    VS Stable  Gen: NAD, A&O x3  Cards: RRR, clear S1 and S2 without murmur  Pulm: CTA B/L without w/r/r  Abd: soft, NT  Ext: no LE edema or ulcerations B/L 63 y/o male, undomicilied and poor historian, resides at [facility], w/ PMHx CHF (EF 34% in 08/2022), HIV (non-compliant w/ HAART), Asthma (non-compliant w/ inhaler), and recent admission to [facility] from 07/29/2022 to 08/03/2022 for chest pain, cough, dizziness, and questionable syncope who presented to [facility] on 11/29/2022 w/ similar symptoms from prior admission including chest pain and RUEDA and syncopal event w/ reported head strike and twisted left ankle. During prior admission, Echocardiogram revealed EF 34% w/o regional wall motion abnormalities and moderate to severe MR, HIV team consulted and patient was initiated on Bactrim for PCP prophylaxis and restarted on Biktarvy, and ischemic evaluation was deferred due to patient's non-compliance and patient was instructed to follow up at [place]. Upon interview on this admission, patient reported that his symptoms felt similar to his prior asthma exacerbations and stated that he had not been taking his HIV medications since being discharged. At [facility], VS stable and EKG showed A-fib at 109 w/ T wave inversion in leads II, III, aVF, and V6 (similar from prior admissions). Labs at [facility] significant for Trop I 25 --> 19. Chest X-ray showed no pleural effusion or pneumothorax. Left Ankle X-ray negative for fracture. CT Head negative for bleed, infarct, and mass effect. CTA Chest negative for PE. Patient received Solumedrol, Duonebs, Metoprolol, and Aspirin at [facility] and subsequently transferred to [facility] for further evaluation and management. Patient underwent a repeat Echocardiogram on 11/30/2022 which was w/o significant changes since 08/2022. Patient also was noted to not have been compliant w/ recommended outpatient follow up and further diagnostic procedures; thus, additional ischemic evaluation was once again deferred. Patient was seen and examined at bedside on 12/01/2022 and continued to endorse generalized pain w/ movement, but was in no acute distress on exam and comfortably resting in bed. Labs were reviewed. No significant events were noted overnight on telemetry. Patient has now been medically cleared for discharge as per Dr. Morel. Patient was given appropriate discharge instructions including medication regimena and follow up. ALL prescriptions havee been e-prescribed to patient's preferred pharmacy.    VS Stable  Gen: NAD, A&O x3  Cards: RRR, clear S1 and S2 without murmur  Pulm: CTA B/L without w/r/r  Abd: soft, NT  Ext: no LE edema or ulcerations B/L 61 y/o male, undomicilied and poor historian, resides at [facility], w/ PMHx CHF (EF 34% in 08/2022), HIV (non-compliant w/ HAART), Asthma (non-compliant w/ inhaler), and recent admission to [facility] from 07/29/2022 to 08/03/2022 for chest pain, cough, dizziness, and questionable syncope who presented to [facility] on 11/29/2022 w/ similar symptoms from prior admission including chest pain and RUEDA and syncopal event w/ reported head strike and twisted left ankle. During prior admission, Echocardiogram revealed EF 34% w/o regional wall motion abnormalities and moderate to severe MR, HIV team consulted and patient was initiated on Bactrim for PCP prophylaxis and restarted on Biktarvy, and ischemic evaluation was deferred due to patient's non-compliance and patient was instructed to follow up at [place]. Upon interview on this admission, patient reported that his symptoms felt similar to his prior asthma exacerbations and stated that he had not been taking his HIV medications since being discharged. At [facility], VS stable and EKG showed A-fib at 109 w/ T wave inversion in leads II, III, aVF, and V6 (similar from prior admissions). Labs at [facility] significant for Trop I 25 --> 19. Chest X-ray showed no pleural effusion or pneumothorax. Left Ankle X-ray negative for fracture. CT Head negative for bleed, infarct, and mass effect. CTA Chest negative for PE. Patient received Solumedrol, Duonebs, Metoprolol, and Aspirin at [facility] and subsequently transferred to [facility] for further evaluation and management. Patient underwent a repeat Echocardiogram on 11/30/2022 which was w/o significant changes since 08/2022. Patient also was noted to not have been compliant w/ recommended outpatient follow up and further diagnostic procedures; thus, additional ischemic evaluation was once again deferred. Patient was seen and examined at bedside on 12/01/2022 and continued to endorse generalized pain w/ movement, but was in no acute distress on exam and comfortably resting in bed. Labs were reviewed. No significant events were noted overnight on telemetry. Patient has now been medically cleared for discharge as per Dr. Morel. Patient was given appropriate discharge instructions including medication regimena and follow up. ALL prescriptions havee been e-prescribed to patient's preferred pharmacy.     VS Stable  Gen: NAD, A&O x3  Cards: RRR, clear S1 and S2 without murmur  Pulm: CTA B/L without w/r/r  Abd: soft, NT  Ext: no LE edema or ulcerations B/L

## 2022-11-30 NOTE — PROGRESS NOTE ADULT - PROBLEM SELECTOR PLAN 1
P/w CP/ RUEDA for days similar to last admission). CTA chest negative for PE or masses. upon arrival, no c/o CP.   - Troponin neg x2, CK / CKMB negative, BNP 5236  - OSH EKG Afib with T wave inv II, III, avF, V6 (simliar from prior)  - TTE 11/30: EF improved from last TTE 55%, severe MR, mod-sev TR, inferior vena cava dialted w/abnormal inspiratory collapse c/s elevated right atrial pressure  - no further ischemic evaluation required given unchanged TTE and patient not compliant with further diagnostic procedures

## 2022-11-30 NOTE — DIETITIAN INITIAL EVALUATION ADULT - OTHER INFO
62M undomiciled/poor historian with PMHx HIV and asthma, recent admission c/o CP/RUEDA, found to have newly reduced EF 34% (without RWMA) and moderate-severe MR. Ischemic eval deferred given noncompliance. [facility]/RU[facility], similar to prior admission. Decision to transfer to [facility] for further evaluation and management. TTE ordered, possible plan for ischemic eval of which pt remains NPO for - per IDR pt very unhappy with NPO status. Pt seen walking hallways on 5UR c/o need for breakfast. RN requesting to defer visit given pt overall dissatisfied stated. Noted admit wt 140 pounds, BMI 22.6; pt admitted to [facility] 7/2022 at 140 pounds, suggests wt stable since then. NFPE not feasible however pt appears on leaner side. Noted pt undomiciled, resides in [place] [employer] - ? access to food. Per prior RD note pt had reported getting food from food pantry and a grocery store, had taken ensure in the past however not consistency d/t being unable to get. Would suggest ONS now when NPO d/c. No pain. Neeraj 22; noted Pt refused full assessment of Skin: no edema/pressure ulcers noted at this time..   Please see below for nutritions recommendations. Recs made with team.

## 2022-11-30 NOTE — PROGRESS NOTE ADULT - PROBLEM/PLAN-2
DISPLAY PLAN FREE TEXT Patient is dead based on Cardiopulmonary criteria including absent breath sounds, pulselessness and/or asystole

## 2022-11-30 NOTE — DISCHARGE NOTE PROVIDER - NSDCMRMEDTOKEN_GEN_ALL_CORE_FT
apixaban 5 mg oral tablet: 1 tab(s) orally every 12 hours  bictegravir/emtricitabine/tenofovir 50 mg-200 mg-25 mg oral tablet: 1 tab(s) orally every 24 hours  dapagliflozin 10 mg oral tablet: 1 tab(s) orally every 24 hours  furosemide 20 mg oral tablet: 1 tab(s) orally once a day  metoprolol succinate 25 mg oral tablet, extended release: 1 tab(s) orally once a day  sulfamethoxazole-trimethoprim 800 mg-160 mg oral tablet: 1 tab(s) orally every 24 hours  valsartan 40 mg oral tablet: 1 tab(s) orally once a day   apixaban 5 mg oral tablet: 1 tab(s) orally every 12 hours  bictegravir/emtricitabine/tenofovir 50 mg-200 mg-25 mg oral tablet: 1 tab(s) orally every 24 hours  dapagliflozin 10 mg oral tablet: 1 tab(s) orally every 24 hours  furosemide 20 mg oral tablet: 1 tab(s) orally once a day  ipratropium-albuterol 0.5 mg-2.5 mg/3 mL inhalation solution: 3 milliliter(s) inhaled every 6 hours, As needed, Shortness of Breath and/or Wheezing  metoprolol succinate 25 mg oral tablet, extended release: 1 tab(s) orally once a day  sulfamethoxazole-trimethoprim 800 mg-160 mg oral tablet: 1 tab(s) orally every 24 hours  valsartan 40 mg oral tablet: 1 tab(s) orally once a day

## 2022-11-30 NOTE — PROGRESS NOTE ADULT - ASSESSMENT
62M undomiciled/poor historian with PMHx HIV and asthma, recent admission c/o CP/RUEDA, found to have newly reduced EF 34% (without RWMA) and moderate-severe MR. Ischemic eval deferred given noncompliance. [facility], similar to prior admission. Decision to transfer to [facility] for further evaluation and management. Patient underwent TTE without significant changes from 08/2022 and is now stable for discharge in AM.

## 2022-11-30 NOTE — H&P ADULT - HISTORY OF PRESENT ILLNESS
62M undomiciled/poor historian resides in [facility] with PMHx of HIV (noncompliant on HAART), asthma (non compliant w/ inhaler) and CHF (EF 34% 08/2022) who had recent admission [facility] 7/29-8/3/2022 c/o CP, cough, dizziness ?syncope . TTE revealed newly reduced EF 34% (without RWMA) and moderate-severe MR. HIV team consulted, patient started on Bactrim for PCP ppx and restarted on Biktarvy. Chest pain improved since admission, ischemic eval deferred given noncompliance. Recommended to follow up with [place] outpatient.    Patient presented to [facility] 11/29 with similiar complaints as prior admission: c/o 2 weeks of CP/ RUEDA and syncope with head strike, also twisted L ankle. Patient feels like symptoms are similiar to prior asthma exacerbations. Also reports he has not taken his HIV meds in 4 months (since discharge) and does not know viral load.     At [facility]: BP 11/78, , SpO2 96% on RA. EKG Afib with T wave inv II, III, avF, V6 (simliar from prior). Labs notable for: TropI 25> 19. WBC 3.6 (baseline). CTH negative for bleed, infarct, mass effect. CTA Chest negative PE. L ankle xray negative for fracture. CXR no pleural effusions or PTX. s/p solumedrol, duonebs, metoprolol and ASA.     Now transferred to [facility] for further evaluation and management.     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 Patient unable to recall last doses of medications ect. Please contact pharmacy in AM.**

## 2022-11-30 NOTE — DIETITIAN INITIAL EVALUATION ADULT - OTHER CALCULATIONS
Ade,      I did prescribe a Medrol Dosepak for you.  Please take this as instructed.  You do not want to do this too often, as it can cause osteopenia/osteoporosis.  Please do not take naproxen or Celebrex while you are doing the Medrol Dosepak.      After you complete the Medrol Dosepak, you can take the naproxen or the Celebrex.  I know that you are good at not taking the naproxen and the Celebrex at the same time.  You want to avoid taking them at the same time, as this can cause stomach issues.      An order for physical therapy has been provided today.  Someone will call you to schedule physical therapy or you can call [phone] to schedule physical therapy.  It will be very important for you to do your physical therapy exercises on a regular basis to decrease your pain and prevent future flares of pain.         I will send you a [employer] message in 4  weeks to see how you are doing. Please do not hesitate to contact  me via [employer] with any questions or concerns that arise before that time.  You are also welcome to call the nurse line at [phone] if you prefer to call the clinic.    Thanks, Ade!  Dr. Julian        
5'6''  pounds +-10%  Wt 140 pounds BMI 22.5 %IBW99  current body wt used for energy calculations as pt falls within % IBW  adjust for age, HIV, CHF; fluids per team

## 2022-11-30 NOTE — H&P ADULT - ASSESSMENT
62M undomiciled/poor historian with PMHx HIV and asthma, recent admission c/o CP/RUEDA, found to have newly reduced EF 34% (without RWMA) and moderate-severe MR. Ischemic eval deferred given noncompliance. [facility], similar to prior admission. Decision to transfer to [facility] for further evaluation and management        # Chest pain  - P/w CP/ RUEDA for days similar to last admission). CTA chest negative for PE or masses. upon arrival, no c/o CP.   - CE ordered   - OSH EKG Afib with T wave inv II, III, avF, V6 (simliar from prior)  - Repeat EKG ordered  - Ischemic eval deferred last admission 2/2 noncompliance Please follow up with [facility] tomorrow to see if pt has followed up since discharge. May reconsider ischemic eval.     # Newly Diagnosed HFrEF   - TTE 08/2022: EF 34% (without RWMA) and moderate-severe MR, elevated PASP likely 2/2 group II PH  - TTE ordered   - BNP ordered    - Consult Heart Failure in AM (last admission rec'd GDMT)  - MEDS: patient discharged on Valsartan 40, Furosemide 20, Farxiga 10, Toprol 25 (given history of noncompliance, unsure if patient is actually taking meds). please call pharmacy in AM.   - in the meantime: c/w furosemide 20, toprol 25, farxiga 10  - holding valsartan 40 until collateral obtained (watching BP so we dont bottom him out)       # Atrial fibrillation  - Mostly RC Afib   - c/w home Eliquis 5mg BID (pt cannot recall last dose taken)      # HIV/AIDS   - Pt with PMHx of HIV, inconsistent use of ART. Does not follow w/ PCP or HIV clinic. Patient reports has not taken any HIV meds in 4 months.    - CD4 96, VL 400k, Pos fungitell (from 08/2022).   - Repeat viral load ordered - follow up  - Consult HIV team in AM   - c/w home Biktarvy daily and Bactrim daily for PCP PPx       # Asthma   - Pt reports hx of asthma (noncompliant on albuterol inhaler BID)   - Pulm followed last admission, consider consult tomorrow   - Consider prednisone as needed   - C/w duonebs prn       Replete Mg<2, K <4  Lovenox DVT ppx  NPO for possible ischemic eval   Dispo: pending workup

## 2022-11-30 NOTE — DIETITIAN INITIAL EVALUATION ADULT - PERTINENT MEDS FT
MEDICATIONS  (STANDING):  apixaban 5 milliGRAM(s) Oral every 12 hours  bictegravir 50 mG/emtricitabine 200 mG/tenofovir alafenamide 25 mG (BIKTARVY) 1 Tablet(s) Oral daily  dapagliflozin 10 milliGRAM(s) Oral every 24 hours  furosemide    Tablet 20 milliGRAM(s) Oral daily  methylPREDNISolone sodium succinate Injectable 60 milliGRAM(s) IV Push every 8 hours  metoprolol succinate ER 25 milliGRAM(s) Oral daily  trimethoprim  160 mG/sulfamethoxazole 800 mG 1 Tablet(s) Oral every 24 hours    MEDICATIONS  (PRN):  acetaminophen     Tablet .. 650 milliGRAM(s) Oral every 6 hours PRN Temp greater or equal to 38C (100.4F), Mild Pain (1 - 3)  albuterol/ipratropium for Nebulization 3 milliLiter(s) Nebulizer every 6 hours PRN Shortness of Breath and/or Wheezing

## 2022-11-30 NOTE — H&P ADULT - NSHPLABSRESULTS_GEN_ALL_CORE
----- Message from Clarence Bill sent at 1/21/2019 11:05 AM CST -----  Contact: Patient  Pt wants to know what medication she is able to take for a headache.    Contact::  [phone]   10.4   3.64  )-----------( 237      ( 29 Nov 2022 11:40 )             33.4       11-29    142  |  105  |  9   ----------------------------<  119<H>  3.9   |  31  |  0.75    Ca    9.6      29 Nov 2022 11:40    TPro  8.8<H>  /  Alb  3.5  /  TBili  1.1  /  DBili  x   /  AST  54<H>  /  ALT  35  /  AlkPhos  112  11-29

## 2022-11-30 NOTE — H&P ADULT - NSICDXPASTMEDICALHX_GEN_ALL_CORE_FT
PAST MEDICAL HISTORY:  Asthma     Atrial fibrillation     Heart failure     HIV (human immunodeficiency virus infection)

## 2022-11-30 NOTE — PATIENT PROFILE ADULT - FALL HARM RISK - ATTEMPT OOB
INSTRUCTIONS PRIOR TO PROCEDURE:    1. Schedule Radiation Oncology appointment: Call [phone]    2. Do blood work [facility] prior to procedure    3. Any Aspirin for 5 days prior to procedure    4. Do not eat or drink anything after midnight the night prior to procedure.    5. Take all other morning medications with a small sip of water on the day of procedure.    6. Come to Entrance A- ask to be brought to [facility] (Entrance F) on 7/21/21 at 8 am    Call with questions or concerns  [phone]  
No

## 2022-11-30 NOTE — PATIENT PROFILE ADULT - CONTRAINDICATIONS & PRECAUTIONS (SELECT ALL THAT APPLY)
----- Message from Sera Alonzo sent at 11/3/2022  1:40 PM CDT -----  Regarding: med  Pt is req refill of Valtrex (for cold sore)  [facility]  [phone]     none...

## 2022-11-30 NOTE — PATIENT PROFILE ADULT - FALL HARM RISK - HARM RISK INTERVENTIONS
Assistance with ambulation/Assistance OOB with selected safe patient handling equipment/Communicate Risk of Fall with Harm to all staff/Discuss with provider need for PT consult/Monitor gait and stability/Provide patient with walking aids - walker, cane, crutches/Reinforce activity limits and safety measures with patient and family/Sit up slowly, dangle for a short time, stand at bedside before walking/Tailored Fall Risk Interventions/Visual Cue: Yellow wristband and red socks/Bed in lowest position, wheels locked, appropriate side rails in place/Call bell, personal items and telephone in reach/Instruct patient to call for assistance before getting out of bed or chair/Non-slip footwear when patient is out of bed/[place] to call system/Physically safe environment - no spills, clutter or unnecessary equipment/Purposeful Proactive Rounding/Room/bathroom lighting operational, light cord in reach

## 2022-11-30 NOTE — PROGRESS NOTE ADULT - PROBLEM SELECTOR PLAN 4
Pt with PMHx of HIV, inconsistent use of ART. Does not follow w/ PCP or HIV clinic. Patient reports has not taken any HIV meds in 4 months.    - CD4 96, VL 400k, Pos fungitell (from 08/2022).   - Hep C 43.05 / reactive  - c/w home Biktarvy daily and Bactrim daily for PCP PPx

## 2022-11-30 NOTE — PROGRESS NOTE ADULT - SUBJECTIVE AND OBJECTIVE BOX
Cardiology PA Adult Progress Note    SUBJECTIVE ASSESSMENT:  Patient seen and examined this AM and is no acute distress. Patient states that he is very upset because he is starving and is unhappy with the peanut butter and jelly option, he missed breakfast and is upset. He doesn't understand why he is here at the hospital as his care is primarily at [facility] and he was just seen here a few months ago. Denies CP, n/v, diaphoresis, lightheadedness, dizziness, syncope.   	  MEDICATIONS:  furosemide    Tablet 20 milliGRAM(s) Oral daily  metoprolol succinate ER 25 milliGRAM(s) Oral daily  bictegravir 50 mG/emtricitabine 200 mG/tenofovir alafenamide 25 mG (BIKTARVY) 1 Tablet(s) Oral daily  trimethoprim  160 mG/sulfamethoxazole 800 mG 1 Tablet(s) Oral every 24 hours  albuterol/ipratropium for Nebulization 3 milliLiter(s) Nebulizer every 6 hours PRN  acetaminophen     Tablet .. 650 milliGRAM(s) Oral every 6 hours PRN  dapagliflozin 10 milliGRAM(s) Oral every 24 hours  methylPREDNISolone sodium succinate Injectable 60 milliGRAM(s) IV Push every 8 hours  apixaban 5 milliGRAM(s) Oral every 12 hours    	  VITAL SIGNS:  T(C): 37 (11-30-22 @ 14:28), Max: 37 (11-30-22 @ 14:28)  HR: 78 (11-30-22 @ 15:21) (65 - 96)  BP: 119/77 (11-30-22 @ 15:21) (119/77 - 138/89)  RR: 18 (11-30-22 @ 15:21) (18 - 19)  SpO2: 98% (11-30-22 @ 15:21) (96% - 99%)      I&O's Summary  29 Nov 2022 07:01  -  30 Nov 2022 07:00  --------------------------------------------------------  IN: 0 mL / OUT: 0 mL / NET: 0 mL    30 Nov 2022 07:01  -  30 Nov 2022 18:43  --------------------------------------------------------  IN: 120 mL / OUT: 0 mL / NET: 120 mL                                           PHYSICAL EXAM:  Appearance: Normal  HEENT: Poor dentition. Normal oral mucosa, PERRL, EOMI	  Neck: Supple, - JVD  Cardiovascular: Normal S1 S2, Grade II murmur appreciated throughout the precordium  Respiratory: Lungs clear to auscultation. No Rales, Rhonchi, Wheezing	  Gastrointestinal:  Soft, Non-tender, + BS	  Skin: No rashes, No ecchymoses, No cyanosis  Extremities: Normal range of motion, No clubbing, cyanosis or edema  Vascular: Peripheral pulses palpable 2+ bilaterally  Neurologic: Non-focal  Psychiatry: A & O x 3, Mood & affect appropriate    LABS:	 	             10.4   3.64  )-----------( 237      ( 29 Nov 2022 11:40 )             33.4     11-30    138  |  106  |  13  ----------------------------<  168<H>  4.1   |  20<L>  |  0.65    Ca    9.4      30 Nov 2022 05:30  Phos  3.3     11-30  Mg     1.6     11-30    TPro  8.8<H>  /  Alb  3.5  /  TBili  1.1  /  DBili  x   /  AST  54<H>  /  ALT  35  /  AlkPhos  112  11-29    proBNP: Serum Pro-Brain Natriuretic Peptide: 5236 pg/mL (11-30 @ 05:30)    Lipid Profile:   HgA1c:   TSH: Thyroid Stimulating Hormone, Serum: 0.378 uIU/mL (11-30 @ 05:30)    PT/INR - ( 30 Nov 2022 07:18 )   PT: 14.6 sec;   INR: 1.22     PTT - ( 30 Nov 2022 07:18 )  PTT:31.2 sec

## 2022-11-30 NOTE — PROGRESS NOTE ADULT - PROBLEM SELECTOR PLAN 5
Pt reports hx of asthma (noncompliant on albuterol inhaler BID)   - Pulm followed last admission, consider consult tomorrow   - Consider prednisone as needed   - C/w duonebs prn       E: Replete Mg<2, K <4  DVT PPX: Lovenox DVT  Diet: DASH  Dispo: Discharge in AM, will need transportation and medication supply/Rx    Above discussed with Dr. Morel

## 2022-11-30 NOTE — DIETITIAN INITIAL EVALUATION ADULT - CONTINUE CURRENT NUTRITION CARE PLAN
Diet to be advanced in 24-48hrs as medically feasible: DASH TLC, ensure MAX HP x1-2/day 150kcal/30gm prot per 1 can

## 2022-11-30 NOTE — DISCHARGE NOTE PROVIDER - NSDCCPCAREPLAN_GEN_ALL_CORE_FT
PRINCIPAL DISCHARGE DIAGNOSIS  Diagnosis: Chest pain  Assessment and Plan of Treatment:       SECONDARY DISCHARGE DIAGNOSES  Diagnosis: Chest pain  Assessment and Plan of Treatment:      PRINCIPAL DISCHARGE DIAGNOSIS  Diagnosis: Chest pain  Assessment and Plan of Treatment: You presented to the hospital endorsing similar complaints as your prior admissions, including chest pain and dyspnea. You also endorsed that you felt these symptoms were similar to your prior exacerbations of asthma. A repeat Echocardiogram was performed and was unchanged from the prior one performed earlier this year. At this time, we do not feel you require any further inpatient testing and are medically ready to be discharged home. We have re-prescribed ALL of your home medications to your preferred pharmacy and HIGHLY recommend you take them all as they have been prescribed.   We also HIGHLY recommend you follow up at the [facility] for further management and outpatient workups for your health/diagnoses. This will be crucial to help ensure you do not get admitted to the hospital again.      SECONDARY DISCHARGE DIAGNOSES  Diagnosis: Atrial fibrillation  Assessment and Plan of Treatment: PLEASE TAKE ALL OF YOUR MEDICATIONS AS THEY HAVE BEEN PRESCRIBED TO YOU.    Diagnosis: Chronic HFrEF (heart failure with reduced ejection fraction)  Assessment and Plan of Treatment: PLEASE TAKE ALL OF YOUR MEDICATIONS AS THEY HAVE BEEN PRESCRIBED TO YOU.    Diagnosis: Asthma  Assessment and Plan of Treatment: PLEASE TAKE ALL OF YOUR MEDICATIONS AS THEY HAVE BEEN PRESCRIBED TO YOU.    Diagnosis: HIV disease  Assessment and Plan of Treatment: PLEASE TAKE ALL OF YOUR MEDICATIONS AS THEY HAVE BEEN PRESCRIBED TO YOU.

## 2022-11-30 NOTE — H&P ADULT - NSHPPHYSICALEXAM_GEN_ALL_CORE
***EXAM LIMITED: PT RELUCTANT TO PARTICIPATE***    Gen: NAD, laying in bed  Neuro: AOx3, no neuro deficits   Pulm: comfortable respirations on RA

## 2022-11-30 NOTE — PROGRESS NOTE ADULT - PROBLEM SELECTOR PLAN 2
- TTE 08/2022: EF 34% (without RWMA) and moderate-severe MR, elevated PASP likely 2/2 group II PH  - TTE 11/30: EF improved from last TTE 55%, severe MR, mod-sev TR, inferior vena cava dialted w/abnormal inspiratory collapse c/s elevated right atrial pressure  - BNP 5236  - MEDS: patient discharged on Valsartan 40, Furosemide 20, Farxiga 10, Toprol 25 (given history of noncompliance, unsure if patient is actually taking meds).   - c/w furosemide 20, toprol 25, farxiga 10  - holding valsartan 40 until collateral obtained

## 2022-11-30 NOTE — DISCHARGE NOTE PROVIDER - NSDCFUADDAPPT_GEN_ALL_CORE_FT
Please follow up at [facility] for further management of your health. It is important for you to follow up REGULARLY as you require ongoing management and monitoring due to your diagnoses of heart failure, atrial fibrillation, asthma, and HIV.

## 2022-12-01 ENCOUNTER — TRANSCRIPTION ENCOUNTER (OUTPATIENT)
Age: 62
End: 2022-12-01

## 2022-12-01 VITALS
RESPIRATION RATE: 18 BRPM | SYSTOLIC BLOOD PRESSURE: 125 MMHG | HEART RATE: 93 BPM | DIASTOLIC BLOOD PRESSURE: 76 MMHG | OXYGEN SATURATION: 96 %

## 2022-12-01 LAB
ANION GAP SERPL CALC-SCNC: 12 MMOL/L — SIGNIFICANT CHANGE UP (ref 5–17)
BUN SERPL-MCNC: 15 MG/DL — SIGNIFICANT CHANGE UP (ref 7–23)
CALCIUM SERPL-MCNC: 9.4 MG/DL — SIGNIFICANT CHANGE UP (ref 8.4–10.5)
CHLORIDE SERPL-SCNC: 101 MMOL/L — SIGNIFICANT CHANGE UP (ref 96–108)
CO2 SERPL-SCNC: 24 MMOL/L — SIGNIFICANT CHANGE UP (ref 22–31)
CREAT SERPL-MCNC: 0.7 MG/DL — SIGNIFICANT CHANGE UP (ref 0.5–1.3)
EGFR: 104 ML/MIN/1.73M2 — SIGNIFICANT CHANGE UP
GLUCOSE SERPL-MCNC: 162 MG/DL — HIGH (ref 70–99)
HCT VFR BLD CALC: 32.3 % — LOW (ref 39–50)
HGB BLD-MCNC: 9.8 G/DL — LOW (ref 13–17)
HIV-1 VIRAL LOAD RESULT: ABNORMAL
HIV1 RNA # SERPL NAA+PROBE: 128 — SIGNIFICANT CHANGE UP
HIV1 RNA SER-IMP: SIGNIFICANT CHANGE UP
HIV1 RNA SERPL NAA+PROBE-ACNC: ABNORMAL
HIV1 RNA SERPL NAA+PROBE-LOG#: 2.11 — SIGNIFICANT CHANGE UP
MAGNESIUM SERPL-MCNC: 1.6 MG/DL — SIGNIFICANT CHANGE UP (ref 1.6–2.6)
MCHC RBC-ENTMCNC: 26.3 PG — LOW (ref 27–34)
MCHC RBC-ENTMCNC: 30.3 GM/DL — LOW (ref 32–36)
MCV RBC AUTO: 86.6 FL — SIGNIFICANT CHANGE UP (ref 80–100)
NRBC # BLD: 0 /100 WBCS — SIGNIFICANT CHANGE UP (ref 0–0)
PLATELET # BLD AUTO: 266 K/UL — SIGNIFICANT CHANGE UP (ref 150–400)
POTASSIUM SERPL-MCNC: 4 MMOL/L — SIGNIFICANT CHANGE UP (ref 3.5–5.3)
POTASSIUM SERPL-SCNC: 4 MMOL/L — SIGNIFICANT CHANGE UP (ref 3.5–5.3)
RBC # BLD: 3.73 M/UL — LOW (ref 4.2–5.8)
RBC # FLD: 20.8 % — HIGH (ref 10.3–14.5)
SODIUM SERPL-SCNC: 137 MMOL/L — SIGNIFICANT CHANGE UP (ref 135–145)
WBC # BLD: 6.68 K/UL — SIGNIFICANT CHANGE UP (ref 3.8–10.5)
WBC # FLD AUTO: 6.68 K/UL — SIGNIFICANT CHANGE UP (ref 3.8–10.5)

## 2022-12-01 PROCEDURE — 96374 THER/PROPH/DIAG INJ IV PUSH: CPT

## 2022-12-01 PROCEDURE — 87536 HIV-1 QUANT&REVRSE TRNSCRPJ: CPT

## 2022-12-01 PROCEDURE — 80053 COMPREHEN METABOLIC PANEL: CPT

## 2022-12-01 PROCEDURE — 71045 X-RAY EXAM CHEST 1 VIEW: CPT

## 2022-12-01 PROCEDURE — 86803 HEPATITIS C AB TEST: CPT

## 2022-12-01 PROCEDURE — 84100 ASSAY OF PHOSPHORUS: CPT

## 2022-12-01 PROCEDURE — 83880 ASSAY OF NATRIURETIC PEPTIDE: CPT

## 2022-12-01 PROCEDURE — 84443 ASSAY THYROID STIM HORMONE: CPT

## 2022-12-01 PROCEDURE — 87637 SARSCOV2&INF A&B&RSV AMP PRB: CPT

## 2022-12-01 PROCEDURE — 85025 COMPLETE CBC W/AUTO DIFF WBC: CPT

## 2022-12-01 PROCEDURE — 84484 ASSAY OF TROPONIN QUANT: CPT

## 2022-12-01 PROCEDURE — 94640 AIRWAY INHALATION TREATMENT: CPT

## 2022-12-01 PROCEDURE — 70450 CT HEAD/BRAIN W/O DYE: CPT

## 2022-12-01 PROCEDURE — 99285 EMERGENCY DEPT VISIT HI MDM: CPT | Mod: 25

## 2022-12-01 PROCEDURE — G0378: CPT

## 2022-12-01 PROCEDURE — 82553 CREATINE MB FRACTION: CPT

## 2022-12-01 PROCEDURE — 83735 ASSAY OF MAGNESIUM: CPT

## 2022-12-01 PROCEDURE — 93306 TTE W/DOPPLER COMPLETE: CPT

## 2022-12-01 PROCEDURE — 83036 HEMOGLOBIN GLYCOSYLATED A1C: CPT

## 2022-12-01 PROCEDURE — 85730 THROMBOPLASTIN TIME PARTIAL: CPT

## 2022-12-01 PROCEDURE — 93005 ELECTROCARDIOGRAM TRACING: CPT

## 2022-12-01 PROCEDURE — 80048 BASIC METABOLIC PNL TOTAL CA: CPT

## 2022-12-01 PROCEDURE — 85027 COMPLETE CBC AUTOMATED: CPT

## 2022-12-01 PROCEDURE — 82550 ASSAY OF CK (CPK): CPT

## 2022-12-01 PROCEDURE — 71275 CT ANGIOGRAPHY CHEST: CPT

## 2022-12-01 PROCEDURE — 36415 COLL VENOUS BLD VENIPUNCTURE: CPT

## 2022-12-01 PROCEDURE — 85610 PROTHROMBIN TIME: CPT

## 2022-12-01 PROCEDURE — 99239 HOSP IP/OBS DSCHRG MGMT >30: CPT

## 2022-12-01 PROCEDURE — 73610 X-RAY EXAM OF ANKLE: CPT

## 2022-12-01 PROCEDURE — 87521 HEPATITIS C PROBE&RVRS TRNSC: CPT

## 2022-12-01 RX ORDER — IPRATROPIUM/ALBUTEROL SULFATE 18-103MCG
3 AEROSOL WITH ADAPTER (GRAM) INHALATION
Qty: 360 | Refills: 3
Start: 2022-12-01 | End: 2023-03-30

## 2022-12-01 RX ORDER — FUROSEMIDE 40 MG
1 TABLET ORAL
Qty: 90 | Refills: 0
Start: 2022-12-01 | End: 2023-02-28

## 2022-12-01 RX ORDER — BICTEGRAVIR SODIUM, EMTRICITABINE, AND TENOFOVIR ALAFENAMIDE FUMARATE 30; 120; 15 MG/1; MG/1; MG/1
1 TABLET ORAL
Qty: 30 | Refills: 0
Start: 2022-12-01 | End: 2022-12-30

## 2022-12-01 RX ORDER — METOPROLOL TARTRATE 50 MG
1 TABLET ORAL
Qty: 90 | Refills: 0
Start: 2022-12-01 | End: 2023-02-28

## 2022-12-01 RX ORDER — VALSARTAN 80 MG/1
1 TABLET ORAL
Qty: 90 | Refills: 0
Start: 2022-12-01 | End: 2023-02-28

## 2022-12-01 RX ORDER — MAGNESIUM SULFATE 500 MG/ML
2 VIAL (ML) INJECTION ONCE
Refills: 0 | Status: COMPLETED | OUTPATIENT
Start: 2022-12-01 | End: 2022-12-01

## 2022-12-01 RX ORDER — APIXABAN 2.5 MG/1
1 TABLET, FILM COATED ORAL
Qty: 60 | Refills: 0
Start: 2022-12-01 | End: 2022-12-30

## 2022-12-01 RX ORDER — DAPAGLIFLOZIN 10 MG/1
1 TABLET, FILM COATED ORAL
Qty: 90 | Refills: 0
Start: 2022-12-01 | End: 2023-02-28

## 2022-12-01 RX ADMIN — Medication 25 GRAM(S): at 10:23

## 2022-12-01 RX ADMIN — Medication 100 MILLIGRAM(S): at 05:42

## 2022-12-01 RX ADMIN — Medication 25 MILLIGRAM(S): at 05:43

## 2022-12-01 RX ADMIN — Medication 1 TABLET(S): at 06:10

## 2022-12-01 RX ADMIN — Medication 20 MILLIGRAM(S): at 05:42

## 2022-12-01 RX ADMIN — Medication 60 MILLIGRAM(S): at 05:44

## 2022-12-01 RX ADMIN — BENZOCAINE AND MENTHOL 1 LOZENGE: 5; 1 LIQUID ORAL at 05:45

## 2022-12-01 RX ADMIN — APIXABAN 5 MILLIGRAM(S): 2.5 TABLET, FILM COATED ORAL at 05:42

## 2022-12-01 RX ADMIN — BICTEGRAVIR SODIUM, EMTRICITABINE, AND TENOFOVIR ALAFENAMIDE FUMARATE 1 TABLET(S): 30; 120; 15 TABLET ORAL at 12:58

## 2022-12-01 NOTE — DISCHARGE NOTE NURSING/CASE MANAGEMENT/SOCIAL WORK - NSDCPEFALRISK_GEN_ALL_CORE
For information on Fall & Injury Prevention, visit: [email or web address] OR  [email or web address] OR  https://www.cdc.gov/steadi/patient.html

## 2022-12-01 NOTE — DISCHARGE NOTE NURSING/CASE MANAGEMENT/SOCIAL WORK - NSDCFUADDAPPT_GEN_ALL_CORE_FT
Please follow up at [facility] for further management of your health. It is important for you to follow up REGULARLY as you require ongoing management and monitoring due to your diagnoses of heart failure, atrial fibrillation, asthma, and HIV.  Ftsg Text: The defect edges were debeveled with a #15 scalpel blade.  Given the location of the defect, shape of the defect and the proximity to free margins a full thickness skin graft was deemed most appropriate.  Using a sterile surgical marker, the primary defect shape was transferred to the donor site. The area thus outlined was incised deep to adipose tissue with a #15 scalpel blade.  The harvested graft was then trimmed of adipose tissue until only dermis and epidermis was left.  The skin margins of the secondary defect were undermined to an appropriate distance in all directions utilizing iris scissors.  The secondary defect was closed with interrupted buried subcutaneous sutures.  The skin edges were then re-apposed with running  sutures.  The skin graft was then placed in the primary defect and oriented appropriately.

## 2022-12-01 NOTE — DISCHARGE NOTE NURSING/CASE MANAGEMENT/SOCIAL WORK - NSDCPEPTCAREGIVEDUMATLIST _GEN_ALL_CORE
This is a historical note converted from [employer]. Formatting and pictures may have been removed.  Please reference Cerjonathan for original formatting and attached multimedia. Chief Complaint  PT. IN TODAY FOR BILATERAL KNEE PAIN THATS BEEN GOING ON FOR COUPLE OF YRS. ?MRI DONE. ?PT. BROUGHT IN CD. XRAYS DONE TODAY. PT. IS AMBULATING WITH CANE TODAY. ?LEFT KNEE WORST AND ?IT LOCKS UP ON HIM. PT. IS TAKING NORCO/DICLOFENAC PAIN TODAY 8/10...SB  History of Present Illness  This is a patient that present today with knee pain. Patient has had this knee pain for several months. This knee pain is moderate to severe. Patient states pain is worse with squats and lunges. Patient states pain is global. Patient had no previous treatment. Patient has been treated previously with medication, bracing, injections, therapy.?  He also had a previous rotator cuff surgery several years back. ?He states he never really quite felt 100% after the surgery. ?He continues to have mild symptoms?in the left shoulder?and weakness.  Review of Systems  GENERAL: No fevers, chills, unexpected weight loss or gain  MUSCULOSKELETAL: Joint pain, extremity pain  Physical Exam  Vitals & Measurements  BP:?139/96?  HT:?185?cm? WT:?150.59?kg? BMI:?44?  General: Well-developed, well-nourished.  Neuro: Alert and oriented x 3.  Psych: Normal mood and affect.  CV: Palpable radial pulses.  Resp: Smooth and unlabored.  Skin: No evidence of focal lesions or trauma.  Hem/Imm/Lymph: No evidence of lymphangitis or adenopathy.  Gait: No trendelenburg gait.  DTR: 2+, no hypo or hyperreflexia.  Coordination and Balance: No tremors or abnormal station.  Bilateral?knee Exam:  Varus deformity. Range of motion from 5-110 degrees. Negative patella grind and equal subluxation of knee cap medial and lateral < 1cm. Negative patella tendon tenderness. Negative Lachman and anterior drawer test. Negative posterior drawer test. Negative varus and valgus stress test. Positive medial  joint line tenderness. Negative lateral joint line tenderness. 4/5 strength and normal skin appearance. Sensibility normal.  Assessment/Plan  1.?Osteoarthritis of both knees?M17.0  ?His x-rays from today obviously show significant osteoarthritis of both knees.? It so bad I think the only?thing this can help and will be knee replacement surgery. ?He is in favor of doing that.? We will scan his knees and then?get the custom implants?shipped back to the hospital to plan for his surgery. ?He would like to do the surgery?the first part of January.? We will do both knees at the same time.??I will have him come back in 1 month to preop.  Ordered:  Clinic Follow up, *Est. 11/17/19 3:00:00 CST, Order for future visit, Osteoarthritis of both knees  Status post left rotator cuff repair  [facility]  Office/Outpatient Visit Level 3 New [id number] PC, Osteoarthritis of both knees  Status post left rotator cuff repair  Obesity, [facility], 10/17/19 10:53:00 CDT  ?  2.?Status post left rotator cuff repair?Z98.890  ?Is a possibility he did not heal the left shoulder rotator cuff repair.? For that reason once he heals up from his knee surgery, we will?obtain a new MRI of his left shoulder.  Ordered:  Clinic Follow up, *Est. 11/17/19 3:00:00 CST, Order for future visit, Osteoarthritis of both knees  Status post left rotator cuff repair  [facility]  Office/Outpatient Visit Level 3 New [id number] PC, Osteoarthritis of both knees  Status post left rotator cuff repair  Obesity, [facility], 10/17/19 10:53:00 CDT  ?  3.?Obesity?E66.9  Ordered:  Clinic Follow up, *Est. 11/17/19 3:00:00 CST, Order for future visit, Osteoarthritis of both knees  Status post left rotator cuff repair  [facility]  Office/Outpatient Visit Level 3 New [id number] PC, Osteoarthritis of both knees  Status post left rotator cuff repair  Obesity, [facility], 10/17/19 10:53:00 CDT  ?  Orders:  XR Knee  Left 4 or More Views, Routine, 10/17/19 10:28:00 CDT, Pain, None, Ambulatory, Rad Type, Left knee pain, Not Scheduled, 10/17/19 10:28:00 CDT  XR Knee Right 4 or More Views, Routine, 10/17/19 10:28:00 CDT, Pain, None, Ambulatory, Rad Type, Right knee pain, Not Scheduled, 10/17/19 10:28:00 CDT  Referrals  Clinic Follow up, *Est. 11/17/19 3:00:00 CST, Order for future visit, Osteoarthritis of both knees  Status post left rotator cuff repair  Obesity, [facility]   Problem List/Past Medical History  Ongoing  No qualifying data  Historical  No qualifying data  Procedure/Surgical History  Knee  Shoulder   Medications  diclofenac sodium 50 mg oral enteric coated tablet, 50 mg= 1 tab(s), Oral, TID, PRN  Template Non-Formulary Med, Daily  Zanaflex 4 mg oral tablet, 4 mg= 1 tab(s), Oral, q8hr, PRN  Allergies  No Known Allergies  Social History  Abuse/Neglect  No, 10/17/2019  Tobacco  Never (less than 100 in lifetime), No, 10/17/2019  Health Maintenance  Health Maintenance  ???Pending?(in the next year)  ??? ??OverDue  ??? ? ? ?Alcohol Misuse Screening due??01/01/19??and every 1??year(s)  ??? ? ? ?Obesity Screening due??01/01/19??and every 1??year(s)  ??? ??Due?  ??? ? ? ?ADL Screening due??10/17/19??and every 1??year(s)  ??? ? ? ?Aspirin Therapy for CVD Prevention due??10/17/19??and every 1??year(s)  ??? ? ? ?Blood Pressure Screening due??10/17/19??and every?  ??? ? ? ?Body Mass Index Check due??10/17/19??and every?  ??? ? ? ?Colorectal Screening due??10/17/19??and every?  ??? ? ? ?Depression Screening due??10/17/19??and every?  ??? ? ? ?Diabetes Screening due??10/17/19??and every?  ??? ? ? ?Hypertension Management-BMP due??10/17/19??and every?  ??? ? ? ?Hypertension Management-Blood Pressure due??10/17/19??and every?  ??? ? ? ?Influenza Vaccine due??10/17/19??and every?  ??? ? ? ?Tetanus Vaccine due??10/17/19??and every 10??year(s)  ???Satisfied?(in the past 1 year)  ???There are no satisfied recommendations within the  defined date range  ?  Diagnostic Results  4 views of bilateral?knee: X-rays demonstrate views of the knee joint space narrowing and degenerative changes with a varus deformity. There is also subchondral sclerosis and osteophyte formation.      Heart Failure/Apixaban/Eliquis

## 2022-12-01 NOTE — DISCHARGE NOTE NURSING/CASE MANAGEMENT/SOCIAL WORK - PATIENT PORTAL LINK FT
You can access the FollowMyHealth Patient Portal offered by [facility] by registering at the following website: [email or web address]. By joining [facility]’s FollowMyHealth portal, you will also be able to view your health information using other applications (apps) compatible with our system.

## 2022-12-01 NOTE — DISCHARGE NOTE NURSING/CASE MANAGEMENT/SOCIAL WORK - NSDCPEEMAIL_GEN_ALL_CORE
[facility] for Tobacco Control email [email or web address]

## 2022-12-01 NOTE — DISCHARGE NOTE NURSING/CASE MANAGEMENT/SOCIAL WORK - NSDCPEWEB_GEN_ALL_CORE
[facility] for Tobacco Control website --- [email or web address] website --- [email or web address]

## 2022-12-01 NOTE — DISCHARGE NOTE NURSING/CASE MANAGEMENT/SOCIAL WORK - NSDCVIVACCINE_GEN_ALL_CORE_FT
Tdap; 26-Jun-2017 00:59; Oj Rowley); [employer]; [id number]; IntraMuscular; Deltoid Left.; 0.5 milliLiter(s); VIS (VIS Published: 09-May-2013, VIS Presented: 26-Jun-2017);

## 2022-12-06 DIAGNOSIS — R55 SYNCOPE AND COLLAPSE: ICD-10-CM

## 2022-12-06 DIAGNOSIS — I48.91 UNSPECIFIED ATRIAL FIBRILLATION: ICD-10-CM

## 2022-12-06 DIAGNOSIS — Z79.01 LONG TERM (CURRENT) USE OF ANTICOAGULANTS: ICD-10-CM

## 2022-12-06 DIAGNOSIS — R07.89 OTHER CHEST PAIN: ICD-10-CM

## 2022-12-06 DIAGNOSIS — I08.1 RHEUMATIC DISORDERS OF BOTH MITRAL AND TRICUSPID VALVES: ICD-10-CM

## 2022-12-06 DIAGNOSIS — I27.20 PULMONARY HYPERTENSION, UNSPECIFIED: ICD-10-CM

## 2022-12-06 DIAGNOSIS — Z21 ASYMPTOMATIC HUMAN IMMUNODEFICIENCY VIRUS [HIV] INFECTION STATUS: ICD-10-CM

## 2022-12-06 DIAGNOSIS — Z91.14 PATIENT'S OTHER NONCOMPLIANCE WITH MEDICATION REGIMEN: ICD-10-CM

## 2022-12-06 DIAGNOSIS — Z79.899 OTHER LONG TERM (CURRENT) DRUG THERAPY: ICD-10-CM

## 2022-12-06 DIAGNOSIS — J45.909 UNSPECIFIED ASTHMA, UNCOMPLICATED: ICD-10-CM

## 2022-12-06 DIAGNOSIS — I50.22 CHRONIC SYSTOLIC (CONGESTIVE) HEART FAILURE: ICD-10-CM

## 2022-12-20 NOTE — PATIENT PROFILE ADULT - FUNCTIONAL ASSESSMENT - BASIC MOBILITY SCORE.
[address] [facility] ORTHOPEDICS - [address] [address]  Dept: [phone]      Physical Therapy Initial Assessment     Referring MD: Miguel Gama MD  Diagnosis:     ICD-10-CM    1. Femoroacetabular impingement of both hips  M25.851     M25.852       2. Arthritis of right hip  M16.11       3. Hip pain, right  M25.551          Therapy precautions:None  Co-morbidities affecting plan of care: none  Total Time: 50 min, Timed Procedure Codes: 25 min   Visit count:  1    PERTINENT MEDICAL HISTORY     Past medical and surgical history:   No past medical history on file. No past surgical history on file. Medications: reviewed in chart   Allergies: No Known Allergies       SUBJECTIVE     Chief complaints/history of injury:    Date symptoms began: 12/1/20  Aung Pedrazabe of condition: Chronic (continuous duration > 3 months)  Primary cause of current episode: Repetitive  How did symptoms start: Pt reports consistent R hip pain over the last 2 years. He is an avid runner and in the last few months he has tried to get back into running w/ onset of his R hip pain. He had been doing cross training w/ bike and elliptical.  He then had imaging for R hip and had cortisone in outside of his hip  w/ no relief. He then had intra articular injection that has greatly benefited him. He has now been able to do a walk/jog program and bike and elliptical and has continued to feel good (able to run 1.5 mile consistent) and no pain inc.  He reports tightness in his hip vs his L. He has symptoms still in outside of his R hip. Goal of 5k, maybe 10k   PMH: R knee meniscectomy (4 yrs ago), spine stenosis (episodes of pain occur more than 1x per year). Received previous outpatient therapy? No    Pain Assessment:   Average Pain/symptom intensity (0-10 scale)  Last 24 hours: 0/10  Last week (1-7 days): 3/10  How often do you feel symptoms?  Frequently (51-75%)  Description: aching, sharp, and tight; less pain in lateral hip since the injection  Aggravating factors:stairs, twisting, running  Alleviating factors: stretching, heat    Neuro screen: denies numbness, tingling, and radiating pain    Social/Functional Hx: How would you rate your overall health? very good  Pt lives with independent spouse in a(n) 2+ story house with B handrails. Current DME: none  Work Status: Employed full time: /desk job   PLOF & Social Hx/Interests: Independent and active without physical limitations and participated in running  How much have your symptoms interfered with daily activities? Moderately  Current level of function: modified independent with pain w/ running    Patient Stated Goals: consistent running program    OBJECTIVE EXAMINATION     Functional Outcome Questionnaire:   Lower Extremity Functional Scale: 56/80= 70% function     Observation:     Palpation: mild TTP along ITB R      A/PROM Measures:    Right Left Comment   Hip Flexion      Hip Abduction      Hip IR 22 32 Measured in prone   Hip ER      Knee Extension      Knee Flexion      Hip [place] [place]note 2[facility]    Ankle [place]/[facility] WFL            Strength/MMT (0-5 Scale):   Right Left Comment   Knee Flexion 29.6 37    Knee Extension 79 78    Quad set      Hip Flexion      Hip Extension 51.6 66.6    Hip Abduction 42.6 54.9    Hip ER      Hip IR      Ankle DF      Ankle PF              Special Tests/Function:     Squat: limited depth  Balance: > 10s (SLS)    Special tests:   + hip impingement testing    Treatment provided today consisted of initial evaluation followed by: Therapeutic exercise (94120) x 25 min to address ROM/strength deficits and to develop an initial HEP as noted below.   Prone hip IR 20x  Qped hip EXT (elbows) 2x10 (1 set w/ knee bent)  Bridging 10x10s  Clams  Iso lunge  Sidestepping  Soleus strengthening  SLS w/ hip ABD at wall      Manual therapy (78921) x  min utilizing techniques to improve joint and/or soft tissue mobility, ROM, and function as well as helping to decrease pain/spasms and swelling. Palpation and assessment of soft tissue, muscles, and landmarks   Consider DN lateral glute and ITB      Patient Education on the condition/pathology, involved anatomy, and exercise rationale. CLINICAL DECISION MAKING/ASSESSMENT     Personal Factors/co-morbidities affecting POC (1-2 Medium/3+High): no factors involved   Problem List: (1-2 Low/ 3 Medium/ 4+ High) Pain  ROM limitations  Strength deficits  Restricted recreational participation    Clinical decision making: low complexity with therapist able to easily and confidently determine and predict expectations and future outcomes for the POC. Prognosis: good   Benefits and precautions of treatment explained to patient. Suad Iglesias is a 45 y.o. male who presents to therapy today with stable clinical presentation (low complexity) related to R hip pain. He presents w/ dec ROM, dec strength, impaired squatting and overall dec functional ability. This limits terrell of squatting, running, stairs. Pt would benefit from skilled physical therapy services to address the deficits noted above for return to prior level of function. PLAN OF CARE     Effective Dates: 12/20/2022 TO 1/31/2023  (42 days). Frequency/Duration: 2x/week for 42 Day(s)  Interventions may include but are not limited to: (42602) Therapeutic exercise to develop ROM, strength, endurance and flexibility  (58067) Therapeutic activities using dynamic activities to improve function  (85945) Manual therapy techniques to improve joint and/or soft tissue mobility, ROM, and function as well as helping to decrease pain/spasms and swelling  (46748/69061) Dry needling for the management of neuromusculoskeletal pain and movement impairment  Home exercise program (HEP) development    The referring physician has reviewed and approved this evaluation and plan of care as noted by the electronic signature attached to note.     GOALS     Short term goals to be met by 1/3/2023 (2 weeks):  Pt will improve R hip IR to 28 deg for donning pants  Pt will report less than 4/10 symptoms w/ walk/jog program  Pt will report mild difficulty w/ standing for 1 hour    Long term goals to be met by 1/17/2023  (4 weeks):   Pt will demonstrate Ind w/ HEP  Improve LEFS to 65/80 demonstrating mild functional restrictions with ADLs, work and athletic activities. Pt will terrell walk/jog program x 3 miles with mild symptom c/o    FIA Formula E  Access Code: [email or web address]  URL: [email or web address]  Date: 12/20/2022  Prepared by:  Philomena Hernandez    Exercises  Prone Hip Internal Rotation AROM - 1 x daily - 15 reps - 5s hold  Supine Bridge - 1 x daily - 2 sets - 10 reps - 10s hold  Quadruped on Forearms Hip Extension Alternating - 1 x daily - 2 sets - 10 reps  Quadruped on Forearms Bent Knee Hip Extension Alternating - 1 x daily - 2 sets - 10 reps 12

## 2023-01-01 NOTE — PHYSICAL THERAPY INITIAL EVALUATION ADULT - DID THE PATIENT HAVE SURGERY?
[Laughs aloud] : laughs aloud [Turns to voice] : turns to voice [Vocalizes with extending cooing] : vocalizes with extending cooing [Keeps hands unfisted] : keeps hands unfisted [Plays with fingers in midline] : plays with fingers in midline [Grasps objects] : grasps objects [Passed] : passed [Rolls over prone to supine] : does not roll over prone to supine [Supports on elbows & wrists in prone] : does not support on elbows and wrists in prone n/a [FreeTextEntry1] : Has not been doing tummy time  [FreeTextEntry2] : 0

## 2023-02-07 NOTE — DIETITIAN INITIAL EVALUATION ADULT - ENTER TO (CAL/KG)
Correct, pantoprazole was only meant to be used for 8 weeks. Opted to replace with omeprazole 40 mg daily for 6 months, and then if tolerated reduced to 20 mg daily.   37

## 2023-03-09 NOTE — ED ADULT TRIAGE NOTE - GLASGOW COMA SCALE: BEST VERBAL RESPONSE, MLM
Pt called today stating she was seen at the urgent care on 2/25 for an illness that started on 2/21. She took all of her antibiotics and steroids, but is still having a bad cough. Pt states her wheezing has got away, but even with Mucinex dm and tessalon pearls, she is constantly coughing. Pt would just like PCP's recommendation on what else she can do for this.
(V5) oriented

## 2023-03-17 ENCOUNTER — EMERGENCY (EMERGENCY)
Facility: HOSPITAL | Age: 63
LOS: 1 days | Discharge: HOME | End: 2023-03-17
Attending: EMERGENCY MEDICINE | Admitting: EMERGENCY MEDICINE
Payer: MEDICAID

## 2023-03-17 VITALS
OXYGEN SATURATION: 96 % | TEMPERATURE: 99 F | RESPIRATION RATE: 18 BRPM | HEIGHT: 67 IN | HEART RATE: 105 BPM | DIASTOLIC BLOOD PRESSURE: 95 MMHG | SYSTOLIC BLOOD PRESSURE: 137 MMHG | WEIGHT: 139.99 LBS

## 2023-03-17 VITALS
OXYGEN SATURATION: 96 % | TEMPERATURE: 99 F | HEART RATE: 85 BPM | SYSTOLIC BLOOD PRESSURE: 133 MMHG | DIASTOLIC BLOOD PRESSURE: 91 MMHG | RESPIRATION RATE: 17 BRPM

## 2023-03-17 PROBLEM — I48.91 UNSPECIFIED ATRIAL FIBRILLATION: Chronic | Status: ACTIVE | Noted: 2022-11-29

## 2023-03-17 PROBLEM — I50.9 HEART FAILURE, UNSPECIFIED: Chronic | Status: ACTIVE | Noted: 2022-11-30

## 2023-03-17 LAB
ALBUMIN SERPL ELPH-MCNC: 3.7 G/DL — SIGNIFICANT CHANGE UP (ref 3.4–5)
ALP SERPL-CCNC: 140 U/L — HIGH (ref 40–120)
ALT FLD-CCNC: 29 U/L — SIGNIFICANT CHANGE UP (ref 12–42)
AMPHET UR-MCNC: NEGATIVE — SIGNIFICANT CHANGE UP
ANION GAP SERPL CALC-SCNC: 12 MMOL/L — SIGNIFICANT CHANGE UP (ref 9–16)
APPEARANCE UR: CLEAR — SIGNIFICANT CHANGE UP
APTT BLD: 33.2 SEC — SIGNIFICANT CHANGE UP (ref 27.5–35.5)
AST SERPL-CCNC: 57 U/L — HIGH (ref 15–37)
BARBITURATES UR SCN-MCNC: NEGATIVE — SIGNIFICANT CHANGE UP
BENZODIAZ UR-MCNC: NEGATIVE — SIGNIFICANT CHANGE UP
BILIRUB SERPL-MCNC: 1.1 MG/DL — SIGNIFICANT CHANGE UP (ref 0.2–1.2)
BILIRUB UR-MCNC: NEGATIVE — SIGNIFICANT CHANGE UP
BUN SERPL-MCNC: 9 MG/DL — SIGNIFICANT CHANGE UP (ref 7–23)
CALCIUM SERPL-MCNC: 9 MG/DL — SIGNIFICANT CHANGE UP (ref 8.5–10.5)
CHLORIDE SERPL-SCNC: 103 MMOL/L — SIGNIFICANT CHANGE UP (ref 96–108)
CK MB BLD-MCNC: 1.24 % — SIGNIFICANT CHANGE UP
CK MB CFR SERPL CALC: 2.8 NG/ML — SIGNIFICANT CHANGE UP (ref 0.5–3.6)
CK SERPL-CCNC: 226 U/L — SIGNIFICANT CHANGE UP (ref 39–308)
CO2 SERPL-SCNC: 25 MMOL/L — SIGNIFICANT CHANGE UP (ref 22–31)
COCAINE METAB.OTHER UR-MCNC: POSITIVE
COLOR SPEC: YELLOW — SIGNIFICANT CHANGE UP
CREAT SERPL-MCNC: 0.94 MG/DL — SIGNIFICANT CHANGE UP (ref 0.5–1.3)
DIFF PNL FLD: NEGATIVE — SIGNIFICANT CHANGE UP
EGFR: 91 ML/MIN/1.73M2 — SIGNIFICANT CHANGE UP
ETHANOL SERPL-MCNC: 142 MG/DL — HIGH
GLUCOSE SERPL-MCNC: 95 MG/DL — SIGNIFICANT CHANGE UP (ref 70–99)
GLUCOSE UR QL: NEGATIVE — SIGNIFICANT CHANGE UP
HCT VFR BLD CALC: 37 % — LOW (ref 39–50)
HGB BLD-MCNC: 11.3 G/DL — LOW (ref 13–17)
INR BLD: 1.26 — HIGH (ref 0.88–1.16)
KETONES UR-MCNC: NEGATIVE — SIGNIFICANT CHANGE UP
LEUKOCYTE ESTERASE UR-ACNC: NEGATIVE — SIGNIFICANT CHANGE UP
MCHC RBC-ENTMCNC: 28 PG — SIGNIFICANT CHANGE UP (ref 27–34)
MCHC RBC-ENTMCNC: 30.5 GM/DL — LOW (ref 32–36)
MCV RBC AUTO: 91.6 FL — SIGNIFICANT CHANGE UP (ref 80–100)
METHADONE UR-MCNC: NEGATIVE — SIGNIFICANT CHANGE UP
NITRITE UR-MCNC: NEGATIVE — SIGNIFICANT CHANGE UP
NRBC # BLD: 0 /100 WBCS — SIGNIFICANT CHANGE UP (ref 0–0)
NT-PROBNP SERPL-SCNC: 7070 PG/ML — HIGH
OPIATES UR-MCNC: NEGATIVE — SIGNIFICANT CHANGE UP
PCP SPEC-MCNC: SIGNIFICANT CHANGE UP
PCP UR-MCNC: NEGATIVE — SIGNIFICANT CHANGE UP
PH UR: 5.5 — SIGNIFICANT CHANGE UP (ref 5–8)
PLATELET # BLD AUTO: 167 K/UL — SIGNIFICANT CHANGE UP (ref 150–400)
POTASSIUM SERPL-MCNC: 3.2 MMOL/L — LOW (ref 3.5–5.3)
POTASSIUM SERPL-SCNC: 3.2 MMOL/L — LOW (ref 3.5–5.3)
PROT SERPL-MCNC: 8.5 G/DL — HIGH (ref 6.4–8.2)
PROT UR-MCNC: NEGATIVE MG/DL — SIGNIFICANT CHANGE UP
PROTHROM AB SERPL-ACNC: 14.7 SEC — HIGH (ref 10.5–13.4)
RBC # BLD: 4.04 M/UL — LOW (ref 4.2–5.8)
RBC # FLD: 20.8 % — HIGH (ref 10.3–14.5)
SODIUM SERPL-SCNC: 140 MMOL/L — SIGNIFICANT CHANGE UP (ref 132–145)
SP GR SPEC: 1.01 — SIGNIFICANT CHANGE UP (ref 1–1.03)
THC UR QL: NEGATIVE — SIGNIFICANT CHANGE UP
TROPONIN I, HIGH SENSITIVITY RESULT: 33.3 NG/L — SIGNIFICANT CHANGE UP
TROPONIN I, HIGH SENSITIVITY RESULT: 33.4 NG/L — SIGNIFICANT CHANGE UP
TROPONIN I, HIGH SENSITIVITY RESULT: 37.7 NG/L — SIGNIFICANT CHANGE UP
UROBILINOGEN FLD QL: 0.2 E.U./DL — SIGNIFICANT CHANGE UP
WBC # BLD: 3.11 K/UL — LOW (ref 3.8–10.5)
WBC # FLD AUTO: 3.11 K/UL — LOW (ref 3.8–10.5)

## 2023-03-17 PROCEDURE — 99285 EMERGENCY DEPT VISIT HI MDM: CPT

## 2023-03-17 PROCEDURE — 99053 MED SERV 10PM-8AM 24 HR FAC: CPT

## 2023-03-17 PROCEDURE — 71046 X-RAY EXAM CHEST 2 VIEWS: CPT | Mod: 26

## 2023-03-17 RX ORDER — FUROSEMIDE 40 MG
40 TABLET ORAL ONCE
Refills: 0 | Status: COMPLETED | OUTPATIENT
Start: 2023-03-17 | End: 2023-03-17

## 2023-03-17 RX ORDER — IPRATROPIUM/ALBUTEROL SULFATE 18-103MCG
3 AEROSOL WITH ADAPTER (GRAM) INHALATION ONCE
Refills: 0 | Status: COMPLETED | OUTPATIENT
Start: 2023-03-17 | End: 2023-03-17

## 2023-03-17 RX ORDER — ASPIRIN/CALCIUM CARB/MAGNESIUM 324 MG
325 TABLET ORAL ONCE
Refills: 0 | Status: COMPLETED | OUTPATIENT
Start: 2023-03-17 | End: 2023-03-17

## 2023-03-17 RX ADMIN — Medication 40 MILLIGRAM(S): at 06:11

## 2023-03-17 RX ADMIN — Medication 40 MILLIGRAM(S): at 05:11

## 2023-03-17 RX ADMIN — Medication 3 MILLILITER(S): at 05:13

## 2023-03-17 RX ADMIN — Medication 40 MILLIGRAM(S): at 07:31

## 2023-03-17 RX ADMIN — Medication 325 MILLIGRAM(S): at 05:15

## 2023-03-17 NOTE — ED PROVIDER NOTE - CARE PLAN
Principal Discharge DX:	Acute asthma exacerbation  Secondary Diagnosis:	Chronic atrial fibrillation   1

## 2023-03-17 NOTE — ED PROVIDER NOTE - CLINICAL SUMMARY MEDICAL DECISION MAKING FREE TEXT BOX
Patient with acute exacerbation of asthma and states his MDI is not enough.  Patient also smokes currently.  States he also had chest pain earlier today.  No fever or chills.  Patient in atrial fibrillation today with HR 82, reviewed pt's prior records and his EKG from July 31, 2022 also shows afib at HR 83.      Duoneb, prednisone, aspirin ordered.  CBC, troponin, CXR, BNP ordered.  Placed in CDU for diagnostic uncertainty and therapeutic intensity.

## 2023-03-17 NOTE — ED ADULT NURSE REASSESSMENT NOTE - GENERAL PATIENT STATE
awakens with ease/comfortable appearance/cooperative/resting/sleeping
resting/sleeping
resting/sleeping

## 2023-03-17 NOTE — ED CDU PROVIDER DISPOSITION NOTE - CLINICAL COURSE
The patient's symptoms progressively improved throughout the ED stay.  The patient tolerated PO fluids.  ED evaluation and management discussed with the patient and family (if available) in detail.  Close PMD and/or specialist follow up encouraged.  Strict ED return instructions discussed in detail for any worsening or new symptoms. The patient was given the opportunity to ask any questions about their discharge diagnosis and discharge instructions. The patient verbalized understanding of these instructions and need to return to the ED for any worsening of illness or for any concern. The patient received a printed version of the discharge instructions. The patient understands the Emergency Department diagnosis is a preliminary diagnosis often based on limited information and that the patient must adhere to the follow-up plan as discussed.  At the time of discharge from the Emergency Department, the patient is alert with fluent appropriate speech and ambulatory without difficulty.  A medical screening examination was performed and no emergency medical condition was identified.    ate peaches and drank water prior to dc home   alert and ambulatory with fluent and appropriate speech The patient's symptoms progressively improved throughout the ED stay.  The patient tolerated PO fluids.  ED evaluation and management discussed with the patient and family (if available) in detail.  Close PMD and/or specialist follow up encouraged.  Strict ED return instructions discussed in detail for any worsening or new symptoms. The patient was given the opportunity to ask any questions about their discharge diagnosis and discharge instructions. The patient verbalized understanding of these instructions and need to return to the ED for any worsening of illness or for any concern. The patient received a printed version of the discharge instructions. The patient understands the Emergency Department diagnosis is a preliminary diagnosis often based on limited information and that the patient must adhere to the follow-up plan as discussed.  At the time of discharge from the Emergency Department, the patient is alert with fluent appropriate speech and ambulatory without difficulty.  A medical screening examination was performed and no emergency medical condition was identified.    ate peaches and drank water prior to dc home   alert and ambulatory with fluent and appropriate speech  no complaints of cp sob ha or abd pain

## 2023-03-17 NOTE — ED ADULT NURSE NOTE - NSIMPLEMENTINTERV_GEN_ALL_ED
Implemented All Fall Risk Interventions:  [place] to call system. Call bell, personal items and telephone within reach. Instruct patient to call for assistance. Room bathroom lighting operational. Non-slip footwear when patient is off stretcher. Physically safe environment: no spills, clutter or unnecessary equipment. Stretcher in lowest position, wheels locked, appropriate side rails in place. Provide visual cue, wrist band, yellow gown, etc. Monitor gait and stability. Monitor for mental status changes and reorient to person, place, and time. Review medications for side effects contributing to fall risk. Reinforce activity limits and safety measures with patient and family.

## 2023-03-17 NOTE — ED ADULT NURSE REASSESSMENT NOTE - NS ED NURSE REASSESS COMMENT FT1
Pt resting in bed comfortably. Repeat troponin drawn as per order. Denies any chest pain. Remains on cardiac monitor.
care assumed, pt sleeping w/o s/s of distress, will continue to monitor for change in assessment

## 2023-03-17 NOTE — ED CDU PROVIDER DISPOSITION NOTE - PATIENT PORTAL LINK FT
You can access the FollowMyHealth Patient Portal offered by [facility] by registering at the following website: [email or web address]. By joining [employer]’s FollowMyHealth portal, you will also be able to view your health information using other applications (apps) compatible with our system.

## 2023-03-17 NOTE — ED ADULT TRIAGE NOTE - BMI (KG/M2)
25 y/o F with no known significant PMH presents to ED c/o chin laceration which she sustained early this morning by accidentally slamming her chin on the corner of her headboard while having sex. She does not recall when her last Tdap was. She denies sustaining any other associated injuries or acute medical complaints at this time.    Denies headache, dizziness, LOC, vision changes, neck pain, dysphagia, drooling, N/V 21.9

## 2023-03-17 NOTE — ED PROVIDER NOTE - OBJECTIVE STATEMENT
Patient with acute exacerbation of asthma and states his MDI is not enough.  Patient also smokes currently.  States he also had chest pain earlier today.  No fever or chills. Patient with acute exacerbation of asthma and states his MDI is not enough.  Patient also smokes currently.  States he also had chest pain earlier today.  No fever or chills.  Patient in atrial fibrillation today with HR 82, reviewed pt's prior records and his EKG from July 31, 2022 also shows afib at HR 83.

## 2023-03-17 NOTE — ED CDU PROVIDER DISPOSITION NOTE - NSFOLLOWUPINSTRUCTIONS_ED_ALL_ED_FT
don't drugs     stay well hydrated     Follow up with your primary care doctor or clinics listed below if you do not have a doctor  [address] [address]  To make an appointment, call [phone]  [facility]  Address: [address] [place] [address]  Appointment Center: [phone] ([phone])

## 2023-03-19 DIAGNOSIS — I48.20 CHRONIC ATRIAL FIBRILLATION, UNSPECIFIED: ICD-10-CM

## 2023-03-19 DIAGNOSIS — J45.901 UNSPECIFIED ASTHMA WITH (ACUTE) EXACERBATION: ICD-10-CM

## 2023-03-19 DIAGNOSIS — F17.210 NICOTINE DEPENDENCE, CIGARETTES, UNCOMPLICATED: ICD-10-CM

## 2023-03-19 DIAGNOSIS — R07.89 OTHER CHEST PAIN: ICD-10-CM

## 2023-03-19 DIAGNOSIS — Z21 ASYMPTOMATIC HUMAN IMMUNODEFICIENCY VIRUS [HIV] INFECTION STATUS: ICD-10-CM

## 2023-03-28 NOTE — H&P ADULT - NSHPSOCIALHISTORY_GEN_ALL_CORE
I have interviewed and examined the patient w/ the resident,   I agree w/ the impression and plan as outlined above.      Maricruz Nichols MD- Staff   Pt currently living in housing project  Denies Etoh, tobacco or illicit drug use

## 2023-06-12 ENCOUNTER — EMERGENCY (EMERGENCY)
Facility: HOSPITAL | Age: 63
LOS: 1 days | Discharge: HOME | End: 2023-06-12
Admitting: EMERGENCY MEDICINE
Payer: COMMERCIAL

## 2023-06-12 ENCOUNTER — INPATIENT (INPATIENT)
Facility: HOSPITAL | Age: 63
LOS: 2 days | Discharge: HOME | DRG: 312 | End: 2023-06-15
Attending: HOSPITALIST | Admitting: HOSPITALIST
Payer: MEDICAID

## 2023-06-12 VITALS
SYSTOLIC BLOOD PRESSURE: 106 MMHG | RESPIRATION RATE: 16 BRPM | TEMPERATURE: 98 F | HEIGHT: 67 IN | HEART RATE: 68 BPM | OXYGEN SATURATION: 100 % | WEIGHT: 104.94 LBS | DIASTOLIC BLOOD PRESSURE: 59 MMHG

## 2023-06-12 VITALS
HEIGHT: 66 IN | DIASTOLIC BLOOD PRESSURE: 83 MMHG | OXYGEN SATURATION: 96 % | TEMPERATURE: 98 F | RESPIRATION RATE: 16 BRPM | WEIGHT: 141.1 LBS | HEART RATE: 88 BPM | SYSTOLIC BLOOD PRESSURE: 128 MMHG

## 2023-06-12 VITALS
TEMPERATURE: 97 F | SYSTOLIC BLOOD PRESSURE: 94 MMHG | OXYGEN SATURATION: 96 % | HEART RATE: 58 BPM | DIASTOLIC BLOOD PRESSURE: 61 MMHG | RESPIRATION RATE: 16 BRPM

## 2023-06-12 DIAGNOSIS — B20 HUMAN IMMUNODEFICIENCY VIRUS [HIV] DISEASE: ICD-10-CM

## 2023-06-12 DIAGNOSIS — R77.8 OTHER SPECIFIED ABNORMALITIES OF PLASMA PROTEINS: ICD-10-CM

## 2023-06-12 DIAGNOSIS — R79.89 OTHER SPECIFIED ABNORMAL FINDINGS OF BLOOD CHEMISTRY: ICD-10-CM

## 2023-06-12 LAB
ALBUMIN SERPL ELPH-MCNC: 3.2 G/DL — LOW (ref 3.4–5)
ALBUMIN SERPL ELPH-MCNC: 3.2 G/DL — LOW (ref 3.4–5)
ALP SERPL-CCNC: 179 U/L — HIGH (ref 40–120)
ALP SERPL-CCNC: 28 U/L — LOW (ref 40–120)
ALT FLD-CCNC: 24 U/L — SIGNIFICANT CHANGE UP (ref 12–42)
ALT FLD-CCNC: 26 U/L — SIGNIFICANT CHANGE UP (ref 12–42)
ANION GAP SERPL CALC-SCNC: 3 MMOL/L — LOW (ref 9–16)
ANION GAP SERPL CALC-SCNC: 9 MMOL/L — SIGNIFICANT CHANGE UP (ref 9–16)
APPEARANCE UR: CLEAR — SIGNIFICANT CHANGE UP
APPEARANCE UR: CLEAR — SIGNIFICANT CHANGE UP
AST SERPL-CCNC: 26 U/L — SIGNIFICANT CHANGE UP (ref 15–37)
AST SERPL-CCNC: 65 U/L — HIGH (ref 15–37)
BASOPHILS # BLD AUTO: 0.02 K/UL — SIGNIFICANT CHANGE UP (ref 0–0.2)
BASOPHILS # BLD AUTO: 0.03 K/UL — SIGNIFICANT CHANGE UP (ref 0–0.2)
BASOPHILS NFR BLD AUTO: 0.6 % — SIGNIFICANT CHANGE UP (ref 0–2)
BASOPHILS NFR BLD AUTO: 0.7 % — SIGNIFICANT CHANGE UP (ref 0–2)
BILIRUB SERPL-MCNC: 0.4 MG/DL — SIGNIFICANT CHANGE UP (ref 0.2–1.2)
BILIRUB SERPL-MCNC: 1.1 MG/DL — SIGNIFICANT CHANGE UP (ref 0.2–1.2)
BILIRUB UR-MCNC: NEGATIVE — SIGNIFICANT CHANGE UP
BILIRUB UR-MCNC: NEGATIVE — SIGNIFICANT CHANGE UP
BUN SERPL-MCNC: 12 MG/DL — SIGNIFICANT CHANGE UP (ref 7–23)
BUN SERPL-MCNC: 14 MG/DL — SIGNIFICANT CHANGE UP (ref 7–23)
CALCIUM SERPL-MCNC: 8.5 MG/DL — SIGNIFICANT CHANGE UP (ref 8.5–10.5)
CALCIUM SERPL-MCNC: 8.8 MG/DL — SIGNIFICANT CHANGE UP (ref 8.5–10.5)
CHLORIDE SERPL-SCNC: 103 MMOL/L — SIGNIFICANT CHANGE UP (ref 96–108)
CHLORIDE SERPL-SCNC: 104 MMOL/L — SIGNIFICANT CHANGE UP (ref 96–108)
CO2 SERPL-SCNC: 25 MMOL/L — SIGNIFICANT CHANGE UP (ref 22–31)
CO2 SERPL-SCNC: 30 MMOL/L — SIGNIFICANT CHANGE UP (ref 22–31)
COLOR SPEC: YELLOW — SIGNIFICANT CHANGE UP
COLOR SPEC: YELLOW — SIGNIFICANT CHANGE UP
CREAT SERPL-MCNC: 0.66 MG/DL — SIGNIFICANT CHANGE UP (ref 0.5–1.3)
CREAT SERPL-MCNC: 0.78 MG/DL — SIGNIFICANT CHANGE UP (ref 0.5–1.3)
DIFF PNL FLD: NEGATIVE — SIGNIFICANT CHANGE UP
DIFF PNL FLD: NEGATIVE — SIGNIFICANT CHANGE UP
EGFR: 100 ML/MIN/1.73M2 — SIGNIFICANT CHANGE UP
EGFR: 122 ML/MIN/1.73M2 — SIGNIFICANT CHANGE UP
EOSINOPHIL # BLD AUTO: 0.09 K/UL — SIGNIFICANT CHANGE UP (ref 0–0.5)
EOSINOPHIL # BLD AUTO: 0.13 K/UL — SIGNIFICANT CHANGE UP (ref 0–0.5)
EOSINOPHIL NFR BLD AUTO: 2.9 % — SIGNIFICANT CHANGE UP (ref 0–6)
EOSINOPHIL NFR BLD AUTO: 3.2 % — SIGNIFICANT CHANGE UP (ref 0–6)
ETHANOL SERPL-MCNC: 80 MG/DL — HIGH
FLUAV H1 2009 PAND RNA SPEC QL NAA+PROBE: SIGNIFICANT CHANGE UP
FLUAV H1 RNA SPEC QL NAA+PROBE: SIGNIFICANT CHANGE UP
FLUAV H3 RNA SPEC QL NAA+PROBE: SIGNIFICANT CHANGE UP
FLUAV SUBTYP SPEC NAA+PROBE: SIGNIFICANT CHANGE UP
FLUBV RNA SPEC QL NAA+PROBE: SIGNIFICANT CHANGE UP
GLUCOSE SERPL-MCNC: 67 MG/DL — LOW (ref 70–99)
GLUCOSE SERPL-MCNC: 85 MG/DL — SIGNIFICANT CHANGE UP (ref 70–99)
GLUCOSE UR QL: NEGATIVE MG/DL — SIGNIFICANT CHANGE UP
GLUCOSE UR QL: NEGATIVE — SIGNIFICANT CHANGE UP
HCG SERPL-ACNC: <1 MIU/ML — SIGNIFICANT CHANGE UP
HCG UR QL: NEGATIVE — SIGNIFICANT CHANGE UP
HCT VFR BLD CALC: 31.2 % — LOW (ref 39–50)
HCT VFR BLD CALC: 36.8 % — SIGNIFICANT CHANGE UP (ref 34.5–45)
HGB BLD-MCNC: 10 G/DL — LOW (ref 13–17)
HGB BLD-MCNC: 12.1 G/DL — SIGNIFICANT CHANGE UP (ref 11.5–15.5)
IMM GRANULOCYTES NFR BLD AUTO: 0.2 % — SIGNIFICANT CHANGE UP (ref 0–0.9)
IMM GRANULOCYTES NFR BLD AUTO: 0.3 % — SIGNIFICANT CHANGE UP (ref 0–0.9)
KETONES UR-MCNC: NEGATIVE MG/DL — SIGNIFICANT CHANGE UP
KETONES UR-MCNC: NEGATIVE — SIGNIFICANT CHANGE UP
LEUKOCYTE ESTERASE UR-ACNC: NEGATIVE — SIGNIFICANT CHANGE UP
LEUKOCYTE ESTERASE UR-ACNC: NEGATIVE — SIGNIFICANT CHANGE UP
LYMPHOCYTES # BLD AUTO: 1.05 K/UL — SIGNIFICANT CHANGE UP (ref 1–3.3)
LYMPHOCYTES # BLD AUTO: 1.67 K/UL — SIGNIFICANT CHANGE UP (ref 1–3.3)
LYMPHOCYTES # BLD AUTO: 33.5 % — SIGNIFICANT CHANGE UP (ref 13–44)
LYMPHOCYTES # BLD AUTO: 41.6 % — SIGNIFICANT CHANGE UP (ref 13–44)
MAGNESIUM SERPL-MCNC: 1.4 MG/DL — LOW (ref 1.6–2.6)
MANUAL SMEAR VERIFICATION: SIGNIFICANT CHANGE UP
MCHC RBC-ENTMCNC: 28.7 PG — SIGNIFICANT CHANGE UP (ref 27–34)
MCHC RBC-ENTMCNC: 32.1 GM/DL — SIGNIFICANT CHANGE UP (ref 32–36)
MCHC RBC-ENTMCNC: 32.4 PG — SIGNIFICANT CHANGE UP (ref 27–34)
MCHC RBC-ENTMCNC: 32.9 GM/DL — SIGNIFICANT CHANGE UP (ref 32–36)
MCV RBC AUTO: 89.7 FL — SIGNIFICANT CHANGE UP (ref 80–100)
MCV RBC AUTO: 98.4 FL — SIGNIFICANT CHANGE UP (ref 80–100)
MONOCYTES # BLD AUTO: 0.43 K/UL — SIGNIFICANT CHANGE UP (ref 0–0.9)
MONOCYTES # BLD AUTO: 0.47 K/UL — SIGNIFICANT CHANGE UP (ref 0–0.9)
MONOCYTES NFR BLD AUTO: 11.7 % — SIGNIFICANT CHANGE UP (ref 2–14)
MONOCYTES NFR BLD AUTO: 13.7 % — SIGNIFICANT CHANGE UP (ref 2–14)
NEUTROPHILS # BLD AUTO: 1.53 K/UL — LOW (ref 1.8–7.4)
NEUTROPHILS # BLD AUTO: 1.7 K/UL — LOW (ref 1.8–7.4)
NEUTROPHILS NFR BLD AUTO: 42.6 % — LOW (ref 43–77)
NEUTROPHILS NFR BLD AUTO: 49 % — SIGNIFICANT CHANGE UP (ref 43–77)
NITRITE UR-MCNC: NEGATIVE — SIGNIFICANT CHANGE UP
NITRITE UR-MCNC: NEGATIVE — SIGNIFICANT CHANGE UP
NRBC # BLD: 0 /100 WBCS — SIGNIFICANT CHANGE UP (ref 0–0)
NRBC # BLD: 0 /100 WBCS — SIGNIFICANT CHANGE UP (ref 0–0)
NT-PROBNP SERPL-SCNC: 3422 PG/ML — HIGH
PCO2 BLDV: 41 MMHG — LOW (ref 42–55)
PH BLDV: 7.43 — SIGNIFICANT CHANGE UP (ref 7.32–7.43)
PH UR: 5.5 — SIGNIFICANT CHANGE UP (ref 5–8)
PH UR: 7.5 — SIGNIFICANT CHANGE UP (ref 5–8)
PLAT MORPH BLD: NORMAL — SIGNIFICANT CHANGE UP
PLATELET # BLD AUTO: 160 K/UL — SIGNIFICANT CHANGE UP (ref 150–400)
PLATELET # BLD AUTO: 187 K/UL — SIGNIFICANT CHANGE UP (ref 150–400)
PO2 BLDV: <35 MMHG — SIGNIFICANT CHANGE UP (ref 25–45)
POTASSIUM SERPL-MCNC: 3.4 MMOL/L — LOW (ref 3.5–5.3)
POTASSIUM SERPL-MCNC: 4 MMOL/L — SIGNIFICANT CHANGE UP (ref 3.5–5.3)
POTASSIUM SERPL-SCNC: 3.4 MMOL/L — LOW (ref 3.5–5.3)
POTASSIUM SERPL-SCNC: 4 MMOL/L — SIGNIFICANT CHANGE UP (ref 3.5–5.3)
PROT SERPL-MCNC: 6.4 G/DL — SIGNIFICANT CHANGE UP (ref 6.4–8.2)
PROT SERPL-MCNC: 7.8 G/DL — SIGNIFICANT CHANGE UP (ref 6.4–8.2)
PROT UR-MCNC: NEGATIVE MG/DL — SIGNIFICANT CHANGE UP
PROT UR-MCNC: NEGATIVE MG/DL — SIGNIFICANT CHANGE UP
RAPID RVP RESULT: SIGNIFICANT CHANGE UP
RBC # BLD: 3.48 M/UL — LOW (ref 4.2–5.8)
RBC # BLD: 3.74 M/UL — LOW (ref 3.8–5.2)
RBC # FLD: 12.5 % — SIGNIFICANT CHANGE UP (ref 10.3–14.5)
RBC # FLD: 15.9 % — HIGH (ref 10.3–14.5)
RBC BLD AUTO: NORMAL — SIGNIFICANT CHANGE UP
SAO2 % BLDV: 41.6 % — LOW (ref 67–88)
SARS-COV-2 RNA SPEC QL NAA+PROBE: SIGNIFICANT CHANGE UP
SODIUM SERPL-SCNC: 137 MMOL/L — SIGNIFICANT CHANGE UP (ref 132–145)
SODIUM SERPL-SCNC: 137 MMOL/L — SIGNIFICANT CHANGE UP (ref 132–145)
SP GR SPEC: 1.01 — SIGNIFICANT CHANGE UP (ref 1–1.03)
SP GR SPEC: 1.01 — SIGNIFICANT CHANGE UP (ref 1–1.03)
TROPONIN I, HIGH SENSITIVITY RESULT: 64.2 NG/L — SIGNIFICANT CHANGE UP
TROPONIN I, HIGH SENSITIVITY RESULT: 73.4 NG/L — SIGNIFICANT CHANGE UP
UROBILINOGEN FLD QL: 0.2 MG/DL — SIGNIFICANT CHANGE UP (ref 0.2–1)
UROBILINOGEN FLD QL: 1 E.U./DL — SIGNIFICANT CHANGE UP
WBC # BLD: 3.13 K/UL — LOW (ref 3.8–10.5)
WBC # BLD: 4.01 K/UL — SIGNIFICANT CHANGE UP (ref 3.8–10.5)
WBC # FLD AUTO: 3.13 K/UL — LOW (ref 3.8–10.5)
WBC # FLD AUTO: 4.01 K/UL — SIGNIFICANT CHANGE UP (ref 3.8–10.5)

## 2023-06-12 PROCEDURE — 99285 EMERGENCY DEPT VISIT HI MDM: CPT

## 2023-06-12 PROCEDURE — 72125 CT NECK SPINE W/O DYE: CPT | Mod: 26

## 2023-06-12 PROCEDURE — 73590 X-RAY EXAM OF LOWER LEG: CPT | Mod: 26,LT

## 2023-06-12 PROCEDURE — 70450 CT HEAD/BRAIN W/O DYE: CPT | Mod: 26

## 2023-06-12 PROCEDURE — 73610 X-RAY EXAM OF ANKLE: CPT | Mod: 26,LT

## 2023-06-12 PROCEDURE — 71275 CT ANGIOGRAPHY CHEST: CPT | Mod: 26

## 2023-06-12 PROCEDURE — 71045 X-RAY EXAM CHEST 1 VIEW: CPT | Mod: 26

## 2023-06-12 PROCEDURE — 99223 1ST HOSP IP/OBS HIGH 75: CPT

## 2023-06-12 RX ORDER — IPRATROPIUM/ALBUTEROL SULFATE 18-103MCG
3 AEROSOL WITH ADAPTER (GRAM) INHALATION
Refills: 0 | Status: COMPLETED | OUTPATIENT
Start: 2023-06-12 | End: 2023-06-12

## 2023-06-12 RX ORDER — POLYETHYLENE GLYCOL 3350 17 G/17G
17 POWDER, FOR SOLUTION ORAL
Qty: 1 | Refills: 0
Start: 2023-06-12 | End: 2023-06-18

## 2023-06-12 RX ORDER — POTASSIUM CHLORIDE 20 MEQ
40 PACKET (EA) ORAL ONCE
Refills: 0 | Status: COMPLETED | OUTPATIENT
Start: 2023-06-12 | End: 2023-06-12

## 2023-06-12 RX ORDER — FUROSEMIDE 40 MG
40 TABLET ORAL ONCE
Refills: 0 | Status: COMPLETED | OUTPATIENT
Start: 2023-06-12 | End: 2023-06-12

## 2023-06-12 RX ORDER — SODIUM CHLORIDE 9 MG/ML
1000 INJECTION INTRAMUSCULAR; INTRAVENOUS; SUBCUTANEOUS ONCE
Refills: 0 | Status: COMPLETED | OUTPATIENT
Start: 2023-06-12 | End: 2023-06-12

## 2023-06-12 RX ORDER — IPRATROPIUM/ALBUTEROL SULFATE 18-103MCG
3 AEROSOL WITH ADAPTER (GRAM) INHALATION EVERY 6 HOURS
Refills: 0 | Status: DISCONTINUED | OUTPATIENT
Start: 2023-06-12 | End: 2023-06-15

## 2023-06-12 RX ORDER — IBUPROFEN 200 MG
600 TABLET ORAL ONCE
Refills: 0 | Status: COMPLETED | OUTPATIENT
Start: 2023-06-12 | End: 2023-06-12

## 2023-06-12 RX ORDER — MAGNESIUM SULFATE 500 MG/ML
1 VIAL (ML) INJECTION ONCE
Refills: 0 | Status: COMPLETED | OUTPATIENT
Start: 2023-06-12 | End: 2023-06-12

## 2023-06-12 RX ORDER — KETOROLAC TROMETHAMINE 30 MG/ML
15 SYRINGE (ML) INJECTION ONCE
Refills: 0 | Status: DISCONTINUED | OUTPATIENT
Start: 2023-06-12 | End: 2023-06-12

## 2023-06-12 RX ORDER — ACETAMINOPHEN 500 MG
975 TABLET ORAL ONCE
Refills: 0 | Status: COMPLETED | OUTPATIENT
Start: 2023-06-12 | End: 2023-06-12

## 2023-06-12 RX ORDER — FUROSEMIDE 40 MG
40 TABLET ORAL DAILY
Refills: 0 | Status: DISCONTINUED | OUTPATIENT
Start: 2023-06-12 | End: 2023-06-13

## 2023-06-12 RX ORDER — DEXTROSE 50 % IN WATER 50 %
25 SYRINGE (ML) INTRAVENOUS ONCE
Refills: 0 | Status: COMPLETED | OUTPATIENT
Start: 2023-06-12 | End: 2023-06-12

## 2023-06-12 RX ORDER — IPRATROPIUM/ALBUTEROL SULFATE 18-103MCG
3 AEROSOL WITH ADAPTER (GRAM) INHALATION ONCE
Refills: 0 | Status: COMPLETED | OUTPATIENT
Start: 2023-06-12 | End: 2023-06-12

## 2023-06-12 RX ORDER — ENOXAPARIN SODIUM 100 MG/ML
40 INJECTION SUBCUTANEOUS EVERY 24 HOURS
Refills: 0 | Status: DISCONTINUED | OUTPATIENT
Start: 2023-06-12 | End: 2023-06-13

## 2023-06-12 RX ADMIN — Medication 975 MILLIGRAM(S): at 12:15

## 2023-06-12 RX ADMIN — Medication 3 MILLILITER(S): at 19:44

## 2023-06-12 RX ADMIN — Medication 3 MILLILITER(S): at 12:38

## 2023-06-12 RX ADMIN — Medication 100 GRAM(S): at 13:49

## 2023-06-12 RX ADMIN — Medication 600 MILLIGRAM(S): at 17:26

## 2023-06-12 RX ADMIN — Medication 15 MILLIGRAM(S): at 19:44

## 2023-06-12 RX ADMIN — Medication 3 MILLILITER(S): at 12:18

## 2023-06-12 RX ADMIN — Medication 40 MILLIEQUIVALENT(S): at 13:49

## 2023-06-12 RX ADMIN — Medication 40 MILLIGRAM(S): at 17:56

## 2023-06-12 RX ADMIN — Medication 3 MILLILITER(S): at 12:58

## 2023-06-12 RX ADMIN — Medication 25 MILLILITER(S): at 13:48

## 2023-06-12 NOTE — ED ADULT NURSE NOTE - HIV OFFER
[facility]  General Surgery    Subjective:      HPI: Patient is a very pleasant 58-year-old male with a past medical history remarkable for hypertension, severe COPD, chronic diastolic heart failure secondary to coronary artery disease, marijuana and opiate use and hepatomegaly. Patient is referred for evaluation and management of her right hydrocele and left testicular pain the emergency room physicians. I reviewed the CAT scan and ultrasound performed April 25, 2019 which revealed umbilical hernia, right-sided hydrocele without evidence of inguinal hernia. The patient describes pain in the left testicle. He denies any recent sexual encounters. No penile drainage or burning.   Patient Active Problem List    Diagnosis Date Noted    Panic attack 10/08/2018    Insomnia 10/08/2018    Need for influenza vaccination 10/08/2018    Need for diphtheria-tetanus-pertussis (Tdap) vaccine 10/08/2018    Hypovitaminosis D 10/08/2018    Overweight (BMI 25.0-29.9) 10/08/2018    Essential hypertension, benign 02/21/2018    Esophagitis 02/21/2018    Sepsis ([address] 12/18/2017    Emphysema/COPD ([address]     COPD, severe ([address]      Past Medical History:   Diagnosis Date    Chronic diastolic heart failure secondary to coronary artery disease (HCC)     Chronic lung disease     Chronic obstructive pulmonary disease ([address] 08/03/2017    PFTS 8/3/17    COPD, severe ([address]     Emphysema/COPD ([address]     Esophagitis 12/11/2017    Endoscopy    Essential hypertension, benign     Hepatomegaly     History of stomach ulcers     Positive urine drug screen 01/2016    opiates and THC    Splenomegaly     Upper GI bleed       Past Surgical History:   Procedure Laterality Date    COLONOSCOPY N/A 11/5/2018    COLONOSCOPY with polypectomies performed by Gian Monique MD at [facility]    HX ENDOSCOPY  12/11/2017      Family History   Problem Relation Age of Onset    Hypertension Mother     Heart Disease Mother     Diabetes Mother     Hypertension Father     Heart Disease Father     Cancer Father         lymphnodes    Diabetes Father       Social History     Tobacco Use    Smoking status: Former Smoker     Packs/day: 2.00     Years: 25.00     Pack years: 50.00     Types: Cigarettes     Start date: 1984     Last attempt to quit: 2017     Years since quittin.3    Smokeless tobacco: Never Used   Substance Use Topics    Alcohol use: No      Allergies   Allergen Reactions    Remeron [Mirtazapine] Other (comments)     Mood swings    Seroquel [Quetiapine] Other (comments)     Mood swings       Prior to Admission medications    Medication Sig Start Date End Date Taking? Authorizing Provider   ciprofloxacin HCl (CIPRO) 500 mg tablet Take 1 Tab by mouth two (2) times a day. 19  Yes Canelo Burgos MD   FLUoxetine (PROZAC) 40 mg capsule Take  by mouth daily. Yes Provider, Historical   hydrOXYzine HCl (ATARAX) 50 mg tablet Take 50 mg by mouth nightly. Indications: 100 mg qhs   Yes Provider, Historical   topiramate (TOPAMAX) 25 mg tablet Take 25 mg BID x 7 days then 50 mg  BID 19  Yes Velia Santana NP   Blood Pressure Monitor (BLOOD PRESSURE KIT) kit 1 Device by Does Not Apply route daily as needed (for blood pressure checks). 19  Yes Aida Toscano NP   PROAIR HFA 90 mcg/actuation inhaler INHALE 2 PUFFS BY MOUTH EVERY 4 HOURS AS NEEDED FOR WHEEZING OR SHORTNESS OF BREATH 19  Yes Roemro Parsons MD   lisinopril (PRINIVIL, ZESTRIL) 20 mg tablet Take 1 Tab by mouth daily for 30 days. 19 Yes Aida Toscano NP   amitriptyline HCl (AMITRIPTYLINE PO) Take 75 mg by mouth nightly. 19  Yes Provider, Historical   OXcarbazepine (TRILEPTAL) 150 mg tablet Take  by mouth two (2) times a day.    Yes Provider, Historical   albuterol (PROVENTIL VENTOLIN) 2.5 mg /3 mL (0.083 %) nebulizer solution Nebulized 1 vial for inhalation every 4 hours as needed 19  Yes Jairo Alas, Shon Yang MD   roflumilast Adamemmanuelle Delacruz) tab tablet Take 1 Tab by mouth daily. 12/17/18  Yes Mar Boeck, MD   tiZANidine (ZANAFLEX) 4 mg tablet Sig: Take 1 tab PO Q 6 as needed for muscle spasm 12/5/18  Yes Sujit Oakes S, DO   tiotropium (SPIRIVA) 18 mcg inhalation capsule Take 1 Cap by inhalation daily. 11/20/18  Yes Mar Boeck, MD   furosemide (LASIX) 20 mg tablet Take 1 Tab by mouth every other day. 9/12/18  Yes Abiel Stearns MD   LORazepam (ATIVAN) 1 mg tablet Take 1 Tab by mouth every eight (8) hours as needed for Anxiety. Max Daily Amount: 3 mg. 8/27/18  Yes Ziyad Stephenson MD   OXYGEN-AIR DELIVERY SYSTEMS (WALKABOUT 1 OXYGEN SYSTEM) 2.5 L/min by Nasal route continuous. Yes Provider, Historical   pantoprazole (PROTONIX) 40 mg tablet Take 1 Tab by mouth Daily (before breakfast). 1/12/18  Yes Madhu Puga MD   OTHER Indications: takes Methylred for insomnia    Provider, Historical   azithromycin (ZITHROMAX TRI-IAN) 500 mg tab Take 1 tab PO daily 4/25/19   Dane Nunn PA-C   methylPREDNISolone (MEDROL DOSEPACK) 4 mg tablet Take as directed on pack 4/9/19   Kylie Denson NP   predniSONE (DELTASONE) 20 mg tablet Take 20 mg by mouth daily (with breakfast). 1/8/19   Irving Mathis S, DO   fluticasone-salmeterol (ADVAIR DISKUS) 500-50 mcg/dose diskus inhaler Take 1 Puff by inhalation two (2) times a day. 11/20/18   Mar Boeck, MD   suvorexant (BELSOMRA) 10 mg tablet Take 1 Tab by mouth nightly as needed for Insomnia. Max Daily Amount: 10 mg. 10/8/18   Frances Landaverde, DO   Nebulizer & Compressor machine 1 Each by Does Not Apply route every four (4) hours as needed. 2/28/18   Deborah Beebe NP       Review of Systems:    14 systems were reviewed. The results are as above in the HPI and otherwise negative.      Objective:     Vitals:    04/30/19 1406 04/30/19 1414   BP: 152/80    Pulse: (!) 122 (!) 125   Resp: 20    Temp: 97.5 °F (36.4 °C)    TempSrc: Oral    SpO2: 98%    Weight: 83.9 kg (185 lb)    Height: 5' 8\" (1.727 m)    PF: 92 L/min        Physical Exam:  GENERAL: alert, cooperative, no distress, appears stated age,   EYE: conjunctivae/corneas clear. PERRL, EOM's intact. THROAT & NECK: normal and no erythema or exudates noted. ,    LYMPHATIC: Cervical, supraclavicular, and axillary nodes normal. ,   LUNG: clear to auscultation bilaterally,   HEART: regular rate and rhythm, S1, S2 normal, no murmur, click, rub or gallop,   ABDOMEN: soft, non-tender. Reducible umbilical hernia. No palpable inguinal hernia on the left. Bowel sounds normal. No masses,  no organomegaly,   Genitalia: Both testes are descended. There is a large right hydrocele present. The left testicle is very tender to touch especially the epididymis. No evidence of hernia. The penis is normal no discharge. EXTREMITIES:  extremities normal, atraumatic, no cyanosis or edema,   SKIN: Normal.,   NEUROLOGIC: AOx3. Cranial nerves 2-12 and sensation grossly intact. ,     Data Review: I reviewed the CT and ultrasound and independently on the monitor I agree with the findings right hydrocele    Mr. Raghavendra Vergara has a reminder for a \"due or due soon\" health maintenance. I have asked that he contact his primary care provider for follow-up on this health maintenance. Impression:     · Patient with right hydrocele and left epididymitis. Plan:     · Epididymitis-ciprofloxacin 500 mg p.o. twice daily for 10 days  · Right hydrocele-referral to [facility] for evaluation and management.   · Follow-up PRN    Signed By: Patti Owens MD     April 30, 2019 Opt out

## 2023-06-12 NOTE — ED ADULT TRIAGE NOTE - BEFAST ARM NUMBNESS
----- Message from Juliet Torrez NP sent at 1/1/2021 10:33 AM CST -----  contamniated specimen pt to finish bactrim  
Message conveyed, all additional questions and concerns addressed.   
No

## 2023-06-12 NOTE — H&P ADULT - PROBLEM SELECTOR PLAN 3
Check Viral load   Call [facility] for collateral  Mild Leucopenia  Check blood culture, UA/UCx, sputum cx

## 2023-06-12 NOTE — H&P ADULT - PROBLEM SELECTOR PLAN 2
Trop 64.2-->73.4  Denies chest pain  Continue to trend trop  NPO for further evaluation such as CCTA in the AM  Repeat echo

## 2023-06-12 NOTE — ED ADULT TRIAGE NOTE - CHIEF COMPLAINT QUOTE
Pt walked in with c/o SOB x4 days. Reports Hx asthma and spitting up blood. Also states has not taken HIV meds in 4 month and this morning twisted His Lt ankle. Ambulates with limp

## 2023-06-12 NOTE — H&P ADULT - NSHPPHYSICALEXAM_GEN_ALL_CORE
T(C): 36.6 (06-12-23 @ 22:27), Max: 37.3 (06-12-23 @ 13:15)  HR: 97 (06-12-23 @ 21:49) (84 - 97)  BP: 144/95 (06-12-23 @ 21:49) (128/83 - 160/99)  RR: 20 (06-12-23 @ 21:49) (16 - 20)  SpO2: 95% (06-12-23 @ 21:49) (95% - 99%)  Wt(kg): --     	  Physical exam: Unable to obtain, pt refused

## 2023-06-12 NOTE — ED PROVIDER NOTE - PATIENT PORTAL LINK FT
You can access the FollowMyHealth Patient Portal offered by [facility] by registering at the following website: [email or web address]. By joining [employer]’s FollowMyHealth portal, you will also be able to view your health information using other applications (apps) compatible with our system.

## 2023-06-12 NOTE — ED PROVIDER NOTE - OBJECTIVE STATEMENT
29 y/o Female with PMHx of amenorrhea presenting to the ER with acute onset of left suprapubic pain x1 day. Patient states that she initially had nausea and a high fever on Friday. Patient states that the fever has since resolved. Patient reports having mild lower back pain. Patient states that the pain is not radiating from the front to the back. Patient states that she has been constipated since Friday. Patient further endorses some nausea but denies vomiting or diarrhea. Denies chest pain and urinary symptoms.

## 2023-06-12 NOTE — H&P ADULT - NSHPLABSRESULTS_GEN_ALL_CORE
10.0   3.13  )-----------( 160      ( 12 Jun 2023 11:30 )             31.2       06-12    137  |  103  |  12  ----------------------------<  67<L>  3.4<L>   |  25  |  0.78    Ca    8.8      12 Jun 2023 11:30  Mg     1.4     06-12    TPro  7.8  /  Alb  3.2<L>  /  TBili  1.1  /  DBili  x   /  AST  65<H>  /  ALT  26  /  AlkPhos  179<H>  06-12

## 2023-06-12 NOTE — PROGRESS NOTE ADULT - PROBLEM SELECTOR PROBLEM 9
"Chief Complaint   Patient presents with     Musculoskeletal Problem     /62 (Cuff Size: Adult Regular)   Pulse 87   Temp 98  F (36.7  C) (Tympanic)   Resp 18   Ht 1.645 m (5' 4.75\")   Wt 57.2 kg (126 lb)   SpO2 98%   BMI 21.13 kg/m   Estimated body mass index is 21.13 kg/m  as calculated from the following:    Height as of this encounter: 1.645 m (5' 4.75\").    Weight as of this encounter: 57.2 kg (126 lb).  Patient presents to the clinic using No DME      Health Maintenance that is potentially due pending provider review:    Health Maintenance Due   Topic Date Due     COVID-19 Vaccine (1) Never done     HPV IMMUNIZATION (1 - Male 2-dose series) Never done     YEARLY PREVENTIVE VISIT  08/30/2022                "
Nutrition, metabolism, and development symptoms

## 2023-06-12 NOTE — H&P ADULT - PROBLEM SELECTOR PLAN 1
BNP 3422  CT chest with mild pulmonary edema  Continue Lasix 40mg IV daily  [place] I/Os  Check Echocardiogram  Will call [facility] for med rec

## 2023-06-12 NOTE — ED PROVIDER NOTE - OBJECTIVE STATEMENT
62 year old M PMH HIV (hasn't taken medications in 2 months), asthma, cancer (? cannot say which) presenting with multiple complaints. Patient notes increasing SOB and productive cough x1 day. Said he had a cigarette yesterday which he thinks set him off. Also notes blood in his sputum over this time frame. He said he had a syncopal episode just PTA while he was waiting for a donut and coffee at the cart outside of the hospital. Hit the back of his head, not on AC, no LOC. While he was walking to the ED, he had a misstep and heard a "pop" in his L ankle, and has been limping. Notes subjective fevers. Denies CP, LE edema, palpitations, N/V/D, abdominal pain, urinary complaints.

## 2023-06-12 NOTE — ED PROVIDER NOTE - PHYSICAL EXAMINATION
GENERAL: Awake, alert, NAD  HEENT: 3cm contusion to posterior occiput, no overlying abrasions or lacerations, moist mucous membranes, PERRL, EOMI, poor dentition  LUNGS: No wheezes, crackles to L base, good aeration apices to bases, active productive cough  CARDIAC: RRR, no m/r/g  ABDOMEN: Soft, normal BS, non tender, non distended, no rebound, no guarding  BACK: No midline spinal tenderness, no CVA tenderness  EXT: No edema, no calf tenderness, 2+ DP pulses bilaterally. LLE - medial and lateral malleolar tenderness and navicular tenderness, prominent edema bilaterally, distally neurovascularly intact   NEURO: A&Ox3. Moving all extremities. 5/5 strength UE and LE bilaterally, sensation intact. CN III-XII grossly intact.   SKIN: Warm and dry. No rash. GENERAL: Awake, alert, NAD  HEENT: 3cm contusion to posterior occiput, no overlying abrasions or lacerations, moist mucous membranes, PERRL, EOMI, poor dentition. Prominent JVD sitting and lying down.  LUNGS: No wheezes, crackles to L base, good aeration apices to bases, active productive cough  CARDIAC: RRR, no m/r/g  ABDOMEN: Soft, normal BS, non tender, non distended, no rebound, no guarding  BACK: No midline spinal tenderness, no CVA tenderness  EXT: 1+ edema to shins bilaterally, no calf tenderness, 2+ DP pulses bilaterally. LLE - medial and lateral malleolar tenderness and navicular tenderness, prominent edema bilaterally, distally neurovascularly intact   NEURO: A&Ox3. Moving all extremities. 5/5 strength UE and LE bilaterally, sensation intact. CN III-XII grossly intact.   SKIN: Warm and dry. No rash.

## 2023-06-12 NOTE — ED PROVIDER NOTE - ATTENDING CONTRIBUTION TO CARE
61yo M hx of HIV noncompliant w meds, hx of asthma, cancer (unclear primary, unclear status), presents with increasing exertional shortness of breath and productive cough x1 day with mild hemoptysis.  On exam afebrile, satting well on RA while resting sitting upright, with JVD.  Pt desats with minimal exertion attempting to get out of stretcher. Trop negative.  BNP elevated.  CXR w cardiomegaly.  CTA chest neg for PE, but shows vascular congestion.  Presentation consistent with new onset CHF.  No hx of CHF.  Pt started on lasix.  Admit to medicine at .

## 2023-06-12 NOTE — ED PROVIDER NOTE - CLINICAL SUMMARY MEDICAL DECISION MAKING FREE TEXT BOX
62 year old M PMH HIV (hasn't taken medications in 2 months), asthma, cancer (? cannot say which) presenting with multiple complaints. VSS, afebrile, satting normally on RA. Prominent bronchospastic cough with mucus production, crackles to L base. Posterior occiput with 3cm contusion. L ankle with posterior lateral and medial malleolar tenderness, navicular tenderness, prominent edema. Syncope orthostatic vs vasovagal vs cardiogenic; less likely seizure. Given patient has not been on HAART therapy for 2 months, must consider AIDS defining illnesses such as PCP pneumonia. Asthma exacerbation vs viral URI also possible. Meets [place] ankle imaging criteria, will get XR. CT head and c-spine given syncope and head trauma, CXR, trial duonebs, basic labs including VBG, troponin, vbg. Reassess.

## 2023-06-12 NOTE — ED PROVIDER NOTE - NS ED ROS FT
CONST: no fevers, no chills  EYES: no pain, no vision changes  ENT: no sore throat, no ear pain, no change in hearing  CV: no chest pain, no leg swelling  RESP: +shortness of breath, +cough  ABD: no abdominal pain, no nausea, no vomiting, no diarrhea  : no dysuria, no flank pain, no hematuria  MSK: no back pain, +L ankle pain  NEURO: no headache or additional neurologic complaints  HEME: no easy bleeding, +contusion to back of head  SKIN:  no rash

## 2023-06-12 NOTE — H&P ADULT - NSHPREVIEWOFSYSTEMS_GEN_ALL_CORE
GENERAL, CONSTITUTIONAL : denies recent weight loss, fever, chills  EYES, VISION: denies changes in vision   EARS, NOSE, THROAT: denies hearing loss  HEART, CARDIOVASCULAR: +SOB. Denies chest pain, arrhythmia, palpitations, LE edema, claudication  RESPIRATORY: +SOB, productive cough. Denies wheezing, PND, orthopnea  GASTROINTESTINAL: Denies abdominal pain, heartburn, bloody stool, dark tarry stool  GENITOURINARY: Denies frequent urination, urgency  MUSCULOSKELETAL: L ankle pain. Denies swelling, restricted motion, musculoskeletal pain.   SKIN & INTEGUMENTARY Denies rashes, sores, blisters, blisters, growths.  NEUROLOGICAL: Denies numbness or tingling sensations, sensation loss, burning.   PSYCHIATRIC: Denies nervousness, anxiety, depression  ENDOCRINE Denies heat or cold intolerance, excessive thirst  HEMATOLOGIC/LYMPHATIC: Denies abnormal bleeding, bleeding of any kind

## 2023-06-12 NOTE — ED PROVIDER NOTE - CLINICAL SUMMARY MEDICAL DECISION MAKING FREE TEXT BOX
27 y/o Female with left suprapubic pain for 1 day. Plan to obtain US transvaginal and US pelvis to evaluate for ovarian pathology. Will send medical labs as well as UA. IV fluids and pain meds refused by patient. Dispo pending medical workup.

## 2023-06-12 NOTE — ED ADULT NURSE NOTE - NSFALLRISKINTERV_ED_ALL_ED
Assistance OOB with selected safe patient handling equipment if applicable/Communicate fall risk and risk factors to all staff, patient, and family/Provide visual cue: yellow wristband, yellow gown, etc/Reinforce activity limits and safety measures with patient and family/Bed in lowest position, wheels locked, appropriate side rails in place/Call bell, personal items and telephone in reach/Instruct patient to call for assistance before getting out of bed/chair/stretcher/Non-slip footwear applied when patient is off stretcher/[place] to call system/Physically safe environment - no spills, clutter or unnecessary equipment/Purposeful Proactive Rounding/Room/bathroom lighting operational, light cord in reach

## 2023-06-12 NOTE — PATIENT PROFILE ADULT - FUNCTIONAL ASSESSMENT - BASIC MOBILITY 6.
3-calculated by average/Not able to assess (calculate score using [facility] averaging method)

## 2023-06-12 NOTE — H&P ADULT - HISTORY OF PRESENT ILLNESS
***History obtained from [facility] chart as pt refused to engage when he came to ***    This is a 62 y.o male with asthma, HIV, ?AIDS (has taken his HIV meds in 2 months, follows at [facility]), presented to [facility] s/p multiple complaints  Per the chart, pt was waiting for donut and coffee that he ordered from a truck outside the hospital when he had a ? syncopal episode with head strike. As he is walking to the ED, he had a misstep and heard a pop in his ankle. He admitted to increased SOB and productive cough. No fever, chest pain, LE edema, palpitations  In the ER, initial vitals with /83, HR 83, R 16, T 98, O2 96%. Labs significant for WBC 3.13, H/H 10/31.2, K 3.4, Mag 1.4, BNP 3422, Alcohol level 80, trop 64.2-->73.4. EKG  CTH without acute infarct, CH chest with mild pulmonary edema but negative for PE or pneumonia. Xray tibia/fibula/ankle:  no fracture or dislocation,  no discrete lytic or blastic lesions  He received Lasix 40mg IV x 1, duoneb, potassium, magnesium repletion, Ketorolac and tylenol and transferred to [facility] for further management and telemetry    Echo 11/30/2022: EF 55%, severe MR, moderate to severe TR, Pulmonary hypertension is present. Pulmonary artery systolic pressure estimated to be 48 mmHg.

## 2023-06-12 NOTE — H&P ADULT - ASSESSMENT
63 yo male HTIV, asthma, [facility] s/[facility] sync[facility], CHF for further management

## 2023-06-13 ENCOUNTER — TRANSCRIPTION ENCOUNTER (OUTPATIENT)
Age: 63
End: 2023-06-13

## 2023-06-13 DIAGNOSIS — R10.32 LEFT LOWER QUADRANT PAIN: ICD-10-CM

## 2023-06-13 DIAGNOSIS — R55 SYNCOPE AND COLLAPSE: ICD-10-CM

## 2023-06-13 DIAGNOSIS — K59.00 CONSTIPATION, UNSPECIFIED: ICD-10-CM

## 2023-06-13 DIAGNOSIS — I48.91 UNSPECIFIED ATRIAL FIBRILLATION: ICD-10-CM

## 2023-06-13 DIAGNOSIS — I50.32 CHRONIC DIASTOLIC (CONGESTIVE) HEART FAILURE: ICD-10-CM

## 2023-06-13 PROBLEM — Z00.00 ENCOUNTER FOR PREVENTIVE HEALTH EXAMINATION: Status: ACTIVE | Noted: 2023-06-13

## 2023-06-13 LAB
ANION GAP SERPL CALC-SCNC: 11 MMOL/L — SIGNIFICANT CHANGE UP (ref 5–17)
BUN SERPL-MCNC: 15 MG/DL — SIGNIFICANT CHANGE UP (ref 7–23)
CALCIUM SERPL-MCNC: 8.4 MG/DL — SIGNIFICANT CHANGE UP (ref 8.4–10.5)
CHLORIDE SERPL-SCNC: 106 MMOL/L — SIGNIFICANT CHANGE UP (ref 96–108)
CO2 SERPL-SCNC: 22 MMOL/L — SIGNIFICANT CHANGE UP (ref 22–31)
CREAT SERPL-MCNC: 0.83 MG/DL — SIGNIFICANT CHANGE UP (ref 0.5–1.3)
EGFR: 98 ML/MIN/1.73M2 — SIGNIFICANT CHANGE UP
GLUCOSE SERPL-MCNC: 87 MG/DL — SIGNIFICANT CHANGE UP (ref 70–99)
HCT VFR BLD CALC: 33.8 % — LOW (ref 39–50)
HCV AB S/CO SERPL IA: 43.79 S/CO — HIGH
HCV AB SERPL-IMP: REACTIVE
HGB BLD-MCNC: 10.9 G/DL — LOW (ref 13–17)
HIV-1 VIRAL LOAD RESULT: ABNORMAL
HIV1 RNA # SERPL NAA+PROBE: SIGNIFICANT CHANGE UP
HIV1 RNA SER-IMP: SIGNIFICANT CHANGE UP
HIV1 RNA SERPL NAA+PROBE-ACNC: ABNORMAL
HIV1 RNA SERPL NAA+PROBE-LOG#: 5.15 — SIGNIFICANT CHANGE UP
MAGNESIUM SERPL-MCNC: 1.6 MG/DL — SIGNIFICANT CHANGE UP (ref 1.6–2.6)
MCHC RBC-ENTMCNC: 29.1 PG — SIGNIFICANT CHANGE UP (ref 27–34)
MCHC RBC-ENTMCNC: 32.2 GM/DL — SIGNIFICANT CHANGE UP (ref 32–36)
MCV RBC AUTO: 90.1 FL — SIGNIFICANT CHANGE UP (ref 80–100)
NRBC # BLD: 0 /100 WBCS — SIGNIFICANT CHANGE UP (ref 0–0)
PHOSPHATE SERPL-MCNC: 3.9 MG/DL — SIGNIFICANT CHANGE UP (ref 2.5–4.5)
PLATELET # BLD AUTO: 131 K/UL — LOW (ref 150–400)
POTASSIUM SERPL-MCNC: 3.7 MMOL/L — SIGNIFICANT CHANGE UP (ref 3.5–5.3)
POTASSIUM SERPL-SCNC: 3.7 MMOL/L — SIGNIFICANT CHANGE UP (ref 3.5–5.3)
RBC # BLD: 3.75 M/UL — LOW (ref 4.2–5.8)
RBC # FLD: 16.5 % — HIGH (ref 10.3–14.5)
SODIUM SERPL-SCNC: 139 MMOL/L — SIGNIFICANT CHANGE UP (ref 135–145)
T3 SERPL-MCNC: 110 NG/DL — SIGNIFICANT CHANGE UP (ref 80–200)
T4 AB SER-ACNC: 8.96 UG/DL — SIGNIFICANT CHANGE UP (ref 4.5–11.7)
TSH SERPL-MCNC: 1.29 UIU/ML — SIGNIFICANT CHANGE UP (ref 0.27–4.2)
WBC # BLD: 2.7 K/UL — LOW (ref 3.8–10.5)
WBC # FLD AUTO: 2.7 K/UL — LOW (ref 3.8–10.5)

## 2023-06-13 PROCEDURE — 99233 SBSQ HOSP IP/OBS HIGH 50: CPT

## 2023-06-13 RX ORDER — MAGNESIUM SULFATE 500 MG/ML
2 VIAL (ML) INJECTION ONCE
Refills: 0 | Status: COMPLETED | OUTPATIENT
Start: 2023-06-13 | End: 2023-06-13

## 2023-06-13 RX ORDER — METOPROLOL TARTRATE 50 MG
25 TABLET ORAL DAILY
Refills: 0 | Status: DISCONTINUED | OUTPATIENT
Start: 2023-06-13 | End: 2023-06-15

## 2023-06-13 RX ORDER — ACETAMINOPHEN 500 MG
650 TABLET ORAL ONCE
Refills: 0 | Status: COMPLETED | OUTPATIENT
Start: 2023-06-13 | End: 2023-06-13

## 2023-06-13 RX ORDER — VALSARTAN 80 MG/1
1 TABLET ORAL
Qty: 30 | Refills: 3
Start: 2023-06-13 | End: 2023-10-10

## 2023-06-13 RX ORDER — POTASSIUM CHLORIDE 20 MEQ
40 PACKET (EA) ORAL ONCE
Refills: 0 | Status: COMPLETED | OUTPATIENT
Start: 2023-06-13 | End: 2023-06-13

## 2023-06-13 RX ORDER — IBUPROFEN 200 MG
600 TABLET ORAL ONCE
Refills: 0 | Status: COMPLETED | OUTPATIENT
Start: 2023-06-13 | End: 2023-06-14

## 2023-06-13 RX ORDER — VALSARTAN 80 MG/1
40 TABLET ORAL DAILY
Refills: 0 | Status: DISCONTINUED | OUTPATIENT
Start: 2023-06-13 | End: 2023-06-15

## 2023-06-13 RX ORDER — FUROSEMIDE 40 MG
1 TABLET ORAL
Qty: 30 | Refills: 3
Start: 2023-06-13 | End: 2023-10-10

## 2023-06-13 RX ORDER — FUROSEMIDE 40 MG
20 TABLET ORAL DAILY
Refills: 0 | Status: DISCONTINUED | OUTPATIENT
Start: 2023-06-14 | End: 2023-06-15

## 2023-06-13 RX ORDER — LANOLIN ALCOHOL/MO/W.PET/CERES
5 CREAM (GRAM) TOPICAL AT BEDTIME
Refills: 0 | Status: DISCONTINUED | OUTPATIENT
Start: 2023-06-13 | End: 2023-06-15

## 2023-06-13 RX ORDER — APIXABAN 2.5 MG/1
5 TABLET, FILM COATED ORAL EVERY 12 HOURS
Refills: 0 | Status: DISCONTINUED | OUTPATIENT
Start: 2023-06-13 | End: 2023-06-15

## 2023-06-13 RX ORDER — DAPAGLIFLOZIN 10 MG/1
10 TABLET, FILM COATED ORAL EVERY 24 HOURS
Refills: 0 | Status: DISCONTINUED | OUTPATIENT
Start: 2023-06-13 | End: 2023-06-15

## 2023-06-13 RX ORDER — APIXABAN 2.5 MG/1
1 TABLET, FILM COATED ORAL
Qty: 60 | Refills: 3
Start: 2023-06-13 | End: 2023-10-10

## 2023-06-13 RX ORDER — DAPAGLIFLOZIN 10 MG/1
1 TABLET, FILM COATED ORAL
Qty: 30 | Refills: 3
Start: 2023-06-13 | End: 2023-10-10

## 2023-06-13 RX ORDER — BICTEGRAVIR SODIUM, EMTRICITABINE, AND TENOFOVIR ALAFENAMIDE FUMARATE 30; 120; 15 MG/1; MG/1; MG/1
1 TABLET ORAL DAILY
Refills: 0 | Status: DISCONTINUED | OUTPATIENT
Start: 2023-06-13 | End: 2023-06-15

## 2023-06-13 RX ORDER — METOPROLOL TARTRATE 50 MG
1 TABLET ORAL
Qty: 30 | Refills: 3
Start: 2023-06-13 | End: 2023-10-10

## 2023-06-13 RX ADMIN — BICTEGRAVIR SODIUM, EMTRICITABINE, AND TENOFOVIR ALAFENAMIDE FUMARATE 1 TABLET(S): 30; 120; 15 TABLET ORAL at 17:45

## 2023-06-13 RX ADMIN — Medication 40 MILLIEQUIVALENT(S): at 10:18

## 2023-06-13 RX ADMIN — DAPAGLIFLOZIN 10 MILLIGRAM(S): 10 TABLET, FILM COATED ORAL at 17:45

## 2023-06-13 RX ADMIN — Medication 3 MILLILITER(S): at 10:36

## 2023-06-13 RX ADMIN — VALSARTAN 40 MILLIGRAM(S): 80 TABLET ORAL at 10:19

## 2023-06-13 RX ADMIN — APIXABAN 5 MILLIGRAM(S): 2.5 TABLET, FILM COATED ORAL at 10:19

## 2023-06-13 RX ADMIN — Medication 25 MILLIGRAM(S): at 10:18

## 2023-06-13 RX ADMIN — Medication 40 MILLIGRAM(S): at 10:40

## 2023-06-13 NOTE — PROGRESS NOTE ADULT - ASSESSMENT
62M, undomiciled, non compliant and poor historian, h/o cocaine use and PMHx of AF (non compliant w/ Eliquis), HFpEF, severe MR and HIV/AIDS (non compliant w/ HAART), presented to [facility] after ? syncopal episode w/ head strike and c/o ankle pain. 62M, undomiciled, non compliant and poor historian, h/o cocaine use and PMHx of AF (non compliant w/ Eliquis), HFpEF, severe MR and HIV/AIDS (non compliant w/ HAART), presented to [facility] after ? syncopal episode w/ head strike, found to be in rate controlled AF and transferred to cardiac tele for further management. Pt self converted to SR, and now pending shelter placement.

## 2023-06-13 NOTE — DISCHARGE NOTE NURSING/CASE MANAGEMENT/SOCIAL WORK - PATIENT PORTAL LINK FT
You can access the FollowMyHealth Patient Portal offered by [facility] by registering at the following website: [email or web address]. By joining [employer]’s FollowMyHealth portal, you will also be able to view your health information using other applications (apps) compatible with our system.

## 2023-06-13 NOTE — DISCHARGE NOTE PROVIDER - NSDCFUSCHEDAPPT_GEN_ALL_CORE_FT
Compa Mortensen  [facility] Physician [facility]  [facility] [address]  Scheduled Appointment: 06/27/2023

## 2023-06-13 NOTE — DISCHARGE NOTE PROVIDER - CARE PROVIDER_API CALL
Compa Mortensen  Cardiovascular Disease  [address]rd [place] [address]  Phone: [phone]  Fax: [phone]  Scheduled Appointment: 06/27/2023 02:30 PM

## 2023-06-13 NOTE — PROGRESS NOTE ADULT - PROBLEM SELECTOR PLAN 1
presented after ? syncope episode i/s/o ETOH use  -Alcohol level 80  -EKG non ischemic and no events on telemetry  -CTH/CT cervical revealed no acute traumatic injury to the head and cervical spine  -CTA Chest revealed no PE, mild pulm edema  -Xray Ankle/Tibia/Fibula revealed distal calf and ankle soft tissue swelling, no fractures/dislocations/osseous deformities  -TTE 11/2022: LVEF 55%, severe MR, mod-severe TR, PASP 48. Pt refusing repeat TTE  -UA negative presented after ? syncope episode i/s/o ETOH use (alc level 80)  -EKG non ischemic and no events on telemetry  -CTH/CT cervical revealed no acute traumatic injury to the head and cervical spine  -CTA Chest revealed no PE, mild pulm edema  -Xray Ankle/Tibia/Fibula revealed distal calf and ankle soft tissue swelling, no fractures/dislocations/osseous deformities  -TTE 11/2022: LVEF 55%, severe MR, mod-severe TR, PASP 48. Pt refusing repeat TTE  -UA negative

## 2023-06-13 NOTE — DISCHARGE NOTE PROVIDER - NSDCCPCAREPLAN_GEN_ALL_CORE_FT
PRINCIPAL DISCHARGE DIAGNOSIS  Diagnosis: Syncope  Assessment and Plan of Treatment:       SECONDARY DISCHARGE DIAGNOSES  Diagnosis: CHF exacerbation  Assessment and Plan of Treatment:     Diagnosis: Atrial fibrillation  Assessment and Plan of Treatment:      PRINCIPAL DISCHARGE DIAGNOSIS  Diagnosis: Syncope  Assessment and Plan of Treatment: You were admitted to the hospital because after a questionable syncopal episode. The CT Scan of your brain was without acute bleeding. Your heart enzymes (markers of heart injury in the blood) were negative. The cause is likely due to alcohol intoxication.      SECONDARY DISCHARGE DIAGNOSES  Diagnosis: CHF exacerbation  Assessment and Plan of Treatment: You have a weak heart, also known as Congestive Heart Failure (CHF). Heart failure is a condition in which the heart does not pump or fill with blood well. As a result, the heart lags behind in its job of moving blood throughout the body. This can lead to symptoms such as swelling, trouble breathing, and feeling tired.   -Please continue Lasix (furosemide) 20mg daily and Farxiga 10mg daily to prevent fluid build up in the body.  -Please continue Valsartan 40mg daily and Metoprolol 25mg daily to help strenghten your heart muscle.  -Avoid drinking more than 1.5L of fluid daily and maintain a low salt diet (max 2grams daily).  -Please weigh yourself daily, for any significant increases in daily weight of 2lbs/day or 5lbs/week with associated swelling in the legs or abdomen and/or shortness of breath, please call your doctor or go to the emergency room.  -Follow up with Dr. Mortensen 6/27/23 at 2:30pm    Diagnosis: Atrial fibrillation  Assessment and Plan of Treatment: You have an abnormal heart rhythm (arrhythmia) called atrial fibrillation. With this condition, the hearts 2 upper chambers (the atria) quiver rather than squeeze the blood out in a normal pattern. This leads to an irregular and sometimes rapid heartbeat. Atrial fibrillation is serious condition as it affects the heart’s ability to fill with blood as it should and blood clots may form, which increases the risk for stroke.  -Please CONTINUE  ELIQUIS 5MG TWICE A DAY to prevent a stroke.  -Please CONTINUE Metoprolol XL 25mg daily to keep your heart rate regular.

## 2023-06-13 NOTE — PROGRESS NOTE ADULT - PROBLEM SELECTOR PLAN 2
presented in rate controlled AF, now self-converted to SR  - presented in rate controlled AF, now self-converted to SR  -EKG: AF @ 90bpm, no ST-T changes  -Continue Eliquis 5mg BID and Toprol 25mg QD

## 2023-06-13 NOTE — DISCHARGE NOTE PROVIDER - NSDCMRMEDTOKEN_GEN_ALL_CORE_FT
Biktarvy 50 mg-200 mg-25 mg oral tablet: 1 tab(s) orally once a day  Eliquis 5 mg oral tablet: 1 tab(s) orally 2 times a day  Farxiga 10 mg oral tablet: 1 tab(s) orally once a day  Lasix 20 mg oral tablet: 1 tab(s) orally once a day  Toprol-XL 25 mg oral tablet, extended release: 1 tab(s) orally once a day  valsartan 40 mg oral tablet: 1 tab(s) orally once a day

## 2023-06-14 LAB
HCV RNA FLD QL NAA+PROBE: SIGNIFICANT CHANGE UP
HCV RNA SPEC QL PROBE+SIG AMP: DETECTED
SARS-COV-2 RNA SPEC QL NAA+PROBE: NEGATIVE — SIGNIFICANT CHANGE UP

## 2023-06-14 PROCEDURE — 99233 SBSQ HOSP IP/OBS HIGH 50: CPT

## 2023-06-14 RX ADMIN — Medication 5 MILLIGRAM(S): at 21:52

## 2023-06-14 RX ADMIN — Medication 600 MILLIGRAM(S): at 12:45

## 2023-06-14 RX ADMIN — Medication 3 MILLILITER(S): at 21:52

## 2023-06-14 RX ADMIN — BICTEGRAVIR SODIUM, EMTRICITABINE, AND TENOFOVIR ALAFENAMIDE FUMARATE 1 TABLET(S): 30; 120; 15 TABLET ORAL at 11:45

## 2023-06-14 RX ADMIN — APIXABAN 5 MILLIGRAM(S): 2.5 TABLET, FILM COATED ORAL at 17:01

## 2023-06-14 RX ADMIN — Medication 15 MILLIGRAM(S): at 20:14

## 2023-06-14 RX ADMIN — DAPAGLIFLOZIN 10 MILLIGRAM(S): 10 TABLET, FILM COATED ORAL at 16:59

## 2023-06-14 RX ADMIN — Medication 600 MILLIGRAM(S): at 11:45

## 2023-06-14 NOTE — PROGRESS NOTE ADULT - PROBLEM SELECTOR PLAN 1
presented after ? syncope episode i/s/o ETOH use (alc level 80)  -EKG non ischemic, pt refusing telemetry  -CTH/CT cervical revealed no acute traumatic injury to the head and cervical spine  -CTA Chest revealed no PE, mild pulm edema  -Xray Ankle/Tibia/Fibula revealed distal calf and ankle soft tissue swelling, no fractures/dislocations/osseous deformities  -TTE 11/2022: LVEF 55%, severe MR, mod-severe TR, PASP 48. Pt refusing repeat TTE  -UA negative

## 2023-06-14 NOTE — PROGRESS NOTE ADULT - ASSESSMENT
62M, undomiciled, non compliant and poor historian, h/o cocaine use and PMHx of AF (non compliant w/ Eliquis), HFpEF, severe MR and HIV/AIDS (non compliant w/ HAART), presented to [facility] after ? syncopal episode w/ head strike, found to be in rate controlled AF and transferred to cardiac tele for further management. Pt self converted to SR, and now pending shelter placement.

## 2023-06-14 NOTE — PROGRESS NOTE ADULT - PROBLEM SELECTOR PLAN 3
euvolemic and HD stable, SpO2 95% RA  -BNP 3K and CTA Chest w/ mild pulm edema  -TTE 11/2022: LVEF 55%, severe MR, mod-severe TR, PASP 48. Pt refusing repeat TTE  -s/p Lasix 40mg IVP x 2, c/w Lasix PO 20mg QD  -Continue Valsartan 40mg QD, Toprol 25mg QD and Farxiga 10mg QD  -Core measure, daily weight and strict I&Os
euvolemic and HD stable, SpO2 95% RA  -BNP 3K and CTA Chest w/ mild pulm edema  -TTE 11/2022: LVEF 55%, severe MR, mod-severe TR, PASP 48. Pt refusing repeat TTE  -s/p Lasix 40mg IVP x 2 w/ adequate UOP, transition to PO Lasix 20mg QD  -Continue Valsartan 40mg QD, Toprol 25mg QD and Farxiga 10mg QD  -Core measure, daily weight and strict I&Os

## 2023-06-14 NOTE — PROGRESS NOTE ADULT - SUBJECTIVE AND OBJECTIVE BOX
Cardiology PA Adult Progress Note    SUBJECTIVE ASSESSMENT: Pt seen and examined at bedside laying comfortably in bed w/o any major complaints or events overnight. He endorses persistent ankle pain and denies any CP, palpitations, dizziness, lightheadedness, RUEDA, orthopnea, PND, N/V or abd pain.  	  MEDICATIONS:  furosemide   Injectable 40 milliGRAM(s) IV Push daily  metoprolol succinate ER 25 milliGRAM(s) Oral daily  valsartan 40 milliGRAM(s) Oral daily  bictegravir 50 mG/emtricitabine 200 mG/tenofovir alafenamide 25 mG (BIKTARVY) 1 Tablet(s) Oral daily  albuterol/ipratropium for Nebulization 3 milliLiter(s) Nebulizer every 6 hours  dapagliflozin 10 milliGRAM(s) Oral every 24 hours  apixaban 5 milliGRAM(s) Oral every 12 hours  	  VITAL SIGNS:  T(C): 37.2 (06-13-23 @ 12:26), Max: 37.2 (06-13-23 @ 12:26)  HR: 84 (06-13-23 @ 10:13) (82 - 97)  BP: 134/104 (06-13-23 @ 10:13) (134/81 - 160/99)  RR: 18 (06-13-23 @ 10:13) (16 - 20)  SpO2: 92% (06-13-23 @ 10:13) (92% - 99%)    I&O's Summary  12 Jun 2023 07:01 - 13 Jun 2023 07:00  --------------------------------------------------------  IN: 400 mL / OUT: 650 mL / NET: -250 mL    13 Jun 2023 07:01  -  13 Jun 2023 14:58  --------------------------------------------------------  IN: 180 mL / OUT: 1000 mL / NET: -820 mL                                    PHYSICAL EXAM:  Appearance: Normal	  HEENT: Normal oral mucosa, PERRL, EOMI	  Neck: Supple, - JVD; no Carotid Bruit   Cardiovascular: Normal S1 S2, No murmurs  Respiratory: Lungs clear to auscultation, No Rales, Rhonchi, Wheezing	  Gastrointestinal:  Soft, Non-tender, + BS	  Skin: No rashes, No ecchymoses, No cyanosis  Extremities: Normal range of motion, No clubbing, cyanosis or edema  Vascular: Peripheral pulses palpable 2+ bilaterally  Neurologic: Non-focal  Psychiatry: A & O x 3, Mood & affect appropriate    LABS:	 	             10.9   2.70  )-----------( 131      ( 13 Jun 2023 08:22 )             33.8     06-13    139  |  106  |  15  ----------------------------<  87  3.7   |  22  |  0.83    Ca    8.4      13 Jun 2023 08:22  Phos  3.9     06-13  Mg     1.6     06-13    TPro  7.8  /  Alb  3.2<L>  /  TBili  1.1  /  DBili  x   /  AST  65<H>  /  ALT  26  /  AlkPhos  179<H>  06-12      TSH: Thyroid Stimulating Hormone, Serum: 1.290 uIU/mL (06-13 @ 08:22)    
Cardiology PA Adult Progress Note    SUBJECTIVE ASSESSMENT: Pt seen and examined at bedside this AM laying comfortably in bed w/o any complaints or events overnight. He states that he feels well and denies any CP, palpitations, dizziness, lightheadedness, fatigue, RUEDA, orthopnea, PND, N/V or abd pain.  	  MEDICATIONS:  furosemide    Tablet 20 milliGRAM(s) Oral daily  metoprolol succinate ER 25 milliGRAM(s) Oral daily  valsartan 40 milliGRAM(s) Oral daily  bictegravir 50 mG/emtricitabine 200 mG/tenofovir alafenamide 25 mG (BIKTARVY) 1 Tablet(s) Oral daily  albuterol/ipratropium for Nebulization 3 milliLiter(s) Nebulizer every 6 hours  guaiFENesin  milliGRAM(s) Oral every 12 hours PRN  melatonin 5 milliGRAM(s) Oral at bedtime  dapagliflozin 10 milliGRAM(s) Oral every 24 hours  apixaban 5 milliGRAM(s) Oral every 12 hours  	  VITAL SIGNS:  T(C): 36.9 (06-14-23 @ 13:24), Max: 36.9 (06-13-23 @ 16:28)  HR: 85 (06-14-23 @ 11:49) (55 - 85)  BP: 132/79 (06-14-23 @ 11:49) (129/96 - 137/90)  RR: 16 (06-14-23 @ 11:49) (16 - 18)  SpO2: 96% (06-14-23 @ 11:49) (93% - 96%)    I&O's Summary  13 Jun 2023 07:01  -  14 Jun 2023 07:00  --------------------------------------------------------  IN: 320 mL / OUT: 3825 mL / NET: -3505 mL    14 Jun 2023 07:01  -  14 Jun 2023 15:01  --------------------------------------------------------  IN: 880 mL / OUT: 725 mL / NET: 155 mL                                     PHYSICAL EXAM:  Appearance: Normal	  HEENT: Normal oral mucosa, PERRL, EOMI	  Neck: Supple, - JVD; no Carotid Bruit   Cardiovascular: Normal S1 S2, No murmurs  Respiratory: Lungs clear to auscultation, no Rales, Rhonchi, Wheezing	  Gastrointestinal:  Soft, Non-tender, + BS	  Skin: No rashes, No ecchymoses, No cyanosis  Extremities: Normal range of motion, No clubbing, cyanosis or edema  Vascular: Peripheral pulses palpable 2+ bilaterally  Neurologic: Non-focal  Psychiatry: A & O x 3, Mood & affect appropriate    LABS:	 	                      10.9   2.70  )-----------( 131      ( 13 Jun 2023 08:22 )             33.8     06-13    139  |  106  |  15  ----------------------------<  87  3.7   |  22  |  0.83    Ca    8.4      13 Jun 2023 08:22  Phos  3.9     06-13  Mg     1.6     06-13

## 2023-06-14 NOTE — PROGRESS NOTE ADULT - PROBLEM SELECTOR PLAN 4
h/o non compliance of HAART  -f/u CD4/VL  -Continue Biktarvy    F: None  E: Replete if K<4 or Mag<2  N: DASH Diet  VTEppx: Eliquis  Dispo: cardiac tele, pending shelter placement
h/o non compliance of HAART  -f/u CD4/VL  -Continue Biktarvy    F: None  E: Replete if K<4 or Mag<2  N: DASH Diet  VTEppx: Eliquis  Dispo: cardiac tele, pending shelter placement

## 2023-06-14 NOTE — PROGRESS NOTE ADULT - PROBLEM SELECTOR PLAN 2
presented in rate controlled AF, now self-converted to SR  -EKG: AF @ 90bpm, no ST-T changes  -Continue Eliquis 5mg BID and Toprol 25mg QD

## 2023-06-15 VITALS — TEMPERATURE: 98 F

## 2023-06-15 PROCEDURE — 99239 HOSP IP/OBS DSCHRG MGMT >30: CPT

## 2023-06-15 RX ADMIN — Medication 25 MILLIGRAM(S): at 06:30

## 2023-06-15 RX ADMIN — VALSARTAN 40 MILLIGRAM(S): 80 TABLET ORAL at 06:30

## 2023-06-15 RX ADMIN — Medication 20 MILLIGRAM(S): at 06:30

## 2023-06-15 RX ADMIN — APIXABAN 5 MILLIGRAM(S): 2.5 TABLET, FILM COATED ORAL at 06:30

## 2023-06-19 DIAGNOSIS — F10.129 ALCOHOL ABUSE WITH INTOXICATION, UNSPECIFIED: ICD-10-CM

## 2023-06-19 DIAGNOSIS — I50.33 ACUTE ON CHRONIC DIASTOLIC (CONGESTIVE) HEART FAILURE: ICD-10-CM

## 2023-06-19 DIAGNOSIS — Z91.148 PATIENT'S OTHER NONCOMPLIANCE WITH MEDICATION REGIMEN FOR OTHER REASON: ICD-10-CM

## 2023-06-19 DIAGNOSIS — R55 SYNCOPE AND COLLAPSE: ICD-10-CM

## 2023-06-19 DIAGNOSIS — B20 HUMAN IMMUNODEFICIENCY VIRUS [HIV] DISEASE: ICD-10-CM

## 2023-06-19 DIAGNOSIS — F14.10 COCAINE ABUSE, UNCOMPLICATED: ICD-10-CM

## 2023-06-19 DIAGNOSIS — F17.210 NICOTINE DEPENDENCE, CIGARETTES, UNCOMPLICATED: ICD-10-CM

## 2023-06-19 DIAGNOSIS — I48.91 UNSPECIFIED ATRIAL FIBRILLATION: ICD-10-CM

## 2023-06-27 ENCOUNTER — APPOINTMENT (OUTPATIENT)
Dept: HEART AND VASCULAR | Facility: CLINIC | Age: 63
End: 2023-06-27

## 2023-07-07 NOTE — DISCHARGE NOTE NURSING/CASE MANAGEMENT/SOCIAL WORK - NSDCPEPT PROEDMA_GEN_ALL_CORE
----- Message from [facility], MADELAINE - NP sent at 7/3/2023  8:13 AM EDT -----  Cr elevated. Will need to recheck this later this week. K also slightly high. Encourage fluids as tolerated   With repeat CMP will follow on calcium levels well    WBC elevated but has been a few times in the last few months. Around its average     Repeat CBC as well to follow on the platelets.  Neutrophils are around his average before too    Chest xray results coming Yes

## 2023-07-13 ENCOUNTER — EMERGENCY (EMERGENCY)
Facility: HOSPITAL | Age: 63
LOS: 1 days | Discharge: HOME | End: 2023-07-13
Attending: EMERGENCY MEDICINE | Admitting: EMERGENCY MEDICINE
Payer: MEDICAID

## 2023-07-13 VITALS
OXYGEN SATURATION: 95 % | RESPIRATION RATE: 18 BRPM | HEART RATE: 82 BPM | DIASTOLIC BLOOD PRESSURE: 62 MMHG | TEMPERATURE: 98 F | SYSTOLIC BLOOD PRESSURE: 124 MMHG

## 2023-07-13 VITALS
TEMPERATURE: 98 F | SYSTOLIC BLOOD PRESSURE: 101 MMHG | OXYGEN SATURATION: 96 % | DIASTOLIC BLOOD PRESSURE: 66 MMHG | HEART RATE: 105 BPM | RESPIRATION RATE: 20 BRPM

## 2023-07-13 DIAGNOSIS — J45.909 UNSPECIFIED ASTHMA, UNCOMPLICATED: ICD-10-CM

## 2023-07-13 DIAGNOSIS — I48.91 UNSPECIFIED ATRIAL FIBRILLATION: ICD-10-CM

## 2023-07-13 DIAGNOSIS — Z20.822 CONTACT WITH AND (SUSPECTED) EXPOSURE TO COVID-19: ICD-10-CM

## 2023-07-13 DIAGNOSIS — R07.9 CHEST PAIN, UNSPECIFIED: ICD-10-CM

## 2023-07-13 DIAGNOSIS — I50.9 HEART FAILURE, UNSPECIFIED: ICD-10-CM

## 2023-07-13 DIAGNOSIS — R05.9 COUGH, UNSPECIFIED: ICD-10-CM

## 2023-07-13 DIAGNOSIS — R06.02 SHORTNESS OF BREATH: ICD-10-CM

## 2023-07-13 DIAGNOSIS — Z79.01 LONG TERM (CURRENT) USE OF ANTICOAGULANTS: ICD-10-CM

## 2023-07-13 DIAGNOSIS — Z21 ASYMPTOMATIC HUMAN IMMUNODEFICIENCY VIRUS [HIV] INFECTION STATUS: ICD-10-CM

## 2023-07-13 LAB
ALBUMIN SERPL ELPH-MCNC: 3.1 G/DL — LOW (ref 3.4–5)
ALP SERPL-CCNC: 150 U/L — HIGH (ref 40–120)
ALT FLD-CCNC: 32 U/L — SIGNIFICANT CHANGE UP (ref 12–42)
ANION GAP SERPL CALC-SCNC: 12 MMOL/L — SIGNIFICANT CHANGE UP (ref 9–16)
AST SERPL-CCNC: 67 U/L — HIGH (ref 15–37)
BASOPHILS # BLD AUTO: 0.02 K/UL — SIGNIFICANT CHANGE UP (ref 0–0.2)
BASOPHILS NFR BLD AUTO: 0.5 % — SIGNIFICANT CHANGE UP (ref 0–2)
BILIRUB SERPL-MCNC: 1.2 MG/DL — SIGNIFICANT CHANGE UP (ref 0.2–1.2)
BUN SERPL-MCNC: 13 MG/DL — SIGNIFICANT CHANGE UP (ref 7–23)
CALCIUM SERPL-MCNC: 9 MG/DL — SIGNIFICANT CHANGE UP (ref 8.5–10.5)
CHLORIDE SERPL-SCNC: 103 MMOL/L — SIGNIFICANT CHANGE UP (ref 96–108)
CO2 SERPL-SCNC: 22 MMOL/L — SIGNIFICANT CHANGE UP (ref 22–31)
CREAT SERPL-MCNC: 1.07 MG/DL — SIGNIFICANT CHANGE UP (ref 0.5–1.3)
EGFR: 77 ML/MIN/1.73M2 — SIGNIFICANT CHANGE UP
EOSINOPHIL # BLD AUTO: 0.18 K/UL — SIGNIFICANT CHANGE UP (ref 0–0.5)
EOSINOPHIL NFR BLD AUTO: 4.9 % — SIGNIFICANT CHANGE UP (ref 0–6)
FLUAV AG NPH QL: SIGNIFICANT CHANGE UP
FLUBV AG NPH QL: SIGNIFICANT CHANGE UP
GLUCOSE SERPL-MCNC: 73 MG/DL — SIGNIFICANT CHANGE UP (ref 70–99)
HCT VFR BLD CALC: 35.3 % — LOW (ref 39–50)
HGB BLD-MCNC: 11.4 G/DL — LOW (ref 13–17)
IMM GRANULOCYTES NFR BLD AUTO: 0.3 % — SIGNIFICANT CHANGE UP (ref 0–0.9)
LYMPHOCYTES # BLD AUTO: 1.07 K/UL — SIGNIFICANT CHANGE UP (ref 1–3.3)
LYMPHOCYTES # BLD AUTO: 29 % — SIGNIFICANT CHANGE UP (ref 13–44)
MAGNESIUM SERPL-MCNC: 1.5 MG/DL — LOW (ref 1.6–2.6)
MCHC RBC-ENTMCNC: 29.4 PG — SIGNIFICANT CHANGE UP (ref 27–34)
MCHC RBC-ENTMCNC: 32.3 GM/DL — SIGNIFICANT CHANGE UP (ref 32–36)
MCV RBC AUTO: 91 FL — SIGNIFICANT CHANGE UP (ref 80–100)
MONOCYTES # BLD AUTO: 0.52 K/UL — SIGNIFICANT CHANGE UP (ref 0–0.9)
MONOCYTES NFR BLD AUTO: 14.1 % — HIGH (ref 2–14)
NEUTROPHILS # BLD AUTO: 1.89 K/UL — SIGNIFICANT CHANGE UP (ref 1.8–7.4)
NEUTROPHILS NFR BLD AUTO: 51.2 % — SIGNIFICANT CHANGE UP (ref 43–77)
NRBC # BLD: 0 /100 WBCS — SIGNIFICANT CHANGE UP (ref 0–0)
NT-PROBNP SERPL-SCNC: 6164 PG/ML — HIGH
PLATELET # BLD AUTO: 239 K/UL — SIGNIFICANT CHANGE UP (ref 150–400)
POTASSIUM SERPL-MCNC: 3.8 MMOL/L — SIGNIFICANT CHANGE UP (ref 3.5–5.3)
POTASSIUM SERPL-SCNC: 3.8 MMOL/L — SIGNIFICANT CHANGE UP (ref 3.5–5.3)
PROT SERPL-MCNC: 8 G/DL — SIGNIFICANT CHANGE UP (ref 6.4–8.2)
RBC # BLD: 3.88 M/UL — LOW (ref 4.2–5.8)
RBC # FLD: 16.8 % — HIGH (ref 10.3–14.5)
RSV RNA NPH QL NAA+NON-PROBE: SIGNIFICANT CHANGE UP
SARS-COV-2 RNA SPEC QL NAA+PROBE: SIGNIFICANT CHANGE UP
SODIUM SERPL-SCNC: 137 MMOL/L — SIGNIFICANT CHANGE UP (ref 132–145)
TROPONIN I, HIGH SENSITIVITY RESULT: 22.7 NG/L — SIGNIFICANT CHANGE UP
WBC # BLD: 3.69 K/UL — LOW (ref 3.8–10.5)
WBC # FLD AUTO: 3.69 K/UL — LOW (ref 3.8–10.5)

## 2023-07-13 PROCEDURE — 99285 EMERGENCY DEPT VISIT HI MDM: CPT

## 2023-07-13 PROCEDURE — 99053 MED SERV 10PM-8AM 24 HR FAC: CPT

## 2023-07-13 PROCEDURE — 71046 X-RAY EXAM CHEST 2 VIEWS: CPT | Mod: 26

## 2023-07-13 RX ORDER — MAGNESIUM SULFATE 500 MG/ML
1 VIAL (ML) INJECTION ONCE
Refills: 0 | Status: COMPLETED | OUTPATIENT
Start: 2023-07-13 | End: 2023-07-13

## 2023-07-13 RX ORDER — ALBUTEROL 90 UG/1
2 AEROSOL, METERED ORAL ONCE
Refills: 0 | Status: COMPLETED | OUTPATIENT
Start: 2023-07-13 | End: 2023-07-13

## 2023-07-13 RX ORDER — ACETAMINOPHEN 500 MG
650 TABLET ORAL ONCE
Refills: 0 | Status: COMPLETED | OUTPATIENT
Start: 2023-07-13 | End: 2023-07-13

## 2023-07-13 RX ADMIN — ALBUTEROL 2 PUFF(S): 90 AEROSOL, METERED ORAL at 03:12

## 2023-07-13 RX ADMIN — Medication 100 GRAM(S): at 04:33

## 2023-07-13 RX ADMIN — Medication 650 MILLIGRAM(S): at 04:36

## 2023-07-13 NOTE — ED ADULT NURSE NOTE - EXTENSIONS OF SELF_ADULT
You can access the FollowMyHealth Patient Portal offered by [facility] by registering at the following website: [email or web address]. By joining [employer]’s FollowMyHealth portal, you will also be able to view your health information using other applications (apps) compatible with our system. None

## 2023-07-13 NOTE — ED PROVIDER NOTE - NS ED ATTENDING STATEMENT MOD
I have seen and examined this patient and fully participated in the care of this patient as the teaching attending.  The service was shared with the LÓPEZ.  I reviewed and verified the documentation and independently performed the documented:

## 2023-07-13 NOTE — ED ADULT NURSE NOTE - NSFALLUNIVINTERV_ED_ALL_ED
Bed/Stretcher in lowest position, wheels locked, appropriate side rails in place/Call bell, personal items and telephone in reach/Instruct patient to call for assistance before getting out of bed/chair/stretcher/Non-slip footwear applied when patient is off stretcher/[place] to call system/Physically safe environment - no spills, clutter or unnecessary equipment/Purposeful proactive rounding/Room/bathroom lighting operational, light cord in reach

## 2023-07-13 NOTE — ED PROVIDER NOTE - OBJECTIVE STATEMENT
63yoM w/Hx of afib on eliquis, CHF, HIV, asthma p/w SOB and CP which began earlier today, pt's asthma inhaler finished today. Also reports cough and chest pain. Pt refusing to elaborate further regarding complaints. ROS limited 2/2 patient compliance; denies HA, abd pain, vomiting, "doesn't know" if he's had fevers.

## 2023-07-13 NOTE — ED PROVIDER NOTE - ATTENDING CONTRIBUTION TO CARE
Pt. with HIV, COPD presenting to E.D. for SOB after running out of albuterol.  Breathing comfortably, saturating normally, lungs clear.  Workup reveals low Mag, labs are within normal limits except BNP, which is in patient's normal range.  Stable for discharge.

## 2023-07-13 NOTE — ED PROVIDER NOTE - PROGRESS NOTE DETAILS
BNP elevated however pt w/o signs/symptoms of fluid overload on exam or on CXR, SOB improved after inhaler use, repeat lung exam unchanged. CP resolved. Pt medically cleared for DC. - Jose Angel Meyers, PGY-3

## 2023-07-13 NOTE — ED PROVIDER NOTE - PATIENT PORTAL LINK FT
You can access the FollowMyHealth Patient Portal offered by [facility] by registering at the following website: [email or web address]. By joining [employer]’s FollowMyHealth portal, you will also be able to view your health information using other applications (apps) compatible with our system.

## 2023-07-13 NOTE — ED PROVIDER NOTE - NSFOLLOWUPINSTRUCTIONS_ED_ALL_ED_FT
Please follow up with your primary care doctor within 1 week. Bring copies of your results with you (provided in your discharge paperwork).     You may take 500-1000 mg acetaminophen (tylenol) every 6 hours, as needed for pain. Please continue all home medications as previously prescribed. Use your albuterol inhaler every 4-6 hours as needed for shortness of breath.     Shortness of breath (dyspnea) means you have trouble breathing and could indicate a medical problem. Causes include lung disease, heart disease, low amount of red blood cells (anemia), poor physical fitness, being overweight, smoking, etc. Your health care provider today may not be able to find a cause for your shortness of breath after your exam. In this case, it is important to have a follow-up exam with your primary care physician as instructed. If medicines were prescribed, take them as directed for the full length of time directed. Refrain from tobacco products.    SEEK IMMEDIATE MEDICAL CARE IF YOU HAVE ANY OF THE FOLLOWING SYMPTOMS: worsening shortness of breath, chest pain, back pain, abdominal pain, fever, coughing up blood, lightheadedness/dizziness.

## 2023-07-13 NOTE — ED ADULT TRIAGE NOTE - CHIEF COMPLAINT QUOTE
Walk in pt with complaints of shortness of breath and chest tightness, hx asthma-states he ran out of his rescue inhaler Yes

## 2023-07-13 NOTE — ED ADULT NURSE NOTE - CHIEF COMPLAINT QUOTE
Walk in pt with complaints of shortness of breath and chest tightness, hx asthma-states he ran out of his rescue inhaler

## 2023-07-13 NOTE — ED PROVIDER NOTE - CLINICAL SUMMARY MEDICAL DECISION MAKING FREE TEXT BOX
63yoM w/Hx of afib on eliquis, HIV, CHF, asthma p/w SOB and CP, vs significant for tachycardia to 105, phys exam w/irregularly irregular heart sounds, holosystolic murmur, lungs grossly CTAB. History limited by patient cooperation, pt guarded and unwilling to answer questions on initial exam. DDx incl. asthma exacerbation vs. CHF exacerbation vs. ACS/MI vs. URI, will eval w/labs incl. cardiac markers, cxr, ekg, albuterol, tylenol for pain and reassess, dispo pending workup. This represents an initial assessment; workup and plan subject to change. - Jose Angel Meyers, PGY-3

## 2023-07-13 NOTE — ED PROVIDER NOTE - PHYSICAL EXAMINATION
Gen: AAOx3, non-toxic elderly-appearing male lying in bed in NAD  Head: NCAT  HEENT: EOMI, oral mucosa dry, normal conjunctiva, +poor dentition  Lung: CTAB, no respiratory distress, no wheezes/rhonchi/rales B/L, speaking in full sentences, +cough  CV: +irregularly irregular; +holosystolic murmur   Abd: soft, NTND, no guarding, no CVA tenderness  MSK: no visible deformities  Neuro: No focal sensory or motor deficits  Skin: Warm, well perfused, no rash  Psych: agitated  ~Jose Angel Meyers M.D. Resident

## 2023-07-13 NOTE — ED PROVIDER NOTE - NSICDXPASTSURGICALHX_GEN_ALL_CORE_FT
"[facility] Note    CC: Epigastric pain    HPI 24 y.o. female with epigastric pain, recent onset, recurrent, moderate in severity, associated with N/V but no bleeding.  EGD done and negative.  Ultrasound today unremarkable.  Patient feeling a little better.  States that she lives in [place].    Past Medical History:   Diagnosis Date    Anxiety     History of stomach ulcers          Review of Systems  General ROS: negative for - chills, fever or weight loss  Cardiovascular ROS: no chest pain or dyspnea on exertion  Gastrointestinal ROS: + abdominal pain    Physical Examination  BP (!) 142/81   Pulse 81   Temp 98.6 °F (37 °C)   Resp 16   Ht 5' 6" (1.676 m)   Wt 63.5 kg (140 lb)   LMP 12/10/2017 (Exact Date)   SpO2 97%   Breastfeeding? No   BMI 22.60 kg/m²   General appearance: alert, cooperative, no distress  HENT: Normocephalic, atraumatic, neck symmetrical, no nasal discharge, sclera anicteric   Lungs: clear to auscultation bilaterally, symmetric chest wall expansion bilaterally  Heart: regular rate and rhythm without rub; no displacement of the PMI   Abdomen: soft with TTP diffusely  Extremities: extremities symmetric; no clubbing, cyanosis, or edema  Neurologic: Alert and oriented X 3, no sensory or motor neurologic deficits      Labs:  Lab Results   Component Value Date    WBC 12.70 12/16/2017    HGB 13.4 12/16/2017    HCT 40.3 12/16/2017    MCV 97 12/16/2017     12/16/2017       CMP  Sodium   Date Value Ref Range Status   12/16/2017 138 136 - 145 mmol/L Final     Potassium   Date Value Ref Range Status   12/16/2017 4.0 3.5 - 5.1 mmol/L Final     Chloride   Date Value Ref Range Status   12/16/2017 105 95 - 110 mmol/L Final     CO2   Date Value Ref Range Status   12/16/2017 22 (L) 23 - 29 mmol/L Final     Glucose   Date Value Ref Range Status   12/16/2017 119 (H) 70 - 110 mg/dL Final     BUN, Bld   Date Value Ref Range Status   12/16/2017 14 6 - 20 mg/dL Final     Creatinine   Date " Value Ref Range Status   12/16/2017 1.0 0.5 - 1.4 mg/dL Final     Calcium   Date Value Ref Range Status   12/16/2017 9.8 8.7 - 10.5 mg/dL Final     Total Protein   Date Value Ref Range Status   12/16/2017 7.6 6.0 - 8.4 g/dL Final     Albumin   Date Value Ref Range Status   12/16/2017 4.4 3.5 - 5.2 g/dL Final     Total Bilirubin   Date Value Ref Range Status   12/16/2017 1.1 (H) 0.1 - 1.0 mg/dL Final     Comment:     For infants and newborns, interpretation of results should be based  on gestational age, weight and in agreement with clinical  observations.  Premature Infant recommended reference ranges:  Up to 24 hours.............<8.0 mg/dL  Up to 48 hours............<12.0 mg/dL  3-5 days..................<15.0 mg/dL  6-29 days.................<15.0 mg/dL       Alkaline Phosphatase   Date Value Ref Range Status   12/16/2017 67 55 - 135 U/L Final     AST   Date Value Ref Range Status   12/16/2017 20 10 - 40 U/L Final     ALT   Date Value Ref Range Status   12/16/2017 19 10 - 44 U/L Final     Anion Gap   Date Value Ref Range Status   12/16/2017 11 8 - 16 mmol/L Final     eGFR if    Date Value Ref Range Status   12/16/2017 >60 >60 mL/min/1.73 m^2 Final     eGFR if non    Date Value Ref Range Status   12/16/2017 >60 >60 mL/min/1.73 m^2 Final     Comment:     Calculation used to obtain the estimated glomerular filtration  rate (eGFR) is the CKD-EPI equation.            Imaging:  Ultrasound was independently visualized and reviewed by me and showed normal findings.      Assessment:   1.  Epigastric pain - likely functional  2.  N/V    Plan:  1.  Symptomatic management for nausea  2.  PPI  3.  Diet as tolerated  4.  GI to sign off.  Call for questions.  Patient has been instructed to follow up with GI when she gets home.  She states that this is already planned.    Oliver Hernández MD  [facility]  [address], [address]  [place] [address]  Office: [phone]  Fax:  [phone]     PAST SURGICAL HISTORY:  No significant past surgical history

## 2023-07-16 ENCOUNTER — EMERGENCY (EMERGENCY)
Facility: HOSPITAL | Age: 63
LOS: 1 days | Discharge: HOME | End: 2023-07-16
Attending: EMERGENCY MEDICINE | Admitting: EMERGENCY MEDICINE
Payer: MEDICAID

## 2023-07-16 VITALS
RESPIRATION RATE: 15 BRPM | HEIGHT: 69 IN | OXYGEN SATURATION: 98 % | WEIGHT: 167.55 LBS | DIASTOLIC BLOOD PRESSURE: 77 MMHG | HEART RATE: 88 BPM | TEMPERATURE: 98 F | SYSTOLIC BLOOD PRESSURE: 117 MMHG

## 2023-07-16 VITALS
RESPIRATION RATE: 16 BRPM | OXYGEN SATURATION: 97 % | TEMPERATURE: 98 F | DIASTOLIC BLOOD PRESSURE: 81 MMHG | HEART RATE: 88 BPM | SYSTOLIC BLOOD PRESSURE: 133 MMHG

## 2023-07-16 PROCEDURE — 99284 EMERGENCY DEPT VISIT MOD MDM: CPT

## 2023-07-16 RX ORDER — ALBUTEROL 90 UG/1
2 AEROSOL, METERED ORAL ONCE
Refills: 0 | Status: COMPLETED | OUTPATIENT
Start: 2023-07-16 | End: 2023-07-16

## 2023-07-16 RX ORDER — IPRATROPIUM/ALBUTEROL SULFATE 18-103MCG
3 AEROSOL WITH ADAPTER (GRAM) INHALATION
Refills: 0 | Status: COMPLETED | OUTPATIENT
Start: 2023-07-16 | End: 2023-07-16

## 2023-07-16 RX ADMIN — ALBUTEROL 2 PUFF(S): 90 AEROSOL, METERED ORAL at 09:09

## 2023-07-16 RX ADMIN — Medication 3 MILLILITER(S): at 09:27

## 2023-07-16 RX ADMIN — Medication 3 MILLILITER(S): at 09:07

## 2023-07-16 RX ADMIN — Medication 3 MILLILITER(S): at 09:47
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
## 2023-07-16 NOTE — ED ADULT NURSE NOTE - NSFALLUNIVINTERV_ED_ALL_ED
Bed/Stretcher in lowest position, wheels locked, appropriate side rails in place/Call bell, personal items and telephone in reach/Instruct patient to call for assistance before getting out of bed/chair/stretcher/Non-slip footwear applied when patient is off stretcher/[place] to call system/Physically safe environment - no spills, clutter or unnecessary equipment/Purposeful proactive rounding/Room/bathroom lighting operational, light cord in reach

## 2023-07-16 NOTE — ED PROVIDER NOTE - PATIENT PORTAL LINK FT
You can access the FollowMyHealth Patient Portal offered by [facility] by registering at the following website: [email or web address]. By joining [employer]’s FollowMyHealth portal, you will also be able to view your health information using other applications (apps) compatible with our system.
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
## 2023-07-19 DIAGNOSIS — I50.9 HEART FAILURE, UNSPECIFIED: ICD-10-CM

## 2023-07-19 DIAGNOSIS — Z79.01 LONG TERM (CURRENT) USE OF ANTICOAGULANTS: ICD-10-CM

## 2023-07-19 DIAGNOSIS — J45.901 UNSPECIFIED ASTHMA WITH (ACUTE) EXACERBATION: ICD-10-CM

## 2023-07-19 DIAGNOSIS — J45.909 UNSPECIFIED ASTHMA, UNCOMPLICATED: ICD-10-CM

## 2023-07-19 DIAGNOSIS — F17.200 NICOTINE DEPENDENCE, UNSPECIFIED, UNCOMPLICATED: ICD-10-CM

## 2023-07-19 DIAGNOSIS — I48.91 UNSPECIFIED ATRIAL FIBRILLATION: ICD-10-CM

## 2023-07-19 DIAGNOSIS — B20 HUMAN IMMUNODEFICIENCY VIRUS [HIV] DISEASE: ICD-10-CM

## 2023-07-19 DIAGNOSIS — R06.02 SHORTNESS OF BREATH: ICD-10-CM

## 2023-08-03 ENCOUNTER — EMERGENCY (EMERGENCY)
Facility: HOSPITAL | Age: 63
LOS: 1 days | Discharge: HOME | End: 2023-08-03
Admitting: EMERGENCY MEDICINE
Payer: MEDICAID

## 2023-08-03 VITALS
OXYGEN SATURATION: 95 % | TEMPERATURE: 99 F | HEIGHT: 69 IN | RESPIRATION RATE: 18 BRPM | DIASTOLIC BLOOD PRESSURE: 87 MMHG | SYSTOLIC BLOOD PRESSURE: 132 MMHG | HEART RATE: 92 BPM

## 2023-08-03 DIAGNOSIS — M79.604 PAIN IN RIGHT LEG: ICD-10-CM

## 2023-08-03 DIAGNOSIS — Z79.01 LONG TERM (CURRENT) USE OF ANTICOAGULANTS: ICD-10-CM

## 2023-08-03 DIAGNOSIS — R60.0 LOCALIZED EDEMA: ICD-10-CM

## 2023-08-03 DIAGNOSIS — I50.9 HEART FAILURE, UNSPECIFIED: ICD-10-CM

## 2023-08-03 DIAGNOSIS — I87.2 VENOUS INSUFFICIENCY (CHRONIC) (PERIPHERAL): ICD-10-CM

## 2023-08-03 DIAGNOSIS — M79.605 PAIN IN LEFT LEG: ICD-10-CM

## 2023-08-03 DIAGNOSIS — Z59.00 HOMELESSNESS UNSPECIFIED: ICD-10-CM

## 2023-08-03 PROCEDURE — 99284 EMERGENCY DEPT VISIT MOD MDM: CPT

## 2023-08-03 PROCEDURE — 99053 MED SERV 10PM-8AM 24 HR FAC: CPT

## 2023-08-03 SDOH — ECONOMIC STABILITY - HOUSING INSECURITY: HOMELESSNESS UNSPECIFIED: Z59.00

## 2023-08-03 NOTE — ED PROVIDER NOTE - OBJECTIVE STATEMENT
64 yo m pmhx sig for HF and peripheral edema pw chronic b/l leg pain requesting a place to lay down and keep his feet up for a couple of hours to relieved the pain, pt is undomeciled and unable to elevate his feet as often as he would like, as result developed worsening b/l leg pain.    I have reviewed available current nursing and previous documentation of past medical, surgical, family, and/or social history.

## 2023-08-03 NOTE — ED ADULT NURSE NOTE - OBJECTIVE STATEMENT
Patient is a 64 y/o M c/o foot pain/injury. Patient reports b/l foot pain for the past few days. Patient also reports hx of hiv and has not been on his meds for 2 months.

## 2023-08-03 NOTE — ED ADULT TRIAGE NOTE - CHIEF COMPLAINT QUOTE
DANIELA with complaints of bilateral foot pain and chronic edema to lower extremities. Also reports hx hiv and has not been on his meds in 2 months.

## 2023-08-03 NOTE — ED PROVIDER NOTE - NSFOLLOWUPINSTRUCTIONS_ED_ALL_ED_FT
Peripheral Edema    Peripheral edema is swelling that is caused by a buildup of fluid. Peripheral edema most often affects the lower legs, ankles, and feet. It can also develop in the arms, hands, and face. The area of the body that has peripheral edema will look swollen. It may also feel heavy or warm. Your clothes may start to feel tight. Pressing on the area may make a temporary dent in your skin. You may not be able to move your arm or leg as much as usual.     There are many causes of peripheral edema. It can be a complication of other diseases, such as congestive heart failure, kidney disease, or a problem with your blood circulation. It also can be a side effect of certain medicines. It often happens to women during pregnancy. Sometimes, the cause is not known. Treating the underlying condition is often the only treatment for peripheral edema.    HOME CARE INSTRUCTIONS  Pay attention to any changes in your symptoms. Take these actions to help with your discomfort:    Raise (elevate) your legs while you are sitting or lying down.  Move around often to prevent stiffness and to lessen swelling. Do not sit or stand for long periods of time.  Wear support stockings as told by your health care provider.  Follow instructions from your health care provider about limiting salt (sodium) in your diet. Sometimes eating less salt can reduce swelling.  Take over-the-counter and prescription medicines only as told by your health care provider. Your health care provider may prescribe medicine to help your body get rid of excess water (diuretic).  Keep all follow-up visits as told by your health care provider. This is important.    SEEK MEDICAL CARE IF:  You have a fever.  Your edema starts suddenly or is getting worse, especially if you are pregnant or have a medical condition.  You have swelling in only one leg.  You have increased swelling and pain in your legs.    SEEK IMMEDIATE MEDICAL CARE IF:  You develop shortness of breath, especially when you are lying down.  You have pain in your chest or abdomen.  You feel weak.  You faint.

## 2023-08-03 NOTE — ED PROVIDER NOTE - PHYSICAL EXAMINATION
Physical Exam    Vital Signs: I have reviewed the initial vital signs.  Constitutional: well-appearing, appears stated age  Eyes: PERRLA, and symmetrical lids.  ENT: neck supple with no adenopathy, moist MM.  Cardiovascular: +S1/S2, no murmurs, regular rate, regular rhythm, well-perfused extremities  Respiratory: unlabored respiratory effort, clear to auscultation bilaterally, speaks in full sentences  Gastrointestinal: soft, non-tender abdomen, no pulsatile mass  Msk: + b/l peripheral edema with chronic skin changes stasis dermatitis

## 2023-08-03 NOTE — ED PROVIDER NOTE - PATIENT PORTAL LINK FT
You can access the FollowMyHealth Patient Portal offered by [facility] by registering at the following website: [email or web address]. By joining [employer]’s FollowMyHealth portal, you will also be able to view your health information using other applications (apps) compatible with our system.

## 2023-08-03 NOTE — ED PROVIDER NOTE - CLINICAL SUMMARY MEDICAL DECISION MAKING FREE TEXT BOX
well appearing pt here with b/l chronic peripheral edema requesting pain control and a place to keep his legs elevated, plan: percocet, dc

## 2023-08-11 ENCOUNTER — EMERGENCY (EMERGENCY)
Facility: HOSPITAL | Age: 63
LOS: 1 days | Discharge: HOME | End: 2023-08-11
Admitting: EMERGENCY MEDICINE
Payer: MEDICAID

## 2023-08-11 VITALS
RESPIRATION RATE: 16 BRPM | HEIGHT: 69 IN | TEMPERATURE: 99 F | HEART RATE: 92 BPM | OXYGEN SATURATION: 95 % | DIASTOLIC BLOOD PRESSURE: 89 MMHG | SYSTOLIC BLOOD PRESSURE: 129 MMHG

## 2023-08-11 DIAGNOSIS — Z79.01 LONG TERM (CURRENT) USE OF ANTICOAGULANTS: ICD-10-CM

## 2023-08-11 DIAGNOSIS — I11.0 HYPERTENSIVE HEART DISEASE WITH HEART FAILURE: ICD-10-CM

## 2023-08-11 DIAGNOSIS — I50.9 HEART FAILURE, UNSPECIFIED: ICD-10-CM

## 2023-08-11 DIAGNOSIS — Z79.84 LONG TERM (CURRENT) USE OF ORAL HYPOGLYCEMIC DRUGS: ICD-10-CM

## 2023-08-11 DIAGNOSIS — R60.0 LOCALIZED EDEMA: ICD-10-CM

## 2023-08-11 DIAGNOSIS — M79.669 PAIN IN UNSPECIFIED LOWER LEG: ICD-10-CM

## 2023-08-11 PROCEDURE — 99053 MED SERV 10PM-8AM 24 HR FAC: CPT

## 2023-08-11 PROCEDURE — 99283 EMERGENCY DEPT VISIT LOW MDM: CPT

## 2023-08-11 RX ORDER — ACETAMINOPHEN 500 MG
650 TABLET ORAL ONCE
Refills: 0 | Status: COMPLETED | OUTPATIENT
Start: 2023-08-11 | End: 2023-08-11

## 2023-08-11 RX ADMIN — Medication 650 MILLIGRAM(S): at 04:10

## 2023-08-11 NOTE — ED PROVIDER NOTE - PATIENT PORTAL LINK FT
You can access the FollowMyHealth Patient Portal offered by [facility] by registering at the following website: [email or web address]. By joining [employer]’s FollowMyHealth portal, you will also be able to view your health information using other applications (apps) compatible with our system.

## 2023-08-11 NOTE — ED PROVIDER NOTE - PHYSICAL EXAMINATION
Physical Exam    Vital Signs: I have reviewed the initial vital signs.  Constitutional: well-appearing, appears stated age  Eyes: PERRLA, EOM intact, RAPD absent, and symmetrical lids.  ENT: neck supple with no adenopathy, moist MM.  Cardiovascular: +S1/S2, no murmurs, regular rate, regular rhythm, well-perfused extremities  Respiratory: unlabored respiratory effort, clear to auscultation bilaterally, speaks in full sentences  Gastrointestinal: soft, non-tender abdomen, no pulsatile mass  Msk: +b/l peripheral edema +3 no ttp, full ROM in all joints, ambulates w/o assistance

## 2023-08-11 NOTE — ED PROVIDER NOTE - OBJECTIVE STATEMENT
64 yo m well known to ed staff pmhx sig for htn, hf pw chronic leg pain with peripheral edema w/o any cp ro sob.    I have reviewed available current nursing and previous documentation of past medical, surgical, family, and/or social history.

## 2023-08-11 NOTE — ED PROVIDER NOTE - MDM ORDERS SUBMITTED SELECTION
Last refill:9/23/21  Last OV:1/10/2021  Upcoming appointment: 10/25/21  Please advise on refill and sign if ok.
Refilled.
Medications

## 2023-09-23 ENCOUNTER — EMERGENCY (EMERGENCY)
Facility: HOSPITAL | Age: 63
LOS: 1 days | Discharge: HOME | End: 2023-09-23
Attending: EMERGENCY MEDICINE | Admitting: EMERGENCY MEDICINE
Payer: MEDICAID

## 2023-09-23 VITALS
HEIGHT: 69 IN | OXYGEN SATURATION: 98 % | HEART RATE: 78 BPM | RESPIRATION RATE: 19 BRPM | DIASTOLIC BLOOD PRESSURE: 71 MMHG | WEIGHT: 176.37 LBS | SYSTOLIC BLOOD PRESSURE: 150 MMHG

## 2023-09-23 PROCEDURE — 99283 EMERGENCY DEPT VISIT LOW MDM: CPT

## 2023-09-23 NOTE — ED ADULT NURSE NOTE - NSFALLHARMRISKINTERV_ED_ALL_ED

## 2023-09-23 NOTE — ED PROVIDER NOTE - OBJECTIVE STATEMENT
63-year-old male patient that is undomiciled.  Patient presents for several complaints including an inguinal rash.  Patient states he does not want to speak to me about his medical problems and he actually just read his paper chart that he brought with him from a recent admission to [facility].  Patient has history of AIDS and CHF and was evaluated for this extensive inguinal rash by dermatology during his admission, also infectious disease.  It seems he has a combination of seborrheic dermatitis with fungal infection.  He has prescriptions for all of these ailments but has decided not to take them.  This interaction was quite difficult as patient is screaming at me that he does not want to speak and he does not want to be touched for examination.

## 2023-09-23 NOTE — ED ADULT NURSE NOTE - OBJECTIVE STATEMENT
Pt is A&OX4. Pt presented with fungal rash spreading, hematuria, bleeding from rectum. Pt states burning sensation when urinating, and pain during BM. B/l LE edema. Pt states he is compliant with medications. No acute distress observed.
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
## 2023-09-23 NOTE — ED PROVIDER NOTE - PATIENT PORTAL LINK FT
You can access the FollowMyHealth Patient Portal offered by [facility] by registering at the following website: [email or web address]. By joining [employer]’s FollowMyHealth portal, you will also be able to view your health information using other applications (apps) compatible with our system.

## 2023-09-26 DIAGNOSIS — L30.8 OTHER SPECIFIED DERMATITIS: ICD-10-CM

## 2023-09-26 DIAGNOSIS — B49 UNSPECIFIED MYCOSIS: ICD-10-CM

## 2023-09-26 DIAGNOSIS — Z79.01 LONG TERM (CURRENT) USE OF ANTICOAGULANTS: ICD-10-CM

## 2023-09-26 DIAGNOSIS — I50.9 HEART FAILURE, UNSPECIFIED: ICD-10-CM

## 2023-09-26 DIAGNOSIS — B20 HUMAN IMMUNODEFICIENCY VIRUS [HIV] DISEASE: ICD-10-CM

## 2023-09-26 DIAGNOSIS — R21 RASH AND OTHER NONSPECIFIC SKIN ERUPTION: ICD-10-CM

## 2023-09-26 DIAGNOSIS — Z00.00 ENCOUNTER FOR GENERAL ADULT MEDICAL EXAMINATION WITHOUT ABNORMAL FINDINGS: ICD-10-CM

## 2023-09-26 DIAGNOSIS — Z59.00 HOMELESSNESS UNSPECIFIED: ICD-10-CM

## 2023-09-26 SDOH — ECONOMIC STABILITY - HOUSING INSECURITY: HOMELESSNESS UNSPECIFIED: Z59.00

## 2023-10-01 ENCOUNTER — INPATIENT (INPATIENT)
Facility: HOSPITAL | Age: 63
LOS: 1 days | Discharge: LEFT BEFORE DISCHARGE | DRG: 280 | End: 2023-10-03
Attending: INTERNAL MEDICINE | Admitting: STUDENT IN AN ORGANIZED HEALTH CARE EDUCATION/TRAINING PROGRAM
Payer: MEDICAID

## 2023-10-01 VITALS
SYSTOLIC BLOOD PRESSURE: 123 MMHG | HEART RATE: 96 BPM | RESPIRATION RATE: 22 BRPM | OXYGEN SATURATION: 95 % | TEMPERATURE: 99 F | DIASTOLIC BLOOD PRESSURE: 85 MMHG

## 2023-10-01 LAB
ALBUMIN SERPL ELPH-MCNC: 3.1 G/DL — LOW (ref 3.4–5)
ALP SERPL-CCNC: 138 U/L — HIGH (ref 40–120)
ALT FLD-CCNC: 47 U/L — HIGH (ref 12–42)
ANION GAP SERPL CALC-SCNC: 9 MMOL/L — SIGNIFICANT CHANGE UP (ref 9–16)
APTT BLD: 30.8 SEC — SIGNIFICANT CHANGE UP (ref 24.5–35.6)
AST SERPL-CCNC: 93 U/L — HIGH (ref 15–37)
BASOPHILS # BLD AUTO: 0.01 K/UL — SIGNIFICANT CHANGE UP (ref 0–0.2)
BASOPHILS NFR BLD AUTO: 0.3 % — SIGNIFICANT CHANGE UP (ref 0–2)
BILIRUB SERPL-MCNC: 0.8 MG/DL — SIGNIFICANT CHANGE UP (ref 0.2–1.2)
BUN SERPL-MCNC: 12 MG/DL — SIGNIFICANT CHANGE UP (ref 7–23)
CALCIUM SERPL-MCNC: 9 MG/DL — SIGNIFICANT CHANGE UP (ref 8.5–10.5)
CHLORIDE SERPL-SCNC: 102 MMOL/L — SIGNIFICANT CHANGE UP (ref 96–108)
CO2 SERPL-SCNC: 24 MMOL/L — SIGNIFICANT CHANGE UP (ref 22–31)
CREAT SERPL-MCNC: 0.95 MG/DL — SIGNIFICANT CHANGE UP (ref 0.5–1.3)
CRP SERPL-MCNC: 3 MG/L — SIGNIFICANT CHANGE UP (ref 0–9)
EGFR: 89 ML/MIN/1.73M2 — SIGNIFICANT CHANGE UP
EOSINOPHIL # BLD AUTO: 0.28 K/UL — SIGNIFICANT CHANGE UP (ref 0–0.5)
EOSINOPHIL NFR BLD AUTO: 7.8 % — HIGH (ref 0–6)
FLUAV AG NPH QL: SIGNIFICANT CHANGE UP
FLUBV AG NPH QL: SIGNIFICANT CHANGE UP
GLUCOSE SERPL-MCNC: 84 MG/DL — SIGNIFICANT CHANGE UP (ref 70–99)
HCT VFR BLD CALC: 32.1 % — LOW (ref 39–50)
HGB BLD-MCNC: 10 G/DL — LOW (ref 13–17)
IMM GRANULOCYTES NFR BLD AUTO: 0.3 % — SIGNIFICANT CHANGE UP (ref 0–0.9)
INR BLD: 1.25 — HIGH (ref 0.85–1.18)
LACTATE BLDV-MCNC: 2 MMOL/L — SIGNIFICANT CHANGE UP (ref 0.5–2)
LYMPHOCYTES # BLD AUTO: 1.05 K/UL — SIGNIFICANT CHANGE UP (ref 1–3.3)
LYMPHOCYTES # BLD AUTO: 29.4 % — SIGNIFICANT CHANGE UP (ref 13–44)
MCHC RBC-ENTMCNC: 30.2 PG — SIGNIFICANT CHANGE UP (ref 27–34)
MCHC RBC-ENTMCNC: 31.2 GM/DL — LOW (ref 32–36)
MCV RBC AUTO: 97 FL — SIGNIFICANT CHANGE UP (ref 80–100)
MONOCYTES # BLD AUTO: 0.51 K/UL — SIGNIFICANT CHANGE UP (ref 0–0.9)
MONOCYTES NFR BLD AUTO: 14.3 % — HIGH (ref 2–14)
NEUTROPHILS # BLD AUTO: 1.71 K/UL — LOW (ref 1.8–7.4)
NEUTROPHILS NFR BLD AUTO: 47.9 % — SIGNIFICANT CHANGE UP (ref 43–77)
NRBC # BLD: 0 /100 WBCS — SIGNIFICANT CHANGE UP (ref 0–0)
NT-PROBNP SERPL-SCNC: 5771 PG/ML — HIGH
PCO2 BLDV: 38 MMHG — LOW (ref 42–55)
PH BLDV: 7.41 — SIGNIFICANT CHANGE UP (ref 7.32–7.43)
PLATELET # BLD AUTO: 204 K/UL — SIGNIFICANT CHANGE UP (ref 150–400)
PO2 BLDV: 36 MMHG — SIGNIFICANT CHANGE UP (ref 25–45)
POTASSIUM SERPL-MCNC: 4.9 MMOL/L — SIGNIFICANT CHANGE UP (ref 3.5–5.3)
POTASSIUM SERPL-SCNC: 4.9 MMOL/L — SIGNIFICANT CHANGE UP (ref 3.5–5.3)
PROT SERPL-MCNC: 8.5 G/DL — HIGH (ref 6.4–8.2)
PROTHROM AB SERPL-ACNC: 14 SEC — HIGH (ref 9.5–13)
RBC # BLD: 3.31 M/UL — LOW (ref 4.2–5.8)
RBC # FLD: 17.2 % — HIGH (ref 10.3–14.5)
RSV RNA NPH QL NAA+NON-PROBE: SIGNIFICANT CHANGE UP
SAO2 % BLDV: 50.8 % — LOW (ref 67–88)
SARS-COV-2 RNA SPEC QL NAA+PROBE: SIGNIFICANT CHANGE UP
SODIUM SERPL-SCNC: 135 MMOL/L — SIGNIFICANT CHANGE UP (ref 132–145)
TROPONIN I, HIGH SENSITIVITY RESULT: 391.3 NG/L — HIGH
TROPONIN I, HIGH SENSITIVITY RESULT: 404.7 NG/L — HIGH
WBC # BLD: 3.57 K/UL — LOW (ref 3.8–10.5)
WBC # FLD AUTO: 3.57 K/UL — LOW (ref 3.8–10.5)

## 2023-10-01 PROCEDURE — 74177 CT ABD & PELVIS W/CONTRAST: CPT | Mod: 26

## 2023-10-01 PROCEDURE — 71260 CT THORAX DX C+: CPT | Mod: 26

## 2023-10-01 PROCEDURE — 99285 EMERGENCY DEPT VISIT HI MDM: CPT

## 2023-10-01 RX ORDER — SODIUM CHLORIDE 9 MG/ML
1000 INJECTION INTRAMUSCULAR; INTRAVENOUS; SUBCUTANEOUS ONCE
Refills: 0 | Status: COMPLETED | OUTPATIENT
Start: 2023-10-01 | End: 2023-10-01

## 2023-10-01 RX ORDER — CEFTRIAXONE 500 MG/1
2000 INJECTION, POWDER, FOR SOLUTION INTRAMUSCULAR; INTRAVENOUS ONCE
Refills: 0 | Status: COMPLETED | OUTPATIENT
Start: 2023-10-01 | End: 2023-10-01

## 2023-10-01 RX ORDER — VANCOMYCIN HCL 1 G
1000 VIAL (EA) INTRAVENOUS ONCE
Refills: 0 | Status: COMPLETED | OUTPATIENT
Start: 2023-10-01 | End: 2023-10-01

## 2023-10-01 RX ORDER — NYSTATIN CREAM 100000 [USP'U]/G
1 CREAM TOPICAL ONCE
Refills: 0 | Status: COMPLETED | OUTPATIENT
Start: 2023-10-01 | End: 2023-10-01

## 2023-10-01 RX ORDER — ACETAMINOPHEN 500 MG
975 TABLET ORAL ONCE
Refills: 0 | Status: COMPLETED | OUTPATIENT
Start: 2023-10-01 | End: 2023-10-01

## 2023-10-01 RX ORDER — ASPIRIN/CALCIUM CARB/MAGNESIUM 324 MG
324 TABLET ORAL ONCE
Refills: 0 | Status: COMPLETED | OUTPATIENT
Start: 2023-10-01 | End: 2023-10-01

## 2023-10-01 RX ORDER — IPRATROPIUM/ALBUTEROL SULFATE 18-103MCG
3 AEROSOL WITH ADAPTER (GRAM) INHALATION ONCE
Refills: 0 | Status: COMPLETED | OUTPATIENT
Start: 2023-10-01 | End: 2023-10-01

## 2023-10-01 RX ORDER — KETOROLAC TROMETHAMINE 30 MG/ML
15 SYRINGE (ML) INJECTION ONCE
Refills: 0 | Status: DISCONTINUED | OUTPATIENT
Start: 2023-10-01 | End: 2023-10-01

## 2023-10-01 RX ADMIN — Medication 1000 MILLIGRAM(S): at 22:55

## 2023-10-01 RX ADMIN — NYSTATIN CREAM 1 APPLICATION(S): 100000 CREAM TOPICAL at 21:25

## 2023-10-01 RX ADMIN — SODIUM CHLORIDE 1000 MILLILITER(S): 9 INJECTION INTRAMUSCULAR; INTRAVENOUS; SUBCUTANEOUS at 20:48

## 2023-10-01 RX ADMIN — Medication 200 MILLIGRAM(S): at 21:25

## 2023-10-01 RX ADMIN — CEFTRIAXONE 100 MILLIGRAM(S): 500 INJECTION, POWDER, FOR SOLUTION INTRAMUSCULAR; INTRAVENOUS at 21:25

## 2023-10-01 RX ADMIN — Medication 15 MILLIGRAM(S): at 20:49

## 2023-10-01 RX ADMIN — Medication 250 MILLIGRAM(S): at 21:49

## 2023-10-01 RX ADMIN — SODIUM CHLORIDE 1000 MILLILITER(S): 9 INJECTION INTRAMUSCULAR; INTRAVENOUS; SUBCUTANEOUS at 22:55

## 2023-10-01 RX ADMIN — Medication 975 MILLIGRAM(S): at 20:49

## 2023-10-01 RX ADMIN — Medication 324 MILLIGRAM(S): at 21:27

## 2023-10-01 RX ADMIN — Medication 3 MILLILITER(S): at 21:27

## 2023-10-01 RX ADMIN — Medication 100 MILLIGRAM(S): at 21:25

## 2023-10-01 RX ADMIN — CEFTRIAXONE 2000 MILLIGRAM(S): 500 INJECTION, POWDER, FOR SOLUTION INTRAMUSCULAR; INTRAVENOUS at 22:55

## 2023-10-01 RX ADMIN — Medication 600 MILLIGRAM(S): at 22:55

## 2023-10-01 NOTE — ED PROVIDER NOTE - NSICDXPASTMEDICALHX_GEN_ALL_CORE_FT
PAST MEDICAL HISTORY:  Atrial fibrillation     COPD (chronic obstructive pulmonary disease)     HIV (human immunodeficiency virus infection)     Lung cancer      PAST MEDICAL HISTORY:  COPD (chronic obstructive pulmonary disease)     HIV (human immunodeficiency virus infection)     HTN (hypertension)     Lung cancer

## 2023-10-01 NOTE — ED PROVIDER NOTE - DIFFERENTIAL DIAGNOSIS
Differential Diagnosis DDx including but not limited to the following -   sepsis, PNA, ledy's, VTE, CHF, ACS, COPD/asthma exacerbation, fungal infection

## 2023-10-01 NOTE — ED ADULT NURSE REASSESSMENT NOTE - NS ED NURSE REASSESS COMMENT FT1
Pt care handed over from elizabeth perez, sitting upright in bed, watching tv, flushed iv post ivabs , pt aware he will be admitted , o2 sats on room air 95% currently

## 2023-10-01 NOTE — ED PROVIDER NOTE - OBJECTIVE STATEMENT
64 yo M with PMHx of HIV, unknown CD4/VL, off HAART x 6 months, lung CA, not on any treatment, "hole in my heart," A.fib, not on any AC currently, HTN, COPD, no h/o intubation and non O2 dependent, presenting c/o cough and SOB. Pt is a poor historian, reports having sx x few months, getting progressively worse and unable to breath x 1 month with chronic productive cough x 2 months and BLE swelling. Also endorses burning sensation to b/l thighs and subjective fever.  Denies chills, sore throat, palpitations, CP, abdominal pain, N/V/D/C, change in urinary/bowel function, dysuria, hematuria, flank pain, malaise, HA, and dizziness. Lost to follow up x 6 months. 64 yo M with PMHx of HIV, unknown CD4/VL, off HAART x 6 months, lung CA, not on any treatment, "hole in my heart," HTN, COPD, no h/o intubation and non O2 dependent, presenting c/o cough and SOB. Pt is a poor historian, reports having sx x few months, getting progressively worse and unable to breath x 1 month with chronic productive cough x 2 months and BLE swelling. Also endorses burning sensation to b/l thighs and subjective fever.  Denies chills, sore throat, palpitations, CP, abdominal pain, N/V/D/C, change in urinary/bowel function, dysuria, hematuria, flank pain, malaise, HA, and dizziness. Lost to follow up x 6 months. 62 yo M with PMHx of HIV, unknown CD4/VL, off HAART x 6 months, lung CA, not on any treatment, "hole in my heart," HTN, asthma/COPD, no h/o intubation and non O2 dependent, presenting c/o cough and SOB. Pt is a poor historian, reports having sx x few months, getting progressively worse and unable to breath x 1 month with chronic productive cough x 2 months and BLE swelling. Also endorses burning sensation to b/l thighs and subjective fever.  Denies chills, sore throat, palpitations, CP, abdominal pain, N/V/D/C, change in urinary/bowel function, dysuria, hematuria, flank pain, malaise, HA, and dizziness. Lost to follow up x 6 months. 64 yo M with PMHx of HIV, unknown CD4/VL, off HAART x 6 months, lung CA, not on any treatment, "hole in my heart," HTN, asthma/COPD, no h/o intubation and non O2 dependent, presenting c/o worsening cough and SOB. Pt is a poor historian, reports having sx x few months, getting progressively worse and unable to breath x 1 month with chronic productive cough x 2 months and BLE swelling. Also endorses burning sensation to b/l thighs and subjective fever.  Denies chills, sore throat, palpitations, CP, abdominal pain, N/V/D/C, change in urinary/bowel function, dysuria, hematuria, flank pain, malaise, HA, and dizziness. Lost to follow up x 6 months.

## 2023-10-01 NOTE — ED ADULT NURSE NOTE - NSFALLUNIVINTERV_ED_ALL_ED
Bed/Stretcher in lowest position, wheels locked, appropriate side rails in place/Call bell, personal items and telephone in reach/Instruct patient to call for assistance before getting out of bed/chair/stretcher/Non-slip footwear applied when patient is off stretcher/[place] to call system/Physically safe environment - no spills, clutter or unnecessary equipment/Purposeful proactive rounding/Room/bathroom lighting operational, light cord in reach

## 2023-10-01 NOTE — ED PROVIDER NOTE - CARE PLAN
Principal Discharge DX:	Atrial fibrillation with RVR  Secondary Diagnosis:	Acute CHF  Secondary Diagnosis:	Perineal rash   1 Principal Discharge DX:	Atrial fibrillation with RVR  Secondary Diagnosis:	Acute CHF  Secondary Diagnosis:	Perineal rash  Secondary Diagnosis:	Candidiasis of mouth with HIV infection

## 2023-10-01 NOTE — ED PROVIDER NOTE - ADMIT DISPOSITION PRESENT ON ADMISSION SEPSIS Q1 - RE-EVALUATED PATIENT FLUID AND VITAL SIGNS
I have re-evaluated the patient's fluid status and reviewed vital signs. Clinical perfusion assessment was performed.
communication

## 2023-10-01 NOTE — ED PROVIDER NOTE - CLINICAL SUMMARY MEDICAL DECISION MAKING FREE TEXT BOX
64 yo M with PMHx of HIV, unknown CD4/VL, off HAART x 6 months, lung CA, not on any treatment, "hole in my heart," HTN, asthma/COPD, no h/o intubation and non O2 dependent, presenting c/o worsening cough and SOB    #febrile illness in immunocompromised state, r/o sepsis, PNA, ledy's, disseminated candidiasis infection 2/2 HIV  - sepsis code activation, pan culture, including blood, urine, rapid flu/covid, labs, ekg, cardiac monitoring, cautions with IVF due to concern for new onset CHF vs fluid overload due to worsening peripheral edema/unknown malignancy, empiric abx for lung/skin/anaerobic/fungal coverage (IV ceftriaxone 2g, vanco, clinda, and nystatin)   - antipyretics, supportive care, and serial reassessment  - CT C/A/P to r/o occult infection due to pt's poor history and med non compliance     #CV, ?new onset A.fib w RVR, ?new onset of CHF, ACS r/o   - noted A.fib w RVR to 110s on arrival, rectal temp of 100.8, likely 2/2 infectious etiology. Pt reports pain with coughing, but otherwise no active CP or prior history of dysrrhythmia. Unsure of prior cardiac w/u or TTE.   - fever control for better rate control, cardiac monitoring, supplemental O2 via NC to keep SpO2>93%  - trend CE, check TSH, serial EKGs 64 yo M with PMHx of HIV, unknown CD4/VL, off HAART x 6 months, lung CA, not on any treatment, "hole in my heart," HTN, asthma/COPD, no h/o intubation and non O2 dependent, presenting c/o worsening cough and SOB    #febrile illness in immunocompromised state, r/o sepsis, PNA, ledy's, disseminated candidiasis infection 2/2 HIV  - sepsis code activation, pan culture, including blood, urine, rapid flu/covid, labs, ekg, cardiac monitoring, cautions with IVF due to concern for new onset CHF vs fluid overload due to worsening peripheral edema/unknown malignancy, empiric abx for lung/skin/anaerobic/fungal coverage (IV ceftriaxone 2g, vanco, clinda, and nystatin)   - antipyretics, supportive care, and serial reassessment  - CT C/A/P to r/o occult infection due to pt's poor history and med non compliance     #CV, ?new onset A.fib w RVR, ?new onset of CHF, ACS r/o   - noted A.fib w RVR to 110s on arrival, rectal temp of 100.8, likely 2/2 infectious etiology. Pt reports pain with coughing, but otherwise no active CP or prior history of dysrrhythmia. Unsure of prior cardiac w/u or TTE.   - RVR likely 2/2 febrile state, fever control for better rate control for now, cardiac monitoring, consider rate controlling agents if HD stable/sepsis r/o, supplemental O2 via NC to keep SpO2>93%, strict I&Os   - trend CE, check TSH, serial EKGs, r/o VTE/ACS/CHF     CT C/A/P -   1. No central pulmonary embolism or pneumonia.    2. Cardiomegaly, right atrial enlargement, reflux of contrast into the   IVC and hepatic veins and smooth interlobular septal thickening. Findings   may be seen with pulmonary edema due to right-sided heart failure.    3. Multiple prominent axillary and paraaortic lymph nodes and enlarged   bilateral inguinal nodes. Etiology is uncertain, may be related to HIV.    4. Small perihepatic ascites and pericholecystic fluid. Gallbladder not   well visualized. Further evaluation with right upper quadrant ultrasound   may be obtained as clinically indicated.    5. nonspecific mild bladder wall thickening posteriorly and on the left.   Correlate for UTI. Other etiologies including neoplasm are not excluded.    Labs significant for elevated trop to 391.3 with A.fib, TWI in lateral leads, probnp of 5771. Repeat trop stable at 404.7, EKG with no dynamic changes. Tele monitoring unremarkable. Rate better controlled s/p fever defervesced. ASA given, no active CP currently.     Will consider a low dose BB and diuresis for CT signs of CHF w R sided failure. elevated troponemia likely 2/2 CHF/A.fib/demand ischemia. Case discussed with cardiac tele, Dr. De La Garza, accepted to [facility] cardiac tele for further intervention and management

## 2023-10-01 NOTE — ED PROVIDER NOTE - ATTENDING APP SHARED VISIT CONTRIBUTION OF CARE
64 yo male pt, with Hx of HIV, unknown CD4/VL, off HAART x 6 months, lung CA, not on any treatment,  A.fib, not on any AC currently, HTN, COPD. Presents for worsening sob/dyspnea. fever and tcahycardia on arrival, triggering sepsis alert. Will work up for infectious etiology, start fluids, broad spectrum abxs. 64 yo M with PMHx of HIV, unknown CD4/VL, off HAART x 6 months, lung CA, not on any treatment, "hole in my heart," HTN, asthma/COPD. Presents for worsening sob/dyspnea. fever and tcahycardia on arrival, triggering sepsis alert. Will work up for infectious etiology, start fluids, broad spectrum abxs.

## 2023-10-01 NOTE — ED ADULT TRIAGE NOTE - CHIEF COMPLAINT QUOTE
pt lives in [place], asked bystander to called 911 stating to ems productive cough "for months" pmhx lung ca no treatment, non compliant with hiv meds pt lives in [place], asked bystander to called 911 stating to ems productive cough "for months" pmhx lung ca no treatment, non compliant with hiv meds, changed to cat 2 as has  rectal temp

## 2023-10-01 NOTE — ED ADULT NURSE NOTE - OBJECTIVE STATEMENT
Pt to ED for cough he has had "for months" and rash on groin. EMS reports pt grunting in ambulance, labored breathing on presentation. Placed on 2L NC. Unreliable historian. pmhx lung ca no treatment, non compliant with hiv meds.

## 2023-10-01 NOTE — ED ADULT NURSE NOTE - NSICDXPASTMEDICALHX_GEN_ALL_CORE_FT
PAST MEDICAL HISTORY:  Atrial fibrillation     COPD (chronic obstructive pulmonary disease)     HIV (human immunodeficiency virus infection)     Lung cancer

## 2023-10-01 NOTE — ED ADULT NURSE NOTE - CHIEF COMPLAINT QUOTE
pt lives in [place], asked bystander to called 911 stating to ems productive cough "for months" pmhx lung ca no treatment, non compliant with hiv meds, changed to cat 2 as has  rectal temp

## 2023-10-01 NOTE — ED PROVIDER NOTE - PHYSICAL EXAMINATION
Gen - Ill appearing M with intermittent coughing, non-toxic appearing, speaking in full sentences   Skin - warm, dry, intact, plaque like excoriation rash to perineal region with inner thigh and groin involvement, no crepitus or fluctuance or streaking   HEENT - AT/NC, PERRL, poor dentition, no nasal discharge, airway patent, neck supple and FROM   CV - S1S2, irregularly irregular   Resp - diffuse wheezing and rales to b/l lung fields, poor inspiratory effort, no tripoding or accessory muscle use   GI - soft, ND, NT, no CVAT b/l   MS - 2+ BLE pitting edema, calves supple and NT, No midline spinal tenderness or step off on palpation  Neuro - AxOx3, no focal neuro deficits, ambulatory with steady gait Gen - Ill appearing M with intermittent coughing, non-toxic appearing, speaking in full sentences   Skin - warm, dry, intact, plaque like excoriation rash to perineal region with inner thigh and groin involvement, no crepitus or fluctuance or streaking   HEENT - AT/NC, PERRL, poor dentition, no nasal discharge, airway patent, neck supple and FROM   CV - S1S2, irregularly irregular   Resp - diffuse wheezing and rales to b/l lung fields, poor inspiratory effort, no tripoding or accessory muscle use   GI - soft, ND, NT, no CVAT b/l    - external genitalia wnl, no ulceration or crepitus noted, diffuse rash per skin section, no dc, bleeding or desquamation of skin noted   MS - 2+ BLE pitting edema, calves supple and NT, No midline spinal tenderness or step off on palpation  Neuro - AxOx3, no focal neuro deficits, ambulatory with steady gait

## 2023-10-02 DIAGNOSIS — B20 HUMAN IMMUNODEFICIENCY VIRUS [HIV] DISEASE: ICD-10-CM

## 2023-10-02 DIAGNOSIS — N39.0 URINARY TRACT INFECTION, SITE NOT SPECIFIED: ICD-10-CM

## 2023-10-02 DIAGNOSIS — Z29.9 ENCOUNTER FOR PROPHYLACTIC MEASURES, UNSPECIFIED: ICD-10-CM

## 2023-10-02 DIAGNOSIS — I48.20 CHRONIC ATRIAL FIBRILLATION, UNSPECIFIED: ICD-10-CM

## 2023-10-02 DIAGNOSIS — F19.10 OTHER PSYCHOACTIVE SUBSTANCE ABUSE, UNCOMPLICATED: ICD-10-CM

## 2023-10-02 DIAGNOSIS — I50.33 ACUTE ON CHRONIC DIASTOLIC (CONGESTIVE) HEART FAILURE: ICD-10-CM

## 2023-10-02 LAB
APPEARANCE UR: ABNORMAL
BILIRUB UR-MCNC: ABNORMAL
C TRACH RRNA SPEC QL NAA+PROBE: SIGNIFICANT CHANGE UP
CK MB CFR SERPL CALC: 1 NG/ML — SIGNIFICANT CHANGE UP (ref 0.5–3.6)
CK SERPL-CCNC: 125 U/L — SIGNIFICANT CHANGE UP (ref 39–308)
COLOR SPEC: SIGNIFICANT CHANGE UP
DIFF PNL FLD: ABNORMAL
GLUCOSE UR QL: NEGATIVE MG/DL — SIGNIFICANT CHANGE UP
KETONES UR-MCNC: ABNORMAL MG/DL
LEUKOCYTE ESTERASE UR-ACNC: ABNORMAL
N GONORRHOEA RRNA SPEC QL NAA+PROBE: SIGNIFICANT CHANGE UP
NITRITE UR-MCNC: NEGATIVE — SIGNIFICANT CHANGE UP
PCP SPEC-MCNC: SIGNIFICANT CHANGE UP
PH UR: 5.5 — SIGNIFICANT CHANGE UP (ref 5–8)
PROT UR-MCNC: 100 MG/DL
SP GR SPEC: 1.04 — HIGH (ref 1–1.03)
SPECIMEN SOURCE: SIGNIFICANT CHANGE UP
TSH SERPL-MCNC: 1.01 UIU/ML — SIGNIFICANT CHANGE UP (ref 0.36–3.74)
UROBILINOGEN FLD QL: 1 MG/DL — SIGNIFICANT CHANGE UP (ref 0.2–1)

## 2023-10-02 PROCEDURE — 99223 1ST HOSP IP/OBS HIGH 75: CPT

## 2023-10-02 RX ORDER — METOPROLOL TARTRATE 50 MG
12.5 TABLET ORAL DAILY
Refills: 0 | Status: DISCONTINUED | OUTPATIENT
Start: 2023-10-03 | End: 2023-10-03

## 2023-10-02 RX ORDER — APIXABAN 2.5 MG/1
5 TABLET, FILM COATED ORAL EVERY 12 HOURS
Refills: 0 | Status: DISCONTINUED | OUTPATIENT
Start: 2023-10-02 | End: 2023-10-03

## 2023-10-02 RX ORDER — DAPAGLIFLOZIN 10 MG/1
10 TABLET, FILM COATED ORAL EVERY 24 HOURS
Refills: 0 | Status: DISCONTINUED | OUTPATIENT
Start: 2023-10-02 | End: 2023-10-03

## 2023-10-02 RX ORDER — CEFTRIAXONE 500 MG/1
1000 INJECTION, POWDER, FOR SOLUTION INTRAMUSCULAR; INTRAVENOUS EVERY 24 HOURS
Refills: 0 | Status: DISCONTINUED | OUTPATIENT
Start: 2023-10-02 | End: 2023-10-03

## 2023-10-02 RX ORDER — IPRATROPIUM/ALBUTEROL SULFATE 18-103MCG
3 AEROSOL WITH ADAPTER (GRAM) INHALATION ONCE
Refills: 0 | Status: COMPLETED | OUTPATIENT
Start: 2023-10-02 | End: 2023-10-02

## 2023-10-02 RX ORDER — ACETAMINOPHEN 500 MG
1000 TABLET ORAL ONCE
Refills: 0 | Status: COMPLETED | OUTPATIENT
Start: 2023-10-02 | End: 2023-10-02

## 2023-10-02 RX ORDER — CEFTRIAXONE 500 MG/1
1000 INJECTION, POWDER, FOR SOLUTION INTRAMUSCULAR; INTRAVENOUS EVERY 24 HOURS
Refills: 0 | Status: DISCONTINUED | OUTPATIENT
Start: 2023-10-02 | End: 2023-10-02

## 2023-10-02 RX ORDER — IPRATROPIUM/ALBUTEROL SULFATE 18-103MCG
3 AEROSOL WITH ADAPTER (GRAM) INHALATION EVERY 6 HOURS
Refills: 0 | Status: DISCONTINUED | OUTPATIENT
Start: 2023-10-02 | End: 2023-10-03

## 2023-10-02 RX ORDER — ACETAMINOPHEN 500 MG
650 TABLET ORAL EVERY 6 HOURS
Refills: 0 | Status: DISCONTINUED | OUTPATIENT
Start: 2023-10-02 | End: 2023-10-03

## 2023-10-02 RX ORDER — VANCOMYCIN HCL 1 G
1250 VIAL (EA) INTRAVENOUS EVERY 24 HOURS
Refills: 0 | Status: DISCONTINUED | OUTPATIENT
Start: 2023-10-02 | End: 2023-10-03

## 2023-10-02 RX ORDER — VALSARTAN 80 MG/1
40 TABLET ORAL DAILY
Refills: 0 | Status: DISCONTINUED | OUTPATIENT
Start: 2023-10-02 | End: 2023-10-03

## 2023-10-02 RX ORDER — FUROSEMIDE 40 MG
20 TABLET ORAL
Refills: 0 | Status: DISCONTINUED | OUTPATIENT
Start: 2023-10-02 | End: 2023-10-03

## 2023-10-02 RX ORDER — NYSTATIN CREAM 100000 [USP'U]/G
1 CREAM TOPICAL
Refills: 0 | Status: DISCONTINUED | OUTPATIENT
Start: 2023-10-02 | End: 2023-10-03

## 2023-10-02 RX ORDER — METOPROLOL TARTRATE 50 MG
12.5 TABLET ORAL ONCE
Refills: 0 | Status: COMPLETED | OUTPATIENT
Start: 2023-10-02 | End: 2023-10-02

## 2023-10-02 RX ORDER — BICTEGRAVIR SODIUM, EMTRICITABINE, AND TENOFOVIR ALAFENAMIDE FUMARATE 30; 120; 15 MG/1; MG/1; MG/1
1 TABLET ORAL DAILY
Refills: 0 | Status: DISCONTINUED | OUTPATIENT
Start: 2023-10-02 | End: 2023-10-02

## 2023-10-02 RX ORDER — ASPIRIN/CALCIUM CARB/MAGNESIUM 324 MG
81 TABLET ORAL DAILY
Refills: 0 | Status: DISCONTINUED | OUTPATIENT
Start: 2023-10-02 | End: 2023-10-03

## 2023-10-02 RX ORDER — FUROSEMIDE 40 MG
20 TABLET ORAL ONCE
Refills: 0 | Status: COMPLETED | OUTPATIENT
Start: 2023-10-02 | End: 2023-10-02

## 2023-10-02 RX ADMIN — Medication 3 MILLILITER(S): at 06:07

## 2023-10-02 RX ADMIN — Medication 12.5 MILLIGRAM(S): at 01:06

## 2023-10-02 RX ADMIN — Medication 1000 MILLIGRAM(S): at 18:56

## 2023-10-02 RX ADMIN — Medication 200 MILLIGRAM(S): at 21:17

## 2023-10-02 RX ADMIN — DAPAGLIFLOZIN 10 MILLIGRAM(S): 10 TABLET, FILM COATED ORAL at 21:17

## 2023-10-02 RX ADMIN — Medication 3 MILLILITER(S): at 21:17

## 2023-10-02 RX ADMIN — Medication 20 MILLIGRAM(S): at 01:06

## 2023-10-02 RX ADMIN — Medication 400 MILLIGRAM(S): at 17:56

## 2023-10-02 RX ADMIN — NYSTATIN CREAM 1 APPLICATION(S): 100000 CREAM TOPICAL at 17:57

## 2023-10-02 RX ADMIN — Medication 250 MILLIGRAM(S): at 21:32

## 2023-10-02 RX ADMIN — CEFTRIAXONE 100 MILLIGRAM(S): 500 INJECTION, POWDER, FOR SOLUTION INTRAMUSCULAR; INTRAVENOUS at 21:17

## 2023-10-02 NOTE — H&P ADULT - PROBLEM SELECTOR PLAN 5
Utox Positive: Cocaine, THC  -on home BB for AFIB/CHF  -Monitor closely Noncompliant on HARRT, last taken 6 months ago  -Restarted on home Biktarvy  -Follow up CD4/VL

## 2023-10-02 NOTE — ED ADULT NURSE REASSESSMENT NOTE - NS ED NURSE REASSESS COMMENT FT1
Pt screaming that he wants the door shut and curtain pulled, explained multiple times that I need to see both pt and monitor, pt screaming and swearing, pa Marquez talked with pt , pt states that she wants to leave , security called to pt;s rising aggression , security on standby with pt

## 2023-10-02 NOTE — H&P ADULT - PROBLEM SELECTOR PLAN 2
Temp 100.5, f/u repeat temp  -treated @ [facility] with Vanco 1g, Ceft 2gm IV, Clinda 600mg IV, tylenol  -See CT Chest PNA  -Ordered for BCXs x 2 (refused to participate in exam)  -c/w tylenol PRN    # Cough/SOB  -See Chest above   -c/w Benzonatate 20mg q8h  -Roxanna Temp 100.5, f/u repeat temp  --See CT Chest PNA  -treated @ [facility] with Vanco 1g, Ceft 2gm IV, Clinda 600mg IV,  tylenol  -c/w Ceft 1gm IV x 5d, Vanco 1g IV x 5d (f/u trough b/f 4th dose)  -Ordered for BCXs x 2 (refused to participate in exam)  -c/w tylenol PRN    # Cough/SOB  -See Chest above   -c/w Benzonatate 20mg q8h  -Roxanna Temp 100.5, f/u repeat temp  --See CT Chest PNA  -treated @ [facility] with Vanco 1g, Ceft 2gm IV, Clinda 600mg IV,  tylenol  -c/w Ceft 1gm IV x 5d, Vanco 1250mg IV x 5d (f/u trough b/f 4th dose)  -Ordered for BCXs x 2 (refused to participate in exam)  -c/w tylenol PRN    # Cough/SOB  -See Chest above   -c/w Benzonatate 20mg q8h  -Roxanna

## 2023-10-02 NOTE — ED ADULT NURSE REASSESSMENT NOTE - NS ED NURSE REASSESS COMMENT FT1
EMS at bedside for transfer. Pt noted to be wheezing at time of transfer. PA Haroldo at bedside, verbal order for duoneb to be given. Medicated as per order. Pt being verbally abusive at time of transfer, stating "this nurse is making me sick", insisting to leave ED with EMS. Unable to perform tele consult.

## 2023-10-02 NOTE — H&P ADULT - PROBLEM SELECTOR PLAN 6
DVT PPx:  Eliquis     F: NS/None  E: K<4, Mg<2  N: DASH/TLC    C: FULL CODE  Dispo: Pending clinical progression    Case discussed with Dr. De La Garza Utox Positive: Cocaine, THC  -on home BB for AFIB/CHF  -Monitor closely

## 2023-10-02 NOTE — H&P ADULT - HISTORY OF PRESENT ILLNESS
63 yr old undomiciled M, noncompliant with meds, hx of  polysubstance abuse (cocaine/THC), PMHx of HIV/AIDs (noncompliant w/ HAART), Afib (noncompliant w/ Eliquis), severe MR/mod-severe TR, HFpEF (EF:55% by TTE 11/2022), lung CA?  who presented to [facility] 10/1/23 c/o progressively worsening cough and SOB x few months.     Patient states he_______    Patient denies any N/V, diaphoresis, palpitations, dizziness, chest pain, PND, orthopnea, LE edema, recent travel or sick contacts.     In [facility], BP: 118/88, HR: 80s-90s, RR: 22, Temp: 100.5F, O2 sat: 98% on 4L NC.  EKG revealed Afib@90BPM with TWI lateral leads. CT Chest/Abd/Pelvis: negative for PE/PNA. Cardiomegaly noted with findings c/w pulmonary edema 2/2 right sided heart failure. Multiple prominent axillary and paraaortic lymph nodes and enlarged bilateral inguinal nodes, likely related to HIV. Small perihepatic ascites and pericholecystic fluid.       Labs notable for: WBC 3.57 (low), , AST 93, ALT 47, BNP 5771, Trop I (HS) 391.3 with second set 404.7, Lactate normal. UA+ small LE/small blood. Utox +cocaine/THC.     Patient treated with     Patient now transferred to [facility] for management of acute on chronic HFpEF exacerbation in setting of UTI.   63 yr old M, reluctant historian (refused to participate in admissions), undomiciled, noncompliant with meds, hx of  polysubstance abuse (cocaine/THC), PMHx of HIV/AIDs (noncompliant w/ HAART), Afib (noncompliant w/ Eliquis), severe MR/mod-severe TR, HFpEF (EF:55% by TTE 11/2022), lung CA?  who presented to [facility] 10/1/23 c/o progressively worsening cough and SOB x few months.  Unable to illicit symptoms from pt, agitated and states he wants to sleep.     In [facility], BP: 118/88, HR: 80s-90s, RR: 22, Temp: 100.5F, O2 sat: 98% on 4L NC.  EKG revealed Afib@90BPM with TWI lateral leads. CT Chest/Abd/Pelvis: negative for PE/PNA. Cardiomegaly noted with findings c/w pulmonary edema 2/2 right sided heart failure. Multiple prominent axillary and paraaortic lymph nodes and enlarged bilateral inguinal nodes, likely related to HIV. Small perihepatic ascites and pericholecystic fluid.       Labs notable for: WBC 3.57 (low), , AST 93, ALT 47, BNP 5771, Trop I (HS) 391.3 with second set 404.7, Lactate normal. UA+ small LE/small blood. Utox +cocaine/THC.     Patient treated with Lasix 20mg IV, Ceftriaxone 2g IV, Vanco 1g IV, Clindamycin 600mg IV, ASA 81mg,  Duonebs, Lopressor 12.5mg and is now transferred to [facility] for management of acute on chronic HFpEF exacerbation in setting of UTI.

## 2023-10-02 NOTE — H&P ADULT - PROBLEM SELECTOR PLAN 4
Noncompliant on HARRT, last taken 6 months ago  -Restarted on home Biktarvy  -Follow up CD4/VL Rate controlled, HR 80-90's  -See EKG above  -Obtain ECHO  -Noncompliant on Eliquis, restarted on Eliquis 5mg BID  -On home Toprol XL 25mg QD, reduced 12.5mg QD

## 2023-10-02 NOTE — H&P ADULT - PROBLEM SELECTOR PLAN 7
DVT PPx:  Eliquis     F: NS/None  E: K<4, Mg<2  N: DASH/TLC    C: FULL CODE  Dispo: Pending clinical progression    Case discussed with Dr. De La Garza

## 2023-10-02 NOTE — H&P ADULT - NSHPLABSRESULTS_GEN_ALL_CORE
10.0   3.57  )-----------( 204      ( 01 Oct 2023 20:27 )             32.1       10    135  |  102  |  12  ----------------------------<  84  4.9   |  24  |  0.95    Ca    9.0      01 Oct 2023 20:27    TPro  8.5<H>  /  Alb  3.1<L>  /  TBili  0.8  /  DBili  x   /  AST  93<H>  /  ALT  47<H>  /  AlkPhos  138<H>  10      PT/INR - ( 01 Oct 2023 20:27 )   PT: 14.0 sec;   INR: 1.25          PTT - ( 01 Oct 2023 20:27 )  PTT:30.8 sec          Urinalysis Basic - ( 01 Oct 2023 20:36 )    Color: Dark Yellow / Appearance: Cloudy / S.045 / pH: x  Gluc: x / Ketone: Trace mg/dL  / Bili: Small / Urobili: 1.0 mg/dL   Blood: x / Protein: 100 mg/dL / Nitrite: Negative   Leuk Esterase: Small / RBC: 3 /HPF / WBC 1 /HPF   Sq Epi: x / Non Sq Epi: x / Bacteria: present /HPF

## 2023-10-02 NOTE — H&P ADULT - ASSESSMENT
63 yr old M, reluctant historian, undomiciled, noncompliant with meds, hx of  polysubstance abuse (cocaine/THC), PMHx of HIV/AIDs (noncompliant w/ HAART), Afib (noncompliant w/ Eliquis), severe MR/mod-severe TR, HFpEF (EF:55% by TTE 11/2022), lung CA?  who presented to [facility] 10/1/23 c/o progressively worsening cough and SOB x few months

## 2023-10-02 NOTE — ED ADULT NURSE REASSESSMENT NOTE - NS ED NURSE REASSESS COMMENT FT1
pt decided to stay, got himself back in bed, iv's intact, placed back on cardiac monitor , remains in Afib @ 90 bpm , 02 sats 96% room air, pt visible, curtain open, door partially closed

## 2023-10-02 NOTE — H&P ADULT - PROBLEM SELECTOR PLAN 3
Rate controlled, HR 80-90's  -See EKG above  -Obtain ECHO  -Noncompliant on Eliquis, restarted on Eliquis 5mg BID  -On home Toprol XL 25mg QD, reduced 12.5mg QD UA+ small LE/small blood  -s/p Ceft 1g @ [facility]  -c/w Ceft 1g IV x 3D  -F/U UCxs and treat accordingly

## 2023-10-02 NOTE — PATIENT PROFILE ADULT - FALL HARM RISK - HARM RISK INTERVENTIONS

## 2023-10-02 NOTE — H&P ADULT - NSHPADDITIONALINFOADULT_GEN_ALL_CORE
Attending Attestation:  I was physically present for the key portions of the evaluation and management (E/M) service provided.  I agree with the above history, physical, and plan which I have reviewed with the following edits/addendum:    63M with polysubstance use, HIV, atrial fibrillation, not on AC, HFpEF, non-compliance who presented w RUEDA worsening over months. Limited history and exam at this time due to patient poor cooperation. On exam, appears hypervolemic but with no LE edema.   Mild temp elevation, occasional desat while in room but >90% on RA with good wave forms.   ECG Afib, rate controlled, with old repol abnormalities.   TTE 11/2022: LVEF 55% w at least moderate eccentric MR, and mod TR  Elevated hstrop 404<-391, normal crea. Utox + for cocaine and THC  CT chest wo congestion. On exam, appears mildly volume up.     #ADHF - repeat TTE, continue IV diuresis for now, resume GDMT w valsartan, metop succ, farxiga  #HTN - GDMT, normal BPs  #hypoxic resp failure - diurese  #Type 2 MI - demand-mediated troponin elevation  #groin rash, HIV - consult ID     Temo Latham MD  Cardiology

## 2023-10-02 NOTE — H&P ADULT - NSICDXPASTMEDICALHX_GEN_ALL_CORE_FT
PAST MEDICAL HISTORY:  COPD (chronic obstructive pulmonary disease)     HIV (human immunodeficiency virus infection)     HTN (hypertension)     Lung cancer

## 2023-10-02 NOTE — H&P ADULT - PROBLEM SELECTOR PLAN 1
Transferred from [facility] with progressive cough/SOB, SpO2 100 on 4-+6L  -Unable to illicit lung exam; pt refuses to participate  -HS Trops 391.3--> 404.7, BNP>5K,  F/U AM trops/EKG   -EKG revealed Afib@90BPM with TWI lateral leads  -CT Chest/Abd/Pelvis: negative for PE/PNA. Cardiomegaly noted with findings c/w pulmonary edema 2/2 right sided heart failure. Multiple prominent axillary and paraaortic lymph nodes and enlarged bilateral inguinal nodes, likely related to HIV. Small perihepatic ascites and pericholecystic fluid.     -s/p Lasix 20mg IV (naive), c/w Lasix 20mg IV BID   -Obtain ECHO for EF and structural  -Home meds: Lasix 20mg QD, Toprol XL 25mg QD, Valsartan 40mg QD,  Farxiga 10 mg  QD  -F/U cardiac risk labs  -Core measures, strict I/O's daily weights, 1L PO restriction, VS per routine, cont tele/pulse ox Transferred from [facility] with progressive cough/SOB, SpO2 100 on 4-+6L  -Unable to illicit lung exam; pt refuses to participate  -HS Trops 391.3--> 404.7, BNP>5K,  F/U AM trops/EKG   -EKG revealed Afib@90BPM with TWI lateral leads  -CT Chest/Abd/Pelvis: negative for PE/PNA. Cardiomegaly noted with findings c/w pulmonary edema 2/2 right sided heart failure. Multiple prominent axillary and paraaortic lymph nodes and enlarged bilateral inguinal nodes, likely related to HIV. Small perihepatic ascites and pericholecystic fluid.     -s/p Lasix 20mg IV (naive), c/w Lasix 20mg IV BID   -Obtain ECHO for EF and structural  -Home meds: Lasix 20mg QD, Toprol XL 25mg QD, Valsartan 40mg QD,  Farxiga 10 mg  QD  -F/U cardiac risk labs  -Core measures, strict I/O's daily weights, 1L PO restriction, VS per routine, cont tele/pulse ox    #Severe MR/Moderate-Severe TR  -ECHO  -C/w Lasix 20mg IV BID Transferred from [facility] with progressive cough/SOB, SpO2 100 on 4-+6L  -Unable to illicit lung exam; pt refuses to participate  -HS Trops 391.3--> 404.7, BNP>5K,  F/U AM trops/EKG   -EKG revealed Afib@90BPM with TWI lateral leads  -ECHO 11/2022: EF:55%   -CT Chest/Abd/Pelvis: negative for PE/PNA. Cardiomegaly noted with findings c/w pulmonary edema 2/2 right sided heart failure. Multiple prominent axillary and paraaortic lymph nodes and enlarged bilateral inguinal nodes, likely related to HIV. Small perihepatic ascites and pericholecystic fluid.     -s/p Lasix 20mg IV (naive), c/w Lasix 20mg IV BID   -Obtain ECHO for EF and structural  -Home meds: Lasix 20mg QD, Toprol XL 25mg QD, Valsartan 40mg QD,  Farxiga 10 mg  QD  -F/U cardiac risk labs  -Core measures, strict I/O's daily weights, 1L PO restriction, VS per routine, cont tele/pulse ox    #Severe MR/Moderate-Severe TR  -ECHO  -C/w Lasix 20mg IV BID

## 2023-10-03 VITALS — TEMPERATURE: 98 F

## 2023-10-03 LAB
CULTURE RESULTS: SIGNIFICANT CHANGE UP
SPECIMEN SOURCE: SIGNIFICANT CHANGE UP
T PALLIDUM AB TITR SER: POSITIVE

## 2023-10-03 RX ADMIN — Medication 200 MILLIGRAM(S): at 06:19

## 2023-10-04 ENCOUNTER — TRANSCRIPTION ENCOUNTER (OUTPATIENT)
Age: 63
End: 2023-10-04

## 2023-10-04 NOTE — CHART NOTE - NSCHARTNOTEFT_GEN_A_CORE
Received a call from the lab notifying patient's + syphilis test result. Patient left against medical advice, and the phone number is not listed in the patient's portal. Will ask the primary team to make attempts to reach the patient so that the patient can be treated.    Juliana De La Garza MD

## 2023-10-04 NOTE — DISCHARGE NOTE PROVIDER - DID THE PATIENT PRESENT WITH OR WAS TREATED FOR MALNUTRITION DURING THIS ADMISSION
Problem: Adult Inpatient Plan of Care  Goal: Plan of Care Review  Outcome: Ongoing, Progressing  Flowsheets (Taken 4/24/2023 1927)  Plan of Care Reviewed With: patient  Goal: Patient-Specific Goal (Individualized)  Outcome: Ongoing, Progressing  Flowsheets (Taken 4/24/2023 1927)  Anxieties, Fears or Concerns: none stated  Individualized Care Needs: assistance with ambulation until end of shift 7a  Goal: Absence of Hospital-Acquired Illness or Injury  Outcome: Ongoing, Progressing  Intervention: Identify and Manage Fall Risk  Flowsheets (Taken 4/24/2023 1927)  Safety Promotion/Fall Prevention:   assistive device/personal item within reach   bed alarm set   Fall Risk reviewed with patient/family   side rails raised x 3  Intervention: Prevent Skin Injury  Flowsheets (Taken 4/24/2023 1927)  Body Position: position changed independently  Skin Protection: incontinence pads utilized  Intervention: Prevent and Manage VTE (Venous Thromboembolism) Risk  Flowsheets (Taken 4/24/2023 1927)  Activity Management: Ambulated in ferreira - L4  VTE Prevention/Management:   ambulation promoted   bleeding precations maintained   bleeding risk assessed  Range of Motion: active ROM (range of motion) encouraged  Intervention: Prevent Infection  Flowsheets (Taken 4/24/2023 1927)  Infection Prevention:   personal protective equipment utilized   environmental surveillance performed   rest/sleep promoted   single patient room provided   equipment surfaces disinfected   hand hygiene promoted      No

## 2023-10-04 NOTE — DISCHARGE NOTE PROVIDER - HOSPITAL COURSE
PATIENT WISHED TO LEAVE AGAINST MEDICAL ADVICE    Patient refused all medical therapy and refused to undergo imaging studies and laboratory studies after day 1.    63 yr old M, reluctant historian (refused to participate in admissions), undomiciled, noncompliant with meds, hx of  polysubstance abuse (cocaine/THC), PMHx of HIV/AIDs (noncompliant w/ HAART), Afib (noncompliant w/ Eliquis), severe MR/mod-severe TR, HFpEF (EF:55% by TTE 11/2022), lung CA?  who presented to [facility] 10/1/23 c/o progressively worsening cough and SOB x few months.  Unable to illicit symptoms from pt, agitated and states he wants to sleep.    PATIENT WISHED TO LEAVE AGAINST MEDICAL ADVICE    Patient refused all medical therapy and refused to undergo imaging studies and laboratory studies after day 1.    63 yr old M, reluctant historian (refused to participate in admissions), undomiciled, noncompliant with meds, hx of  polysubstance abuse (cocaine/THC), PMHx of HIV/AIDs (noncompliant w/ HAART), Afib (noncompliant w/ Eliquis), severe MR/mod-severe TR, HFpEF (EF:55% by TTE 11/2022), lung CA?  who presented to [facility] 10/1/23 c/o progressively worsening cough and SOB x few months.  Unable to illicit symptoms from pt, agitated and refuses to cooperate with nursing staff even for antibiotics.

## 2023-10-05 DIAGNOSIS — J96.91 RESPIRATORY FAILURE, UNSPECIFIED WITH HYPOXIA: ICD-10-CM

## 2023-10-05 DIAGNOSIS — R18.8 OTHER ASCITES: ICD-10-CM

## 2023-10-05 DIAGNOSIS — F14.10 COCAINE ABUSE, UNCOMPLICATED: ICD-10-CM

## 2023-10-05 DIAGNOSIS — B20 HUMAN IMMUNODEFICIENCY VIRUS [HIV] DISEASE: ICD-10-CM

## 2023-10-05 DIAGNOSIS — R21 RASH AND OTHER NONSPECIFIC SKIN ERUPTION: ICD-10-CM

## 2023-10-05 DIAGNOSIS — I11.0 HYPERTENSIVE HEART DISEASE WITH HEART FAILURE: ICD-10-CM
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
## 2023-10-05 DIAGNOSIS — Z59.02 UNSHELTERED HOMELESSNESS: ICD-10-CM

## 2023-10-05 DIAGNOSIS — I21.A1 MYOCARDIAL INFARCTION TYPE 2: ICD-10-CM

## 2023-10-05 DIAGNOSIS — N39.0 URINARY TRACT INFECTION, SITE NOT SPECIFIED: ICD-10-CM

## 2023-10-05 DIAGNOSIS — B37.0 CANDIDAL STOMATITIS: ICD-10-CM

## 2023-10-05 DIAGNOSIS — J44.9 CHRONIC OBSTRUCTIVE PULMONARY DISEASE, UNSPECIFIED: ICD-10-CM

## 2023-10-05 DIAGNOSIS — R59.1 GENERALIZED ENLARGED LYMPH NODES: ICD-10-CM

## 2023-10-05 DIAGNOSIS — Z91.148 PATIENT'S OTHER NONCOMPLIANCE WITH MEDICATION REGIMEN FOR OTHER REASON: ICD-10-CM

## 2023-10-05 DIAGNOSIS — I50.33 ACUTE ON CHRONIC DIASTOLIC (CONGESTIVE) HEART FAILURE: ICD-10-CM

## 2023-10-05 DIAGNOSIS — R50.9 FEVER, UNSPECIFIED: ICD-10-CM

## 2023-10-05 DIAGNOSIS — T50.916A UNDERDOSING OF MULTIPLE UNSPECIFIED DRUGS, MEDICAMENTS AND BIOLOGICAL SUBSTANCES, INITIAL ENCOUNTER: ICD-10-CM

## 2023-10-05 DIAGNOSIS — I34.0 NONRHEUMATIC MITRAL (VALVE) INSUFFICIENCY: ICD-10-CM

## 2023-10-05 DIAGNOSIS — R45.1 RESTLESSNESS AND AGITATION: ICD-10-CM

## 2023-10-05 DIAGNOSIS — I36.1 NONRHEUMATIC TRICUSPID (VALVE) INSUFFICIENCY: ICD-10-CM

## 2023-10-05 DIAGNOSIS — R05.9 COUGH, UNSPECIFIED: ICD-10-CM

## 2023-10-05 DIAGNOSIS — Z41.8 ENCOUNTER FOR OTHER PROCEDURES FOR PURPOSES OTHER THAN REMEDYING HEALTH STATE: ICD-10-CM

## 2023-10-05 SDOH — ECONOMIC STABILITY - HOUSING INSECURITY: UNSHELTERED HOMELESSNESS: Z59.02

## 2023-10-07 LAB
CULTURE RESULTS: SIGNIFICANT CHANGE UP
CULTURE RESULTS: SIGNIFICANT CHANGE UP
SPECIMEN SOURCE: SIGNIFICANT CHANGE UP
SPECIMEN SOURCE: SIGNIFICANT CHANGE UP

## 2023-10-30 VITALS
WEIGHT: 139.99 LBS | TEMPERATURE: 98 F | DIASTOLIC BLOOD PRESSURE: 81 MMHG | RESPIRATION RATE: 19 BRPM | SYSTOLIC BLOOD PRESSURE: 126 MMHG | OXYGEN SATURATION: 99 % | HEART RATE: 76 BPM

## 2023-10-30 LAB
ALBUMIN SERPL ELPH-MCNC: 3.1 G/DL — LOW (ref 3.4–5)
ALBUMIN SERPL ELPH-MCNC: 3.1 G/DL — LOW (ref 3.4–5)
ALP SERPL-CCNC: 132 U/L — HIGH (ref 40–120)
ALP SERPL-CCNC: 132 U/L — HIGH (ref 40–120)
ALT FLD-CCNC: 19 U/L — SIGNIFICANT CHANGE UP (ref 12–42)
ALT FLD-CCNC: 19 U/L — SIGNIFICANT CHANGE UP (ref 12–42)
ANION GAP SERPL CALC-SCNC: 10 MMOL/L — SIGNIFICANT CHANGE UP (ref 9–16)
ANION GAP SERPL CALC-SCNC: 10 MMOL/L — SIGNIFICANT CHANGE UP (ref 9–16)
AST SERPL-CCNC: 52 U/L — HIGH (ref 15–37)
AST SERPL-CCNC: 52 U/L — HIGH (ref 15–37)
BASOPHILS # BLD AUTO: 0 K/UL — SIGNIFICANT CHANGE UP (ref 0–0.2)
BASOPHILS # BLD AUTO: 0 K/UL — SIGNIFICANT CHANGE UP (ref 0–0.2)
BASOPHILS NFR BLD AUTO: 0 % — SIGNIFICANT CHANGE UP (ref 0–2)
BASOPHILS NFR BLD AUTO: 0 % — SIGNIFICANT CHANGE UP (ref 0–2)
BILIRUB SERPL-MCNC: 0.7 MG/DL — SIGNIFICANT CHANGE UP (ref 0.2–1.2)
BILIRUB SERPL-MCNC: 0.7 MG/DL — SIGNIFICANT CHANGE UP (ref 0.2–1.2)
BUN SERPL-MCNC: 8 MG/DL — SIGNIFICANT CHANGE UP (ref 7–23)
BUN SERPL-MCNC: 8 MG/DL — SIGNIFICANT CHANGE UP (ref 7–23)
CALCIUM SERPL-MCNC: 8.9 MG/DL — SIGNIFICANT CHANGE UP (ref 8.5–10.5)
CALCIUM SERPL-MCNC: 8.9 MG/DL — SIGNIFICANT CHANGE UP (ref 8.5–10.5)
CHLORIDE SERPL-SCNC: 104 MMOL/L — SIGNIFICANT CHANGE UP (ref 96–108)
CHLORIDE SERPL-SCNC: 104 MMOL/L — SIGNIFICANT CHANGE UP (ref 96–108)
CO2 SERPL-SCNC: 26 MMOL/L — SIGNIFICANT CHANGE UP (ref 22–31)
CO2 SERPL-SCNC: 26 MMOL/L — SIGNIFICANT CHANGE UP (ref 22–31)
CREAT SERPL-MCNC: 0.79 MG/DL — SIGNIFICANT CHANGE UP (ref 0.5–1.3)
CREAT SERPL-MCNC: 0.79 MG/DL — SIGNIFICANT CHANGE UP (ref 0.5–1.3)
EGFR: 99 ML/MIN/1.73M2 — SIGNIFICANT CHANGE UP
EGFR: 99 ML/MIN/1.73M2 — SIGNIFICANT CHANGE UP
EOSINOPHIL # BLD AUTO: 0.32 K/UL — SIGNIFICANT CHANGE UP (ref 0–0.5)
EOSINOPHIL # BLD AUTO: 0.32 K/UL — SIGNIFICANT CHANGE UP (ref 0–0.5)
EOSINOPHIL NFR BLD AUTO: 11 % — HIGH (ref 0–6)
EOSINOPHIL NFR BLD AUTO: 11 % — HIGH (ref 0–6)
FLUAV AG NPH QL: SIGNIFICANT CHANGE UP
FLUAV AG NPH QL: SIGNIFICANT CHANGE UP
FLUBV AG NPH QL: SIGNIFICANT CHANGE UP
FLUBV AG NPH QL: SIGNIFICANT CHANGE UP
GIANT PLATELETS BLD QL SMEAR: PRESENT — SIGNIFICANT CHANGE UP
GIANT PLATELETS BLD QL SMEAR: PRESENT — SIGNIFICANT CHANGE UP
GLUCOSE SERPL-MCNC: 90 MG/DL — SIGNIFICANT CHANGE UP (ref 70–99)
GLUCOSE SERPL-MCNC: 90 MG/DL — SIGNIFICANT CHANGE UP (ref 70–99)
HCT VFR BLD CALC: 33.9 % — LOW (ref 39–50)
HCT VFR BLD CALC: 33.9 % — LOW (ref 39–50)
HGB BLD-MCNC: 10.9 G/DL — LOW (ref 13–17)
HGB BLD-MCNC: 10.9 G/DL — LOW (ref 13–17)
HYPOCHROMIA BLD QL: SLIGHT — SIGNIFICANT CHANGE UP
HYPOCHROMIA BLD QL: SLIGHT — SIGNIFICANT CHANGE UP
LACTATE BLDV-MCNC: 1.1 MMOL/L — SIGNIFICANT CHANGE UP (ref 0.5–2)
LACTATE BLDV-MCNC: 1.1 MMOL/L — SIGNIFICANT CHANGE UP (ref 0.5–2)
LG PLATELETS BLD QL AUTO: SLIGHT — SIGNIFICANT CHANGE UP
LG PLATELETS BLD QL AUTO: SLIGHT — SIGNIFICANT CHANGE UP
LIDOCAIN IGE QN: 44 U/L — SIGNIFICANT CHANGE UP (ref 16–77)
LIDOCAIN IGE QN: 44 U/L — SIGNIFICANT CHANGE UP (ref 16–77)
LYMPHOCYTES # BLD AUTO: 0.81 K/UL — LOW (ref 1–3.3)
LYMPHOCYTES # BLD AUTO: 0.81 K/UL — LOW (ref 1–3.3)
LYMPHOCYTES # BLD AUTO: 28 % — SIGNIFICANT CHANGE UP (ref 13–44)
LYMPHOCYTES # BLD AUTO: 28 % — SIGNIFICANT CHANGE UP (ref 13–44)
MACROCYTES BLD QL: SLIGHT — SIGNIFICANT CHANGE UP
MACROCYTES BLD QL: SLIGHT — SIGNIFICANT CHANGE UP
MAGNESIUM SERPL-MCNC: 1.5 MG/DL — LOW (ref 1.6–2.6)
MAGNESIUM SERPL-MCNC: 1.5 MG/DL — LOW (ref 1.6–2.6)
MANUAL SMEAR VERIFICATION: SIGNIFICANT CHANGE UP
MANUAL SMEAR VERIFICATION: SIGNIFICANT CHANGE UP
MCHC RBC-ENTMCNC: 30.2 PG — SIGNIFICANT CHANGE UP (ref 27–34)
MCHC RBC-ENTMCNC: 30.2 PG — SIGNIFICANT CHANGE UP (ref 27–34)
MCHC RBC-ENTMCNC: 32.2 GM/DL — SIGNIFICANT CHANGE UP (ref 32–36)
MCHC RBC-ENTMCNC: 32.2 GM/DL — SIGNIFICANT CHANGE UP (ref 32–36)
MCV RBC AUTO: 93.9 FL — SIGNIFICANT CHANGE UP (ref 80–100)
MCV RBC AUTO: 93.9 FL — SIGNIFICANT CHANGE UP (ref 80–100)
MONOCYTES # BLD AUTO: 0.38 K/UL — SIGNIFICANT CHANGE UP (ref 0–0.9)
MONOCYTES # BLD AUTO: 0.38 K/UL — SIGNIFICANT CHANGE UP (ref 0–0.9)
MONOCYTES NFR BLD AUTO: 13 % — SIGNIFICANT CHANGE UP (ref 2–14)
MONOCYTES NFR BLD AUTO: 13 % — SIGNIFICANT CHANGE UP (ref 2–14)
NEUTROPHILS # BLD AUTO: 1.1 K/UL — LOW (ref 1.8–7.4)
NEUTROPHILS # BLD AUTO: 1.1 K/UL — LOW (ref 1.8–7.4)
NEUTROPHILS NFR BLD AUTO: 38 % — LOW (ref 43–77)
NEUTROPHILS NFR BLD AUTO: 38 % — LOW (ref 43–77)
NRBC # BLD: 0 /100 — SIGNIFICANT CHANGE UP (ref 0–0)
NRBC # BLD: 0 /100 — SIGNIFICANT CHANGE UP (ref 0–0)
NRBC # BLD: SIGNIFICANT CHANGE UP /100 WBCS (ref 0–0)
NRBC # BLD: SIGNIFICANT CHANGE UP /100 WBCS (ref 0–0)
NT-PROBNP SERPL-SCNC: 4361 PG/ML — HIGH
NT-PROBNP SERPL-SCNC: 4361 PG/ML — HIGH
PLAT MORPH BLD: ABNORMAL
PLAT MORPH BLD: ABNORMAL
PLATELET # BLD AUTO: 244 K/UL — SIGNIFICANT CHANGE UP (ref 150–400)
PLATELET # BLD AUTO: 244 K/UL — SIGNIFICANT CHANGE UP (ref 150–400)
PLATELET CLUMP BLD QL SMEAR: SLIGHT
PLATELET CLUMP BLD QL SMEAR: SLIGHT
PLATELET COUNT - ESTIMATE: ABNORMAL
PLATELET COUNT - ESTIMATE: ABNORMAL
POLYCHROMASIA BLD QL SMEAR: SLIGHT — SIGNIFICANT CHANGE UP
POLYCHROMASIA BLD QL SMEAR: SLIGHT — SIGNIFICANT CHANGE UP
POTASSIUM SERPL-MCNC: 4.3 MMOL/L — SIGNIFICANT CHANGE UP (ref 3.5–5.3)
POTASSIUM SERPL-MCNC: 4.3 MMOL/L — SIGNIFICANT CHANGE UP (ref 3.5–5.3)
POTASSIUM SERPL-SCNC: 4.3 MMOL/L — SIGNIFICANT CHANGE UP (ref 3.5–5.3)
POTASSIUM SERPL-SCNC: 4.3 MMOL/L — SIGNIFICANT CHANGE UP (ref 3.5–5.3)
PROT SERPL-MCNC: 7.9 G/DL — SIGNIFICANT CHANGE UP (ref 6.4–8.2)
PROT SERPL-MCNC: 7.9 G/DL — SIGNIFICANT CHANGE UP (ref 6.4–8.2)
RBC # BLD: 3.61 M/UL — LOW (ref 4.2–5.8)
RBC # BLD: 3.61 M/UL — LOW (ref 4.2–5.8)
RBC # FLD: 15.1 % — HIGH (ref 10.3–14.5)
RBC # FLD: 15.1 % — HIGH (ref 10.3–14.5)
RBC BLD AUTO: ABNORMAL
RBC BLD AUTO: ABNORMAL
RSV RNA NPH QL NAA+NON-PROBE: SIGNIFICANT CHANGE UP
RSV RNA NPH QL NAA+NON-PROBE: SIGNIFICANT CHANGE UP
SARS-COV-2 RNA SPEC QL NAA+PROBE: SIGNIFICANT CHANGE UP
SARS-COV-2 RNA SPEC QL NAA+PROBE: SIGNIFICANT CHANGE UP
SODIUM SERPL-SCNC: 140 MMOL/L — SIGNIFICANT CHANGE UP (ref 132–145)
SODIUM SERPL-SCNC: 140 MMOL/L — SIGNIFICANT CHANGE UP (ref 132–145)
TROPONIN I, HIGH SENSITIVITY RESULT: 18.1 NG/L — SIGNIFICANT CHANGE UP
TROPONIN I, HIGH SENSITIVITY RESULT: 18.1 NG/L — SIGNIFICANT CHANGE UP
VARIANT LYMPHS # BLD: 10 % — HIGH (ref 0–6)
VARIANT LYMPHS # BLD: 10 % — HIGH (ref 0–6)
WBC # BLD: 2.89 K/UL — LOW (ref 3.8–10.5)
WBC # BLD: 2.89 K/UL — LOW (ref 3.8–10.5)
WBC # FLD AUTO: 2.89 K/UL — LOW (ref 3.8–10.5)
WBC # FLD AUTO: 2.89 K/UL — LOW (ref 3.8–10.5)

## 2023-10-30 PROCEDURE — 71045 X-RAY EXAM CHEST 1 VIEW: CPT | Mod: 26

## 2023-10-30 PROCEDURE — 99285 EMERGENCY DEPT VISIT HI MDM: CPT

## 2023-10-30 PROCEDURE — 71275 CT ANGIOGRAPHY CHEST: CPT | Mod: 26

## 2023-10-30 PROCEDURE — 74177 CT ABD & PELVIS W/CONTRAST: CPT | Mod: 26

## 2023-10-30 RX ORDER — ACETAMINOPHEN 500 MG
650 TABLET ORAL ONCE
Refills: 0 | Status: COMPLETED | OUTPATIENT
Start: 2023-10-30 | End: 2023-10-30

## 2023-10-30 RX ORDER — MAGNESIUM SULFATE 500 MG/ML
2 VIAL (ML) INJECTION ONCE
Refills: 0 | Status: COMPLETED | OUTPATIENT
Start: 2023-10-30 | End: 2023-10-30

## 2023-10-30 RX ORDER — FUROSEMIDE 40 MG
40 TABLET ORAL ONCE
Refills: 0 | Status: COMPLETED | OUTPATIENT
Start: 2023-10-30 | End: 2023-10-30

## 2023-10-30 RX ADMIN — Medication 650 MILLIGRAM(S): at 21:02

## 2023-10-30 RX ADMIN — Medication 40 MILLIGRAM(S): at 21:58

## 2023-10-30 RX ADMIN — Medication 25 GRAM(S): at 21:59

## 2023-10-30 NOTE — ED ADULT NURSE NOTE - ABDOMEN
Occupational Therapy    Visit Type: treatment  Precautions:  Medical precautions:  fall risk; standard precautions.  Admit 3/15/23 right LE pain, falls, bladder dysfunction  R TKA 2/15/23- WBAT  Cystoscopy 3/15/23- Indwelling murray catheter  Pmhx significant for: AVR 3/19, DVT, esophageal CA s/p esophagectomy, HTN, chronic LBP s/p lami and fusion L1-3, CKD.  Lines:     Basic: indwelling urinary catheter and capped IV      Lines in chart and on patient reviewed, precautions maintained throughout session.  Safety Measures: bed alarm (call light within reach)  SUBJECTIVE  Patient agreed to participate in therapy this date.  Patient stated, \"It feels tight right there\" (indicating right axilla)    Patient participated in all scheduled therapy time this session.  Patient / Family Goal: return home    Pain   RN informed on pain level     OBJECTIVE          Bed Mobility  - Supine to sit: supervision  - Sit to supine: supervision  Bed mobility completed on mat table, supervision for safety. Patient moves to supine then left sidelying for stretches on mat table. Supervision for safety, increased time.   Transfers  Assistive devices: gait belt, 2-wheeled walker  - Sit to stand: supervision  - Stand to sit: supervision  Supervision for safety, increased time required for all transfers.             Therapeutic Exercise  From sidelying on mat table:   -therapist assisted scap-humeral mobilizations to provide PROM of left shoulder    From supine on mat table:  -attempt to roll onto foam roller positioned lengthwise with spine to promote pectoralis stretch, however patient was unable to raise body onto roller. Soft foam wedge placed lengthwise instead of roller with good carryover. Patient reports good stretch through right pectoralis  -hip bridges with feet planted on mat table: 10 reps x1, patient reports cramping through right hamstring, counter stretch with knee in extension resting on mat table. Cues for glute  soft/nondistended/nontender activation    From sitting at edge of mat table:   -Yellow theraband provided with demonstration and verbal instruction for scap stabilizing exercises - elbows in extension, shoulders flexed to 90 degrees, horizonal abduction from midline to ~45 degrees, 10 reps x1. Cues for maintaining elbow extension throughout with good carryover.          ASSESSMENT  Impairments: activity tolerance, balance, bed mobility, safety awareness, pain, strength, decreased insight into deficits, cognitive, range of motion and vision  Functional Limitations: bed mobility, functional mobility, grooming, bathing, functional transfers, IADLs, participating in meaningful/purposeful activities, dressing, showering, toileting, community reintegration and job performance    See AM note for full assessment details         Discharge Recommendations:  Recommendation for Discharge Location: OT WI: Home without therapy needs, Home with Outpatient therapy, Pending functional progress  Recommendation for Discharge Support: OT WI: Assistance with IADLs, Intermittent assist daily  Recommendation Comments: Up w/1, gb, 2ww, min A to supervision  PT/OT Mobility Equipment for Discharge: has 2ww and cane.  no other anticipated equipment needs for mobility.  PT/OT ADL Equipment for Discharge: has sock aide at home  OT Identified Barriers to Discharge: medical, fall risk, lives alone, pain, weakness, cognition      Progress: progressing toward goals    • Skilled therapy is required to address these limitations in attempt to maximize the patient's independence.    Education:   - Present and ready to learn: patient  Education provided during session:  - See body of note.  - Results of above outlined education: Needs reinforcement    Patient at End of Session:   Location: in wheelchair  Handoff to: therapist    PLAN  Suggestions for next session as indicated: Treatment plan for next session: UE & core strengthening activities, theraband exercises     Additional  treatment options: Vision assessment, assess insight into safety awareness of home environment    Plan considerations:     Outcome measures: Pillbox Test completed 3/20, MENU task 3/21    Family/Care partner training details:     Hospital equipment in room: Commode  Interventions: activity tolerance training, ADL retraining, compensatory techniques, therapeutic activity, transfer training, therapeutic exercise, functional transfer training, HEP training, IADL, safety training and cognitive retraining Frequency: 5-7 days per week  Frequency Comments: 90 mins/day at least 5 days/week   Duration: 3/28/2023      Agreement to plan and goals: patient agrees with goals and treatment plan      GOALS  Review Date: 3/27/2023  Short Term Goals (STGs): to be met 7 days from date established, unless otherwise stated.  - Patient will complete bathing at supervision level with adaptive equipment as deemed appropriate.  - Patient will complete upper body dressing at supervision level with adaptive equipment as deemed appropriate.   - Patient will complete lower body dressing at supervision level with adaptive equipment as deemed appropriate.   - Patient will complete toileting at supervision level with adaptive equipment as deemed appropriate.    - Patient will complete toilet transfer at supervision level with adaptive equipment as deemed appropriate.  - Patient will complete shower transfer at supervision level with adaptive equipment as deemed appropriate.   Long Term Goals (LTGs): to be met by discharge from rehab program.  - Patient will complete grooming at indepdendent level with adaptive equipment as deemed appropriate.  - Patient will complete bathing at indepdendent level with adaptive equipment as deemed appropriate.  - Patient will complete upper body dressing at indepdendent level with adaptive equipment as deemed appropriate.  - Patient will complete lower body dressing at indepdendent level with adaptive equipment as  deemed appropriate.  - Patient will complete toileting at indepdendent level with adaptive equipment as deemed appropriate.  - Patient will complete toilet transfer at indepdendent level with adaptive equipment as deemed appropriate.  - Patient will complete shower transfer at indepdendent level with adaptive equipment as deemed appropriate.   - Patient will complete light meal prep at indepdendent level.  - Patient will complete complex meal prep at indepdendent level.  - Patient will complete laundry task at indepdendent level.  - Patient will complete light cleaning task at indepdendent level.   - Patient will complete heavy cleaning task at indepdendent level.   - Patient will complete item retrieval and transport at indepdendent level.  - Patient will complete money management task at indepdendent level.  - Patient will complete medication management task at indepdendent level.    Documented in the chart in the following areas: Pain. Assessment. Plan.      Therapy procedure time and total treatment time can be found documented on the Time Entry flowsheet

## 2023-10-30 NOTE — ED PROVIDER NOTE - CLINICAL SUMMARY MEDICAL DECISION MAKING FREE TEXT BOX
63-year-old male, past medical history HIV - unknown CD4/VL, off HAART x 6 months, lung CA, not on any treatment, "hole in my heart," HTN, asthma/COPD, no h/o intubation and non O2 dependent, presenting to the emergency room complaining of cough after getting caught in the rain a few days ago.  On triage report, patient was noted to have an 82% pulse ox on room air, however on my examination patient is satting at % on room air and is able to speak in full sentences, however is noted to have frequent coughing on exam.  There are few crackles noted in the lung bases as well as rhonchi noted throughout.  Will plan to obtain EKG, medical labs, rectal temp, chest x-ray, and CT chest/abdomen and pelvis. Dispo pending medical work-up.

## 2023-10-30 NOTE — ED PROVIDER NOTE - OBJECTIVE STATEMENT
63-year-old male, past medical history HIV - unknown CD4/VL, off HAART x 6 months, lung CA, not on any treatment, "hole in my heart," HTN, asthma/COPD, no h/o intubation and non O2 dependent, presenting to the emergency room complaining of cough after getting caught in the rain a few days ago.  Patient also endorsing small amounts of blood noticed with the productive sputum.  He reports generalized body aches and occasional chills.  He also complains of shortness of breath and feeling as though his entire body is swollen.  Patient was recently admitted at [facility] for an acute on chronic CHF exacerbation in the beginning of the month, but signed out AMA.  Patient has also noted pain in his right lower quadrant abdomen as well.  Patient is also complaining of a burning rash in his groin that has been present for over a month.  He reports previously being told it was fungal but has not been on medication for it. 63-year-old male, past medical history HIV - unknown CD4/VL, off HAART x 6 months, lung CA, not on any treatment, "hole in my heart," HTN, asthma/COPD, no h/o intubation and non O2 dependent, presenting to the emergency room complaining of cough after getting caught in the rain a few days ago.  Patient also endorsing small amounts of blood noticed with the productive sputum.  He reports generalized body aches and occasional chills. +SOB w/ ambulating. He also complains feeling as though his entire body is swollen.  Patient was recently admitted at [facility] for an acute on chronic CHF exacerbation in the beginning of the month, but signed out AMA.  Patient has also noted pain in his right lower quadrant abdomen as well.  Patient is also complaining of a burning rash in his groin that has been present for over a month.  He reports previously being told it was fungal but has not been on medication for it. 63-year-old male, past medical history HIV - unknown CD4/VL, off HAART x 6 months, lung CA, not on any treatment, "hole in my heart," a fib, HTN, asthma/COPD, no h/o intubation and non O2 dependent, presenting to the emergency room complaining of cough after getting caught in the rain a few days ago.  Patient also endorsing small amounts of blood noticed with the productive sputum.  He reports generalized body aches and occasional chills. +SOB w/ ambulating. He also complains feeling as though his entire body is swollen.  Patient was recently admitted at [facility] for an acute on chronic CHF exacerbation in the beginning of the month, but signed out AMA.  Patient has also noted pain in his right lower quadrant abdomen as well.  Patient is also complaining of a burning rash in his groin that has been present for over a month.  He reports previously being told it was fungal but has not been on medication for it.

## 2023-10-30 NOTE — ED ADULT NURSE NOTE - NSFALLHARMRISKINTERV_ED_ALL_ED

## 2023-10-30 NOTE — ED PROVIDER NOTE - PHYSICAL EXAMINATION
VITAL SIGNS: I have reviewed nursing notes and confirm.  CONSTITUTIONAL: Well-developed; well-nourished; in no acute distress.  SKIN: Skin is warm and dry, no acute rash.  HEAD: Normocephalic; atraumatic.  NECK: Supple; non tender.  CARD: S1, S2 normal; no murmurs, gallops, or rubs. Regular rate and rhythm.  RESP: +rales/rhonchi in elias lower lobes; no wheezes noted.  ABD: Soft; mildly distended; +RLQ ttp; no rebound or guarding.  EXT: Normal ROM. No clubbing, cyanosis or edema.  NEURO: Alert, oriented. Grossly unremarkable. GARCIA, normal tone, no gross motor or sensory changes. Fluent speech.   PSYCH: Cooperative, appropriate. Mood and affect wnl. VITAL SIGNS: I have reviewed nursing notes and confirm.  CONSTITUTIONAL: Well-developed; well-nourished; in no acute distress.  SKIN: Skin is warm and dry, no acute rash.  HEAD: Normocephalic; atraumatic.  NECK: Supple; non tender.  CARD: S1, S2 normal; no murmurs, gallops, or rubs. Regular rate and rhythm.  RESP: +rales/rhonchi in elias lower lobes; no wheezes noted.  ABD: Soft; mildly distended; +RLQ ttp; no rebound or guarding.  EXT: Normal ROM. +1-2+ elias LE pitting edema.  NEURO: Alert, oriented. Grossly unremarkable. GARCIA, normal tone, no gross motor or sensory changes. Fluent speech.   PSYCH: Cooperative, appropriate. Mood and affect wnl.

## 2023-10-30 NOTE — ED PROVIDER NOTE - PROGRESS NOTE DETAILS
CT:  Multiple gas foci within the soft tissues of the distal penis may be   related to uncircumcised penis or infection  ==== After examining patient, there is no penile or significant scrotal tenderness noted.  He is however noted to be uncircumcised, likely cause of the CT findings.    ---    We will plan to admit patient to cardiology for further management of CHF exacerbation.

## 2023-10-30 NOTE — ED PROVIDER NOTE - NS ED ROS FT
+sob, cough, chills, bodyaches, abd pain  Denies fevers, chills, nausea, vomiting, diarrhea, constipation, urinary symptoms, chest pain, palpitations, syncope/near syncope, headache, weakness, numbness, focal deficits, visual changes, gait or balance changes, dizziness

## 2023-10-30 NOTE — ED ADULT NURSE REASSESSMENT NOTE - NS ED NURSE REASSESS COMMENT FT1
Pt returned from CT scan, placed back on cardiac monitor. Pt demanding for food, pt is NPO at this time as per NICKI Logan. Pt becoming verbally abusive, refused rectal temp, NICKI Logan made aware.

## 2023-10-31 ENCOUNTER — TRANSCRIPTION ENCOUNTER (OUTPATIENT)
Age: 63
End: 2023-10-31

## 2023-10-31 ENCOUNTER — INPATIENT (INPATIENT)
Facility: HOSPITAL | Age: 63
LOS: 3 days | Discharge: LEFT BEFORE DISCHARGE | DRG: 975 | End: 2023-11-04
Attending: INTERNAL MEDICINE | Admitting: INTERNAL MEDICINE
Payer: MEDICAID

## 2023-10-31 DIAGNOSIS — C34.90 MALIGNANT NEOPLASM OF UNSPECIFIED PART OF UNSPECIFIED BRONCHUS OR LUNG: ICD-10-CM

## 2023-10-31 DIAGNOSIS — F12.10 CANNABIS ABUSE, UNCOMPLICATED: ICD-10-CM

## 2023-10-31 DIAGNOSIS — F32.A DEPRESSION, UNSPECIFIED: ICD-10-CM

## 2023-10-31 DIAGNOSIS — B20 HUMAN IMMUNODEFICIENCY VIRUS [HIV] DISEASE: ICD-10-CM

## 2023-10-31 DIAGNOSIS — I50.22 CHRONIC SYSTOLIC (CONGESTIVE) HEART FAILURE: ICD-10-CM

## 2023-10-31 DIAGNOSIS — I11.0 HYPERTENSIVE HEART DISEASE WITH HEART FAILURE: ICD-10-CM

## 2023-10-31 DIAGNOSIS — I48.91 UNSPECIFIED ATRIAL FIBRILLATION: ICD-10-CM

## 2023-10-31 DIAGNOSIS — Z79.01 LONG TERM (CURRENT) USE OF ANTICOAGULANTS: ICD-10-CM

## 2023-10-31 DIAGNOSIS — J44.9 CHRONIC OBSTRUCTIVE PULMONARY DISEASE, UNSPECIFIED: ICD-10-CM

## 2023-10-31 DIAGNOSIS — T37.5X6A UNDERDOSING OF ANTIVIRAL DRUGS, INITIAL ENCOUNTER: ICD-10-CM

## 2023-10-31 DIAGNOSIS — R78.81 BACTEREMIA: ICD-10-CM

## 2023-10-31 DIAGNOSIS — I50.23 ACUTE ON CHRONIC SYSTOLIC (CONGESTIVE) HEART FAILURE: ICD-10-CM

## 2023-10-31 DIAGNOSIS — B95.62 METHICILLIN RESISTANT STAPHYLOCOCCUS AUREUS INFECTION AS THE CAUSE OF DISEASES CLASSIFIED ELSEWHERE: ICD-10-CM
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
## 2023-10-31 DIAGNOSIS — F14.10 COCAINE ABUSE, UNCOMPLICATED: ICD-10-CM

## 2023-10-31 DIAGNOSIS — I08.1 RHEUMATIC DISORDERS OF BOTH MITRAL AND TRICUSPID VALVES: ICD-10-CM

## 2023-10-31 DIAGNOSIS — I48.92 UNSPECIFIED ATRIAL FLUTTER: ICD-10-CM

## 2023-10-31 DIAGNOSIS — B37.2 CANDIDIASIS OF SKIN AND NAIL: ICD-10-CM

## 2023-10-31 DIAGNOSIS — I50.9 HEART FAILURE, UNSPECIFIED: ICD-10-CM

## 2023-10-31 DIAGNOSIS — Z91.148 PATIENT'S OTHER NONCOMPLIANCE WITH MEDICATION REGIMEN FOR OTHER REASON: ICD-10-CM

## 2023-10-31 DIAGNOSIS — Z11.52 ENCOUNTER FOR SCREENING FOR COVID-19: ICD-10-CM

## 2023-10-31 DIAGNOSIS — R21 RASH AND OTHER NONSPECIFIC SKIN ERUPTION: ICD-10-CM

## 2023-10-31 DIAGNOSIS — T45.516A UNDERDOSING OF ANTICOAGULANTS, INITIAL ENCOUNTER: ICD-10-CM

## 2023-10-31 LAB
-  STAPHYLOCOCCUS EPIDERMIDIS, METHICILLIN RESISTANT: SIGNIFICANT CHANGE UP
-  STAPHYLOCOCCUS EPIDERMIDIS, METHICILLIN RESISTANT: SIGNIFICANT CHANGE UP
APPEARANCE UR: CLEAR — SIGNIFICANT CHANGE UP
APPEARANCE UR: CLEAR — SIGNIFICANT CHANGE UP
BACTERIA # UR AUTO: PRESENT /HPF — SIGNIFICANT CHANGE UP
BACTERIA # UR AUTO: PRESENT /HPF — SIGNIFICANT CHANGE UP
BILIRUB UR-MCNC: NEGATIVE — SIGNIFICANT CHANGE UP
BILIRUB UR-MCNC: NEGATIVE — SIGNIFICANT CHANGE UP
COLOR SPEC: YELLOW — SIGNIFICANT CHANGE UP
COLOR SPEC: YELLOW — SIGNIFICANT CHANGE UP
DIFF PNL FLD: NEGATIVE — SIGNIFICANT CHANGE UP
DIFF PNL FLD: NEGATIVE — SIGNIFICANT CHANGE UP
FLUAV H1 2009 PAND RNA SPEC QL NAA+PROBE: SIGNIFICANT CHANGE UP
FLUAV H1 2009 PAND RNA SPEC QL NAA+PROBE: SIGNIFICANT CHANGE UP
FLUAV H1 RNA SPEC QL NAA+PROBE: SIGNIFICANT CHANGE UP
FLUAV H1 RNA SPEC QL NAA+PROBE: SIGNIFICANT CHANGE UP
FLUAV H3 RNA SPEC QL NAA+PROBE: SIGNIFICANT CHANGE UP
FLUAV H3 RNA SPEC QL NAA+PROBE: SIGNIFICANT CHANGE UP
FLUAV SUBTYP SPEC NAA+PROBE: SIGNIFICANT CHANGE UP
FLUAV SUBTYP SPEC NAA+PROBE: SIGNIFICANT CHANGE UP
FLUBV RNA SPEC QL NAA+PROBE: SIGNIFICANT CHANGE UP
FLUBV RNA SPEC QL NAA+PROBE: SIGNIFICANT CHANGE UP
GLUCOSE UR QL: NEGATIVE MG/DL — SIGNIFICANT CHANGE UP
GLUCOSE UR QL: NEGATIVE MG/DL — SIGNIFICANT CHANGE UP
GRAM STN FLD: ABNORMAL
KETONES UR-MCNC: NEGATIVE MG/DL — SIGNIFICANT CHANGE UP
KETONES UR-MCNC: NEGATIVE MG/DL — SIGNIFICANT CHANGE UP
LEUKOCYTE ESTERASE UR-ACNC: ABNORMAL
LEUKOCYTE ESTERASE UR-ACNC: ABNORMAL
METHOD TYPE: SIGNIFICANT CHANGE UP
METHOD TYPE: SIGNIFICANT CHANGE UP
MRSA SPEC QL CULT: SIGNIFICANT CHANGE UP
MRSA SPEC QL CULT: SIGNIFICANT CHANGE UP
NITRITE UR-MCNC: NEGATIVE — SIGNIFICANT CHANGE UP
NITRITE UR-MCNC: NEGATIVE — SIGNIFICANT CHANGE UP
PH UR: 7 — SIGNIFICANT CHANGE UP (ref 5–8)
PH UR: 7 — SIGNIFICANT CHANGE UP (ref 5–8)
PROT UR-MCNC: NEGATIVE MG/DL — SIGNIFICANT CHANGE UP
PROT UR-MCNC: NEGATIVE MG/DL — SIGNIFICANT CHANGE UP
RAPID RVP RESULT: SIGNIFICANT CHANGE UP
RAPID RVP RESULT: SIGNIFICANT CHANGE UP
RBC CASTS # UR COMP ASSIST: 1 /HPF — SIGNIFICANT CHANGE UP (ref 0–4)
RBC CASTS # UR COMP ASSIST: 1 /HPF — SIGNIFICANT CHANGE UP (ref 0–4)
SARS-COV-2 RNA SPEC QL NAA+PROBE: SIGNIFICANT CHANGE UP
SARS-COV-2 RNA SPEC QL NAA+PROBE: SIGNIFICANT CHANGE UP
SP GR SPEC: 1.01 — SIGNIFICANT CHANGE UP (ref 1–1.03)
SP GR SPEC: 1.01 — SIGNIFICANT CHANGE UP (ref 1–1.03)
SPECIMEN SOURCE: SIGNIFICANT CHANGE UP
UROBILINOGEN FLD QL: 1 MG/DL — SIGNIFICANT CHANGE UP (ref 0.2–1)
UROBILINOGEN FLD QL: 1 MG/DL — SIGNIFICANT CHANGE UP (ref 0.2–1)
WBC UR QL: 4 /HPF — SIGNIFICANT CHANGE UP (ref 0–5)
WBC UR QL: 4 /HPF — SIGNIFICANT CHANGE UP (ref 0–5)

## 2023-10-31 PROCEDURE — 93306 TTE W/DOPPLER COMPLETE: CPT | Mod: 26

## 2023-10-31 PROCEDURE — 99222 1ST HOSP IP/OBS MODERATE 55: CPT

## 2023-10-31 RX ORDER — DAPAGLIFLOZIN 10 MG/1
10 TABLET, FILM COATED ORAL EVERY 24 HOURS
Refills: 0 | Status: DISCONTINUED | OUTPATIENT
Start: 2023-10-31 | End: 2023-11-04

## 2023-10-31 RX ORDER — FUROSEMIDE 40 MG
40 TABLET ORAL
Refills: 0 | Status: DISCONTINUED | OUTPATIENT
Start: 2023-10-31 | End: 2023-10-31

## 2023-10-31 RX ORDER — AZITHROMYCIN 500 MG/1
500 TABLET, FILM COATED ORAL ONCE
Refills: 0 | Status: COMPLETED | OUTPATIENT
Start: 2023-10-31 | End: 2023-10-31

## 2023-10-31 RX ORDER — PIPERACILLIN AND TAZOBACTAM 4; .5 G/20ML; G/20ML
3.38 INJECTION, POWDER, LYOPHILIZED, FOR SOLUTION INTRAVENOUS ONCE
Refills: 0 | Status: DISCONTINUED | OUTPATIENT
Start: 2023-10-31 | End: 2023-10-31

## 2023-10-31 RX ORDER — VANCOMYCIN HCL 1 G
1000 VIAL (EA) INTRAVENOUS EVERY 12 HOURS
Refills: 0 | Status: DISCONTINUED | OUTPATIENT
Start: 2023-10-31 | End: 2023-11-01

## 2023-10-31 RX ORDER — PIPERACILLIN AND TAZOBACTAM 4; .5 G/20ML; G/20ML
3.38 INJECTION, POWDER, LYOPHILIZED, FOR SOLUTION INTRAVENOUS ONCE
Refills: 0 | Status: COMPLETED | OUTPATIENT
Start: 2023-10-31 | End: 2023-10-31

## 2023-10-31 RX ORDER — CARVEDILOL PHOSPHATE 80 MG/1
25 CAPSULE, EXTENDED RELEASE ORAL EVERY 12 HOURS
Refills: 0 | Status: DISCONTINUED | OUTPATIENT
Start: 2023-10-31 | End: 2023-10-31

## 2023-10-31 RX ORDER — CEFTRIAXONE 500 MG/1
2000 INJECTION, POWDER, FOR SOLUTION INTRAMUSCULAR; INTRAVENOUS EVERY 24 HOURS
Refills: 0 | Status: DISCONTINUED | OUTPATIENT
Start: 2023-10-31 | End: 2023-10-31

## 2023-10-31 RX ORDER — PIPERACILLIN AND TAZOBACTAM 4; .5 G/20ML; G/20ML
3.38 INJECTION, POWDER, LYOPHILIZED, FOR SOLUTION INTRAVENOUS ONCE
Refills: 0 | Status: COMPLETED | OUTPATIENT
Start: 2023-11-01 | End: 2023-11-01

## 2023-10-31 RX ORDER — VALSARTAN 80 MG/1
40 TABLET ORAL DAILY
Refills: 0 | Status: DISCONTINUED | OUTPATIENT
Start: 2023-10-31 | End: 2023-11-04

## 2023-10-31 RX ORDER — APIXABAN 2.5 MG/1
5 TABLET, FILM COATED ORAL EVERY 12 HOURS
Refills: 0 | Status: DISCONTINUED | OUTPATIENT
Start: 2023-10-31 | End: 2023-11-02

## 2023-10-31 RX ORDER — BICTEGRAVIR SODIUM, EMTRICITABINE, AND TENOFOVIR ALAFENAMIDE FUMARATE 30; 120; 15 MG/1; MG/1; MG/1
1 TABLET ORAL DAILY
Refills: 0 | Status: DISCONTINUED | OUTPATIENT
Start: 2023-10-31 | End: 2023-11-01

## 2023-10-31 RX ORDER — PIPERACILLIN AND TAZOBACTAM 4; .5 G/20ML; G/20ML
3.38 INJECTION, POWDER, LYOPHILIZED, FOR SOLUTION INTRAVENOUS EVERY 8 HOURS
Refills: 0 | Status: DISCONTINUED | OUTPATIENT
Start: 2023-11-01 | End: 2023-11-01

## 2023-10-31 RX ORDER — AZITHROMYCIN 500 MG/1
250 TABLET, FILM COATED ORAL DAILY
Refills: 0 | Status: DISCONTINUED | OUTPATIENT
Start: 2023-10-31 | End: 2023-10-31

## 2023-10-31 RX ORDER — CEFTRIAXONE 500 MG/1
1000 INJECTION, POWDER, FOR SOLUTION INTRAMUSCULAR; INTRAVENOUS ONCE
Refills: 0 | Status: COMPLETED | OUTPATIENT
Start: 2023-10-31 | End: 2023-10-31

## 2023-10-31 RX ADMIN — PIPERACILLIN AND TAZOBACTAM 200 GRAM(S): 4; .5 INJECTION, POWDER, LYOPHILIZED, FOR SOLUTION INTRAVENOUS at 22:41

## 2023-10-31 RX ADMIN — Medication 1 TABLET(S): at 00:46

## 2023-10-31 RX ADMIN — CEFTRIAXONE 100 MILLIGRAM(S): 500 INJECTION, POWDER, FOR SOLUTION INTRAMUSCULAR; INTRAVENOUS at 00:46

## 2023-10-31 RX ADMIN — APIXABAN 5 MILLIGRAM(S): 2.5 TABLET, FILM COATED ORAL at 06:50

## 2023-10-31 RX ADMIN — AZITHROMYCIN 255 MILLIGRAM(S): 500 TABLET, FILM COATED ORAL at 02:10

## 2023-10-31 RX ADMIN — BICTEGRAVIR SODIUM, EMTRICITABINE, AND TENOFOVIR ALAFENAMIDE FUMARATE 1 TABLET(S): 30; 120; 15 TABLET ORAL at 15:51

## 2023-10-31 RX ADMIN — APIXABAN 5 MILLIGRAM(S): 2.5 TABLET, FILM COATED ORAL at 19:01

## 2023-10-31 RX ADMIN — DAPAGLIFLOZIN 10 MILLIGRAM(S): 10 TABLET, FILM COATED ORAL at 06:53

## 2023-10-31 RX ADMIN — Medication 40 MILLIGRAM(S): at 06:50

## 2023-10-31 RX ADMIN — CARVEDILOL PHOSPHATE 25 MILLIGRAM(S): 80 CAPSULE, EXTENDED RELEASE ORAL at 06:50

## 2023-10-31 RX ADMIN — VALSARTAN 40 MILLIGRAM(S): 80 TABLET ORAL at 06:49

## 2023-10-31 NOTE — DIETITIAN INITIAL EVALUATION ADULT - PERTINENT LABORATORY DATA
10-30    140  |  104  |  8   ----------------------------<  90  4.3   |  26  |  0.79    Ca    8.9      30 Oct 2023 20:30  Mg     1.5     10-30    TPro  7.9  /  Alb  3.1<L>  /  TBili  0.7  /  DBili  x   /  AST  52<H>  /  ALT  19  /  AlkPhos  132<H>  10-30

## 2023-10-31 NOTE — DIETITIAN INITIAL EVALUATION ADULT - OTHER CALCULATIONS
Admit wt 140 pounds, daily wt 117.9 pounds 10/31  10/31 for EER as this appears more accurate  Adjusted for age, presumed malnutrition, CHF, HIV/Aids - fluids per team  EER To be adjusted as [facility]ble Pending EMR ht/wt  Total Volume Injected (Ccs- Only Use Numbers And Decimals): 0.3

## 2023-10-31 NOTE — DISCHARGE NOTE PROVIDER - CARE PROVIDERS DIRECT ADDRESSES
,DirectAddress_Unknown ,DirectAddress_Unknown,[email or web address] ,DirectAddress_Unknown,[email or web address] ,DirectAddress_Unknown,[email or web address]

## 2023-10-31 NOTE — CHART NOTE - NSCHARTNOTEFT_GEN_A_CORE
Pt of note rude to nurses refusing vitals and monitoring, complaint with meds but refusing vitals from nursing, patient given 24 hours notice for discharge

## 2023-10-31 NOTE — DISCHARGE NOTE PROVIDER - PROVIDER TOKENS
PROVIDER:[TOKEN:[[id number]],FOLLOWUP:[1 week]] PROVIDER:[TOKEN:[[id number]],FOLLOWUP:[1 week]] PROVIDER:[TOKEN:[[id number]],FOLLOWUP:[1 week]] PROVIDER:[TOKEN:[[id number]],FOLLOWUP:[1 week]],PROVIDER:[TOKEN:[[id number]],FOLLOWUP:[2 weeks]] PROVIDER:[TOKEN:[[id number]],FOLLOWUP:[1 week]],PROVIDER:[TOKEN:[[id number]],FOLLOWUP:[2 weeks]] PROVIDER:[TOKEN:[[id number]],FOLLOWUP:[1 week]],PROVIDER:[TOKEN:[[id number]],FOLLOWUP:[2 weeks]]

## 2023-10-31 NOTE — PATIENT PROFILE ADULT - FALL HARM RISK - HARM RISK INTERVENTIONS

## 2023-10-31 NOTE — DISCHARGE NOTE PROVIDER - HOSPITAL COURSE
63-year-old male, reluctant historian, recently admitted to [facility] on 10/2 for CHF exacerbation, signed out AMA, undomiciled, noncompliant with meds, hx of  polysubstance abuse (cocaine/THC), PMHx of HIV/AIDs (noncompliant w/ HAART), Afib (noncompliant w/ Eliquis), severe MR/mod-severe TR, HFpEF (EF:55% by TTE 11/2022), lung CA not on any treatment  who [facility]  complaining of cough after getting caught in the rain a few days ago.  Patient also endorsing small amounts of blood noticed with the productive sputum.  He reports generalized body aches and occasional chills. +SOB w/ ambulating. He also complains feeling as though his entire body is swollen.  Patient is also complaining of a burning rash in his groin that has been present for over a month 63-year-old male, reluctant historian, recently admitted to [facility] on 10/2 for CHF exacerbation, signed out AMA, undomiciled, noncompliant with meds, hx of  polysubstance abuse (cocaine/THC), PMHx of HIV/AIDs (noncompliant w/ HAART), Afib (noncompliant w/ Eliquis), severe MR/mod-severe TR, HFpEF (EF:55% by TTE 11/2022), lung CA not on any treatment  [facility]  complaining of cough after getting caught in the rain a few days ago.  Patient also endorsing small amounts of blood noticed with the productive sputum.  He reports generalized body aches and occasional chills. +SOB w/ ambulating. He also complains feeling as though his entire body is swollen.  Patient is also complaining of a burning rash in his groin that has been present for over a month 63-year-old male, reluctant historian, recently admitted to [facility] on 10/2 for CHF exacerbation, signed out AMA, undomiciled, noncompliant with meds, hx of  polysubstance abuse (cocaine/THC), PMHx of HIV/AIDs (noncompliant w/ HAART), Afib (noncompliant w/ Eliquis), severe MR/mod-severe TR, HFpEF (EF:55% by TTE 11/2022), lung CA not on any treatment  [facility]  complaining of cough after getting caught in the rain a few days ago.  Patient also endorsing small amounts of blood noticed with the productive sputum.  He reports generalized body aches and occasional chills. +SOB w/ ambulating. He also complains feeling as though his entire body is swollen.  Patient is also complaining of a burning rash in his groin that has been present for over a month 62 y/o male, reluctant historian, recently admitted to [facility] on 10/2 for CHF exacerbation, signed out AMA, undomiciled, noncompliant with meds, hx of polysubstance abuse (cocaine/THC), PMHx of HIV/AIDs (noncompliant w/ HAART), Afib (noncompliant w/ Eliquis), severe MR/mod-severe TR, HFpEF (EF 55% by TTE 11/2022), lung CA not on any treatment, [facility] c/o cough after getting caught in the rain a few days ago, endorsed blood tinged sputum, generalized body aches and occasional chills. +SOB w/ ambulating. Found to be euvolemic not in HF exacerbation. Patient is also complaining of a burning rash in his groin that has been present for over a month. ID following for MRSA bacteremia, unclear source. Patient refusing telemetry, intermittently refusing medications/treatment, now agreeable pending MRI spine.      Problem/Plan - 1:  ·  Problem: MRSA bacteremia.   ·  Plan: Blood cultures on admission positive for MRSA bacteremia, unclear source, ?from scratching rash. MRSE likely a contaminant.     -Will need daily BCx until bacteremia clears, BC 11/2 NGTD x 1day  -ID consulted for MRSA bacteremia  -Start Daptomycin 500mg q 24h x at least 2-4 wks. Will need CK level every Thur  --If patient refusing IV Abx or leaves AMA: Start Linezolid 600 mg PO Q12 x 2wks course   -Cont Bactrim DS 1 tab daily for PCP ppx  -Obtain MRI of C/T/L spine with and without contrast r/o metastatic infection to spine  -Pt refusing and will defer CLAUDIA per Dr Pa given would not  in this pt (no evidence of severe TR or HF from valve disease, not a surgical candidate due to ongoing active polysubstance abuse and noncompliance).  -Defer LP at this time per ID 2/2 repeat Cryptococcal Ag negative and not exhibiting s/s cryptococcal meningitis.     Problem/Plan - 2:  ·  Problem: Chronic heart failure with preserved ejection fraction (HFpEF).   ·  Plan: Appears euvolemic w/o SOB or orthopnea. No lasix or diuretics needed   -Had recent echo w/ EF 55%  -Home meds: Lasix 20mg QD, Toprol XL 25mg QD, Valsartan 40mg QD,  Farxiga 10 mg QD  -c/w Valsartan 40mg qd and Farxiga  -Core measures, strict I/O's daily weights, 1L PO restriction.     Problem/Plan - 3:  ·  Problem: HIV disease.   ·  Plan: -Non compliant with HAART, CD4 82 and Crypto antigen neg  -Patient has not been taking Biktarvy for unknown amount of time. i/s/o recent positive cryptococcal ag, restarting HAART can lead to IRIS.     Problem/Plan - 4:  ·  Problem: Atrial fibrillation and flutter.   ·  Plan: Rate controlled Afib w/ HR 70s off meds  -Noncompliant on Eliquis, was restarted on Eliquis 5mg BID on admission. Initially held 11/2 pending possible LP- now deferred.  -Resume Eliquis 5mg BID.     Problem/Plan - 5:  ·  Problem: Groin rash.   ·  Plan: Pt allowed ID team exam of groin, noting to appear as intertrigo (fungal infection)  -Started Clotrimazole 1% cream BID x 2 wks   64 y/o male, reluctant historian, recently admitted to [facility] on 10/2 for CHF exacerbation, signed out AMA, undomiciled, noncompliant with meds, hx of polysubstance abuse (cocaine/THC), PMHx of HIV/AIDs (noncompliant w/ HAART), Afib (noncompliant w/ Eliquis), severe MR/mod-severe TR, HFpEF (EF 55% by TTE 11/2022), lung CA not on any treatment, [facility] c/o cough after getting caught in the rain a few days ago, endorsed blood tinged sputum, generalized body aches and occasional chills. +SOB w/ ambulating. Found to be euvolemic not in HF exacerbation. Patient is also complaining of a burning rash in his groin that has been present for over a month. ID following for MRSA bacteremia, unclear source. Patient refusing telemetry, intermittently refusing medications/treatment, now agreeable pending MRI spine.      Problem/Plan - 1:  ·  Problem: MRSA bacteremia.   ·  Plan: Blood cultures on admission positive for MRSA bacteremia, unclear source, ?from scratching rash. MRSE likely a contaminant.     -Will need daily BCx until bacteremia clears, BC 11/2 NGTD x 1day  -ID consulted for MRSA bacteremia  -Start Daptomycin 500mg q 24h x at least 2-4 wks. Will need CK level every Thur  --If patient refusing IV Abx or leaves AMA: Start Linezolid 600 mg PO Q12 x 2wks course   -Cont Bactrim DS 1 tab daily for PCP ppx  -Obtain MRI of C/T/L spine with and without contrast r/o metastatic infection to spine  -Pt refusing and will defer CLAUDIA per Dr Pa given would not  in this pt (no evidence of severe TR or HF from valve disease, not a surgical candidate due to ongoing active polysubstance abuse and noncompliance).  -Defer LP at this time per ID 2/2 repeat Cryptococcal Ag negative and not exhibiting s/s cryptococcal meningitis.     Problem/Plan - 2:  ·  Problem: Chronic heart failure with preserved ejection fraction (HFpEF).   ·  Plan: Appears euvolemic w/o SOB or orthopnea. No lasix or diuretics needed   -Had recent echo w/ EF 55%  -Home meds: Lasix 20mg QD, Toprol XL 25mg QD, Valsartan 40mg QD,  Farxiga 10 mg QD  -c/w Valsartan 40mg qd and Farxiga  -Core measures, strict I/O's daily weights, 1L PO restriction.     Problem/Plan - 3:  ·  Problem: HIV disease.   ·  Plan: -Non compliant with HAART, CD4 82 and Crypto antigen neg  -Patient has not been taking Biktarvy for unknown amount of time. i/s/o recent positive cryptococcal ag, restarting HAART can lead to IRIS.     Problem/Plan - 4:  ·  Problem: Atrial fibrillation and flutter.   ·  Plan: Rate controlled Afib w/ HR 70s off meds  -Noncompliant on Eliquis, was restarted on Eliquis 5mg BID on admission. Initially held 11/2 pending possible LP- now deferred.  -Resume Eliquis 5mg BID.     Problem/Plan - 5:  ·  Problem: Groin rash.   ·  Plan: Pt allowed ID team exam of groin, noting to appear as intertrigo (fungal infection)  -Started Clotrimazole 1% cream BID x 2 wks   64 y/o male, reluctant historian, recently admitted to [facility] on 10/2 for CHF exacerbation, signed out AMA, undomiciled, noncompliant with meds, hx of polysubstance abuse (cocaine/THC), PMHx of HIV/AIDs (noncompliant w/ HAART), Afib (noncompliant w/ Eliquis), severe MR/mod-severe TR, HFpEF (EF 55% by TTE 11/2022), lung CA not on any treatment, [facility] c/o cough after getting caught in the rain a few days ago, endorsed blood tinged sputum, generalized body aches and occasional chills. +SOB w/ ambulating. Found to be euvolemic not in HF exacerbation. Patient is also complaining of a burning rash in his groin that has been present for over a month. ID following for MRSA bacteremia, unclear source. Patient refusing telemetry, intermittently refusing medications/treatment, now agreeable pending MRI spine.      Problem/Plan - 1:  ·  Problem: MRSA bacteremia.   ·  Plan: Blood cultures on admission positive for MRSA bacteremia, unclear source, ?from scratching rash. MRSE likely a contaminant.     -Will need daily BCx until bacteremia clears, BC 11/2 NGTD x 1day  -ID consulted for MRSA bacteremia  -Start Daptomycin 500mg q 24h x at least 2-4 wks. Will need CK level every Thur  --If patient refusing IV Abx or leaves AMA: Start Linezolid 600 mg PO Q12 x 2wks course   -Cont Bactrim DS 1 tab daily for PCP ppx  -Obtain MRI of C/T/L spine with and without contrast r/o metastatic infection to spine  -Pt refusing and will defer CLAUDIA per Dr Pa given would not  in this pt (no evidence of severe TR or HF from valve disease, not a surgical candidate due to ongoing active polysubstance abuse and noncompliance).  -Defer LP at this time per ID 2/2 repeat Cryptococcal Ag negative and not exhibiting s/s cryptococcal meningitis.     Problem/Plan - 2:  ·  Problem: Chronic heart failure with preserved ejection fraction (HFpEF).   ·  Plan: Appears euvolemic w/o SOB or orthopnea. No lasix or diuretics needed   -Had recent echo w/ EF 55%  -Home meds: Lasix 20mg QD, Toprol XL 25mg QD, Valsartan 40mg QD,  Farxiga 10 mg QD  -c/w Valsartan 40mg qd and Farxiga  -Core measures, strict I/O's daily weights, 1L PO restriction.     Problem/Plan - 3:  ·  Problem: HIV disease.   ·  Plan: -Non compliant with HAART, CD4 82 and Crypto antigen neg  -Patient has not been taking Biktarvy for unknown amount of time. i/s/o recent positive cryptococcal ag, restarting HAART can lead to IRIS.     Problem/Plan - 4:  ·  Problem: Atrial fibrillation and flutter.   ·  Plan: Rate controlled Afib w/ HR 70s off meds  -Noncompliant on Eliquis, was restarted on Eliquis 5mg BID on admission. Initially held 11/2 pending possible LP- now deferred.  -Resume Eliquis 5mg BID.     Problem/Plan - 5:  ·  Problem: Groin rash.   ·  Plan: Pt allowed ID team exam of groin, noting to appear as intertrigo (fungal infection)  -Started Clotrimazole 1% cream BID x 2 wks   64 y/o male, reluctant historian, recently admitted to [facility] on 10/2 for CHF exacerbation, signed out AMA, undomiciled, noncompliant with meds, hx of polysubstance abuse (cocaine/THC), PMHx of HIV/AIDs (noncompliant w/ HAART), Afib (noncompliant w/ Eliquis), severe MR/mod-severe TR, HFpEF (EF 55% by TTE 11/2022), lung CA not on any treatment, [facility]/o cough after getting caught in the rain a few days ago, endorsed blood tinged sputum, generalized body aches and occasional chills. +SOB w/ ambulating. Found to be euvolemic not in HF exacerbation. Patient is also complaining of a burning rash in his groin that has been present for over a month. Examined by ID, fungal rash 2/2 intertrigo, rec clotrimazole 1% cream x 2 wks. ID following for MRSA bacteremia, unclear source, initiated Daptomycin 500mg IV q24h. Pt refused CLAUDIA r/o endocarditis. Initially agreeable for MRI C/T/L spine, but ultimately refused and wish to sign out against medical advice.  Pt endorsed did not wish to remain inhospital for continued medical treatment and workup. The pt was explained the risk of leaving AMA including but not limited to worsening infection, sepsis, sob, chest pain, respirator/cardiac arrest, and death. Pt verbalizes understanding and wishes to leave AMA regardless of risks. Agrees to  medications at [facility].      Problem/Plan - 1:  ·  Problem: MRSA bacteremia.   ·  Plan: Blood cultures on admission positive for MRSA bacteremia, unclear source, ?from scratching rash. MRSE likely a contaminant.     -Will need daily BCx until bacteremia clears, BC 11/2 NGTD x 1day  -ID consulted for MRSA bacteremia  -Start Daptomycin 500mg q 24h x at least 2-4 wks. Will need CK level every Thur  --If patient refusing IV Abx or leaves AMA: Start Linezolid 600 mg PO Q12 x 2wks course   -Cont Bactrim DS 1 tab daily for PCP ppx  -Obtain MRI of C/T/L spine with and without contrast r/o metastatic infection to spine  -Pt refusing and will defer CLAUDIA per Dr Pa given would not  in this pt (no evidence of severe TR or HF from valve disease, not a surgical candidate due to ongoing active polysubstance abuse and noncompliance).  -Defer LP at this time per ID 2/2 repeat Cryptococcal Ag negative and not exhibiting s/s cryptococcal meningitis.     Problem/Plan - 2:  ·  Problem: Chronic heart failure with preserved ejection fraction (HFpEF).   ·  Plan: Appears euvolemic w/o SOB or orthopnea. No lasix or diuretics needed   -Had recent echo w/ EF 55%  -Home meds: Lasix 20mg QD, Toprol XL 25mg QD, Valsartan 40mg QD,  Farxiga 10 mg QD  -c/w Valsartan 40mg qd and Farxiga  -Core measures, strict I/O's daily weights, 1L PO restriction.     Problem/Plan - 3:  ·  Problem: HIV disease.   ·  Plan: -Non compliant with HAART, CD4 82 and Crypto antigen neg  -Patient has not been taking Biktarvy for unknown amount of time. i/s/o recent positive cryptococcal ag, restarting HAART can lead to IRIS.     Problem/Plan - 4:  ·  Problem: Atrial fibrillation and flutter.   ·  Plan: Rate controlled Afib w/ HR 70s off meds  -Noncompliant on Eliquis, was restarted on Eliquis 5mg BID on admission. Initially held 11/2 pending possible LP- now deferred.  -Resume Eliquis 5mg BID.     Problem/Plan - 5:  ·  Problem: Groin rash.   ·  Plan: Pt allowed ID team exam of groin, noting to appear as intertrigo (fungal infection)  -Started Clotrimazole 1% cream BID x 2 wks   62 y/o male, reluctant historian, recently admitted to [facility] on 10/2 for CHF exacerbation, signed out AMA, undomiciled, noncompliant with meds, hx of polysubstance abuse (cocaine/THC), PMHx of HIV/AIDs (noncompliant w/ HAART), Afib (noncompliant w/ Eliquis), severe MR/mod-severe TR, HFpEF (EF 55% by TTE 11/2022), lung CA not on any treatment, [facility] c/o cough after getting caught in the rain a few days ago, endorsed blood tinged sputum, generalized body aches and occasional chills. +SOB w/ ambulating. Found to be euvolemic not in HF exacerbation. Patient is also complaining of a burning rash in his groin that has been present for over a month. Examined by ID, fungal rash 2/2 intertrigo, rec clotrimazole 1% cream x 2 wks. ID following for MRSA bacteremia, unclear source, initiated Daptomycin 500mg IV q24h. Pt refused CLAUDIA r/o endocarditis. Initially agreeable for MRI C/T/L spine, but ultimately refused and wish to sign out against medical advice.  Pt endorsed did not wish to remain inhospital for continued medical treatment and workup. The pt was explained the risk of leaving AMA including but not limited to worsening infection, sepsis, sob, chest pain, respirator/cardiac arrest, and death. Pt verbalizes understanding and wishes to leave AMA regardless of risks. Agrees to  medications at [facility].      Problem/Plan - 1:  ·  Problem: MRSA bacteremia.   ·  Plan: Blood cultures on admission positive for MRSA bacteremia, unclear source, ?from scratching rash. MRSE likely a contaminant.     -Will need daily BCx until bacteremia clears, BC 11/2 NGTD x 1day  -ID consulted for MRSA bacteremia  -Start Daptomycin 500mg q 24h x at least 2-4 wks. Will need CK level every Thur  --If patient refusing IV Abx or leaves AMA: Start Linezolid 600 mg PO Q12 x 2wks course   -Cont Bactrim DS 1 tab daily for PCP ppx  -Obtain MRI of C/T/L spine with and without contrast r/o metastatic infection to spine  -Pt refusing and will defer CLAUDIA per Dr Pa given would not  in this pt (no evidence of severe TR or HF from valve disease, not a surgical candidate due to ongoing active polysubstance abuse and noncompliance).  -Defer LP at this time per ID 2/2 repeat Cryptococcal Ag negative and not exhibiting s/s cryptococcal meningitis.     Problem/Plan - 2:  ·  Problem: Chronic heart failure with preserved ejection fraction (HFpEF).   ·  Plan: Appears euvolemic w/o SOB or orthopnea. No lasix or diuretics needed   -Had recent echo w/ EF 55%  -Home meds: Lasix 20mg QD, Toprol XL 25mg QD, Valsartan 40mg QD,  Farxiga 10 mg QD  -c/w Valsartan 40mg qd and Farxiga  -Core measures, strict I/O's daily weights, 1L PO restriction.     Problem/Plan - 3:  ·  Problem: HIV disease.   ·  Plan: -Non compliant with HAART, CD4 82 and Crypto antigen neg  -Patient has not been taking Biktarvy for unknown amount of time. i/s/o recent positive cryptococcal ag, restarting HAART can lead to IRIS.     Problem/Plan - 4:  ·  Problem: Atrial fibrillation and flutter.   ·  Plan: Rate controlled Afib w/ HR 70s off meds  -Noncompliant on Eliquis, was restarted on Eliquis 5mg BID on admission. Initially held 11/2 pending possible LP- now deferred.  -Resume Eliquis 5mg BID.     Problem/Plan - 5:  ·  Problem: Groin rash.   ·  Plan: Pt allowed ID team exam of groin, noting to appear as intertrigo (fungal infection)  -Started Clotrimazole 1% cream BID x 2 wks   62 y/o male, reluctant historian, recently admitted to [facility] on 10/2 for CHF exacerbation, signed out AMA, undomiciled, noncompliant with meds, hx of polysubstance abuse (cocaine/THC), PMHx of HIV/AIDs (noncompliant w/ HAART), Afib (noncompliant w/ Eliquis), severe MR/mod-severe TR, HFpEF (EF 55% by TTE 11/2022), lung CA not on any treatment, [facility]/o cough after getting caught in the rain a few days ago, endorsed blood tinged sputum, generalized body aches and occasional chills. +SOB w/ ambulating. Found to be euvolemic not in HF exacerbation. Patient is also complaining of a burning rash in his groin that has been present for over a month. Examined by ID, fungal rash 2/2 intertrigo, rec clotrimazole 1% cream x 2 wks. ID following for MRSA bacteremia, unclear source, initiated Daptomycin 500mg IV q24h. Pt refused CLAUDIA r/o endocarditis. Initially agreeable for MRI C/T/L spine, but ultimately refused and wish to sign out against medical advice.  Pt endorsed did not wish to remain inhospital for continued medical treatment and workup. The pt was explained the risk of leaving AMA including but not limited to worsening infection, sepsis, sob, chest pain, respirator/cardiac arrest, and death. Pt verbalizes understanding and wishes to leave AMA regardless of risks. Agrees to  medications at [facility].      Problem/Plan - 1:  ·  Problem: MRSA bacteremia.   ·  Plan: Blood cultures on admission positive for MRSA bacteremia, unclear source, ?from scratching rash. MRSE likely a contaminant.     -Will need daily BCx until bacteremia clears, BC 11/2 NGTD x 1day  -ID consulted for MRSA bacteremia  -Start Daptomycin 500mg q 24h x at least 2-4 wks. Will need CK level every Thur  --If patient refusing IV Abx or leaves AMA: Start Linezolid 600 mg PO Q12 x 2wks course   -Cont Bactrim DS 1 tab daily for PCP ppx  -Obtain MRI of C/T/L spine with and without contrast r/o metastatic infection to spine  -Pt refusing and will defer CLAUDIA per Dr Pa given would not  in this pt (no evidence of severe TR or HF from valve disease, not a surgical candidate due to ongoing active polysubstance abuse and noncompliance).  -Defer LP at this time per ID 2/2 repeat Cryptococcal Ag negative and not exhibiting s/s cryptococcal meningitis.     Problem/Plan - 2:  ·  Problem: Chronic heart failure with preserved ejection fraction (HFpEF).   ·  Plan: Appears euvolemic w/o SOB or orthopnea. No lasix or diuretics needed   -Had recent echo w/ EF 55%  -Home meds: Lasix 20mg QD, Toprol XL 25mg QD, Valsartan 40mg QD,  Farxiga 10 mg QD  -c/w Valsartan 40mg qd and Farxiga  -Core measures, strict I/O's daily weights, 1L PO restriction.     Problem/Plan - 3:  ·  Problem: HIV disease.   ·  Plan: -Non compliant with HAART, CD4 82 and Crypto antigen neg  -Patient has not been taking Biktarvy for unknown amount of time. i/s/o recent positive cryptococcal ag, restarting HAART can lead to IRIS.     Problem/Plan - 4:  ·  Problem: Atrial fibrillation and flutter.   ·  Plan: Rate controlled Afib w/ HR 70s off meds  -Noncompliant on Eliquis, was restarted on Eliquis 5mg BID on admission. Initially held 11/2 pending possible LP- now deferred.  -Resume Eliquis 5mg BID.     Problem/Plan - 5:  ·  Problem: Groin rash.   ·  Plan: Pt allowed ID team exam of groin, noting to appear as intertrigo (fungal infection)  -Started Clotrimazole 1% cream BID x 2 wks   62 y/o male, reluctant historian, recently admitted to [facility] on 10/2 for CHF exacerbation, signed out AMA, undomiciled, noncompliant with meds, hx of polysubstance abuse (cocaine/THC), PMHx of HIV/AIDs (noncompliant w/ HAART), Afib (noncompliant w/ Eliquis), severe MR/mod-severe TR, HFpEF (EF 55% by TTE 11/2022), lung CA not on any treatment, [facility] c/o cough after getting caught in the rain a few days ago, endorsed blood tinged sputum, generalized body aches and occasional chills. +SOB w/ ambulating. Admitted to cardiac tele for presumed HF, but pt found to be euvolemic and not in HF exacerbation. Found to be bacteremic. ID following for MRSA bacteremia of unclear source, initiated Daptomycin 500mg IV q24h. Patient also complaining of a burning rash in his groin that has been present for over a month. Examined by ID, fungal rash 2/2 intertrigo, rec clotrimazole 1% cream x 2 wks. Pt refused CLAUDIA r/o endocarditis. Initially agreeable for MRI C/T/L spine, but ultimately refused and wish to sign out against medical advice.  Pt endorsed did not wish to remain inhospital for continued medical treatment and workup. The pt was explained the risk of leaving AMA including but not limited to worsening infection, sepsis, sob, chest pain, respirator/cardiac arrest, and death. Pt verbalizes understanding and wishes to leave AMA regardless of risks. Discussed w/ ID upon AMA, will send prescription for Doxycycline 100mg BID x 2 wks course for MRSA bacteremia as Linezolid is contraindicated in setting of cocaine use. Pt agreed to  medications at [facility] upon AMA.      Problem/Plan - 1:  ·  Problem: MRSA bacteremia.   ·  Plan: Blood cultures on admission positive for MRSA bacteremia, unclear source, ?from scratching rash. MRSE likely a contaminant.     - BC on 11/2 NGTD x 2 days  -ID consulted for MRSA bacteremia  -S/p Daptomycin 500mg q 24h inhospital  -Discussed w/ ID upon pt's AMA, sent prescription for Doxycycline 100mg BID x 2 wks course for MRSA bacteremia as Linezolid is contraindicated in setting of cocaine use  -Cont Bactrim DS 1 tab daily for PCP ppx  -Refused MRI of C/T/L spine with and without contrast r/o metastatic infection to spine  -Pt refusing and will defer CLAUDIA given would not  in this pt (no evidence of severe TR or HF from valve disease, not a surgical candidate due to ongoing active polysubstance abuse and noncompliance).  -Defer LP at this time per ID 2/2 repeat Cryptococcal Ag negative and not exhibiting s/s cryptococcal meningitis.     Problem/Plan - 2:  ·  Problem: Chronic heart failure with preserved ejection fraction (HFpEF).   ·  Plan: Appears euvolemic w/o SOB or orthopnea. No lasix or diuretics needed   -Had recent echo w/ EF 55%  -Resume home meds: Lasix 20mg QD, Toprol XL 25mg QD, Valsartan 40mg QD,  Farxiga 10 mg QD     Problem/Plan - 3:  ·  Problem: HIV disease.   ·  Plan: -Non compliant with HAART, CD4 82 and Crypto antigen neg  -Patient has not been taking Biktarvy for unknown amount of time. i/s/o recent positive cryptococcal ag, restarting HAART can lead to IRIS.     Problem/Plan - 4:  ·  Problem: Atrial fibrillation and flutter.   ·  Plan: Rate controlled Afib w/ HR 70s off meds  -Noncompliant on Eliquis, was restarted on Eliquis 5mg BID on admission. Initially held 11/2 pending possible LP- now deferred.  -Resume Eliquis 5mg BID.     Problem/Plan - 5:  ·  Problem: Groin rash.   ·  Plan: Pt allowed ID team exam of groin, noting to appear as intertrigo (fungal infection)  -Started Clotrimazole 1% cream BID x 2 wks 62 y/o male, reluctant historian, recently admitted to [facility] on 10/2 for CHF exacerbation, signed out AMA, undomiciled, noncompliant with meds, hx of polysubstance abuse (cocaine/THC), PMHx of HIV/AIDs (noncompliant w/ HAART), Afib (noncompliant w/ Eliquis), severe MR/mod-severe TR, HFpEF (EF 55% by TTE 11/2022), lung CA not on any treatment, who [facility] c/o cough after getting caught in the rain a few days ago, endorsed blood tinged sputum, generalized body aches and occasional chills. +SOB w/ ambulating. Admitted to cardiac tele for presumed HF, but pt found to be euvolemic and not in HF exacerbation. Found to be bacteremic. ID following for MRSA bacteremia of unclear source, initiated Daptomycin 500mg IV q24h. Patient also complaining of a burning rash in his groin that has been present for over a month. Examined by ID, fungal rash 2/2 intertrigo, rec clotrimazole 1% cream x 2 wks. Pt refused CLAUDIA r/o endocarditis. Initially agreeable for MRI C/T/L spine, but ultimately refused and wish to sign out against medical advice.  Pt endorsed did not wish to remain inhospital for continued medical treatment and workup. The pt was explained the risk of leaving AMA including but not limited to worsening infection, sepsis, sob, chest pain, respirator/cardiac arrest, and death. Pt verbalizes understanding and wishes to leave AMA regardless of risks. Discussed w/ ID upon AMA, will send prescription for Doxycycline 100mg BID x 2 wks course for MRSA bacteremia as Linezolid is contraindicated in setting of cocaine use. Pt agreed to  medications at [facility] upon AMA.      Problem/Plan - 1:  ·  Problem: MRSA bacteremia.   ·  Plan: Blood cultures on admission positive for MRSA bacteremia, unclear source, ?from scratching rash. MRSE likely a contaminant.     - BC on 11/2 NGTD x 2 days  -ID consulted for MRSA bacteremia  -S/p Daptomycin 500mg q 24h inhospital  -Discussed w/ ID upon pt's AMA, sent prescription for Doxycycline 100mg BID x 2 wks course for MRSA bacteremia as Linezolid is contraindicated in setting of cocaine use  -Cont Bactrim DS 1 tab daily for PCP ppx  -Refused MRI of C/T/L spine with and without contrast r/o metastatic infection to spine  -Pt refusing and will defer CLAUDIA given would not  in this pt (no evidence of severe TR or HF from valve disease, not a surgical candidate due to ongoing active polysubstance abuse and noncompliance).  -Defer LP at this time per ID 2/2 repeat Cryptococcal Ag negative and not exhibiting s/s cryptococcal meningitis.     Problem/Plan - 2:  ·  Problem: Chronic heart failure with preserved ejection fraction (HFpEF).   ·  Plan: Appears euvolemic w/o SOB or orthopnea. No lasix or diuretics needed   -Had recent echo w/ EF 55%  -Resume home meds: Lasix 20mg QD, Toprol XL 25mg QD, Valsartan 40mg QD,  Farxiga 10 mg QD     Problem/Plan - 3:  ·  Problem: HIV disease.   ·  Plan: -Non compliant with HAART, CD4 82 and Crypto antigen neg  -Patient has not been taking Biktarvy for unknown amount of time. i/s/o recent positive cryptococcal ag, restarting HAART can lead to IRIS.     Problem/Plan - 4:  ·  Problem: Atrial fibrillation and flutter.   ·  Plan: Rate controlled Afib w/ HR 70s off meds  -Noncompliant on Eliquis, was restarted on Eliquis 5mg BID on admission. Initially held 11/2 pending possible LP- now deferred.  -Resume Eliquis 5mg BID.     Problem/Plan - 5:  ·  Problem: Groin rash.   ·  Plan: Pt allowed ID team exam of groin, noting to appear as intertrigo (fungal infection)  -Started Clotrimazole 1% cream BID x 2 wks 64 y/o male, reluctant historian, recently admitted to [facility] on 10/2 for CHF exacerbation, signed out AMA, undomiciled, noncompliant with meds, hx of polysubstance abuse (cocaine/THC), PMHx of HIV/AIDs (noncompliant w/ HAART), Afib (noncompliant w/ Eliquis), severe MR/mod-severe TR, HFpEF (EF 55% by TTE 11/2022), lung CA not on any treatment, who [facility] c/o cough after getting caught in the rain a few days ago, endorsed blood tinged sputum, generalized body aches and occasional chills. +SOB w/ ambulating. Admitted to cardiac tele for presumed HF, but pt found to be euvolemic and not in HF exacerbation. Found to be bacteremic. ID following for MRSA bacteremia of unclear source, initiated Daptomycin 500mg IV q24h. Patient also complaining of a burning rash in his groin that has been present for over a month. Examined by ID, fungal rash 2/2 intertrigo, rec clotrimazole 1% cream x 2 wks. Pt refused CLAUDIA r/o endocarditis. Initially agreeable for MRI C/T/L spine, but ultimately refused and wish to sign out against medical advice.  Pt endorsed did not wish to remain inhospital for continued medical treatment and workup. The pt was explained the risk of leaving AMA including but not limited to worsening infection, sepsis, sob, chest pain, respirator/cardiac arrest, and death. Pt verbalizes understanding and wishes to leave AMA regardless of risks. Discussed w/ ID upon AMA, will send prescription for Doxycycline 100mg BID x 2 wks course for MRSA bacteremia as Linezolid is contraindicated in setting of cocaine use. Pt agreed to  medications at [facility] upon AMA.      Problem/Plan - 1:  ·  Problem: MRSA bacteremia.   ·  Plan: Blood cultures on admission positive for MRSA bacteremia, unclear source, ?from scratching rash. MRSE likely a contaminant.     - BC on 11/2 NGTD x 2 days  -ID consulted for MRSA bacteremia  -S/p Daptomycin 500mg q 24h inhospital  -Discussed w/ ID upon pt's AMA, sent prescription for Doxycycline 100mg BID x 2 wks course for MRSA bacteremia as Linezolid is contraindicated in setting of cocaine use  -Cont Bactrim DS 1 tab daily for PCP ppx  -Refused MRI of C/T/L spine with and without contrast r/o metastatic infection to spine  -Pt refusing and will defer CLAUDIA given would not  in this pt (no evidence of severe TR or HF from valve disease, not a surgical candidate due to ongoing active polysubstance abuse and noncompliance).  -Defer LP at this time per ID 2/2 repeat Cryptococcal Ag negative and not exhibiting s/s cryptococcal meningitis.     Problem/Plan - 2:  ·  Problem: Chronic heart failure with preserved ejection fraction (HFpEF).   ·  Plan: Appears euvolemic w/o SOB or orthopnea. No lasix or diuretics needed   -Had recent echo w/ EF 55%  -Resume home meds: Lasix 20mg QD, Toprol XL 25mg QD, Valsartan 40mg QD,  Farxiga 10 mg QD     Problem/Plan - 3:  ·  Problem: HIV disease.   ·  Plan: -Non compliant with HAART, CD4 82 and Crypto antigen neg  -Patient has not been taking Biktarvy for unknown amount of time. i/s/o recent positive cryptococcal ag, restarting HAART can lead to IRIS.     Problem/Plan - 4:  ·  Problem: Atrial fibrillation and flutter.   ·  Plan: Rate controlled Afib w/ HR 70s off meds  -Noncompliant on Eliquis, was restarted on Eliquis 5mg BID on admission. Initially held 11/2 pending possible LP- now deferred.  -Resume Eliquis 5mg BID.     Problem/Plan - 5:  ·  Problem: Groin rash.   ·  Plan: Pt allowed ID team exam of groin, noting to appear as intertrigo (fungal infection)  -Started Clotrimazole 1% cream BID x 2 wks

## 2023-10-31 NOTE — H&P ADULT - PROBLEM SELECTOR PLAN 1
Unable to illicit lung exam; pt refuses to participate  Received Lasix 40IV at OSH, will continue  Had recent echo, no need to repeat at this time  Home meds: Lasix 20mg QD, Toprol XL 25mg QD, Valsartan 40mg QD,  Farxiga 10 mg  QD  -F/U cardiac risk labs  -Core measures, strict I/O's daily weights, 1L PO restriction, VS per routine, cont tele/pulse ox

## 2023-10-31 NOTE — DISCHARGE NOTE PROVIDER - CARE PROVIDER_API CALL
Jelani Bowman  Infectious Disease  [address], [address]  [place] [address]  Phone: [phone]  Fax: [phone]  Follow Up Time: 1 week   Jelani Bowman  Infectious Disease  [address], [address]  [place] [address]  Phone: [phone]  Fax: [phone]  Follow Up Time: 1 week   Jelani Bowman  Infectious Disease  [address], [address]  [place] [address]  Phone: [phone]  Fax: [phone]  Follow Up Time: 1 week   Jelani Bowman  Infectious Disease  [address], [address]  [place] [address]  Phone: [phone]  Fax: [phone]  Follow Up Time: 1 week    Compa Mortensen  Cardiovascular Disease  [address] [place] [address], [address]  [place] [address]  Phone: [phone]  Fax: [phone]  Follow Up Time: 2 weeks   Jelani Bowman  Infectious Disease  [address], [address]  [place] [address]  Phone: [phone]  Fax: [phone]  Follow Up Time: 1 week    Compa Mortensen  Cardiovascular Disease  [address] [place] [address], [address]  [place] [address]  Phone: [phone]  Fax: [phone]  Follow Up Time: 2 weeks   Jelani Bowman  Infectious Disease  [address], [address]  [place] [address]  Phone: [phone]  Fax: [phone]  Follow Up Time: 1 week    Compa Mortensen  Cardiovascular Disease  [address] [place] [address], [address]  [place] [address]  Phone: [phone]  Fax: [phone]  Follow Up Time: 2 weeks

## 2023-10-31 NOTE — H&P ADULT - NSHPLABSRESULTS_GEN_ALL_CORE
10.9   2.89  )-----------( 244      ( 30 Oct 2023 20:30 )             33.9       10    140  |  104  |  8   ----------------------------<  90  4.3   |  26  |  0.79    Ca    8.9      30 Oct 2023 20:30  Mg     1.5     10-30    TPro  7.9  /  Alb  3.1<L>  /  TBili  0.7  /  DBili  x   /  AST  52<H>  /  ALT  19  /  AlkPhos  132<H>  10-30                Urinalysis Basic - ( 30 Oct 2023 20:30 )    Color: Yellow / Appearance: Clear / S.011 / pH: x  Gluc: 90 mg/dL / Ketone: Negative mg/dL  / Bili: Negative / Urobili: 1.0 mg/dL   Blood: x / Protein: Negative mg/dL / Nitrite: Negative   Leuk Esterase: Trace / RBC: 1 /HPF / WBC 4 /HPF   Sq Epi: x / Non Sq Epi: x / Bacteria: present /HPF

## 2023-10-31 NOTE — DIETITIAN INITIAL EVALUATION ADULT - OTHER INFO
63-year-old male, reluctant historian, recently admitted to [facility] 10/2 for CHF exacerbation, signed out AMA, undomiciled, noncompliant with meds, hx polysubstance abuse (cocaine/THC), HIV/AIDs (noncompliant: HAART), Afib (noncompliant: Eliquis), severe MR/mod-severe TR, HFpEF (EF:55% by TTE 11/2022), lung CA not on any treatment who presented to [facility] 10/30/23 complaining of cough after getting caught in the rain a few days ago. Pt also endorsing small amounts of blood noticed with the productive sputum. He reports generalized body aches and occasional chills. +SOB when ambulating. He also complains feeling as though his entire body is swollen. Pt is also complaining of a burning rash in his groin that has been present for over a month.  He reports previously being told it was fungal but has not been on medication for it. At [facility], vitals with /81, HR 76, R 19, T 36.5O2 sat 99% RA, labs with WBC 2.89, Hg 10.9, Mag 1.5, BNP 4361 (was 5771 on 10/2), trop 18.1, EKG AFib at 75 bpm. CT CAP negative for PE, multiple gas foci within the soft tissues of the distal penis may be related to uncircumcised penis or infection. He received Ceftriaxone, Azithromycin and Bactrim and transferred to [facility] for further management of Acute on chronic systolic congestive heart failure.    Pt seen this AM on 5UR. Per staff pt has been verbally abusive and rude. Noted pt is undomiciled, ? access to food PTA. Pt seen eating breakfast and declined RD visit as he wanted to left to eat prior to meal getting cold. Pt did however request ensure x2/day in vanilla flavor. Noted pt with limited dentition at this time. Admit wt 140 pounds with no EMR ht. Daily wt from 10/31 noted at 117.9 pounds - seems more accurate as Pt appeared visually with wasting/loss however did NFPE not feasible at this time i/s/o pt wanting to defer RD visit. Will attempt on f/u as able. No no prior RD notes to pull wts from. No Cd4, Mg 1.5. RN flow sheets not filled out for Skin/GI as pt pt refusing all care.   Please see below for nutritions recommendations.

## 2023-10-31 NOTE — DIETITIAN INITIAL EVALUATION ADULT - NS FNS DIET ORDER
Diet, DASH/TLC:   Sodium & Cholesterol Restricted  1000mL Fluid Restriction ([id number]) (10-31-23 @ 09:30)

## 2023-10-31 NOTE — H&P ADULT - HISTORY OF PRESENT ILLNESS
Pt refused to answer any questions, refused physical exam, refused telemetry, stating "go find everything in my chart"    History copied from [facility]  This is  a 63-year-old male, reluctant historian, recently admitted to [facility] on 10/2 for CHF exacerbation, signed out AMA, undomiciled, noncompliant with meds, hx of  polysubstance abuse (cocaine/THC), PMHx of HIV/AIDs (noncompliant w/ HAART), Afib (noncompliant w/ Eliquis), severe MR/mod-severe TR, HFpEF (EF:55% by TTE 11/2022), lung CA not on any treatment  who presented to [facility] 10/30/23  complaining of cough after getting caught in the rain a few days ago.  Patient also endorsing small amounts of blood noticed with the productive sputum.  He reports generalized body aches and occasional chills. +SOB w/ ambulating. He also complains feeling as though his entire body is swollen.  Patient is also complaining of a burning rash in his groin that has been present for over a month.  He reports previously being told it was fungal but has not been on medication for it.  At [facility], vitals with /81, HR 76, R 19, T 36.5O2 sat 99% RA, labs with WBC 2.89, Hg 10.9, Mag 1.5, BNP 4361 (was 5771 on 10/2), trop 18.1, EKG AFib at 75 bpm. CT CAP negative for PE, multiple gas foci within the soft tissues of the distal penis may be   related to uncircumcised penis or infection. He received Ceftriaxone, Azithromycin and Bactrim and transferred to [facility] for further management

## 2023-10-31 NOTE — H&P ADULT - PROBLEM SELECTOR PLAN 3
Rate controlled, HR 70s  Noncompliant on Eliquis, restarted on Eliquis 5mg BID  ContinueToprol XL 25mg QD

## 2023-10-31 NOTE — DISCHARGE NOTE PROVIDER - NSDCMRMEDTOKEN_GEN_ALL_CORE_FT
Biktarvy 50 mg-200 mg-25 mg oral tablet: 1 tab(s) orally once a day  Eliquis 5 mg oral tablet: 1 tab(s) orally 2 times a day  Farxiga 10 mg oral tablet: 1 tab(s) orally once a day  Lasix 20 mg oral tablet: 1 tab(s) orally once a day  Toprol-XL 25 mg oral tablet, extended release: 1 tab(s) orally once a day  valsartan 40 mg oral tablet: 1 tab(s) orally once a day   clotrimazole 1% topical cream: Apply topically to affected area 2 times a day  Eliquis 5 mg oral tablet: 1 tab(s) orally 2 times a day  Farxiga 10 mg oral tablet: 1 tab(s) orally once a day  Lasix 20 mg oral tablet: 1 tab(s) orally once a day  linezolid 600 mg oral tablet: 1 tab(s) orally 2 times a day  sulfamethoxazole-trimethoprim 800 mg-160 mg oral tablet: 1 tab(s) orally once a day  valsartan 40 mg oral tablet: 1 tab(s) orally once a day   clotrimazole 1% topical cream: Apply topically to affected area 2 times a day  doxycycline hyclate 100 mg oral tablet: 1 tab(s) orally 2 times a day  Eliquis 5 mg oral tablet: 1 tab(s) orally 2 times a day  Farxiga 10 mg oral tablet: 1 tab(s) orally once a day  Lasix 20 mg oral tablet: 1 tab(s) orally once a day  sulfamethoxazole-trimethoprim 800 mg-160 mg oral tablet: 1 tab(s) orally once a day  valsartan 40 mg oral tablet: 1 tab(s) orally once a day

## 2023-10-31 NOTE — H&P ADULT - ASSESSMENT
63-year-old male, reluctant historian, recently admitted to [facility] on 10/2 for CHF exacerbation, signed out AMA, undomiciled, noncompliant with meds, hx of  polysubstance abuse (cocaine/THC), PMHx of HIV/AIDs (noncompliant w/ HAART), Afib (noncompliant w/ Eliquis), severe MR/mod-severe TR, HFpEF (EF:55% by TTE 11/2022), lung CA not on any treatment  who [facility]  complaining of cough after getting caught in the rain a few days ago, endorsed blood tinged sputum, generalized body aches and occasional chills. +SOB w/ ambulating. He also complains feeling as though his entire body is swollen.  Patient is also complaining of a burning rash in his groin that has been present for over a month.  He reports previously being told it was fungal but has not been on medication for it.

## 2023-10-31 NOTE — DIETITIAN INITIAL EVALUATION ADULT - ADD RECOMMEND
1. Monitor PO intake/appetite, GI distress, diet tolerance, labs, weights.  2. Honor pt food preferences as able.  3. MVI.  4. RD to remain available for additional nutrition interventions as needed.  ** High Nutrition Risk.

## 2023-10-31 NOTE — DISCHARGE NOTE PROVIDER - NPI NUMBER (FOR SYSADMIN USE ONLY) :
[[id number]] [id number] [[id number] [[id number]] [[id number]] [[id number]]

## 2023-10-31 NOTE — H&P ADULT - NS ATTEND AMEND GEN_ALL_CORE FT
63-year-old male, reluctant historian, recently admitted to [facility] on 10/2 for CHF exacerbation, signed out AMA, undomiciled, noncompliant with meds, hx of  polysubstance abuse (cocaine/THC), PMHx of HIV/AIDs (noncompliant w/ HAART), Afib (noncompliant w/ Eliquis), severe MR/mod-severe TR, HFpEF (EF:55% by TTE 11/2022), lung CA not on any treatment  [facility]  complaining of cough after getting caught in the rain a few days ago.  Patient also endorsing small amounts of blood noticed with the productive sputum.  He reports generalized body aches and occasional chills. +SOB w/ ambulating. He also complains feeling as though his entire body is swollen.  Patient is also complaining of a burning rash in his groin that has been present for over a month.      1. HF  Chronic congestive heart failure with preserved LVEF. Euvolemic. No indication for lasix.    2 ? URI  Ceftriaxone, Azithromycin and Bactrim    No active CV problem. Discharge with oral antibiotics.

## 2023-10-31 NOTE — DIETITIAN INITIAL EVALUATION ADULT - PERTINENT MEDS FT
MEDICATIONS  (STANDING):  apixaban 5 milliGRAM(s) Oral every 12 hours  bictegravir 50 mG/emtricitabine 200 mG/tenofovir alafenamide 25 mG (BIKTARVY) 1 Tablet(s) Oral daily  carvedilol 25 milliGRAM(s) Oral every 12 hours  dapagliflozin 10 milliGRAM(s) Oral every 24 hours  furosemide   Injectable 40 milliGRAM(s) IV Push two times a day  valsartan 40 milliGRAM(s) Oral daily    MEDICATIONS  (PRN):

## 2023-10-31 NOTE — DISCHARGE NOTE PROVIDER - NSDCCPCAREPLAN_GEN_ALL_CORE_FT
PRINCIPAL DISCHARGE DIAGNOSIS  Diagnosis: CHF exacerbation  Assessment and Plan of Treatment: You have a history of heart failure and should continue your daily lasix and spironolactone. You should weigh yourself daily and if you notice a weight gain of more than 2-3 pounds in 2 days, shortness of breath, or lower extremity swelling you should contact your doctor immediately. Please restrict your fluid intake to 1-2L daily.      SECONDARY DISCHARGE DIAGNOSES  Diagnosis: Atrial fibrillation and flutter  Assessment and Plan of Treatment: When you presented, your heart was in an irregular rhythm called atrial fibrillation. We did a procedure called a Cardioversion, which puts your heart back into a normal sinus rhythm.  You have a history of Atrial Fibrillation. This means your atrium do not contract properly and blood does not efficiently get pumped into the ventricles. This may lead to blood abnormally pooling in the ventricles and causing it to clot. This puts you at an increased risk for a stroke. Therefore, we started you on a heart rate controlling medication called (insert medication name here) and a blood-thinning medication called (insert medication name here).    Diagnosis: HIV disease  Assessment and Plan of Treatment: Continue with your home medications     PRINCIPAL DISCHARGE DIAGNOSIS  Diagnosis: MRSA bacteremia  Assessment and Plan of Treatment: You were found to have a very serious infection in the blood called MRSA bacteremia.      SECONDARY DISCHARGE DIAGNOSES  Diagnosis: Chronic heart failure with preserved ejection fraction (HFpEF)  Assessment and Plan of Treatment:     Diagnosis: Intertrigo  Assessment and Plan of Treatment:     Diagnosis: HIV disease  Assessment and Plan of Treatment:      PRINCIPAL DISCHARGE DIAGNOSIS  Diagnosis: MRSA bacteremia  Assessment and Plan of Treatment: You were found to have a very serious infection in the blood stream called MRSA bacteremia. You were seen by the Infectious Disease doctors and recommended to stay in the hospital for intravenous antibiotics and further workup including MRI of the spine. You did not want to stay in the hospital for further treatment and wish to leave against medical advice. Please  the antibiotic Linezolid from [facility], you will take this for 2 weeks.      SECONDARY DISCHARGE DIAGNOSES  Diagnosis: Chronic heart failure with preserved ejection fraction (HFpEF)  Assessment and Plan of Treatment: Please take your medications for heart failure.    Diagnosis: Intertrigo  Assessment and Plan of Treatment: You were found to have a fungal infection called intertrigo in the groin. Apply clotrimazole cream to your groin rash for 2 weeks.    Diagnosis: HIV disease  Assessment and Plan of Treatment: Follow up with infectious disease and HIV doctor.     PRINCIPAL DISCHARGE DIAGNOSIS  Diagnosis: MRSA bacteremia  Assessment and Plan of Treatment: You were found to have a very serious infection in the blood stream called MRSA bacteremia. You were seen by the Infectious Disease doctors and recommended to stay in the hospital for intravenous antibiotics and further workup including MRI of the spine. You did not want to stay in the hospital for further treatment and wish to leave against medical advice. Please  the antibiotic Doxyclycine from [facility], you will take this for 2 weeks.      SECONDARY DISCHARGE DIAGNOSES  Diagnosis: Chronic heart failure with preserved ejection fraction (HFpEF)  Assessment and Plan of Treatment: Please take your medications for heart failure.    Diagnosis: Intertrigo  Assessment and Plan of Treatment: You were found to have a fungal infection called intertrigo in the groin. Apply clotrimazole cream to your groin rash for 2 weeks.    Diagnosis: HIV disease  Assessment and Plan of Treatment: Follow up with infectious disease and HIV doctor.

## 2023-11-01 LAB
ANION GAP SERPL CALC-SCNC: 10 MMOL/L — SIGNIFICANT CHANGE UP (ref 5–17)
ANION GAP SERPL CALC-SCNC: 10 MMOL/L — SIGNIFICANT CHANGE UP (ref 5–17)
BUN SERPL-MCNC: 14 MG/DL — SIGNIFICANT CHANGE UP (ref 7–23)
BUN SERPL-MCNC: 14 MG/DL — SIGNIFICANT CHANGE UP (ref 7–23)
CALCIUM SERPL-MCNC: 9.4 MG/DL — SIGNIFICANT CHANGE UP (ref 8.4–10.5)
CALCIUM SERPL-MCNC: 9.4 MG/DL — SIGNIFICANT CHANGE UP (ref 8.4–10.5)
CHLORIDE SERPL-SCNC: 100 MMOL/L — SIGNIFICANT CHANGE UP (ref 96–108)
CHLORIDE SERPL-SCNC: 100 MMOL/L — SIGNIFICANT CHANGE UP (ref 96–108)
CO2 SERPL-SCNC: 26 MMOL/L — SIGNIFICANT CHANGE UP (ref 22–31)
CO2 SERPL-SCNC: 26 MMOL/L — SIGNIFICANT CHANGE UP (ref 22–31)
CREAT SERPL-MCNC: 1.05 MG/DL — SIGNIFICANT CHANGE UP (ref 0.5–1.3)
CREAT SERPL-MCNC: 1.05 MG/DL — SIGNIFICANT CHANGE UP (ref 0.5–1.3)
CULTURE RESULTS: SIGNIFICANT CHANGE UP
CULTURE RESULTS: SIGNIFICANT CHANGE UP
EGFR: 79 ML/MIN/1.73M2 — SIGNIFICANT CHANGE UP
EGFR: 79 ML/MIN/1.73M2 — SIGNIFICANT CHANGE UP
GLUCOSE SERPL-MCNC: 87 MG/DL — SIGNIFICANT CHANGE UP (ref 70–99)
GLUCOSE SERPL-MCNC: 87 MG/DL — SIGNIFICANT CHANGE UP (ref 70–99)
HCT VFR BLD CALC: 34.1 % — LOW (ref 39–50)
HCT VFR BLD CALC: 34.1 % — LOW (ref 39–50)
HGB BLD-MCNC: 11 G/DL — LOW (ref 13–17)
HGB BLD-MCNC: 11 G/DL — LOW (ref 13–17)
MAGNESIUM SERPL-MCNC: 1.5 MG/DL — LOW (ref 1.6–2.6)
MAGNESIUM SERPL-MCNC: 1.5 MG/DL — LOW (ref 1.6–2.6)
MCHC RBC-ENTMCNC: 30.1 PG — SIGNIFICANT CHANGE UP (ref 27–34)
MCHC RBC-ENTMCNC: 30.1 PG — SIGNIFICANT CHANGE UP (ref 27–34)
MCHC RBC-ENTMCNC: 32.3 GM/DL — SIGNIFICANT CHANGE UP (ref 32–36)
MCHC RBC-ENTMCNC: 32.3 GM/DL — SIGNIFICANT CHANGE UP (ref 32–36)
MCV RBC AUTO: 93.2 FL — SIGNIFICANT CHANGE UP (ref 80–100)
MCV RBC AUTO: 93.2 FL — SIGNIFICANT CHANGE UP (ref 80–100)
NRBC # BLD: 0 /100 WBCS — SIGNIFICANT CHANGE UP (ref 0–0)
NRBC # BLD: 0 /100 WBCS — SIGNIFICANT CHANGE UP (ref 0–0)
PLATELET # BLD AUTO: 230 K/UL — SIGNIFICANT CHANGE UP (ref 150–400)
PLATELET # BLD AUTO: 230 K/UL — SIGNIFICANT CHANGE UP (ref 150–400)
POTASSIUM SERPL-MCNC: 4.4 MMOL/L — SIGNIFICANT CHANGE UP (ref 3.5–5.3)
POTASSIUM SERPL-MCNC: 4.4 MMOL/L — SIGNIFICANT CHANGE UP (ref 3.5–5.3)
POTASSIUM SERPL-SCNC: 4.4 MMOL/L — SIGNIFICANT CHANGE UP (ref 3.5–5.3)
POTASSIUM SERPL-SCNC: 4.4 MMOL/L — SIGNIFICANT CHANGE UP (ref 3.5–5.3)
RBC # BLD: 3.66 M/UL — LOW (ref 4.2–5.8)
RBC # BLD: 3.66 M/UL — LOW (ref 4.2–5.8)
RBC # FLD: 15.2 % — HIGH (ref 10.3–14.5)
RBC # FLD: 15.2 % — HIGH (ref 10.3–14.5)
SODIUM SERPL-SCNC: 136 MMOL/L — SIGNIFICANT CHANGE UP (ref 135–145)
SODIUM SERPL-SCNC: 136 MMOL/L — SIGNIFICANT CHANGE UP (ref 135–145)
SPECIMEN SOURCE: SIGNIFICANT CHANGE UP
SPECIMEN SOURCE: SIGNIFICANT CHANGE UP
TROPONIN T, HIGH SENSITIVITY RESULT: 14 NG/L — SIGNIFICANT CHANGE UP (ref 0–51)
TROPONIN T, HIGH SENSITIVITY RESULT: 14 NG/L — SIGNIFICANT CHANGE UP (ref 0–51)
WBC # BLD: 3.33 K/UL — LOW (ref 3.8–10.5)
WBC # BLD: 3.33 K/UL — LOW (ref 3.8–10.5)
WBC # FLD AUTO: 3.33 K/UL — LOW (ref 3.8–10.5)
WBC # FLD AUTO: 3.33 K/UL — LOW (ref 3.8–10.5)

## 2023-11-01 PROCEDURE — 99221 1ST HOSP IP/OBS SF/LOW 40: CPT

## 2023-11-01 PROCEDURE — 99232 SBSQ HOSP IP/OBS MODERATE 35: CPT

## 2023-11-01 PROCEDURE — 99222 1ST HOSP IP/OBS MODERATE 55: CPT

## 2023-11-01 RX ORDER — MAGNESIUM OXIDE 400 MG ORAL TABLET 241.3 MG
400 TABLET ORAL ONCE
Refills: 0 | Status: COMPLETED | OUTPATIENT
Start: 2023-11-01 | End: 2023-11-01

## 2023-11-01 RX ADMIN — Medication 100 MILLIGRAM(S): at 17:23

## 2023-11-01 RX ADMIN — APIXABAN 5 MILLIGRAM(S): 2.5 TABLET, FILM COATED ORAL at 17:23

## 2023-11-01 RX ADMIN — BICTEGRAVIR SODIUM, EMTRICITABINE, AND TENOFOVIR ALAFENAMIDE FUMARATE 1 TABLET(S): 30; 120; 15 TABLET ORAL at 13:21

## 2023-11-01 RX ADMIN — Medication 1 TABLET(S): at 16:28

## 2023-11-01 RX ADMIN — MAGNESIUM OXIDE 400 MG ORAL TABLET 400 MILLIGRAM(S): 241.3 TABLET ORAL at 18:44

## 2023-11-01 NOTE — CONSULT NOTE ADULT - SUBJECTIVE AND OBJECTIVE BOX
INFECTIOUS DISEASES INITIAL CONSULT NOTE    HPI:  Pt refused to answer any questions, refused physical exam, refused telemetry, stating "go find everything in my chart"    History copied from [facility]  This is  a 63-year-old male, reluctant historian, recently admitted to [facility] on 10/2 for CHF exacerbation, signed out AMA, undomiciled, noncompliant with meds, hx of  polysubstance abuse (cocaine/THC), PMHx of HIV/AIDs (noncompliant w/ HAART), Afib (noncompliant w/ Eliquis), severe MR/mod-severe TR, HFpEF (EF:55% by TTE 11/2022), lung CA not on any treatment  who presented to [facility] 10/30/23  complaining of cough after getting caught in the rain a few days ago.  Patient also endorsing small amounts of blood noticed with the productive sputum.  He reports generalized body aches and occasional chills. +SOB w/ ambulating. He also complains feeling as though his entire body is swollen.  Patient is also complaining of a burning rash in his groin that has been present for over a month.  He reports previously being told it was fungal but has not been on medication for it.  At [facility], vitals with /81, HR 76, R 19, T 36.5O2 sat 99% RA, labs with WBC 2.89, Hg 10.9, Mag 1.5, BNP 4361 (was 5771 on 10/2), trop 18.1, EKG AFib at 75 bpm. CT CAP negative for PE, multiple gas foci within the soft tissues of the distal penis may be   related to uncircumcised penis or infection. He received Ceftriaxone, Azithromycin and Bactrim and transferred to [facility] for further management (31 Oct 2023 04:40)      PAST MEDICAL & SURGICAL HISTORY:  HIV (human immunodeficiency virus infection)      Lung cancer      COPD (chronic obstructive pulmonary disease)      HTN (hypertension)            Review of Systems:   Constitutional, eyes, ENT, cardiovascular, respiratory, gastrointestinal, genitourinary, integumentary, neurological, psychiatric and heme/lymph are otherwise negative other than noted above       ANTIBIOTICS:  MEDICATIONS  (STANDING):  apixaban 5 milliGRAM(s) Oral every 12 hours  bictegravir 50 mG/emtricitabine 200 mG/tenofovir alafenamide 25 mG (BIKTARVY) 1 Tablet(s) Oral daily  dapagliflozin 10 milliGRAM(s) Oral every 24 hours  piperacillin/tazobactam IVPB.. 3.375 Gram(s) IV Intermittent every 8 hours  valsartan 40 milliGRAM(s) Oral daily  vancomycin  IVPB 1000 milliGRAM(s) IV Intermittent every 12 hours    MEDICATIONS  (PRN):      Allergies    No Known Allergies    Intolerances        SOCIAL HISTORY:    FAMILY HISTORY:   no FH leading to current infection    Vital Signs Last 24 Hrs  T(C): 36.9 (31 Oct 2023 20:44), Max: 36.9 (31 Oct 2023 20:44)  T(F): 98.4 (31 Oct 2023 20:44), Max: 98.4 (31 Oct 2023 20:44)  HR: 70 (31 Oct 2023 20:44) (70 - 83)  BP: 93/60 (31 Oct 2023 20:44) (85/59 - 103/59)  BP(mean): 71 (31 Oct 2023 20:44) (71 - 71)  RR: 18 (31 Oct 2023 20:44) (18 - 18)  SpO2: 95% (31 Oct 2023 20:44) (95% - 95%)    Parameters below as of 31 Oct 2023 20:44  Patient On (Oxygen Delivery Method): room air        10-31-23 @ 07:01 - 11-01-23 @ 07:00  --------------------------------------------------------  IN: 1020 mL / OUT: 1726 mL / NET: -706 mL    11-01-23 @ 07:01  -  11-01-23 @ 11:23  --------------------------------------------------------  IN: 180 mL / OUT: 0 mL / NET: 180 mL        PHYSICAL EXAM:  Constitutional: alert, NAD  Eyes: the sclera and conjunctiva were normal.   ENT: the ears and nose were normal in appearance.   Neck: the appearance of the neck was normal and the neck was supple.   Pulmonary: no respiratory distress and lungs were clear to auscultation bilaterally.   Heart: heart rate was normal and rhythm regular, normal S1 and S2  Vascular:. there was no peripheral edema  Abdomen: normal bowel sounds, soft, non-tender  Neurological: no focal deficits.   Psychiatric: the affect was normal      LABS:                        11.0   3.33  )-----------( 230      ( 01 Nov 2023 08:41 )             34.1     11-01    136  |  100  |  14  ----------------------------<  87  4.4   |  26  |  1.05    Ca    9.4      01 Nov 2023 08:41  Mg     1.5     11-01    TPro  7.9  /  Alb  3.1<L>  /  TBili  0.7  /  DBili  x   /  AST  52<H>  /  ALT  19  /  AlkPhos  132<H>  10-30      Urinalysis Basic - ( 01 Nov 2023 08:41 )    Color: x / Appearance: x / SG: x / pH: x  Gluc: 87 mg/dL / Ketone: x  / Bili: x / Urobili: x   Blood: x / Protein: x / Nitrite: x   Leuk Esterase: x / RBC: x / WBC x   Sq Epi: x / Non Sq Epi: x / Bacteria: x        MICROBIOLOGY:    RADIOLOGY & ADDITIONAL STUDIES:   INFECTIOUS DISEASES INITIAL CONSULT NOTE    HPI:  Pt refused to participate in interview or physical exam    History copied from [facility]  This is  a 63-year-old male, reluctant historian, recently admitted to [facility] on 10/2 for CHF exacerbation, signed out AMA, undomiciled, noncompliant with meds, hx of  polysubstance abuse (cocaine/THC), PMHx of HIV/AIDs (noncompliant w/ HAART), Afib (noncompliant w/ Eliquis), severe MR/mod-severe TR, HFpEF (EF:55% by TTE 11/2022), lung CA not on any treatment  who presented to [facility] 10/30/23  complaining of cough after getting caught in the rain a few days ago.  Patient also endorsing small amounts of blood noticed with the productive sputum.  He reports generalized body aches and occasional chills. +SOB w/ ambulating. He also complains feeling as though his entire body is swollen.  Patient is also complaining of a burning rash in his groin that has been present for over a month.  He reports previously being told it was fungal but has not been on medication for it.  At [facility], vitals with /81, HR 76, R 19, T 36.5O2 sat 99% RA, labs with WBC 2.89, Hg 10.9, Mag 1.5, BNP 4361 (was 5771 on 10/2), trop 18.1, EKG AFib at 75 bpm. CT CAP negative for PE, multiple gas foci within the soft tissues of the distal penis may be   related to uncircumcised penis or infection. He received Ceftriaxone, Azithromycin and Bactrim and transferred to [facility] for further management     PAST MEDICAL & SURGICAL HISTORY:  HIV (human immunodeficiency virus infection)      Lung cancer      COPD (chronic obstructive pulmonary disease)      HTN (hypertension)            Review of Systems:   Constitutional, eyes, ENT, cardiovascular, respiratory, gastrointestinal, genitourinary, integumentary, neurological, psychiatric and heme/lymph are otherwise negative other than noted above       ANTIBIOTICS:  MEDICATIONS  (STANDING):  apixaban 5 milliGRAM(s) Oral every 12 hours  bictegravir 50 mG/emtricitabine 200 mG/tenofovir alafenamide 25 mG (BIKTARVY) 1 Tablet(s) Oral daily  dapagliflozin 10 milliGRAM(s) Oral every 24 hours  piperacillin/tazobactam IVPB.. 3.375 Gram(s) IV Intermittent every 8 hours  valsartan 40 milliGRAM(s) Oral daily  vancomycin  IVPB 1000 milliGRAM(s) IV Intermittent every 12 hours    MEDICATIONS  (PRN):      Allergies    No Known Allergies    Intolerances        SOCIAL HISTORY:    FAMILY HISTORY:   no FH leading to current infection    Vital Signs Last 24 Hrs  T(C): 36.9 (31 Oct 2023 20:44), Max: 36.9 (31 Oct 2023 20:44)  T(F): 98.4 (31 Oct 2023 20:44), Max: 98.4 (31 Oct 2023 20:44)  HR: 70 (31 Oct 2023 20:44) (70 - 83)  BP: 93/60 (31 Oct 2023 20:44) (85/59 - 103/59)  BP(mean): 71 (31 Oct 2023 20:44) (71 - 71)  RR: 18 (31 Oct 2023 20:44) (18 - 18)  SpO2: 95% (31 Oct 2023 20:44) (95% - 95%)    Parameters below as of 31 Oct 2023 20:44  Patient On (Oxygen Delivery Method): room air        10-31-23 @ 07:01 - 11-01-23 @ 07:00  --------------------------------------------------------  IN: 1020 mL / OUT: 1726 mL / NET: -706 mL    11-01-23 @ 07:01 - 11-01-23 @ 11:23  --------------------------------------------------------  IN: 180 mL / OUT: 0 mL / NET: 180 mL        PHYSICAL EXAM:  Constitutional: NAD, patient covered himself with blanket   Eyes: the sclera and conjunctiva were normal.   ENT: the ears and nose were normal in appearance.   Neck: the appearance of the neck was normal and the neck was supple.   Pulmonary: normal respiratory rate at rest   Psychiatric: patient became agitated and argumentative      LABS:                        11.0   3.33  )-----------( 230      ( 01 Nov 2023 08:41 )             34.1     11-01    136  |  100  |  14  ----------------------------<  87  4.4   |  26  |  1.05    Ca    9.4      01 Nov 2023 08:41  Mg     1.5     11-01    TPro  7.9  /  Alb  3.1<L>  /  TBili  0.7  /  DBili  x   /  AST  52<H>  /  ALT  19  /  AlkPhos  132<H>  10-30      Urinalysis Basic - ( 01 Nov 2023 08:41 )    Color: x / Appearance: x / SG: x / pH: x  Gluc: 87 mg/dL / Ketone: x  / Bili: x / Urobili: x   Blood: x / Protein: x / Nitrite: x   Leuk Esterase: x / RBC: x / WBC x   Sq Epi: x / Non Sq Epi: x / Bacteria: x        MICROBIOLOGY:  reviewed  RADIOLOGY & ADDITIONAL STUDIES:  reviewed
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
## 2023-11-01 NOTE — PROGRESS NOTE ADULT - ASSESSMENT
63-year-old male, reluctant historian, recently admitted to [facility] on 10/2 for CHF exacerbation, signed out AMA, undomiciled, noncompliant with meds, hx of  polysubstance abuse (cocaine/THC), PMHx of HIV/AIDs (noncompliant w/ HAART), Afib (noncompliant w/ Eliquis), severe MR/mod-severe TR, HFpEF (EF:55% by TTE 11/2022), lung CA not on any treatment  [facility]  complaining of cough after getting caught in the rain a few days ago, endorsed blood tinged sputum, generalized body aches and occasional chills. +SOB w/ ambulating. Found to be euvolemic not on exacerbation. Patient is also complaining of a burning rash in his groin that has been present for over a month.  ID consulted as Blood Cultures +. Patient has been refusing telemetry and medications intermittently and abusive to staff,  bedside discussion with patient and SW patient reports wanting to go on with full infectious work up including LP if patient refusing continued work up in AM will have to sign out AMA per ID.

## 2023-11-01 NOTE — BH CONSULTATION LIAISON ASSESSMENT NOTE - NSBHCONSULTFOLLOWAFTERCARE_PSY_A_CORE FT
•	[facility]  o	[email or web address]  o	Comprehensive addiction treatment services, including psychopharm, psychotherapy, drug testing, as well as eating disorder treatment, and specialized treatment for LGBTQ, Sabianist Faith populations  o	Two locations  ?	[address], [address] [facility] [address] [phone]  ?	[address] [place] [address] [phone]  •	[facility] [place]  o	[email or web address]  o	Comprehensive addiction treatment services, including psychopharm, psychotherapy, opiod treatment program  o	[address] [place] [address] [phone]  o	Medicare, Medicaid, most private insurance (NOT [employer])

## 2023-11-01 NOTE — CHART NOTE - NSCHARTNOTEFT_GEN_A_CORE
Patient seen and examined at the bedside to discuss treatment plan. Discussed with treatment and why he is refusing care and rude and aggressive to staff. Patient annoyed nurses come in to check vitals, and is refusing his medication and wearing cardiac monitor for telemetry. Patient verbally abusive to staff throughout Patient seen and examined at the bedside to discuss treatment plan. Discussed with treatment and why he is refusing care and rude and aggressive to staff. Patient annoyed nurses come in to check vitals, and is refusing his medication and wearing cardiac monitor for telemetry. Patient verbally abusive to staff throughout the day including nursing and other multidisciplinary team. Behavioral Team saw patient and not cooperative with interview. ID consult provider noted patient rude cursing her out during evaluation. Discussed treatment plan with patient about need for workup and IV antibiotics Echo, and LP as well as lab work and discussion that he is safe to go home on PO antibiotics if he is not gonna comply with care. Patient sat up and was verbally aggressive with provider saying he was getting pissed off and said you're kicking me out of there. I explained to the patient he is not cooperative with care and we are not kicking him out but if he will refuse medications and testing then he can go home with these medications safely. Patient ordered for PO antibiotics given his refusal to start any antibiotics IV and not candidate for long term given history of substance use, non compliance and ama where he is rude and aggressive verbally to staff Patient seen and examined at the bedside to discuss treatment plan. Discussed with treatment and why he is refusing care and rude and aggressive to staff. Patient annoyed nurses come in to check vitals, and is refusing his medication and wearing cardiac monitor for telemetry. Patient verbally abusive to staff throughout the day including nursing and other multidisciplinary team. Behavioral Team saw patient and not cooperative with interview. ID consult provider noted patient rude cursing her out during evaluation. Discussed treatment plan with patient about need for workup and IV antibiotics Echo, and LP as well as lab work and that if he is not gonna comply with care . Patient sat up and was verbally aggressive with provider saying he was getting pissed off and said you're kicking me out of there. I explained to the patient he is not cooperative with care and we are not kicking him out but if he will refuse medications and testing then he can go home with these medications safely. Patient ordered for PO antibiotics given his refusal to start any antibiotics IV and not candidate for long term given history of substance use, non compliance and ama where he is rude and aggressive verbally to staff

## 2023-11-01 NOTE — PROGRESS NOTE ADULT - PROBLEM SELECTOR PLAN 1
Unable to illicit lung exam; pt refuses to participate  Euvolemic on exam no lasix or diuretics needed   Had recent echo,   Home meds: Lasix 20mg QD, Toprol XL 25mg QD, Valsartan 40mg QD,  Farxiga 10 mg  QD  -Core measures, strict I/O's daily weights, 1L PO restriction, VS per routine, cont tele/pulse ox

## 2023-11-01 NOTE — BH CONSULTATION LIAISON ASSESSMENT NOTE - NSBHTIMEACTIVITIESPERFORMED_PSY_A_CORE
The time documented includes the review of chart, labs, tests and other pertinent documents, interview, collaterals, decision-making, psychoeducation, coordination of care etc. and excludes time spent on separately reportable services/teaching during the encounter.

## 2023-11-01 NOTE — BH CONSULTATION LIAISON ASSESSMENT NOTE - RISK ASSESSMENT
Risk Factors (Modifiable): Polysubstance use, treatment for medical conditions  Risk Factors (Non-Modifiable): Undomiciled, multiple medical comorbidities   Protective Factors: Unable to assess

## 2023-11-01 NOTE — PROGRESS NOTE ADULT - PROBLEM SELECTOR PLAN 3
Rate controlled, HR 70s  Noncompliant on Eliquis, restarted on Eliquis 5mg BID  Continue Toprol XL 25mg QD

## 2023-11-01 NOTE — BH CONSULTATION LIAISON ASSESSMENT NOTE - NSBHCHARTREVIEWVS_PSY_A_CORE FT
Vital Signs Last 24 Hrs  T(C): 36.9 (31 Oct 2023 20:44), Max: 36.9 (31 Oct 2023 20:44)  T(F): 98.4 (31 Oct 2023 20:44), Max: 98.4 (31 Oct 2023 20:44)  HR: 70 (31 Oct 2023 20:44) (70 - 83)  BP: 93/60 (31 Oct 2023 20:44) (85/59 - 103/59)  BP(mean): 71 (31 Oct 2023 20:44) (71 - 71)  RR: 18 (31 Oct 2023 20:44) (18 - 18)  SpO2: 95% (31 Oct 2023 20:44) (95% - 95%)    Parameters below as of 31 Oct 2023 20:44  Patient On (Oxygen Delivery Method): room air

## 2023-11-01 NOTE — PROGRESS NOTE ADULT - SUBJECTIVE AND OBJECTIVE BOX
INCOMPLETE    OVERNIGHT EVENTS:  No acute events overnight.    SUBJECTIVE / INTERVAL HPI: Patient seen and examined at bedside.    Denies CP, SOB, dizziness/lightheadedness, palpitations    12 point ROS otherwise negative    VITAL SIGNS:  Vital Signs Last 24 Hrs  T(C): 36.9 (31 Oct 2023 20:44), Max: 36.9 (31 Oct 2023 20:44)  T(F): 98.4 (31 Oct 2023 20:44), Max: 98.4 (31 Oct 2023 20:44)  HR: 70 (31 Oct 2023 20:44) (70 - 83)  BP: 93/60 (31 Oct 2023 20:44) (85/59 - 103/59)  BP(mean): 71 (31 Oct 2023 20:44) (71 - 71)  RR: 18 (31 Oct 2023 20:44) (18 - 18)  SpO2: 95% (31 Oct 2023 20:44) (95% - 95%)    Parameters below as of 31 Oct 2023 20:44  Patient On (Oxygen Delivery Method): room air          I&O's Summary    31 Oct 2023 07:01  -  01 Nov 2023 07:00  --------------------------------------------------------  IN: 1020 mL / OUT: 1726 mL / NET: -706 mL    01 Nov 2023 07:01  -  01 Nov 2023 10:24  --------------------------------------------------------  IN: 180 mL / OUT: 0 mL / NET: 180 mL          PHYSICAL EXAM:    General: sitting up in bed, NAD  HEENT: conjunctiva clear; MMM  Neck: supple, no JVD  Cardiovascular: RRR, no murmurs  Respiratory: CTA B/L  Gastrointestinal: soft, NT/ND, +BS  Extremities: WWP, no edema or cyanosis  Vascular: Peripheral pulses palpable 2+ bilaterally/ carotid 2+ b/l, no bruits; radial 2+ b/l; femoral 2+ b/l no bruits; DP/PT 2+ b/l  Neurological: AAOx3, no focal deficits    MEDICATIONS:  MEDICATIONS  (STANDING):  apixaban 5 milliGRAM(s) Oral every 12 hours  bictegravir 50 mG/emtricitabine 200 mG/tenofovir alafenamide 25 mG (BIKTARVY) 1 Tablet(s) Oral daily  dapagliflozin 10 milliGRAM(s) Oral every 24 hours  piperacillin/tazobactam IVPB.. 3.375 Gram(s) IV Intermittent every 8 hours  valsartan 40 milliGRAM(s) Oral daily  vancomycin  IVPB 1000 milliGRAM(s) IV Intermittent every 12 hours    MEDICATIONS  (PRN):      LABS:                        11.0   3.33  )-----------( 230      ( 01 Nov 2023 08:41 )             34.1       11-01    136  |  100  |  14  ----------------------------<  87  4.4   |  26  |  1.05    Ca    9.4      01 Nov 2023 08:41  Mg     1.5     11-01    TPro  7.9  /  Alb  3.1<L>  /  TBili  0.7  /  DBili  x   /  AST  52<H>  /  ALT  19  /  AlkPhos  132<H>  10-30                TELEMETRY:    RADIOLOGY & ADDITIONAL TESTS: Reviewed. OVERNIGHT EVENTS:  No acute events overnight.    SUBJECTIVE / INTERVAL HPI: Patient seen and examined at bedside.    Denies CP, SOB, dizziness/lightheadedness, palpitations    12 point ROS otherwise negative    VITAL SIGNS:  Vital Signs Last 24 Hrs  T(C): 36.9 (31 Oct 2023 20:44), Max: 36.9 (31 Oct 2023 20:44)  T(F): 98.4 (31 Oct 2023 20:44), Max: 98.4 (31 Oct 2023 20:44)  HR: 70 (31 Oct 2023 20:44) (70 - 83)  BP: 93/60 (31 Oct 2023 20:44) (85/59 - 103/59)  BP(mean): 71 (31 Oct 2023 20:44) (71 - 71)  RR: 18 (31 Oct 2023 20:44) (18 - 18)  SpO2: 95% (31 Oct 2023 20:44) (95% - 95%)    Parameters below as of 31 Oct 2023 20:44  Patient On (Oxygen Delivery Method): room air          I&O's Summary    31 Oct 2023 07:01  -  01 Nov 2023 07:00  --------------------------------------------------------  IN: 1020 mL / OUT: 1726 mL / NET: -706 mL    01 Nov 2023 07:01  -  01 Nov 2023 10:24  --------------------------------------------------------  IN: 180 mL / OUT: 0 mL / NET: 180 mL          PHYSICAL EXAM:    General: sitting up in bed, NAD  HEENT: conjunctiva clear; MMM  Neck: supple, no JVD  Cardiovascular: RRR, no murmurs  Respiratory: CTA B/L  Gastrointestinal: soft, NT/ND, +BS  Extremities: WWP, no edema or cyanosis  Vascular: Peripheral pulses palpable 2+ bilaterally/ carotid 2+ b/l, no bruits; radial 2+ b/l; femoral 2+ b/l no bruits; DP/PT 2+ b/l  Neurological: AAOx3, no focal deficits    MEDICATIONS:  MEDICATIONS  (STANDING):  apixaban 5 milliGRAM(s) Oral every 12 hours  bictegravir 50 mG/emtricitabine 200 mG/tenofovir alafenamide 25 mG (BIKTARVY) 1 Tablet(s) Oral daily  dapagliflozin 10 milliGRAM(s) Oral every 24 hours  piperacillin/tazobactam IVPB.. 3.375 Gram(s) IV Intermittent every 8 hours  valsartan 40 milliGRAM(s) Oral daily  vancomycin  IVPB 1000 milliGRAM(s) IV Intermittent every 12 hours    MEDICATIONS  (PRN):      LABS:                        11.0   3.33  )-----------( 230      ( 01 Nov 2023 08:41 )             34.1       11-01    136  |  100  |  14  ----------------------------<  87  4.4   |  26  |  1.05    Ca    9.4      01 Nov 2023 08:41  Mg     1.5     11-01    TPro  7.9  /  Alb  3.1<L>  /  TBili  0.7  /  DBili  x   /  AST  52<H>  /  ALT  19  /  AlkPhos  132<H>  10-30                TELEMETRY:    RADIOLOGY & ADDITIONAL TESTS: Reviewed.

## 2023-11-01 NOTE — BH CONSULTATION LIAISON ASSESSMENT NOTE - DIFFERENTIAL
Depression, Unspecified vs. Substance-Induced Depression vs. Depression Due to Another Medical Condition
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
## 2023-11-01 NOTE — CONSULT NOTE ADULT - NS ATTEND AMEND GEN_ALL_CORE FT
64yo undomiciled M h/o HIV/AIDS not on ART (NI6=076, 12%, VL [id number] in 6/2023), HFpEF, severe MR/mod severe TR, COPD p/w cough for a few days per chart review. Patient non-compliant and uncooperative, minimally answered my questions and refused to answer any further questions. History mainly obtained from chart review. He was recently admitted from 10/2-10/3/23 for acute on chronic CHF exacerbation and he left AMA. He returned to the ED on 10/30 for cough after getting cough in the rain a few days PTA. He also reported rash in his groin which is present for unknown duration (per chart, over a month and patient was told it was fungal infection).  He scratches groin area due to pain/itchiness. Upon admission, afebrile, VSS, 99%RA. WBC 2.89, Hgb 10, BNP 4631, Cr 0.79. CT c/a/p showed nege for PE, no pulmonary abnormalities, no acute intra-abdominal findings,  multiple gas foci within the soft tissues of the distal penis may be related to uncircumcised penis or infection. He got CTX/azithro in the ED and was admitted to [facility].  BCx 4/4 bottles now growing GPB in clusters, and BCID MRSA and MRSE. IV vanco started but patient has been refusing vitals, labs, all medications. When I tried to exam him, he briefly showed me his groin area with rash then covered again, unable to fully assess but he denied any issue at penis.  Refused to answer further questions or physican exams. Per chart review, he has cryptococcal serum ag 1:5 in 6/23/23, and 2022 it was negative.  Patient with MRSA bacteremia, likely from scratching rash.  MRSE likely a contaminant.  f/u susceptibility.  Cont vanco 1g IV q12h.  If patient refuses, then can offer doxy 100mg PO q12h as suboptimal therapy. Daily BCx until bacteremia clears . TTE. Cont Bactrim DS 1tab daily as PCP ppx.  Hold Biktarvy given non-compliance and patient with Cryptococcus antigenemia.  Check cryptococcal serum ag again.  Obtain LP and send for cryptococcal ag, ME panel, cell count, glucose, protein to r/o cryptococcal CNS infection.  Check CD4.  As for groin rash, suspect intertrigo however unable to fully assess.  If patient cooperate, need to re-examine him.    Team 2 will follow you.  Dr. Bowman will resume care tomorrow.  Case d/w primary team.    Meseret Young MD, MS  Infectious Disease attending  office phone [phone]  work cell [phone] (provider to provider call only)  For any questions during evening/weekend/holiday, please page ID on call

## 2023-11-01 NOTE — BH CONSULTATION LIAISON ASSESSMENT NOTE - CURRENT MEDICATION
MEDICATIONS  (STANDING):  apixaban 5 milliGRAM(s) Oral every 12 hours  bictegravir 50 mG/emtricitabine 200 mG/tenofovir alafenamide 25 mG (BIKTARVY) 1 Tablet(s) Oral daily  dapagliflozin 10 milliGRAM(s) Oral every 24 hours  piperacillin/tazobactam IVPB.. 3.375 Gram(s) IV Intermittent every 8 hours  valsartan 40 milliGRAM(s) Oral daily  vancomycin  IVPB 1000 milliGRAM(s) IV Intermittent every 12 hours    MEDICATIONS  (PRN):

## 2023-11-02 DIAGNOSIS — I50.32 CHRONIC DIASTOLIC (CONGESTIVE) HEART FAILURE: ICD-10-CM

## 2023-11-02 DIAGNOSIS — R78.81 BACTEREMIA: ICD-10-CM

## 2023-11-02 LAB
-  AMPICILLIN/SULBACTAM: SIGNIFICANT CHANGE UP
-  CEFAZOLIN: SIGNIFICANT CHANGE UP
-  CLINDAMYCIN: SIGNIFICANT CHANGE UP
-  DAPTOMYCIN: SIGNIFICANT CHANGE UP
-  DAPTOMYCIN: SIGNIFICANT CHANGE UP
-  ERYTHROMYCIN: SIGNIFICANT CHANGE UP
-  GENTAMICIN: SIGNIFICANT CHANGE UP
-  LINEZOLID: SIGNIFICANT CHANGE UP
-  LINEZOLID: SIGNIFICANT CHANGE UP
-  OXACILLIN: SIGNIFICANT CHANGE UP
-  PENICILLIN: SIGNIFICANT CHANGE UP
-  RIFAMPIN: SIGNIFICANT CHANGE UP
-  TETRACYCLINE: SIGNIFICANT CHANGE UP
-  TRIMETHOPRIM/SULFAMETHOXAZOLE: SIGNIFICANT CHANGE UP
-  VANCOMYCIN: SIGNIFICANT CHANGE UP
4/8 RATIO: 0.08 RATIO — LOW (ref 0.9–3.6)
4/8 RATIO: 0.08 RATIO — LOW (ref 0.9–3.6)
ABS CD8: 1028 CELLS/UL — HIGH (ref 142–740)
ABS CD8: 1028 CELLS/UL — HIGH (ref 142–740)
ANION GAP SERPL CALC-SCNC: 9 MMOL/L — SIGNIFICANT CHANGE UP (ref 5–17)
ANION GAP SERPL CALC-SCNC: 9 MMOL/L — SIGNIFICANT CHANGE UP (ref 5–17)
BUN SERPL-MCNC: 15 MG/DL — SIGNIFICANT CHANGE UP (ref 7–23)
BUN SERPL-MCNC: 15 MG/DL — SIGNIFICANT CHANGE UP (ref 7–23)
CALCIUM SERPL-MCNC: 9.8 MG/DL — SIGNIFICANT CHANGE UP (ref 8.4–10.5)
CALCIUM SERPL-MCNC: 9.8 MG/DL — SIGNIFICANT CHANGE UP (ref 8.4–10.5)
CD3 BLASTS SPEC-ACNC: 1168 CELLS/UL — SIGNIFICANT CHANGE UP (ref 672–1870)
CD3 BLASTS SPEC-ACNC: 1168 CELLS/UL — SIGNIFICANT CHANGE UP (ref 672–1870)
CD3 BLASTS SPEC-ACNC: 78 % — SIGNIFICANT CHANGE UP (ref 59–83)
CD3 BLASTS SPEC-ACNC: 78 % — SIGNIFICANT CHANGE UP (ref 59–83)
CD4 %: 5 % — LOW (ref 30–62)
CD4 %: 5 % — LOW (ref 30–62)
CD8 %: 68 % — HIGH (ref 12–36)
CD8 %: 68 % — HIGH (ref 12–36)
CHLORIDE SERPL-SCNC: 100 MMOL/L — SIGNIFICANT CHANGE UP (ref 96–108)
CHLORIDE SERPL-SCNC: 100 MMOL/L — SIGNIFICANT CHANGE UP (ref 96–108)
CK SERPL-CCNC: 53 U/L — SIGNIFICANT CHANGE UP (ref 30–200)
CK SERPL-CCNC: 53 U/L — SIGNIFICANT CHANGE UP (ref 30–200)
CO2 SERPL-SCNC: 25 MMOL/L — SIGNIFICANT CHANGE UP (ref 22–31)
CO2 SERPL-SCNC: 25 MMOL/L — SIGNIFICANT CHANGE UP (ref 22–31)
CREAT SERPL-MCNC: 0.87 MG/DL — SIGNIFICANT CHANGE UP (ref 0.5–1.3)
CREAT SERPL-MCNC: 0.87 MG/DL — SIGNIFICANT CHANGE UP (ref 0.5–1.3)
CRYPTOC AG FLD QL: NEGATIVE — SIGNIFICANT CHANGE UP
CRYPTOC AG FLD QL: NEGATIVE — SIGNIFICANT CHANGE UP
CULTURE RESULTS: ABNORMAL
EGFR: 96 ML/MIN/1.73M2 — SIGNIFICANT CHANGE UP
EGFR: 96 ML/MIN/1.73M2 — SIGNIFICANT CHANGE UP
GLUCOSE SERPL-MCNC: 87 MG/DL — SIGNIFICANT CHANGE UP (ref 70–99)
GLUCOSE SERPL-MCNC: 87 MG/DL — SIGNIFICANT CHANGE UP (ref 70–99)
HCT VFR BLD CALC: 33.7 % — LOW (ref 39–50)
HCT VFR BLD CALC: 33.7 % — LOW (ref 39–50)
HGB BLD-MCNC: 10.8 G/DL — LOW (ref 13–17)
HGB BLD-MCNC: 10.8 G/DL — LOW (ref 13–17)
MAGNESIUM SERPL-MCNC: 1.4 MG/DL — LOW (ref 1.6–2.6)
MAGNESIUM SERPL-MCNC: 1.4 MG/DL — LOW (ref 1.6–2.6)
MCHC RBC-ENTMCNC: 30.1 PG — SIGNIFICANT CHANGE UP (ref 27–34)
MCHC RBC-ENTMCNC: 30.1 PG — SIGNIFICANT CHANGE UP (ref 27–34)
MCHC RBC-ENTMCNC: 32 GM/DL — SIGNIFICANT CHANGE UP (ref 32–36)
MCHC RBC-ENTMCNC: 32 GM/DL — SIGNIFICANT CHANGE UP (ref 32–36)
MCV RBC AUTO: 93.9 FL — SIGNIFICANT CHANGE UP (ref 80–100)
MCV RBC AUTO: 93.9 FL — SIGNIFICANT CHANGE UP (ref 80–100)
METHOD TYPE: SIGNIFICANT CHANGE UP
NRBC # BLD: 0 /100 WBCS — SIGNIFICANT CHANGE UP (ref 0–0)
NRBC # BLD: 0 /100 WBCS — SIGNIFICANT CHANGE UP (ref 0–0)
ORGANISM # SPEC MICROSCOPIC CNT: ABNORMAL
ORGANISM # SPEC MICROSCOPIC CNT: SIGNIFICANT CHANGE UP
PLATELET # BLD AUTO: 217 K/UL — SIGNIFICANT CHANGE UP (ref 150–400)
PLATELET # BLD AUTO: 217 K/UL — SIGNIFICANT CHANGE UP (ref 150–400)
POTASSIUM SERPL-MCNC: 4.1 MMOL/L — SIGNIFICANT CHANGE UP (ref 3.5–5.3)
POTASSIUM SERPL-MCNC: 4.1 MMOL/L — SIGNIFICANT CHANGE UP (ref 3.5–5.3)
POTASSIUM SERPL-SCNC: 4.1 MMOL/L — SIGNIFICANT CHANGE UP (ref 3.5–5.3)
POTASSIUM SERPL-SCNC: 4.1 MMOL/L — SIGNIFICANT CHANGE UP (ref 3.5–5.3)
RBC # BLD: 3.59 M/UL — LOW (ref 4.2–5.8)
RBC # BLD: 3.59 M/UL — LOW (ref 4.2–5.8)
RBC # FLD: 15 % — HIGH (ref 10.3–14.5)
RBC # FLD: 15 % — HIGH (ref 10.3–14.5)
SODIUM SERPL-SCNC: 134 MMOL/L — LOW (ref 135–145)
SODIUM SERPL-SCNC: 134 MMOL/L — LOW (ref 135–145)
SPECIMEN SOURCE: SIGNIFICANT CHANGE UP
T-CELL CD4 SUBSET PNL BLD: 82 CELLS/UL — LOW (ref 489–1457)
T-CELL CD4 SUBSET PNL BLD: 82 CELLS/UL — LOW (ref 489–1457)
WBC # BLD: 3.38 K/UL — LOW (ref 3.8–10.5)
WBC # BLD: 3.38 K/UL — LOW (ref 3.8–10.5)
WBC # FLD AUTO: 3.38 K/UL — LOW (ref 3.8–10.5)
WBC # FLD AUTO: 3.38 K/UL — LOW (ref 3.8–10.5)

## 2023-11-02 PROCEDURE — 99232 SBSQ HOSP IP/OBS MODERATE 35: CPT

## 2023-11-02 PROCEDURE — 99233 SBSQ HOSP IP/OBS HIGH 50: CPT

## 2023-11-02 PROCEDURE — 99222 1ST HOSP IP/OBS MODERATE 55: CPT

## 2023-11-02 RX ORDER — LINEZOLID 600 MG/300ML
600 INJECTION, SOLUTION INTRAVENOUS EVERY 12 HOURS
Refills: 0 | Status: DISCONTINUED | OUTPATIENT
Start: 2023-11-02 | End: 2023-11-02

## 2023-11-02 RX ORDER — MAGNESIUM OXIDE 400 MG ORAL TABLET 241.3 MG
400 TABLET ORAL
Refills: 0 | Status: COMPLETED | OUTPATIENT
Start: 2023-11-02 | End: 2023-11-03

## 2023-11-02 RX ORDER — DAPTOMYCIN 500 MG/10ML
500 INJECTION, POWDER, LYOPHILIZED, FOR SOLUTION INTRAVENOUS EVERY 24 HOURS
Refills: 0 | Status: DISCONTINUED | OUTPATIENT
Start: 2023-11-03 | End: 2023-11-04

## 2023-11-02 RX ORDER — BENZOCAINE AND MENTHOL 5; 1 G/100ML; G/100ML
2 LIQUID ORAL ONCE
Refills: 0 | Status: COMPLETED | OUTPATIENT
Start: 2023-11-02 | End: 2023-11-02

## 2023-11-02 RX ORDER — DAPTOMYCIN 500 MG/10ML
500 INJECTION, POWDER, LYOPHILIZED, FOR SOLUTION INTRAVENOUS ONCE
Refills: 0 | Status: COMPLETED | OUTPATIENT
Start: 2023-11-02 | End: 2023-11-02

## 2023-11-02 RX ORDER — MAGNESIUM SULFATE 500 MG/ML
2 VIAL (ML) INJECTION ONCE
Refills: 0 | Status: COMPLETED | OUTPATIENT
Start: 2023-11-02 | End: 2023-11-02

## 2023-11-02 RX ORDER — DAPTOMYCIN 500 MG/10ML
INJECTION, POWDER, LYOPHILIZED, FOR SOLUTION INTRAVENOUS
Refills: 0 | Status: DISCONTINUED | OUTPATIENT
Start: 2023-11-02 | End: 2023-11-04

## 2023-11-02 RX ADMIN — MAGNESIUM OXIDE 400 MG ORAL TABLET 400 MILLIGRAM(S): 241.3 TABLET ORAL at 18:41

## 2023-11-02 RX ADMIN — DAPAGLIFLOZIN 10 MILLIGRAM(S): 10 TABLET, FILM COATED ORAL at 07:02

## 2023-11-02 RX ADMIN — Medication 1 TABLET(S): at 15:05

## 2023-11-02 RX ADMIN — BENZOCAINE AND MENTHOL 2 LOZENGE: 5; 1 LIQUID ORAL at 03:46

## 2023-11-02 RX ADMIN — Medication 1 APPLICATION(S): at 18:41

## 2023-11-02 RX ADMIN — DAPTOMYCIN 500 MILLIGRAM(S): 500 INJECTION, POWDER, LYOPHILIZED, FOR SOLUTION INTRAVENOUS at 15:03

## 2023-11-02 RX ADMIN — APIXABAN 5 MILLIGRAM(S): 2.5 TABLET, FILM COATED ORAL at 07:02

## 2023-11-02 RX ADMIN — Medication 100 MILLIGRAM(S): at 07:02

## 2023-11-02 NOTE — CONSULT NOTE ADULT - TIME BILLING
Chart reviewed, pt interviewed and examined and discussed case with ID, Cardiology ACP, Cardiology attending and Dr. Guadarrama.
MRSA bacteremia, HIV/AIDS, cryptococcal antigenemia, groin rash

## 2023-11-02 NOTE — PROGRESS NOTE ADULT - SUBJECTIVE AND OBJECTIVE BOX
INFECTIOUS DISEASES CONSULT FOLLOW-UP NOTE    INTERVAL HPI/OVERNIGHT EVENTS:    Patient seen and examined at bedside. LAMONT. Afebrile. Agitated that he is NPO and that he has not had LP yet.    ROS:   Constitutional, eyes, ENT, cardiovascular, respiratory, gastrointestinal, genitourinary, integumentary, neurological, psychiatric and heme/lymph are otherwise negative other than noted above       ANTIBIOTICS/RELEVANT:    MEDICATIONS  (STANDING):  dapagliflozin 10 milliGRAM(s) Oral every 24 hours  DAPTOmycin IVPB 500 milliGRAM(s) IV Intermittent once  DAPTOmycin IVPB      magnesium oxide 400 milliGRAM(s) Oral two times a day with meals  trimethoprim  160 mG/sulfamethoxazole 800 mG 1 Tablet(s) Oral daily  valsartan 40 milliGRAM(s) Oral daily    MEDICATIONS  (PRN):        Vital Signs Last 24 Hrs  T(C): 36.2 (02 Nov 2023 09:00), Max: 36.8 (01 Nov 2023 13:35)  T(F): 97.2 (02 Nov 2023 09:00), Max: 98.3 (01 Nov 2023 19:00)  HR: 60 (02 Nov 2023 09:00) (60 - 78)  BP: 98/58 (02 Nov 2023 09:00) (90/59 - 128/75)  BP(mean): 69 (02 Nov 2023 07:06) (69 - 93)  RR: 18 (02 Nov 2023 09:00) (18 - 19)  SpO2: 97% (02 Nov 2023 09:00) (96% - 98%)    Parameters below as of 02 Nov 2023 09:00  Patient On (Oxygen Delivery Method): room air        11-01-23 @ 07:01  -  11-02-23 @ 07:00  --------------------------------------------------------  IN: 420 mL / OUT: 1675 mL / NET: -1255 mL    11-02-23 @ 07:01  -  11-02-23 @ 12:53  --------------------------------------------------------  IN: 0 mL / OUT: 200 mL / NET: -200 mL      PHYSICAL EXAM:  Constitutional: alert, NAD  Eyes: the sclera and conjunctiva were normal.   ENT: the ears and nose were normal in appearance.   Neck: the appearance of the neck was normal and the neck was supple. ROM intact and w/o pain  Pulmonary: no respiratory distress and lungs were clear to auscultation bilaterally.   Heart: heart rate was normal and rhythm regular, normal S1 and S2  Back: thoracic spinous tenderness noted. No cervical or lumbar spine tenderness   Vascular:. there was no peripheral edema  Abdomen: normal bowel sounds, soft, non-tender  : +intertrigo   Neurological: no focal deficits.   Psychiatric: the affect was normal        LABS:                        10.8   3.38  )-----------( 217      ( 02 Nov 2023 08:43 )             33.7     11-02    134<L>  |  100  |  15  ----------------------------<  87  4.1   |  25  |  0.87    Ca    9.8      02 Nov 2023 08:43  Mg     1.4     11-02        Urinalysis Basic - ( 02 Nov 2023 08:43 )    Color: x / Appearance: x / SG: x / pH: x  Gluc: 87 mg/dL / Ketone: x  / Bili: x / Urobili: x   Blood: x / Protein: x / Nitrite: x   Leuk Esterase: x / RBC: x / WBC x   Sq Epi: x / Non Sq Epi: x / Bacteria: x        MICROBIOLOGY:  reviewed     RADIOLOGY & ADDITIONAL STUDIES:  Reviewed

## 2023-11-02 NOTE — PROGRESS NOTE ADULT - SUBJECTIVE AND OBJECTIVE BOX
CARDIOLOGY NP PROGRESS NOTE    Subjective:   Remainder ROS otherwise negative.    Overnight Events:     TELEMETRY:    EKG:      VITAL SIGNS:  T(C): 36.2 (11-02-23 @ 09:00), Max: 36.8 (11-01-23 @ 13:35)  HR: 60 (11-02-23 @ 09:00) (60 - 78)  BP: 98/58 (11-02-23 @ 09:00) (90/59 - 128/75)  RR: 18 (11-02-23 @ 09:00) (18 - 19)  SpO2: 97% (11-02-23 @ 09:00) (96% - 98%)  Wt(kg): --    I&O's Summary    01 Nov 2023 07:01  -  02 Nov 2023 07:00  --------------------------------------------------------  IN: 420 mL / OUT: 1675 mL / NET: -1255 mL    02 Nov 2023 07:01  -  02 Nov 2023 12:07  --------------------------------------------------------  IN: 0 mL / OUT: 200 mL / NET: -200 mL          PHYSICAL EXAM:    General: A/ox 3, No acute Distress  Neck: Supple, NO JVD  Cardiac: S1 S2, No M/R/G  Pulmonary: CTAB, Breathing unlabored, No Rhonchi/Rales/Wheezing  Abdomen: Soft, Non -tender, +BS x 4 quads  Extremities: No Rashes, No edema  Neuro: A/o x 3, No focal deficits          LABS:                          10.8   3.38  )-----------( 217      ( 02 Nov 2023 08:43 )             33.7                              11-02    134<L>  |  100  |  15  ----------------------------<  87  4.1   |  25  |  0.87    Ca    9.8      02 Nov 2023 08:43  Mg     1.4     11-02                                CAPILLARY BLOOD GLUCOSE                Allergies:  No Known Allergies    MEDICATIONS  (STANDING):  dapagliflozin 10 milliGRAM(s) Oral every 24 hours  DAPTOmycin IVPB 500 milliGRAM(s) IV Intermittent once  DAPTOmycin IVPB      trimethoprim  160 mG/sulfamethoxazole 800 mG 1 Tablet(s) Oral daily  valsartan 40 milliGRAM(s) Oral daily    MEDICATIONS  (PRN):        DIAGNOSTIC TESTS:        CARDIOLOGY NP PROGRESS NOTE    Subjective: Pt seen and examined at bedside. Reports feeling agitated due to being NPO this AM.  Remainder ROS otherwise negative.    Overnight Events: BC x 2 upon admission positive for MRSA bacteremia.    TELEMETRY: off tele         VITAL SIGNS:  T(C): 36.2 (11-02-23 @ 09:00), Max: 36.8 (11-01-23 @ 13:35)  HR: 60 (11-02-23 @ 09:00) (60 - 78)  BP: 98/58 (11-02-23 @ 09:00) (90/59 - 128/75)  RR: 18 (11-02-23 @ 09:00) (18 - 19)  SpO2: 97% (11-02-23 @ 09:00) (96% - 98%)  Wt(kg): --    I&O's Summary    01 Nov 2023 07:01  -  02 Nov 2023 07:00  --------------------------------------------------------  IN: 420 mL / OUT: 1675 mL / NET: -1255 mL    02 Nov 2023 07:01  -  02 Nov 2023 12:07  --------------------------------------------------------  IN: 0 mL / OUT: 200 mL / NET: -200 mL          PHYSICAL EXAM:    refused physical exam        LABS:                          10.8   3.38  )-----------( 217      ( 02 Nov 2023 08:43 )             33.7                              11-02    134<L>  |  100  |  15  ----------------------------<  87  4.1   |  25  |  0.87    Ca    9.8      02 Nov 2023 08:43  Mg     1.4     11-02                                CAPILLARY BLOOD GLUCOSE             Allergies:  No Known Allergies    MEDICATIONS  (STANDING):  dapagliflozin 10 milliGRAM(s) Oral every 24 hours  DAPTOmycin IVPB 500 milliGRAM(s) IV Intermittent once  DAPTOmycin IVPB      trimethoprim  160 mG/sulfamethoxazole 800 mG 1 Tablet(s) Oral daily  valsartan 40 milliGRAM(s) Oral daily    MEDICATIONS  (PRN):        DIAGNOSTIC TESTS:     < from: TTE Echo Complete w/o Contrast w/ Doppler (10.31.23 @ 14:23) >  CONCLUSIONS:     1. Technically difficult study.   2. Left ventricular endocardium is not well visualized. Grossly, left   ventricular function appears mildly reduced.   3. Abnormal septal motion seendue to right ventricular volume overload.   4. Dilated right ventricular size.   5. Reduced right ventricular systolic function.   6. Dilated right atrium.   7. Mild-to-moderate mitral regurgitation.   8. Moderate tricuspid regurgitation.   9. Pulmonary artery systolic pressure is 44 mmHg.  10. Trivial pericardial effusion.  11. No prior echo is available for comparison.    < end of copied text >

## 2023-11-02 NOTE — PROGRESS NOTE ADULT - PROBLEM SELECTOR PLAN 5
Add [id number] Cpt? (Important Note: In 2017 The Use Of 40794 Is Being Tracked By Cms To Determine Future Global Period Reimbursement For Global Periods): no Detail Level: Detailed Wound Evaluated By: Dr. Oglesby Pt allowed ID team exam of groin, noting to appear as intertrigo (fungal infection)  -Started Clotrimazole 1% cream BID x 2 wks    F: No IVF  N: DASH/TLC diet  E: Replete lytes PRN K<4, Mg<2  P: DVT PPX: HOLD Eliquis  C: FULL CODE  Dispo: Admit to tele 5 [facility]. Medicine consult refused transfer

## 2023-11-02 NOTE — CONSULT NOTE ADULT - SUBJECTIVE AND OBJECTIVE BOX
This is  a 63-year-old male, reluctant historian, recently admitted to [facility] on 10/2 for CHF exacerbation, signed out AMA, undomiciled, noncompliant with meds, hx of  polysubstance abuse (cocaine/THC), PMHx of HIV/AIDs (noncompliant w/ HAART), Afib (noncompliant w/ Eliquis), severe MR/mod-severe TR, HFpEF (EF:55% by TTE 11/2022), lung CA not on any treatment  who presented to [facility] 10/30/23  complaining of cough and small hemoptysis, admitted to cardiology for concern for HFpEF exacerbation.  Now found to be bacteremic 2/2 MRSA. Notable labs found upon chart review include: positive Cryptococcal ag in 6/2023; CD4 count 115 and viral load of 104k in 7/2023. Pt is continuing to refuse IV abx. TTE without evidence on vegetation.  ID following.       Patient was seen and examined at bedside. Reports he has back pain but his primary complaint is a rash around his groin which he reports is painful and itchy.      VITAL SIGNS:  T(F): 97.2 (11-02-23 @ 09:00)  HR: 60 (11-02-23 @ 09:00)  BP: 98/58 (11-02-23 @ 09:00)  RR: 18 (11-02-23 @ 09:00)  SpO2: 97% (11-02-23 @ 09:00)  Wt(kg): --    PHYSICAL EXAM:  Constitutional: resting comfortably, no acute distress  HEENT: sclera non-icteric  Respiratory:  without accessory muscle use and no intercostal retractions  Cardiovascular: RRR, normal S1S2, no M/R/G  Gastrointestinal: soft, NTND  Extremities: Warm, well perfused, no edema, no clubbing  Back: Point tenderness along spine in cervical and thoracic area   Neurological: AAOx3, CN Grossly intact  Skin: Normal temperature, warm, dry    MEDICATIONS  (STANDING):  clotrimazole 1% Cream 1 Application(s) Topical two times a day  dapagliflozin 10 milliGRAM(s) Oral every 24 hours  DAPTOmycin IVPB      magnesium oxide 400 milliGRAM(s) Oral two times a day with meals  trimethoprim  160 mG/sulfamethoxazole 800 mG 1 Tablet(s) Oral daily  valsartan 40 milliGRAM(s) Oral daily    MEDICATIONS  (PRN):    Allergies    No Known Allergies    Intolerances    LABS:                        10.8   3.38  )-----------( 217      ( 02 Nov 2023 08:43 )             33.7     11-02    134<L>  |  100  |  15  ----------------------------<  87  4.1   |  25  |  0.87    Ca    9.8      02 Nov 2023 08:43  Mg     1.4     11-02    Urinalysis Basic - ( 02 Nov 2023 08:43 )    Color: x / Appearance: x / SG: x / pH: x  Gluc: 87 mg/dL / Ketone: x  / Bili: x / Urobili: x   Blood: x / Protein: x / Nitrite: x   Leuk Esterase: x / RBC: x / WBC x   Sq Epi: x / Non Sq Epi: x / Bacteria: x          RADIOLOGY & ADDITIONAL TESTS:  Reviewed

## 2023-11-02 NOTE — PROGRESS NOTE ADULT - PROBLEM SELECTOR PLAN 4
Rate controlled Afib w/ HR 70s  -Noncompliant on Eliquis, restarted on Eliquis 5mg BID on admission. HOLD Eliquis pending LP  -Continue Toprol XL 25mg QD Rate controlled Afib w/ HR 70s off meds  -Noncompliant on Eliquis, restarted on Eliquis 5mg BID on admission. HOLD Eliquis pending LP

## 2023-11-02 NOTE — PROVIDER CONTACT NOTE (CRITICAL VALUE NOTIFICATION) - SITUATION
Pt found to be positive for MRSA (in anaerobic and aerobic bottle) and positive for staphylococcus and epidermidis (in anaerobic bottle)

## 2023-11-02 NOTE — CONSULT NOTE ADULT - ATTENDING COMMENTS
63-year-old male, undomiciled, non-compliant with meds and followup, reluctant historian with PMHx of polysubstance abuse (cocaine and THC in [place]), hx HIV/AIDS ( CD4 count 82 now, CD4 count 115 & VL 104K in 7/2023, non-compliant with HAART, on Bactrim for PJP ppx; hx cryptococcal antigen positive in 6/2023), AFib ( non-compliant with Eliquis), severe MR/mod-severe TR, HFpEF, lung Ca not on treatment, prior AMA for CHF exacerbation on 10/2/23; presented to [facility] ( 10/30/23) for SOB/cough and admitted to Cardiology service on ( 10/31) for CHF exacerbation ( pBNP 4000) s/p Lasix 40mg iv x2 on 10/31.Clinicaly euvolemic with SpO2 96% RA.  ID was consulted (11/1) for MRSA BSI ( 3 out of 4 bottles of BCx 10/30 positive for MRSA, Vanco SANTOS 2). Pt however was refusing telemetry, medication intermittently and iv abx, and abusive to staff. Pt now is willing to have full infectious workup and accepting Daptomycin 500mg iv q24hr ( 11/2, baseline CK 53), repeat BCx x2 sets drawn today  (11/2), and agreeable to have CLAUDIA and MRI C/T/L spine done. Pt however is telling us not the LP but everything else ok. Last dose Apixaban was given at 7am 11/2, will continue to hold for at least 3 days prior LP is pt is agreeable. Medicine consult was requested to evaluate for Medicine transfer. Chart reviewed, pt interviewed and examined with Dr. Lebron. Pt's understood he is serious and life threatening infection and agreeable to CLAUDIA and MRI spine but not LP. He reports back pain 10/10 , and pain/pruritis around groin. Case reviewed with Associate Chief of Hospitalist, Dr. Meeta Guadarrama whom declined the Medicine transfer approval and will d/w Cardiology attending, Dr. Pa.    Case d/w Med consult resident Dr. Silvia Lebron and Cardiology service, [place] and Dr. Pa.     Med consult will signoff. Thank you. 63-year-old male, undomiciled, non-compliant with meds and followup, reluctant historian with PMHx of polysubstance abuse (cocaine and THC in [place]), hx HIV/AIDS ( CD4 count 82 now, CD4 count 115 & VL 104K in 7/2023, non-compliant with HAART, on Bactrim for PJP ppx; hx cryptococcal antigen positive in 6/2023), AFib ( non-compliant with Eliquis), severe MR/mod-severe TR, HFpEF, lung Ca not on treatment, prior AMA for CHF exacerbation on 10/2/23; presented to [facility] ( 10/30/23) for SOB/cough and admitted to Cardiology service on ( 10/31) for CHF exacerbation ( pBNP 4000) s/p Lasix 40mg iv x2 on 10/31.Clinicaly euvolemic with SpO2 96% RA.  ID was consulted (11/1) for MRSA BSI ( 3 out of 4 bottles of BCx 10/30 positive for MRSA, Vanco SANTOS 2). Pt however was refusing telemetry, medication intermittently and iv abx, and abusive to staff. Pt now is willing to have full infectious workup and accepting Daptomycin 500mg iv q24hr ( 11/2, baseline CK 53), repeat BCx x2 sets drawn today  (11/2), and agreeable to have CLAUDIA and MRI C/T/L spine done. Pt however is telling us not the LP but everything else ok. Last dose Apixaban was given at 7am 11/2, will continue to hold for at least 3 days prior LP is pt is agreeable. Medicine consult was requested to evaluate for Medicine transfer. Chart reviewed, pt interviewed and examined with Dr. Lebron. Pt's understood he has serious and life threatening infection and agreeable to CLAUDIA and MRI spine but not to the LP. He reports back pain 10/10 though appearing comfortable w. thoracic spine tenderness , and pain/pruritis around groin. Case reviewed with Associate Chief of Hospitalist, Dr. Meeta Guadarrama whom did not approve the Medicine transfer.     Case d/w Med consult resident Dr. Silvia Lebron, ID team, and Cardiology ACP Ladonna and Cardiology attending Dr. Pa.     Med consult will signoff. Thank you. 63-year-old male, undomiciled, non-compliant with meds and followup, reluctant historian with PMHx of polysubstance abuse (cocaine and THC in [place]), hx HIV/AIDS ( CD4 count 82 now, CD4 count 115 & VL 104K in 7/2023, non-compliant with HAART, on Bactrim for PJP ppx; hx cryptococcal antigen positive in 6/2023), AFib ( non-compliant with Eliquis), severe MR/mod-severe TR, HFpEF, lung Ca not on treatment, prior AMA for CHF exacerbation on 10/2/23; presented to [facility] ( 10/30/23) for SOB/cough and admitted to Cardiology service on ( 10/31) for CHF exacerbation ( pBNP 4000) s/p Lasix 40mg iv x2 on 10/31.Clinically euvolemic with SpO2 96% RA.  ID was consulted (11/1) for MRSA BSI ( 3 out of 4 bottles of BCx 10/30 positive for MRSA, Vanco SANTOS 2). Initially pt was refusing telemetry, medication intermittently, iv abx, and abusive to staff. Pt now is willing to have full infectious workup and accepting Daptomycin 500mg iv q24hr ( 11/2, baseline CK 53), repeat BCx x2 sets drawn today  (11/2), and agreeable to have CLAUDIA and MRI C/T/L spine done. Pt however is telling us not the LP but everything else ok. Last dose Apixaban was given at 7am on 11/2, will continue to hold for at least 3 days prior LP if pt is agreeable. Medicine consult was requested (11/2)  to evaluate for Medicine transfer.   Chart reviewed, pt interviewed and examined with Dr. Lebron. Pt's understood he has serious and life threatening infection and agreeable to CLAUDIA and MRI spine but not to the LP. He reports severe back pain 10/10 though appearing comfortable w. upper thoracic spine tenderness , and pain/pruritis around groin. PE remarkable for euvolemic on exam, no JVP, lung clear and no pedal edema, normal S1 and S2, upper spine tenderness but refused palpation for the rest of mid or lower spine since pt wanted to eat.   Case reviewed with Associate Chief of Hospitalist, Dr. Meeta Guadarrama whom did not approve the Medicine transfer.     Case d/w Med consult resident Dr. Silvia Lebron, ID team of Dr. Bowman;  Cardiology ACP Ladonna and Cardiology attending Dr. Pa.     Med consult will signoff. Thank you.

## 2023-11-02 NOTE — PROVIDER CONTACT NOTE (OTHER) - SITUATION
Pt verbally aggressive towards staff. Screaming that we need to find out what time his "fucking test is or get the fuck out". Approached phlebotomy in a threatening manner. States I wants to leave AMA

## 2023-11-02 NOTE — PROGRESS NOTE ADULT - PROBLEM SELECTOR PLAN 3
-Non compliant with HAART, CD4 82 and Crypto antigen ordered f/u   -Patient has not been taking Biktarvy for unknown amount of time. i/s/o recent positive cryptococcal ag, restarting HAART can lead to IRIS

## 2023-11-02 NOTE — PROGRESS NOTE ADULT - ASSESSMENT
64 y/o male, reluctant historian, recently admitted to [facility] on 10/2 for CHF exacerbation, signed out AMA, undomiciled, noncompliant with meds, hx of polysubstance abuse (cocaine/THC), PMHx of HIV/AIDs (noncompliant w/ HAART), Afib (noncompliant w/ Eliquis), severe MR/mod-severe TR, HFpEF (EF 55% by TTE 11/2022), lung CA not on any treatment, who [facility] c/o cough after getting caught in the rain a few days ago, endorsed blood tinged sputum, generalized body aches and occasional chills. +SOB w/ ambulating. Found to be euvolemic not in HF exacerbation. Patient is also complaining of a burning rash in his groin that has been present for over a month. ID consulted for MRSA bacteremia, unclear source. Patient refusing telemetry,  intermittently refusing medications/treatment, now agreeable to LP and MRI testing. Neuro consulted for LP.

## 2023-11-02 NOTE — PROVIDER CONTACT NOTE (OTHER) - ACTION/TREATMENT ORDERED:
-PA to review patients options, and review option to leave AMA if he is not willing to be compliant with NPO in order to safely have the lumbar puncture done

## 2023-11-02 NOTE — PROGRESS NOTE ADULT - PROBLEM SELECTOR PLAN 1
Blood cultures on admission positive for MRSA bacteremia, unclear source, ?from scratching rash. MRSE likely a contaminant.     -Will need daily BCx until bacteremia clears, ordered for AM draw   -ID consulted for MRSA bacteremia  -Start Daptomycin 500mg q 24h x at least 2 wks  ---If patient refusing IV abx: Start Linezolid 600 mg PO Q12 (has better serum concentration than doxy, and better data for BSI)  -Cont Bactrim DS 1 tab daily for PCP ppx  -Defer CLAUDIA per Dr Pa given would not  in this pt (no evidence of severe TR or HF from valve disease, not a surgical candidate due to ongoing active polysubstance abuse and noncompliance).  -Obtain MRI of C/T/L spine with and without contrast r/o metastatic infection to spine  -Neuro consulted for LP, will need to HOLD Eliquis for 24-48hrs (last dose 12/2 7AM) and recheck INR in AM. Will need to send for LP: cryptococcal, ME panel, cell count, glucose, protein to r/o cryptococcal CNS infection

## 2023-11-03 LAB
ACANTHOCYTES BLD QL SMEAR: SLIGHT — SIGNIFICANT CHANGE UP
ACANTHOCYTES BLD QL SMEAR: SLIGHT — SIGNIFICANT CHANGE UP
ANION GAP SERPL CALC-SCNC: 10 MMOL/L — SIGNIFICANT CHANGE UP (ref 5–17)
ANION GAP SERPL CALC-SCNC: 10 MMOL/L — SIGNIFICANT CHANGE UP (ref 5–17)
ANISOCYTOSIS BLD QL: SLIGHT — SIGNIFICANT CHANGE UP
ANISOCYTOSIS BLD QL: SLIGHT — SIGNIFICANT CHANGE UP
APTT BLD: 36.5 SEC — HIGH (ref 24.5–35.6)
APTT BLD: 36.5 SEC — HIGH (ref 24.5–35.6)
BASOPHILS # BLD AUTO: 0 K/UL — SIGNIFICANT CHANGE UP (ref 0–0.2)
BASOPHILS # BLD AUTO: 0 K/UL — SIGNIFICANT CHANGE UP (ref 0–0.2)
BASOPHILS NFR BLD AUTO: 0 % — SIGNIFICANT CHANGE UP (ref 0–2)
BASOPHILS NFR BLD AUTO: 0 % — SIGNIFICANT CHANGE UP (ref 0–2)
BUN SERPL-MCNC: 12 MG/DL — SIGNIFICANT CHANGE UP (ref 7–23)
BUN SERPL-MCNC: 12 MG/DL — SIGNIFICANT CHANGE UP (ref 7–23)
BURR CELLS BLD QL SMEAR: PRESENT — SIGNIFICANT CHANGE UP
BURR CELLS BLD QL SMEAR: PRESENT — SIGNIFICANT CHANGE UP
CALCIUM SERPL-MCNC: 9.4 MG/DL — SIGNIFICANT CHANGE UP (ref 8.4–10.5)
CALCIUM SERPL-MCNC: 9.4 MG/DL — SIGNIFICANT CHANGE UP (ref 8.4–10.5)
CHLORIDE SERPL-SCNC: 102 MMOL/L — SIGNIFICANT CHANGE UP (ref 96–108)
CHLORIDE SERPL-SCNC: 102 MMOL/L — SIGNIFICANT CHANGE UP (ref 96–108)
CO2 SERPL-SCNC: 24 MMOL/L — SIGNIFICANT CHANGE UP (ref 22–31)
CO2 SERPL-SCNC: 24 MMOL/L — SIGNIFICANT CHANGE UP (ref 22–31)
CREAT SERPL-MCNC: 0.85 MG/DL — SIGNIFICANT CHANGE UP (ref 0.5–1.3)
CREAT SERPL-MCNC: 0.85 MG/DL — SIGNIFICANT CHANGE UP (ref 0.5–1.3)
EGFR: 97 ML/MIN/1.73M2 — SIGNIFICANT CHANGE UP
EGFR: 97 ML/MIN/1.73M2 — SIGNIFICANT CHANGE UP
EOSINOPHIL # BLD AUTO: 0.37 K/UL — SIGNIFICANT CHANGE UP (ref 0–0.5)
EOSINOPHIL # BLD AUTO: 0.37 K/UL — SIGNIFICANT CHANGE UP (ref 0–0.5)
EOSINOPHIL NFR BLD AUTO: 11.8 % — HIGH (ref 0–6)
EOSINOPHIL NFR BLD AUTO: 11.8 % — HIGH (ref 0–6)
GIANT PLATELETS BLD QL SMEAR: PRESENT — SIGNIFICANT CHANGE UP
GIANT PLATELETS BLD QL SMEAR: PRESENT — SIGNIFICANT CHANGE UP
GLUCOSE SERPL-MCNC: 93 MG/DL — SIGNIFICANT CHANGE UP (ref 70–99)
GLUCOSE SERPL-MCNC: 93 MG/DL — SIGNIFICANT CHANGE UP (ref 70–99)
HCT VFR BLD CALC: 33.8 % — LOW (ref 39–50)
HCT VFR BLD CALC: 33.8 % — LOW (ref 39–50)
HGB BLD-MCNC: 10.8 G/DL — LOW (ref 13–17)
HGB BLD-MCNC: 10.8 G/DL — LOW (ref 13–17)
INR BLD: 1.09 — SIGNIFICANT CHANGE UP (ref 0.85–1.18)
INR BLD: 1.09 — SIGNIFICANT CHANGE UP (ref 0.85–1.18)
LYMPHOCYTES # BLD AUTO: 1.41 K/UL — SIGNIFICANT CHANGE UP (ref 1–3.3)
LYMPHOCYTES # BLD AUTO: 1.41 K/UL — SIGNIFICANT CHANGE UP (ref 1–3.3)
LYMPHOCYTES # BLD AUTO: 44.6 % — HIGH (ref 13–44)
LYMPHOCYTES # BLD AUTO: 44.6 % — HIGH (ref 13–44)
MACROCYTES BLD QL: SLIGHT — SIGNIFICANT CHANGE UP
MACROCYTES BLD QL: SLIGHT — SIGNIFICANT CHANGE UP
MAGNESIUM SERPL-MCNC: 1.4 MG/DL — LOW (ref 1.6–2.6)
MAGNESIUM SERPL-MCNC: 1.4 MG/DL — LOW (ref 1.6–2.6)
MANUAL SMEAR VERIFICATION: SIGNIFICANT CHANGE UP
MANUAL SMEAR VERIFICATION: SIGNIFICANT CHANGE UP
MCHC RBC-ENTMCNC: 29.6 PG — SIGNIFICANT CHANGE UP (ref 27–34)
MCHC RBC-ENTMCNC: 29.6 PG — SIGNIFICANT CHANGE UP (ref 27–34)
MCHC RBC-ENTMCNC: 32 GM/DL — SIGNIFICANT CHANGE UP (ref 32–36)
MCHC RBC-ENTMCNC: 32 GM/DL — SIGNIFICANT CHANGE UP (ref 32–36)
MCV RBC AUTO: 92.6 FL — SIGNIFICANT CHANGE UP (ref 80–100)
MCV RBC AUTO: 92.6 FL — SIGNIFICANT CHANGE UP (ref 80–100)
MONOCYTES # BLD AUTO: 0.35 K/UL — SIGNIFICANT CHANGE UP (ref 0–0.9)
MONOCYTES # BLD AUTO: 0.35 K/UL — SIGNIFICANT CHANGE UP (ref 0–0.9)
MONOCYTES NFR BLD AUTO: 10.9 % — SIGNIFICANT CHANGE UP (ref 2–14)
MONOCYTES NFR BLD AUTO: 10.9 % — SIGNIFICANT CHANGE UP (ref 2–14)
NEUTROPHILS # BLD AUTO: 0.98 K/UL — LOW (ref 1.8–7.4)
NEUTROPHILS # BLD AUTO: 0.98 K/UL — LOW (ref 1.8–7.4)
NEUTROPHILS NFR BLD AUTO: 30.9 % — LOW (ref 43–77)
NEUTROPHILS NFR BLD AUTO: 30.9 % — LOW (ref 43–77)
OVALOCYTES BLD QL SMEAR: SLIGHT — SIGNIFICANT CHANGE UP
OVALOCYTES BLD QL SMEAR: SLIGHT — SIGNIFICANT CHANGE UP
PLAT MORPH BLD: ABNORMAL
PLAT MORPH BLD: ABNORMAL
PLATELET # BLD AUTO: 223 K/UL — SIGNIFICANT CHANGE UP (ref 150–400)
PLATELET # BLD AUTO: 223 K/UL — SIGNIFICANT CHANGE UP (ref 150–400)
POIKILOCYTOSIS BLD QL AUTO: SIGNIFICANT CHANGE UP
POIKILOCYTOSIS BLD QL AUTO: SIGNIFICANT CHANGE UP
POTASSIUM SERPL-MCNC: 4.4 MMOL/L — SIGNIFICANT CHANGE UP (ref 3.5–5.3)
POTASSIUM SERPL-MCNC: 4.4 MMOL/L — SIGNIFICANT CHANGE UP (ref 3.5–5.3)
POTASSIUM SERPL-SCNC: 4.4 MMOL/L — SIGNIFICANT CHANGE UP (ref 3.5–5.3)
POTASSIUM SERPL-SCNC: 4.4 MMOL/L — SIGNIFICANT CHANGE UP (ref 3.5–5.3)
PROTHROM AB SERPL-ACNC: 12.4 SEC — SIGNIFICANT CHANGE UP (ref 9.5–13)
PROTHROM AB SERPL-ACNC: 12.4 SEC — SIGNIFICANT CHANGE UP (ref 9.5–13)
RBC # BLD: 3.65 M/UL — LOW (ref 4.2–5.8)
RBC # BLD: 3.65 M/UL — LOW (ref 4.2–5.8)
RBC # FLD: 15 % — HIGH (ref 10.3–14.5)
RBC # FLD: 15 % — HIGH (ref 10.3–14.5)
RBC BLD AUTO: ABNORMAL
RBC BLD AUTO: ABNORMAL
SMUDGE CELLS # BLD: PRESENT — SIGNIFICANT CHANGE UP
SMUDGE CELLS # BLD: PRESENT — SIGNIFICANT CHANGE UP
SODIUM SERPL-SCNC: 136 MMOL/L — SIGNIFICANT CHANGE UP (ref 135–145)
SODIUM SERPL-SCNC: 136 MMOL/L — SIGNIFICANT CHANGE UP (ref 135–145)
VARIANT LYMPHS # BLD: 1.8 % — SIGNIFICANT CHANGE UP (ref 0–6)
VARIANT LYMPHS # BLD: 1.8 % — SIGNIFICANT CHANGE UP (ref 0–6)
WBC # BLD: 3.17 K/UL — LOW (ref 3.8–10.5)
WBC # BLD: 3.17 K/UL — LOW (ref 3.8–10.5)
WBC # FLD AUTO: 3.17 K/UL — LOW (ref 3.8–10.5)
WBC # FLD AUTO: 3.17 K/UL — LOW (ref 3.8–10.5)

## 2023-11-03 PROCEDURE — 99232 SBSQ HOSP IP/OBS MODERATE 35: CPT

## 2023-11-03 RX ORDER — APIXABAN 2.5 MG/1
5 TABLET, FILM COATED ORAL EVERY 12 HOURS
Refills: 0 | Status: DISCONTINUED | OUTPATIENT
Start: 2023-11-03 | End: 2023-11-04

## 2023-11-03 RX ORDER — MAGNESIUM OXIDE 400 MG ORAL TABLET 241.3 MG
400 TABLET ORAL
Refills: 0 | Status: DISCONTINUED | OUTPATIENT
Start: 2023-11-03 | End: 2023-11-04

## 2023-11-03 RX ORDER — MAGNESIUM SULFATE 500 MG/ML
2 VIAL (ML) INJECTION ONCE
Refills: 0 | Status: DISCONTINUED | OUTPATIENT
Start: 2023-11-03 | End: 2023-11-03

## 2023-11-03 RX ADMIN — MAGNESIUM OXIDE 400 MG ORAL TABLET 400 MILLIGRAM(S): 241.3 TABLET ORAL at 17:44

## 2023-11-03 RX ADMIN — DAPTOMYCIN 120 MILLIGRAM(S): 500 INJECTION, POWDER, LYOPHILIZED, FOR SOLUTION INTRAVENOUS at 11:47

## 2023-11-03 RX ADMIN — Medication 1 TABLET(S): at 11:47

## 2023-11-03 RX ADMIN — APIXABAN 5 MILLIGRAM(S): 2.5 TABLET, FILM COATED ORAL at 18:27

## 2023-11-03 RX ADMIN — Medication 1 TABLET(S): at 18:27

## 2023-11-03 RX ADMIN — Medication 1 APPLICATION(S): at 17:43

## 2023-11-03 RX ADMIN — MAGNESIUM OXIDE 400 MG ORAL TABLET 400 MILLIGRAM(S): 241.3 TABLET ORAL at 11:47

## 2023-11-03 NOTE — PROGRESS NOTE ADULT - ASSESSMENT
62 y/o male, reluctant historian, recently admitted to [facility] on 10/2 for CHF exacerbation, signed out AMA, undomiciled, noncompliant with meds, hx of polysubstance abuse (cocaine/THC), PMHx of HIV/AIDs (noncompliant w/ HAART), Afib (noncompliant w/ Eliquis), severe MR/mod-severe TR, HFpEF (EF 55% by TTE 11/2022), lung CA not on any treatment, [facility] c/o cough after getting caught in the rain a few days ago, endorsed blood tinged sputum, generalized body aches and occasional chills. +SOB w/ ambulating. Found to be euvolemic not in HF exacerbation. Patient is also complaining of a burning rash in his groin that has been present for over a month. ID consulted for MRSA bacteremia, unclear source. Patient refusing telemetry,  intermittently refusing medications/treatment, now agreeable to LP and MRI testing. Neuro consulted for LP. 64 y/o male, reluctant historian, recently admitted to [facility] on 10/2 for CHF exacerbation, signed out AMA, undomiciled, noncompliant with meds, hx of polysubstance abuse (cocaine/THC), PMHx of HIV/AIDs (noncompliant w/ HAART), Afib (noncompliant w/ Eliquis), severe MR/mod-severe TR, HFpEF (EF 55% by TTE 11/2022), lung CA not on any treatment, who [facility] c/o cough after getting caught in the rain a few days ago, endorsed blood tinged sputum, generalized body aches and occasional chills. +SOB w/ ambulating. Found to be euvolemic not in HF exacerbation. Patient is also complaining of a burning rash in his groin that has been present for over a month. ID following for MRSA bacteremia, unclear source. Patient refusing telemetry, intermittently refusing medications/treatment, now agreeable pending MRI spine.

## 2023-11-03 NOTE — PROGRESS NOTE ADULT - PROBLEM SELECTOR PLAN 3
-Non compliant with HAART, CD4 82 and Crypto antigen ordered f/u   -Patient has not been taking Biktarvy for unknown amount of time. i/s/o recent positive cryptococcal ag, restarting HAART can lead to IRIS -Non compliant with HAART, CD4 82 and Crypto antigen neg  -Patient has not been taking Biktarvy for unknown amount of time. i/s/o recent positive cryptococcal ag, restarting HAART can lead to IRIS

## 2023-11-03 NOTE — PROGRESS NOTE ADULT - NS ATTEND AMEND GEN_ALL_CORE FT
63-year-old male, reluctant historian, recently admitted to [facility] on 10/2 for CHF exacerbation, signed out AMA, undomiciled, noncompliant with meds, hx of  polysubstance abuse (cocaine/THC), PMHx of HIV/AIDs (noncompliant w/ HAART), Afib (noncompliant w/ Eliquis), severe MR/mod-severe TR, HFpEF (EF:55% by TTE 11/2022), lung CA not on any treatment  [facility]  complaining of cough after getting caught in the rain a few days ago.  Patient also endorsing small amounts of blood noticed with the productive sputum.  He reports generalized body aches and occasional chills. +SOB w/ ambulating. He also complains feeling as though his entire body is swollen.  Patient is also complaining of a burning rash in his groin that has been present for over a month.      1. HF  Chronic congestive heart failure with preserved LVEF. Euvolemic. No indication for lasix.    2 ? URI  Ceftriaxone, Azithromycin and Bactrim    3. + Bacteremia, possibly MRSA  Refusing IV antibiotics. Not a candidate for PICC line and prolonged abx IV due to active substance abuse. Noncompliant with HIV HAART.  Consult ID for oral antibiotics.
63-year-old male, reluctant historian, recently admitted to [facility] on 10/2 for CHF exacerbation, signed out AMA, undomiciled, noncompliant with meds, hx of  polysubstance abuse (cocaine/THC), PMHx of HIV/AIDs (noncompliant w/ HAART), Afib (noncompliant w/ Eliquis), severe MR/mod-severe TR, HFpEF (EF:55% by TTE 11/2022), lung CA not on any treatment  [facility]  complaining of cough after getting caught in the rain a few days ago.  Patient also endorsing small amounts of blood noticed with the productive sputum.  He reports generalized body aches and occasional chills. +SOB w/ ambulating. He also complains feeling as though his entire body is swollen.  Patient is also complaining of a burning rash in his groin that has been present for over a month.      1. HF, systolic, chronic  Chronic congestive heart failure with preserved LVEF.   RV failure.  Euvolemic. No indication for Lasix.    2 ? URI  Ceftriaxone, Azithromycin and Bactrim    3. + Bacteremia, possibly MRSA  No vegetations on TTE, would not proceed with CLAUDIA as unlikely to  (no evidence of severe TR or HF from valve disease, not a surgical candidate due to ongoing active polysubstance abuse and noncompliance).  Appreciate ID recommendations. Repeat bcxs   given hx of positive cryptococcal ag, obtain lumbar puncture   repeat cryptococcal ag   start Bactrim 1 DS tablet daily for PCP ppx   should stop Biktarvy. Patient has not been taking for unknown amount of time. i/s/o recent positive cryptococcal ag, restarting HAART can lead to IRIS   start Vancomycin 1 g IV Q12h. Check trough before 4th dose   if patient refusing IV abx, start Doxycycline 100mg PO Q12h     will transfer to medicine as main issues are ID and noncardiac.
Afebrile and clinically well appearing. No new symptoms. Continue daptomycin as above. Pending MRI spine. Recommend CLAUDIA (even if not surgical candidate, the diagnosis of endocarditis would affect antibiotic treatment duration). Patient expressed interest today in resuming HAART- will continue this conversation and plan to start Biktarvy in the coming days if patient remains amenable and plans to follow up for ongoing care outpatient.
Patient is clinically stable and without fever or leukocytosis, but has been declining most of our diagnostic and therapeutic interventions and has displayed threatening behavior toward staff (but not on our visit today). He has significant thoracic spine midline point tenderness, and I'm concerned for possible metastatic infection here. I stressed to the patient that MRSA bacteremia is a life threatening condition, and that optimal treatment will include repeat blood cultures, CLAUDIA, MRI C/T/L spine, and IV antibiotics (which patient has been declining). Linezolid would be an alternative but suboptimal alternative in the meantime. Regarding HIV, he needs an LP for hx of cryptococcal antigenemia, but this will be delayed due to his being on eliquis. A repeat CrAg is pending.

## 2023-11-03 NOTE — PROGRESS NOTE ADULT - PROBLEM SELECTOR PLAN 1
Blood cultures on admission positive for MRSA bacteremia, unclear source, ?from scratching rash. MRSE likely a contaminant.     -Will need daily BCx until bacteremia clears, ordered for AM draw   -ID consulted for MRSA bacteremia  -Start Daptomycin 500mg q 24h x at least 2 wks  ---If patient refusing IV abx: Start Linezolid 600 mg PO Q12 (has better serum concentration than doxy, and better data for BSI)  -Cont Bactrim DS 1 tab daily for PCP ppx  -Defer CLAUDIA per Dr Pa given would not  in this pt (no evidence of severe TR or HF from valve disease, not a surgical candidate due to ongoing active polysubstance abuse and noncompliance).  -Obtain MRI of C/T/L spine with and without contrast r/o metastatic infection to spine  -Neuro consulted for LP, will need to HOLD Eliquis for 24-48hrs (last dose 12/2 7AM) and recheck INR in AM. Will need to send for LP: cryptococcal, ME panel, cell count, glucose, protein to r/o cryptococcal CNS infection Blood cultures on admission positive for MRSA bacteremia, unclear source, ?from scratching rash. MRSE likely a contaminant.     -Will need daily BCx until bacteremia clears, BC 11/2 NGTD x 1day  -ID consulted for MRSA bacteremia  -Start Daptomycin 500mg q 24h x at least 2-4 wks. Will need CK level every Thur  --If patient refusing IV Abx or leaves AMA: Start Linezolid 600 mg PO Q12 x 2wks course   -Cont Bactrim DS 1 tab daily for PCP ppx  -Obtain MRI of C/T/L spine with and without contrast r/o metastatic infection to spine  -Pt refusing and will defer CLAUDIA per Dr Pa given would not  in this pt (no evidence of severe TR or HF from valve disease, not a surgical candidate due to ongoing active polysubstance abuse and noncompliance).  -Defer LP at this time per ID 2/2 repeat Cryptococcal Ag negative and not exhibiting s/s cryptococcal meningitis

## 2023-11-03 NOTE — PROGRESS NOTE ADULT - NSPROGADDITIONALINFOA_GEN_ALL_CORE
Attending Attestation:  I was physically present for the key portions of the evaluation and management (E/M) service provided.  I agree with the above history, physical, and plan which I have reviewed.  Temo Latham MD (Cardiology)

## 2023-11-03 NOTE — PROGRESS NOTE ADULT - PROBLEM SELECTOR PLAN 4
Rate controlled Afib w/ HR 70s off meds  -Noncompliant on Eliquis, restarted on Eliquis 5mg BID on admission. HOLD Eliquis pending LP Rate controlled Afib w/ HR 70s off meds  -Noncompliant on Eliquis, was restarted on Eliquis 5mg BID on admission. Initially held 11/2 pending possible LP- now deferred.  -Resume Eliquis 5mg BID

## 2023-11-03 NOTE — PROGRESS NOTE ADULT - PROBLEM SELECTOR PLAN 5
Pt allowed ID team exam of groin, noting to appear as intertrigo (fungal infection)  -Started Clotrimazole 1% cream BID x 2 wks    F: No IVF  N: DASH/TLC diet  E: Replete lytes PRN K<4, Mg<2  P: DVT PPX: HOLD Eliquis  C: FULL CODE  Dispo: Admit to tele 5 [facility]ris. Medicine consult refused transfer Pt allowed ID team exam of groin, noting to appear as intertrigo (fungal infection)  -Started Clotrimazole 1% cream BID x 2 wks    F: No IVF  N: DASH/TLC diet  E: Replete lytes PRN K<4, Mg<2  P: DVT PPX: Eliquis  C: FULL CODE  Dispo: Admit to tele 5 [facility]. Medicine consult refused transfer

## 2023-11-03 NOTE — CHART NOTE - NSCHARTNOTEFT_GEN_A_CORE
Admitting Diagnosis:   Patient is a 63y old  Male who presents with a chief complaint of CHF exacerbation (2023 12:43)      PAST MEDICAL & SURGICAL HISTORY:  HIV (human immunodeficiency virus infection)      Lung cancer      COPD (chronic obstructive pulmonary disease)      HTN (hypertension)          Current Nutrition Order:  DASH TLC   1000ml fluid restriction   Ensure Enlive x2/day 350kcal/20gm per 1 can     PO Intake: Good (%) [  XX ]  Fair (50-75%) [   ] Po or (<25%) [   ]    GI Issues:   Noted as distended   MAGOX ordered     Pain: none per flow sheets     Skin Integrity:   Neeraj 20, 1+ R ankle Edema, No pressure ulcer, +Rash     Labs:       136  |  102  |  12  ----------------------------<  93  4.4   |  24  |  0.85    Ca    9.4      2023 05:30  Mg     1.4     11-03      CAPILLARY BLOOD GLUCOSE          Medications:  MEDICATIONS  (STANDING):  clotrimazole 1% Cream 1 Application(s) Topical two times a day  dapagliflozin 10 milliGRAM(s) Oral every 24 hours  DAPTOmycin IVPB 500 milliGRAM(s) IV Intermittent every 24 hours  DAPTOmycin IVPB      magnesium oxide 400 milliGRAM(s) Oral three times a day with meals  trimethoprim  160 mG/sulfamethoxazole 800 mG 1 Tablet(s) Oral daily  valsartan 40 milliGRAM(s) Oral daily    MEDICATIONS  (PRN):      Admit wt: 140 pounds  *Pt remains without Ht @ this time     Weight Change:  Admit wt 140 pounds with no EMR ht. Daily wt from 10/31 noted at 117.9 pounds - seems more accurate as Pt appeared visually with wasting/loss however did NFPE not feasible at this time. No updated wts since admit.     Nutrition Focused Physical Exam:   Unable to complete on  initial assessment and follow up     Estimated energy needs:   Admit wt 140 pounds, daily wt 117.9 pounds 10/31  10/31 for EER as this appears more accurate  Adjusted for age, presumed malnutrition, CHF, HIV/Aids (Cd4) - fluids per team  EER To be adjusted as Able Pending EMR ht/wt  35-45kcal/k-1855kcal/day  1.5-1.5gm/k-135gm prot/day     Subjective: 64 y/o male, reluctant historian, recently admitted to [facility] 10/2 for CHF exacerbation, signed out AMA, undomiciled, noncompliant with meds, hx of polysubstance abuse (cocaine/THC), PMHx HIV/AIDs (noncompliant HAART), Afib (noncompliant Eliquis), severe MR/mod-severe TR, HFpEF (EF 55% by TTE 2022), lung CA not on any treatment, who presented to [facility] 10/30/23 c/o cough after getting caught in the rain a few days ago, endorsed blood tinged sputum, generalized body aches and occasional chills. Found to be euvolemic not in HF exacerbation. Pt is also complaining of a burning rash in his groin that has been present for over a month. ID consulted for MRSA bacteremia, unclear source. Pt refusing telemetry,  intermittently refusing medications/treatment, now agreeable to LP and MRI testing. Neuro consulted for LP- will need to HOLD Eliquis for 24-48hrs (last dose  7AM).    Pt seen this AM on 5UR. Per RN pt with labile mood. Pt was seen soundly sleeping this AM therefore RD visit deffered. RN reports pt with very good PO intake this AM - does express pt ordered pancakes with 6 syrups. BG 11/3 93 - however noted from 5:30AM. No updated BG s/p breakfast meal. Other labs of note Na K WDL, BUN/Cr WDL, CD4 82, Mg is low (has been low during admit), Na WDL-low prior, no phosphorus noted, K WDL.   Please see below for nutritions recommendations.  Recs made with team.     Previous Nutrition Diagnosis: Increased Nutrient Needs... RT increased demands AEB: presumed malnutrition, CHF, HIV/Aids  Active [   XX]  Resolved [   ]  GOAL: Pt will meet at least 75% of nutrient needs / Show no s/s of malnutrition    Recommendations:  1. Current Diet:  DASH TLC 1000ml fluid restriction, + Ensure Enlive x2/day 350kcal/20gm per 1 can (700kcal/40gm prot).   - Continue diet.  - Can consider increasing oral nutrition supplements use i/s/o increase EER; x3/day will provide ~1050kcal/60gm prot.   - Monitor %PO intake, Diet tolerance.  2. Labs: monitor BMP, CBC, glucose, lytes - Replete PRN (Please check phosphorus), trend renal indices, LFTs.  3. MVI.  4. Monitor Skin, Wt, Pain, Labs, GI, GOC.  - Please obtain ht as able.   5. RD to remain available for additional nutrition interventions as needed.      Education: Deferred.     Risk Level: High [ XX ] Moderate [   ] Low [   ]

## 2023-11-03 NOTE — PROGRESS NOTE ADULT - ASSESSMENT
63-year-old male, reluctant historian, recently admitted to [facility] on 10/2 for CHF exacerbation, signed out AMA, undomiciled, noncompliant with meds, hx of polysubstance abuse (cocaine/THC), PMHx of HIV/AIDs (noncompliant w/ HAART), Afib (noncompliant w/ Eliquis), severe MR/mod-severe TR, HFpEF, lung CA not on any treatment who [facility]/cough found to have CHF exacerbation. Now found to be bacteremic 2/2 MRSA. Notable labs found upon chart review include: positive Cryptococcal ag in 6/2023; CD4 count 115 and viral load of 104k in 7/2023. Pt is now amenable to IV abx. TTE without evidence on vegetation however with MRSA BSI, CLAUDIA is needed to r/o IE. CD4% 5; ABS 82- would cont PCP and Toxo ppx.     Intertrigo of groin noted on exam.     Suggest:   -Repeat bcxs 11/2 --ngtd   -f/u bcxs 10/31 --MRSA (vanc S2 associated with treatment failure; dapto s 1)  -obtain CLAUDIA to eval for endocarditis   -f/u MRI of C/T/L spine with and without contrast    -given hx of positive cryptococcal ag, obtain lumbar puncture   -f/u repeat cryptococcal ag   -continue Bactrim 1 DS tablet daily for PCP and toxo ppx   -hold Biktarvy. Patient has not been taking for unknown amount of time. i/s/o recent positive cryptococcal ag, restarting HAART can lead to IRIS   -Continue Daptomycin 500mg IV daily. Check weekly CK (q Thurs)     -If patient refusing IV abx: Start Linezolid 600 mg PO Q12 (has better serum concentration than doxy, and better data for BSI)  -Continue clotrimazole 1% topical cream applied to affected area(s) BID for 2 weeks for intertrigo       Team 2 will follow you.    Case d/w primary team.    Tory Brewer, Infectious Diseases PA  Please reach out for any questions 9 am-5pm. For evenings and weekends, please call the ID physician on call.  Work cell: [phone]   63-year-old male, reluctant historian, recently admitted to [facility] on 10/2 for CHF exacerbation, signed out AMA, undomiciled, noncompliant with meds, hx of polysubstance abuse (cocaine/THC), PMHx of HIV/AIDs (noncompliant w/ HAART), Afib (noncompliant w/ Eliquis), severe MR/mod-severe TR, HFpEF, lung CA not on any treatment who [facility]cough found to have CHF exacerbation. Now found to be bacteremic 2/2 MRSA. Notable labs found upon chart review include: positive Cryptococcal ag in 6/2023; CD4 count 115 and viral load of 104k in 7/2023. Pt is now amenable to IV abx. TTE without evidence on vegetation however with MRSA BSI, CLAUDIA is needed to r/o IE. CD4% 5; ABS 82- would cont PCP and Toxo ppx. Repeat Cryptococcal Ag negative and not exhibiting signs or symptoms of cryptococcal meningitis, can defer LP at this time.     Intertrigo of groin noted on exam.     Suggest:   -Repeat bcxs 11/2 --ngtd   -f/u bcxs 10/31 --MRSA (vanc S2 associated with treatment failure; dapto s 1)  -obtain CLAUDIA to eval for endocarditis   -f/u MRI of C/T/L spine with and without contrast    -continue Bactrim 1 DS tablet daily for PCP and toxo ppx   -hold Biktarvy. Patient has not been taking for unknown amount of time. i/s/o recent positive cryptococcal ag, restarting HAART can lead to IRIS   -Continue Daptomycin 500mg IV daily. Check weekly CK (q Thurs)     -If patient refusing IV abx: Start Linezolid 600 mg PO Q12 (has better serum concentration than doxy, and better data for BSI)  -Continue clotrimazole 1% topical cream applied to affected area(s) BID for 2 weeks for intertrigo       Team 2 will follow you.    Case d/w primary team.    Tory Brewer, Infectious Diseases PA  Please reach out for any questions 9 am-5pm. For evenings and weekends, please call the ID physician on call.  Work cell: [phone]   63-year-old male, reluctant historian, recently admitted to [facility] on 10/2 for CHF exacerbation, signed out AMA, undomiciled, noncompliant with meds, hx of polysubstance abuse (cocaine/THC), PMHx of HIV/AIDs (noncompliant w/ HAART), Afib (noncompliant w/ Eliquis), severe MR/mod-severe TR, HFpEF, lung CA not on any treatment who [facility]/cough found to have CHF exacerbation. Now found to be bacteremic 2/2 MRSA. Notable labs found upon chart review include: positive Cryptococcal ag in 6/2023; CD4 count 115 and viral load of 104k in 7/2023. Pt is now amenable to IV abx. TTE without evidence on vegetation however with MRSA BSI, CLAUDIA is needed to r/o IE. CD4% 5; ABS 82- would cont PCP and Toxo ppx. Repeat Cryptococcal Ag negative and not exhibiting signs or symptoms of cryptococcal meningitis, can defer LP at this time.     Intertrigo of groin noted on exam.     Suggest:   -Repeat bcxs 11/2 --ngtd   -f/u bcxs 10/31 --MRSA (vanc S2 associated with treatment failure; dapto s 1)  -obtain CLAUDIA to eval for endocarditis   -f/u MRI of C/T/L spine with and without contrast    -continue Bactrim 1 DS tablet daily for PCP and toxo ppx   -continue to hold Biktarvy. Patient has not been taking for unknown amount of time  -Continue Daptomycin 500mg IV daily. Check weekly CK (q Thurs)     -If patient refusing IV abx: Start Linezolid 600 mg PO Q12 (has better serum concentration than doxy, and better data for BSI)  -Continue clotrimazole 1% topical cream applied to affected area(s) BID for 2 weeks for intertrigo     *In the event that patient decides to leave AMA, would provide 2 week course of Linezolid 600 mg PO Q12      Team 2 will follow you.    Case d/w primary team.    Tory Brewer, Infectious Diseases PA  Please reach out for any questions 9 am-5pm. For evenings and weekends, please call the ID physician on call.  Work cell: [phone]   63-year-old male, reluctant historian, recently admitted to [facility] on 10/2 for CHF exacerbation, signed out AMA, undomiciled, noncompliant with meds, hx of polysubstance abuse (cocaine/THC), PMHx of HIV/AIDs (noncompliant w/ HAART, CD4 82/5%), Afib (noncompliant w/ Eliquis), severe MR/mod-severe TR, HFpEF, lung CA not on any treatment who [facility]/cough found to have CHF exacerbation. Now found to be bacteremic 2/2 MRSA. Pt is now amenable to IV abx. TTE without evidence on vegetation however with MRSA BSI, CLAUDIA is needed to r/o IE. CD4% 5; ABS 82- would cont PCP and Toxo ppx. Repeat Cryptococcal Ag negative, can defer LP at this time.     Intertrigo of groin noted on exam.     Suggest:   -Please send HIV viral load  -Repeat bcxs 11/2 --ngtd   -f/u bcxs 10/31 --MRSA (vanc S2 associated with treatment failure; dapto s 1)  -obtain CLAUDIA to eval for endocarditis   -f/u MRI of C/T/L spine with and without contrast    -continue Bactrim 1 DS tablet daily for PCP and toxo ppx   -continue to hold Biktarvy. Patient has not been taking for unknown amount of time  -Continue Daptomycin 500mg IV daily. Check weekly CK (q Thurs)  -Continue clotrimazole 1% topical cream applied to affected area(s) BID for 2 weeks for intertrigo     *In the event that patient decides to leave [place], would provide 2 week course of Linezolid 600 mg PO Q12      Team 2 will follow you.    Case d/w primary team.    Tory Brewer, Infectious Diseases PA  Please reach out for any questions 9 am-5pm. For evenings and weekends, please call the ID physician on call.  Work cell: [phone]

## 2023-11-04 VITALS
SYSTOLIC BLOOD PRESSURE: 127 MMHG | HEART RATE: 66 BPM | RESPIRATION RATE: 19 BRPM | DIASTOLIC BLOOD PRESSURE: 77 MMHG | TEMPERATURE: 98 F

## 2023-11-04 LAB
ANION GAP SERPL CALC-SCNC: 7 MMOL/L — SIGNIFICANT CHANGE UP (ref 5–17)
ANION GAP SERPL CALC-SCNC: 7 MMOL/L — SIGNIFICANT CHANGE UP (ref 5–17)
BUN SERPL-MCNC: 15 MG/DL — SIGNIFICANT CHANGE UP (ref 7–23)
BUN SERPL-MCNC: 15 MG/DL — SIGNIFICANT CHANGE UP (ref 7–23)
CALCIUM SERPL-MCNC: 10 MG/DL — SIGNIFICANT CHANGE UP (ref 8.4–10.5)
CALCIUM SERPL-MCNC: 10 MG/DL — SIGNIFICANT CHANGE UP (ref 8.4–10.5)
CHLORIDE SERPL-SCNC: 102 MMOL/L — SIGNIFICANT CHANGE UP (ref 96–108)
CHLORIDE SERPL-SCNC: 102 MMOL/L — SIGNIFICANT CHANGE UP (ref 96–108)
CO2 SERPL-SCNC: 28 MMOL/L — SIGNIFICANT CHANGE UP (ref 22–31)
CO2 SERPL-SCNC: 28 MMOL/L — SIGNIFICANT CHANGE UP (ref 22–31)
CREAT SERPL-MCNC: 0.93 MG/DL — SIGNIFICANT CHANGE UP (ref 0.5–1.3)
CREAT SERPL-MCNC: 0.93 MG/DL — SIGNIFICANT CHANGE UP (ref 0.5–1.3)
EGFR: 92 ML/MIN/1.73M2 — SIGNIFICANT CHANGE UP
EGFR: 92 ML/MIN/1.73M2 — SIGNIFICANT CHANGE UP
GLUCOSE SERPL-MCNC: 86 MG/DL — SIGNIFICANT CHANGE UP (ref 70–99)
GLUCOSE SERPL-MCNC: 86 MG/DL — SIGNIFICANT CHANGE UP (ref 70–99)
MAGNESIUM SERPL-MCNC: 1.7 MG/DL — SIGNIFICANT CHANGE UP (ref 1.6–2.6)
MAGNESIUM SERPL-MCNC: 1.7 MG/DL — SIGNIFICANT CHANGE UP (ref 1.6–2.6)
PHOSPHATE SERPL-MCNC: 3.6 MG/DL — SIGNIFICANT CHANGE UP (ref 2.5–4.5)
PHOSPHATE SERPL-MCNC: 3.6 MG/DL — SIGNIFICANT CHANGE UP (ref 2.5–4.5)
POTASSIUM SERPL-MCNC: 5.1 MMOL/L — SIGNIFICANT CHANGE UP (ref 3.5–5.3)
POTASSIUM SERPL-MCNC: 5.1 MMOL/L — SIGNIFICANT CHANGE UP (ref 3.5–5.3)
POTASSIUM SERPL-SCNC: 5.1 MMOL/L — SIGNIFICANT CHANGE UP (ref 3.5–5.3)
POTASSIUM SERPL-SCNC: 5.1 MMOL/L — SIGNIFICANT CHANGE UP (ref 3.5–5.3)
SODIUM SERPL-SCNC: 137 MMOL/L — SIGNIFICANT CHANGE UP (ref 135–145)
SODIUM SERPL-SCNC: 137 MMOL/L — SIGNIFICANT CHANGE UP (ref 135–145)

## 2023-11-04 PROCEDURE — 99231 SBSQ HOSP IP/OBS SF/LOW 25: CPT

## 2023-11-04 RX ORDER — VALSARTAN 80 MG/1
1 TABLET ORAL
Qty: 30 | Refills: 0
Start: 2023-11-04 | End: 2023-12-03

## 2023-11-04 RX ORDER — DAPAGLIFLOZIN 10 MG/1
1 TABLET, FILM COATED ORAL
Qty: 30 | Refills: 3
Start: 2023-11-04 | End: 2024-03-02

## 2023-11-04 RX ORDER — LINEZOLID 600 MG/300ML
1 INJECTION, SOLUTION INTRAVENOUS
Qty: 28 | Refills: 0
Start: 2023-11-04 | End: 2023-11-17

## 2023-11-04 RX ORDER — FUROSEMIDE 40 MG
1 TABLET ORAL
Qty: 30 | Refills: 0
Start: 2023-11-04 | End: 2023-12-03

## 2023-11-04 RX ORDER — APIXABAN 2.5 MG/1
1 TABLET, FILM COATED ORAL
Qty: 60 | Refills: 0
Start: 2023-11-04 | End: 2023-12-03

## 2023-11-04 RX ORDER — ACETAMINOPHEN 500 MG
650 TABLET ORAL EVERY 6 HOURS
Refills: 0 | Status: DISCONTINUED | OUTPATIENT
Start: 2023-11-04 | End: 2023-11-04

## 2023-11-04 RX ADMIN — Medication 650 MILLIGRAM(S): at 08:55

## 2023-11-04 RX ADMIN — DAPAGLIFLOZIN 10 MILLIGRAM(S): 10 TABLET, FILM COATED ORAL at 07:45

## 2023-11-04 RX ADMIN — Medication 650 MILLIGRAM(S): at 07:53

## 2023-11-04 RX ADMIN — APIXABAN 5 MILLIGRAM(S): 2.5 TABLET, FILM COATED ORAL at 07:45

## 2023-11-04 RX ADMIN — Medication 1 APPLICATION(S): at 07:45

## 2023-11-04 RX ADMIN — VALSARTAN 40 MILLIGRAM(S): 80 TABLET ORAL at 07:45

## 2023-11-04 RX ADMIN — MAGNESIUM OXIDE 400 MG ORAL TABLET 400 MILLIGRAM(S): 241.3 TABLET ORAL at 08:56

## 2023-11-04 NOTE — PROGRESS NOTE ADULT - PROBLEM SELECTOR PROBLEM 2
Chronic heart failure with preserved ejection fraction (HFpEF)
HIV disease

## 2023-11-04 NOTE — CHART NOTE - NSCHARTNOTEFT_GEN_A_CORE
Patient left AMA today prior to my being able to see him.  We were unable to obtain repeat CrAg (with request for serial dilutions from lab to r/o prozone effect, given prior low serum CrAg in 6/2023 and negative serum CrAg on this admission - no neuro symptoms) and an HIV VL.  We had planned for a 2 week course of PO linezolid as suboptimal alternative therapy for management of bacteremia in the event of an AMA discharge, however there was a high copay for this medication, and also there is a contraindication with his active cocaine use d/o. Unfortunately the MRSA isolate was resistant to bactrim and clindamycin, so I recommended doxycycline x 2 week supply. There was not enough time prior to his abrupt decision to leave to try to arrange for dalbavancin.

## 2023-11-04 NOTE — PROGRESS NOTE ADULT - SUBJECTIVE AND OBJECTIVE BOX
CARDIOLOGY NP PROGRESS NOTE    Subjective: Pt seen and examined at bedside. No specific complaints.  Remainder ROS otherwise negative.    Overnight Events: still awaiting MRI spine    TELEMETRY: off tele         VITAL SIGNS:  T(C): 36.9 (11-04-23 @ 05:48), Max: 36.9 (11-04-23 @ 05:48)  HR: 66 (11-04-23 @ 09:08) (59 - 70)  BP: 127/77 (11-04-23 @ 09:08) (101/65 - 127/77)  RR: 19 (11-04-23 @ 09:08) (16 - 19)  SpO2: 98% (11-04-23 @ 05:48) (97% - 99%)  Wt(kg): --    I&O's Summary    03 Nov 2023 07:01  -  04 Nov 2023 07:00  --------------------------------------------------------  IN: 1080 mL / OUT: 2150 mL / NET: -1070 mL    04 Nov 2023 08:01  -  04 Nov 2023 11:12  --------------------------------------------------------  IN: 420 mL / OUT: 550 mL / NET: -130 mL          PHYSICAL EXAM:    refused exam          LABS:                          10.8   3.17  )-----------( 223      ( 03 Nov 2023 05:30 )             33.8                              11-03    136  |  102  |  12  ----------------------------<  93  4.4   |  24  |  0.85    Ca    9.4      03 Nov 2023 05:30  Mg     1.4     11-03      PT/INR - ( 03 Nov 2023 05:30 )   PT: 12.4 sec;   INR: 1.09          PTT - ( 03 Nov 2023 05:30 )  PTT:36.5 sec  CAPILLARY BLOOD GLUCOSE                Allergies:  No Known Allergies    MEDICATIONS  (STANDING):  apixaban 5 milliGRAM(s) Oral every 12 hours  clotrimazole 1% Cream 1 Application(s) Topical two times a day  dapagliflozin 10 milliGRAM(s) Oral every 24 hours  DAPTOmycin IVPB 500 milliGRAM(s) IV Intermittent every 24 hours  DAPTOmycin IVPB      magnesium oxide 400 milliGRAM(s) Oral three times a day with meals  multivitamin 1 Tablet(s) Oral daily  trimethoprim  160 mG/sulfamethoxazole 800 mG 1 Tablet(s) Oral daily  valsartan 40 milliGRAM(s) Oral daily    MEDICATIONS  (PRN):  acetaminophen     Tablet .. 650 milliGRAM(s) Oral every 6 hours PRN Mild Pain (1 - 3), Moderate Pain (4 - 6)        DIAGNOSTIC TESTS:

## 2023-11-04 NOTE — PROGRESS NOTE ADULT - PROBLEM SELECTOR PLAN 1
Blood cultures on admission positive for MRSA bacteremia, unclear source, ?from scratching rash. MRSE likely a contaminant.     -Will need daily BCx until bacteremia clears, BC 11/2 NGTD x 1day  -ID consulted for MRSA bacteremia  -Start Daptomycin 500mg q 24h x at least 2-4 wks. Will need CK level every Thur  --If patient refusing IV Abx or leaves AMA: Start Linezolid 600 mg PO Q12 x 2wks course   -Cont Bactrim DS 1 tab daily for PCP ppx  -Obtain MRI of C/T/L spine with and without contrast r/o metastatic infection to spine  -Pt refusing and will defer CLAUDIA per Dr Pa given would not  in this pt (no evidence of severe TR or HF from valve disease, not a surgical candidate due to ongoing active polysubstance abuse and noncompliance).  -Defer LP at this time per ID 2/2 repeat Cryptococcal Ag negative and not exhibiting s/s cryptococcal meningitis

## 2023-11-04 NOTE — PROGRESS NOTE ADULT - PROBLEM SELECTOR PROBLEM 4
Atrial fibrillation and flutter
Groin rash

## 2023-11-04 NOTE — PROGRESS NOTE ADULT - ASSESSMENT
64 y/o male, reluctant historian, recently admitted to [facility] on 10/2 for CHF exacerbation, signed out AMA, undomiciled, noncompliant with meds, hx of polysubstance abuse (cocaine/THC), PMHx of HIV/AIDs (noncompliant w/ HAART), Afib (noncompliant w/ Eliquis), severe MR/mod-severe TR, HFpEF (EF 55% by TTE 11/2022), lung CA not on any treatment, who [facility] c/o cough after getting caught in the rain a few days ago, endorsed blood tinged sputum, generalized body aches and occasional chills. +SOB w/ ambulating. Found to be euvolemic not in HF exacerbation. Patient is also complaining of a burning rash in his groin that has been present for over a month. ID following for MRSA bacteremia, unclear source. Patient refusing telemetry, intermittently refusing medications/treatment, now agreeable pending MRI spine.

## 2023-11-04 NOTE — PROGRESS NOTE ADULT - PROBLEM SELECTOR PLAN 5
Pt allowed ID team exam of groin, noting to appear as intertrigo (fungal infection)  -Started Clotrimazole 1% cream BID x 2 wks    F: No IVF  N: DASH/TLC diet  E: Replete lytes PRN K<4, Mg<2  P: DVT PPX: Eliquis  C: FULL CODE  Dispo: Admit to tele 5 [facility]ris. Medicine consult refused transfer

## 2023-11-04 NOTE — PROGRESS NOTE ADULT - PROBLEM SELECTOR PLAN 4
Rate controlled Afib w/ HR 70s off meds  -Noncompliant on Eliquis, was restarted on Eliquis 5mg BID on admission. Initially held 11/2 pending possible LP- now deferred.  -Resume Eliquis 5mg BID

## 2023-11-04 NOTE — PROGRESS NOTE ADULT - PROBLEM SELECTOR PLAN 2
Appears euvolemic w/o SOB or orthopnea. No lasix or diuretics needed   -Had recent echo w/ EF 55%  -Home meds: Lasix 20mg QD, Toprol XL 25mg QD, Valsartan 40mg QD,  Farxiga 10 mg QD  -c/w Valsartan 40mg qd and Farxiga  -Core measures, strict I/O's daily weights, 1L PO restriction
Appears euvolemic w/o SOB or orthopnea. No lasix or diuretics needed   -Had recent echo w/ EF 55%  -Home meds: Lasix 20mg QD, Toprol XL 25mg QD, Valsartan 40mg QD,  Farxiga 10 mg QD  -c/w Valsartan 40mg qd and Farxiga  -Core measures, strict I/O's daily weights, 1L PO restriction
-ID consulted for co management and bacteremia   -Non compliant with HAART, CD 4 ordered and Crypto antigen ordered f/u   -Patient with MRSA bacteremia, likely from scratching rash.  MRSE likely a contaminant.  f/u susceptibility.    -Per ID should be on Vanco 1 gram q 12 however patient refusing IV ABX therefore on suboptimal therapy with PO antibiotics If patient refuses, then can offer doxy 100mg PO q12h as suboptimal therapy.   -Daily BCx until bacteremia clears ordered for tomr.   -Cont Bactrim DS 1tab daily as PCP ppx.   - Hold Biktarvy given non-compliance and patient with Cryptococcus antigenemia back in June.   - Call IR or Neuro although unlikely patient is gonna proceed given hospital course for LP and send for cryptococcal , ME panel, cell count, glucose, protein to r/o cryptococcal CNS infect   Should stop Biktarvy. Patient has not been taking for unknown amount of time. i/s/o recent positive cryptococcal ag, restarting HAART can lead to IRI
Appears euvolemic w/o SOB or orthopnea. No lasix or diuretics needed   -Had recent echo w/ EF 55%  -Home meds: Lasix 20mg QD, Toprol XL 25mg QD, Valsartan 40mg QD,  Farxiga 10 mg QD  -c/w Valsartan 40mg qd and Farxiga  -Core measures, strict I/O's daily weights, 1L PO restriction

## 2023-11-04 NOTE — PROGRESS NOTE ADULT - PROBLEM SELECTOR PLAN 3
-Non compliant with HAART, CD4 82 and Crypto antigen neg  -Patient has not been taking Biktarvy for unknown amount of time. i/s/o recent positive cryptococcal ag, restarting HAART can lead to IRIS

## 2023-11-07 LAB
CULTURE RESULTS: SIGNIFICANT CHANGE UP
CULTURE RESULTS: SIGNIFICANT CHANGE UP
HIV-1 VIRAL LOAD RESULT: ABNORMAL
HIV-1 VIRAL LOAD RESULT: ABNORMAL
HIV1 RNA # SERPL NAA+PROBE: SIGNIFICANT CHANGE UP
HIV1 RNA # SERPL NAA+PROBE: SIGNIFICANT CHANGE UP
HIV1 RNA SER-IMP: SIGNIFICANT CHANGE UP
HIV1 RNA SER-IMP: SIGNIFICANT CHANGE UP
HIV1 RNA SERPL NAA+PROBE-ACNC: ABNORMAL
HIV1 RNA SERPL NAA+PROBE-ACNC: ABNORMAL
HIV1 RNA SERPL NAA+PROBE-LOG#: 4.01 — SIGNIFICANT CHANGE UP
HIV1 RNA SERPL NAA+PROBE-LOG#: 4.01 — SIGNIFICANT CHANGE UP
SPECIMEN SOURCE: SIGNIFICANT CHANGE UP
SPECIMEN SOURCE: SIGNIFICANT CHANGE UP

## 2023-11-08 LAB
CULTURE RESULTS: SIGNIFICANT CHANGE UP
SPECIMEN SOURCE: SIGNIFICANT CHANGE UP

## 2023-11-21 NOTE — BH CONSULTATION LIAISON ASSESSMENT NOTE - CURRENT INTENT:
"Palliative Care Daily Progress Note     Referring:  Paco Ma    C/C: pt unable    S: Medical record reviewed.  Events noted.  Remains limitedly responsive if at all.  Non vocal this AM.    ROS: pt unable    O: Code Status:   Code Status and Medical Interventions:   Ordered at: 11/17/23 1552     Medical Intervention Limits:    NO intubation (DNI)     Level Of Support Discussed With:    Next of Kin (If No Surrogate)     Code Status (Patient has no pulse and is not breathing):    No CPR (Do Not Attempt to Resuscitate)     Medical Interventions (Patient has pulse or is breathing):    Limited Support      Advanced Directives: Advance Directive Status: Patient does not have advance directive   Goals of Care: Ongoing.   Palliative Performance Scale Score: 10%     BP (P) 167/73   Pulse (P) 58   Temp 97.9 °F (36.6 °C) (Axillary)   Resp 20   Ht 157.5 cm (62\")   Wt 91.4 kg (201 lb 8 oz)   SpO2 97%   BMI 36.85 kg/m²     Intake/Output Summary (Last 24 hours) at 11/21/2023 0946  Last data filed at 11/20/2023 2320  Gross per 24 hour   Intake 844 ml   Output 1500 ml   Net -656 ml       PE:  General Appearance:    somnolent   HEENT:    anicteric, dry MM, face relaxed   Neck:   supple, trachea midline, no JVD   Lungs:     CTA bilaterally, diminished in bases; respirations regular, even and unlabored    Heart:    RRR, normal S1 and S2, no M/R/G   Abdomen:     Normal bowel sounds, soft, nontender, nondistended   G/U:   Deferred   MSK/Extremities:   Wasting, +edema   Pulses:   Pulses palpable and equal bilaterally   Skin:   Warm, dry   Neurologic: Unresponsive to exam today         Meds: Reviewed and changes not noted    Labs:   Results from last 7 days   Lab Units 11/20/23  0932   WBC 10*3/mm3 9.11   HEMOGLOBIN g/dL 9.0*   HEMATOCRIT % 30.5*   PLATELETS 10*3/mm3 273     Results from last 7 days   Lab Units 11/20/23  0020   SODIUM mmol/L 154*   POTASSIUM mmol/L 3.8   CHLORIDE mmol/L 123*   CO2 mmol/L 23.0   BUN mg/dL 32* "   CREATININE mg/dL 0.87   GLUCOSE mg/dL 150*   CALCIUM mg/dL 8.2*     Results from last 7 days   Lab Units 11/20/23  0020   SODIUM mmol/L 154*   POTASSIUM mmol/L 3.8   CHLORIDE mmol/L 123*   CO2 mmol/L 23.0   BUN mg/dL 32*   CREATININE mg/dL 0.87   CALCIUM mg/dL 8.2*   BILIRUBIN mg/dL 0.8   ALK PHOS U/L 88   ALT (SGPT) U/L 30   AST (SGOT) U/L 22   GLUCOSE mg/dL 150*     Imaging Results (Last 72 Hours)       ** No results found for the last 72 hours. **                  Diagnostics: Reviewed    A:   Fever       Impression:  Fever/UTI  Multi bilatera CVAs.    S/P recent CABG  DM with A1c 9.8     Symptoms:  AMS  Debility      P:   Continue to monitor for needs and see prn for now.  F/u with dtr later in week as she till Friday to see if pt improved with abx.  Palliative Care Team will continue to follow patient.     Malissa Oliva MD  11/21/2023  Time spent:22 min     None known

## 2023-11-28 ENCOUNTER — EMERGENCY (EMERGENCY)
Facility: HOSPITAL | Age: 63
LOS: 1 days | Discharge: HOME | End: 2023-11-28
Admitting: EMERGENCY MEDICINE
Payer: MEDICAID

## 2023-11-28 VITALS
HEART RATE: 87 BPM | DIASTOLIC BLOOD PRESSURE: 79 MMHG | TEMPERATURE: 97 F | RESPIRATION RATE: 18 BRPM | OXYGEN SATURATION: 96 % | SYSTOLIC BLOOD PRESSURE: 116 MMHG

## 2023-11-28 DIAGNOSIS — Z21 ASYMPTOMATIC HUMAN IMMUNODEFICIENCY VIRUS [HIV] INFECTION STATUS: ICD-10-CM

## 2023-11-28 DIAGNOSIS — Z79.01 LONG TERM (CURRENT) USE OF ANTICOAGULANTS: ICD-10-CM

## 2023-11-28 DIAGNOSIS — B35.6 TINEA CRURIS: ICD-10-CM

## 2023-11-28 DIAGNOSIS — I48.91 UNSPECIFIED ATRIAL FIBRILLATION: ICD-10-CM

## 2023-11-28 DIAGNOSIS — R21 RASH AND OTHER NONSPECIFIC SKIN ERUPTION: ICD-10-CM

## 2023-11-28 PROCEDURE — 99284 EMERGENCY DEPT VISIT MOD MDM: CPT

## 2023-11-28 PROCEDURE — 99053 MED SERV 10PM-8AM 24 HR FAC: CPT

## 2023-11-28 RX ORDER — BICTEGRAVIR SODIUM, EMTRICITABINE, AND TENOFOVIR ALAFENAMIDE FUMARATE 30; 120; 15 MG/1; MG/1; MG/1
1 TABLET ORAL
Qty: 30 | Refills: 0
Start: 2023-11-28 | End: 2023-12-27

## 2023-11-28 RX ORDER — FLUCONAZOLE 150 MG/1
150 TABLET ORAL ONCE
Refills: 0 | Status: COMPLETED | OUTPATIENT
Start: 2023-11-28 | End: 2023-11-28

## 2023-11-28 RX ADMIN — FLUCONAZOLE 150 MILLIGRAM(S): 150 TABLET ORAL at 05:03

## 2023-11-28 NOTE — ED PROVIDER NOTE - PHYSICAL EXAMINATION
Physical Exam    Vital Signs: I have reviewed the initial vital signs.  Constitutional: well-appearing, appears stated age  Eyes: PERRLA, EOM intact, RAPD absent, and symmetrical lids.  ENT: neck supple with no adenopathy, moist MM.  Cardiovascular: +S1/S2, no murmurs, regular rate, regular rhythm, well-perfused extremities  Respiratory: unlabored respiratory effort, clear to auscultation bilaterally, speaks in full sentences  Gastrointestinal: soft, non-tender abdomen, no pulsatile mass  : b/l nl testes, nl scrotum; +diffuse hyperpigmented rash with keratination, no induration, no ttp; no penile discharge, nontender exam. Chaperoned by OWEN Hoyt

## 2023-11-28 NOTE — ED PROVIDER NOTE - OBJECTIVE STATEMENT
64 yo m pmhx sig for hx of  polysubstance abuse (cocaine/THC), PMHx of HIV/AIDs (noncompliant w/ HAART), Afib (noncompliant w/ Eliquis), severe MR/mod-severe TR, HFpEF (EF:55% by TTE 11/2022), lung CA not on any treatment pw groin rash ongoing for several weeks in duration pt reports that it was improving until he started applying Hydrocortizone creams, was given Ketaconazole cream but not applying the cream to the area. Rash is diffuse and pruritic. No pain, fevers or chills.

## 2023-11-28 NOTE — ED ADULT NURSE NOTE - NSFALLUNIVINTERV_ED_ALL_ED
Bed/Stretcher in lowest position, wheels locked, appropriate side rails in place/Call bell, personal items and telephone in reach/Instruct patient to call for assistance before getting out of bed/chair/stretcher/Non-slip footwear applied when patient is off stretcher/[place] to call system/Physically safe environment - no spills, clutter or unnecessary equipment/Purposeful proactive rounding/Room/bathroom lighting operational, light cord in reach

## 2023-11-28 NOTE — ED PROVIDER NOTE - PATIENT PORTAL LINK FT
You can access the FollowMyHealth Patient Portal offered by [facility] by registering at the following website: [email or web address]. By joining [facility]’s FollowMyHealth portal, you will also be able to view your health information using other applications (apps) compatible with our system.

## 2023-11-28 NOTE — ED PROVIDER NOTE - CLINICAL SUMMARY MEDICAL DECISION MAKING FREE TEXT BOX
well appearing pt here with diffuse rash consistent with tinea cruris no sings of cellulitis or systemic illness, advised to discontinue Hydrocortizone, plan: advised to restart Ketaconazole cream and 1 time dose of fluconazole cream

## 2023-11-28 NOTE — ED ADULT NURSE NOTE - HOW PATIENT ADDRESSED, PROFILE
How Severe Is Your Skin Lesion?: mild
Have Your Skin Lesions Been Treated?: not been treated
Is This A New Presentation, Or A Follow-Up?: Skin Lesions
Mr. Lewis

## 2024-02-13 NOTE — ED ADULT NURSE NOTE - NSFALLASSESSNEED_ED_ALL_ED
Contact information:  General phone number: [phone]    Closest radiology sites for the bone age are:  [facility] sites.     no

## 2024-06-13 NOTE — ED PROVIDER NOTE - TIMING
63y F s/p right hemiglossectomy with right neck dissection and reconstruction with L RFFF and skin graft reconstruction of flap donor site. Flap down and RTOR 6/11.     Plan:  - Tentative plan to RTOR for further reconstruction on Friday, Dr. Alvarez to speak to patient and family this afternoon  -  q4 flap checks with cook doppler  - please avoid pressure over right side of neck  - BP systolic goal = under 140  - fever curve/abx  - Appreciate care per primary  - Dispo: continue care on floor    Ladonna Catherine MD  Plastic and Reconstructive Surgery, PGY-1  MAGDALENA: [id number]  NS: [phone]   63y F s/p right hemiglossectomy with right neck dissection and reconstruction with L RFFF and skin graft reconstruction of flap donor site. Flap down and RTOR 6/11.     Plan:  - Tentative plan to RTOR for further reconstruction on [place] tomorrow, Dr. Alvarez to speak to patient and family this afternoon  - q4 flap checks with cook doppler  - please avoid pressure over right side of neck  - BP systolic goal = under 140  - fever curve/abx  - Appreciate care per primary  - Dispo: continue care on floor    Ladonna Catherine MD  Plastic and Reconstructive Surgery, PGY-1  MAGDALENA: [id number]  NS: [phone]   gradual onset

## 2024-06-20 RX ORDER — FUROSEMIDE 40 MG
1 TABLET ORAL
Refills: 0 | DISCHARGE

## 2024-06-20 RX ORDER — METOPROLOL TARTRATE 50 MG
1 TABLET ORAL
Refills: 0 | DISCHARGE

## 2024-06-20 RX ORDER — BICTEGRAVIR SODIUM, EMTRICITABINE, AND TENOFOVIR ALAFENAMIDE FUMARATE 30; 120; 15 MG/1; MG/1; MG/1
1 TABLET ORAL
Refills: 0 | DISCHARGE

## 2024-06-20 RX ORDER — APIXABAN 2.5 MG/1
1 TABLET, FILM COATED ORAL
Refills: 0 | DISCHARGE

## 2024-06-20 RX ORDER — VALSARTAN 80 MG/1
1 TABLET ORAL
Refills: 0 | DISCHARGE

## 2024-06-20 RX ORDER — DAPAGLIFLOZIN 10 MG/1
1 TABLET, FILM COATED ORAL
Refills: 0 | DISCHARGE

## 2024-06-27 ENCOUNTER — EMERGENCY (EMERGENCY)
Age: 64
LOS: 1 days | Discharge: HOME | End: 2024-06-27
Attending: EMERGENCY MEDICINE | Admitting: EMERGENCY MEDICINE
Payer: MEDICAID

## 2024-06-27 VITALS
OXYGEN SATURATION: 99 % | TEMPERATURE: 98 F | HEART RATE: 78 BPM | RESPIRATION RATE: 17 BRPM | DIASTOLIC BLOOD PRESSURE: 96 MMHG | SYSTOLIC BLOOD PRESSURE: 134 MMHG

## 2024-06-27 DIAGNOSIS — S01.81XA LACERATION WITHOUT FOREIGN BODY OF OTHER PART OF HEAD, INITIAL ENCOUNTER: ICD-10-CM

## 2024-06-27 DIAGNOSIS — K04.7 PERIAPICAL ABSCESS WITHOUT SINUS: ICD-10-CM
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
## 2024-06-27 DIAGNOSIS — I10 ESSENTIAL (PRIMARY) HYPERTENSION: ICD-10-CM

## 2024-06-27 DIAGNOSIS — Z20.822 CONTACT WITH AND (SUSPECTED) EXPOSURE TO COVID-19: ICD-10-CM

## 2024-06-27 DIAGNOSIS — S02.40EA ZYGOMATIC FRACTURE, RIGHT SIDE, INITIAL ENCOUNTER FOR CLOSED FRACTURE: ICD-10-CM

## 2024-06-27 DIAGNOSIS — R41.82 ALTERED MENTAL STATUS, UNSPECIFIED: ICD-10-CM

## 2024-06-27 DIAGNOSIS — Z85.118 PERSONAL HISTORY OF OTHER MALIGNANT NEOPLASM OF BRONCHUS AND LUNG: ICD-10-CM

## 2024-06-27 DIAGNOSIS — K02.9 DENTAL CARIES, UNSPECIFIED: ICD-10-CM

## 2024-06-27 DIAGNOSIS — S02.81XA FRACTURE OF OTHER SPECIFIED SKULL AND FACIAL BONES, RIGHT SIDE, INITIAL ENCOUNTER FOR CLOSED FRACTURE: ICD-10-CM

## 2024-06-27 DIAGNOSIS — S01.511A LACERATION WITHOUT FOREIGN BODY OF LIP, INITIAL ENCOUNTER: ICD-10-CM

## 2024-06-27 DIAGNOSIS — S22.41XA MULTIPLE FRACTURES OF RIBS, RIGHT SIDE, INITIAL ENCOUNTER FOR CLOSED FRACTURE: ICD-10-CM

## 2024-06-27 DIAGNOSIS — J45.909 UNSPECIFIED ASTHMA, UNCOMPLICATED: ICD-10-CM

## 2024-06-27 DIAGNOSIS — Z21 ASYMPTOMATIC HUMAN IMMUNODEFICIENCY VIRUS [HIV] INFECTION STATUS: ICD-10-CM

## 2024-06-27 DIAGNOSIS — S02.2XXA FRACTURE OF NASAL BONES, INITIAL ENCOUNTER FOR CLOSED FRACTURE: ICD-10-CM

## 2024-06-27 DIAGNOSIS — S00.83XA CONTUSION OF OTHER PART OF HEAD, INITIAL ENCOUNTER: ICD-10-CM

## 2024-06-27 DIAGNOSIS — J44.9 CHRONIC OBSTRUCTIVE PULMONARY DISEASE, UNSPECIFIED: ICD-10-CM

## 2024-06-27 DIAGNOSIS — I48.91 UNSPECIFIED ATRIAL FIBRILLATION: ICD-10-CM

## 2024-06-27 DIAGNOSIS — Z23 ENCOUNTER FOR IMMUNIZATION: ICD-10-CM
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
## 2024-06-27 PROBLEM — B20 HUMAN IMMUNODEFICIENCY VIRUS [HIV] DISEASE: Chronic | Status: ACTIVE | Noted: 2023-10-01

## 2024-06-27 PROBLEM — Z87.42 PERSONAL HISTORY OF OTHER DISEASES OF THE FEMALE GENITAL TRACT: Chronic | Status: ACTIVE | Noted: 2023-06-12

## 2024-06-27 PROBLEM — C34.90 MALIGNANT NEOPLASM OF UNSPECIFIED PART OF UNSPECIFIED BRONCHUS OR LUNG: Chronic | Status: ACTIVE | Noted: 2023-10-01

## 2024-06-27 LAB
ALBUMIN SERPL ELPH-MCNC: 3 G/DL — LOW (ref 3.4–5)
ALP SERPL-CCNC: 138 U/L — HIGH (ref 40–120)
ALT FLD-CCNC: 30 U/L — SIGNIFICANT CHANGE UP (ref 12–42)
AMPHET UR-MCNC: NEGATIVE — SIGNIFICANT CHANGE UP
ANION GAP SERPL CALC-SCNC: 8 MMOL/L — LOW (ref 9–16)
APPEARANCE UR: CLEAR — SIGNIFICANT CHANGE UP
AST SERPL-CCNC: 79 U/L — HIGH (ref 15–37)
BACTERIA # UR AUTO: NEGATIVE /HPF — SIGNIFICANT CHANGE UP
BARBITURATES UR SCN-MCNC: NEGATIVE — SIGNIFICANT CHANGE UP
BASOPHILS # BLD AUTO: 0.01 K/UL — SIGNIFICANT CHANGE UP (ref 0–0.2)
BASOPHILS NFR BLD AUTO: 0.2 % — SIGNIFICANT CHANGE UP (ref 0–2)
BENZODIAZ UR-MCNC: NEGATIVE — SIGNIFICANT CHANGE UP
BILIRUB SERPL-MCNC: 0.8 MG/DL — SIGNIFICANT CHANGE UP (ref 0.2–1.2)
BILIRUB UR-MCNC: NEGATIVE — SIGNIFICANT CHANGE UP
BUN SERPL-MCNC: 6 MG/DL — LOW (ref 7–23)
CALCIUM SERPL-MCNC: 8.1 MG/DL — LOW (ref 8.5–10.5)
CHLORIDE SERPL-SCNC: 102 MMOL/L — SIGNIFICANT CHANGE UP (ref 96–108)
CO2 SERPL-SCNC: 25 MMOL/L — SIGNIFICANT CHANGE UP (ref 22–31)
COCAINE METAB.OTHER UR-MCNC: POSITIVE
COLOR SPEC: YELLOW — SIGNIFICANT CHANGE UP
CREAT SERPL-MCNC: 0.82 MG/DL — SIGNIFICANT CHANGE UP (ref 0.5–1.3)
DIFF PNL FLD: NEGATIVE — SIGNIFICANT CHANGE UP
EGFR: 97 ML/MIN/1.73M2 — SIGNIFICANT CHANGE UP
EGFR: 97 ML/MIN/1.73M2 — SIGNIFICANT CHANGE UP
EOSINOPHIL # BLD AUTO: 0.23 K/UL — SIGNIFICANT CHANGE UP (ref 0–0.5)
EOSINOPHIL NFR BLD AUTO: 5.3 % — SIGNIFICANT CHANGE UP (ref 0–6)
ETHANOL SERPL-MCNC: 66 MG/DL — HIGH
FENTANYL UR QL SCN: NEGATIVE — SIGNIFICANT CHANGE UP
FLUAV AG NPH QL: SIGNIFICANT CHANGE UP
FLUBV AG NPH QL: SIGNIFICANT CHANGE UP
GLUCOSE SERPL-MCNC: 75 MG/DL — SIGNIFICANT CHANGE UP (ref 70–99)
GLUCOSE UR QL: NEGATIVE MG/DL — SIGNIFICANT CHANGE UP
HCT VFR BLD CALC: 37.5 % — LOW (ref 39–50)
HGB BLD-MCNC: 12.3 G/DL — LOW (ref 13–17)
IMM GRANULOCYTES NFR BLD AUTO: 0 % — SIGNIFICANT CHANGE UP (ref 0–0.9)
KETONES UR-MCNC: ABNORMAL MG/DL
LACTATE BLDV-MCNC: 1 MMOL/L — SIGNIFICANT CHANGE UP (ref 0.5–2)
LEUKOCYTE ESTERASE UR-ACNC: NEGATIVE — SIGNIFICANT CHANGE UP
LIDOCAIN IGE QN: 20 U/L — SIGNIFICANT CHANGE UP (ref 16–77)
LYMPHOCYTES # BLD AUTO: 1.34 K/UL — SIGNIFICANT CHANGE UP (ref 1–3.3)
LYMPHOCYTES # BLD AUTO: 31.2 % — SIGNIFICANT CHANGE UP (ref 13–44)
MCHC RBC-ENTMCNC: 28.4 PG — SIGNIFICANT CHANGE UP (ref 27–34)
MCHC RBC-ENTMCNC: 32.8 GM/DL — SIGNIFICANT CHANGE UP (ref 32–36)
MCV RBC AUTO: 86.6 FL — SIGNIFICANT CHANGE UP (ref 80–100)
METHADONE UR-MCNC: NEGATIVE — SIGNIFICANT CHANGE UP
MONOCYTES # BLD AUTO: 0.39 K/UL — SIGNIFICANT CHANGE UP (ref 0–0.9)
MONOCYTES NFR BLD AUTO: 9.1 % — SIGNIFICANT CHANGE UP (ref 2–14)
NEUTROPHILS # BLD AUTO: 2.33 K/UL — SIGNIFICANT CHANGE UP (ref 1.8–7.4)
NEUTROPHILS NFR BLD AUTO: 54.2 % — SIGNIFICANT CHANGE UP (ref 43–77)
NITRITE UR-MCNC: NEGATIVE — SIGNIFICANT CHANGE UP
NRBC # BLD: 0 /100 WBCS — SIGNIFICANT CHANGE UP (ref 0–0)
NRBC BLD-RTO: 0 /100 WBCS — SIGNIFICANT CHANGE UP (ref 0–0)
OPIATES UR-MCNC: NEGATIVE — SIGNIFICANT CHANGE UP
PCP SPEC-MCNC: SIGNIFICANT CHANGE UP
PCP UR-MCNC: NEGATIVE — SIGNIFICANT CHANGE UP
PH UR: 6 — SIGNIFICANT CHANGE UP (ref 5–8)
PLATELET # BLD AUTO: 202 K/UL — SIGNIFICANT CHANGE UP (ref 150–400)
POTASSIUM SERPL-MCNC: 3.5 MMOL/L — SIGNIFICANT CHANGE UP (ref 3.5–5.3)
POTASSIUM SERPL-SCNC: 3.5 MMOL/L — SIGNIFICANT CHANGE UP (ref 3.5–5.3)
PROT SERPL-MCNC: 7.6 G/DL — SIGNIFICANT CHANGE UP (ref 6.4–8.2)
PROT UR-MCNC: 30 MG/DL
RAPID RVP RESULT: SIGNIFICANT CHANGE UP
RBC # BLD: 4.33 M/UL — SIGNIFICANT CHANGE UP (ref 4.2–5.8)
RBC # FLD: 19.5 % — HIGH (ref 10.3–14.5)
RBC CASTS # UR COMP ASSIST: 0 /HPF — SIGNIFICANT CHANGE UP (ref 0–4)
RSV RNA NPH QL NAA+NON-PROBE: SIGNIFICANT CHANGE UP
SARS-COV-2 RNA SPEC QL NAA+PROBE: SIGNIFICANT CHANGE UP
SARS-COV-2 RNA SPEC QL NAA+PROBE: SIGNIFICANT CHANGE UP
SODIUM SERPL-SCNC: 135 MMOL/L — SIGNIFICANT CHANGE UP (ref 132–145)
SP GR SPEC: 1.01 — SIGNIFICANT CHANGE UP (ref 1–1.03)
THC UR QL: NEGATIVE — SIGNIFICANT CHANGE UP
UROBILINOGEN FLD QL: 1 MG/DL — SIGNIFICANT CHANGE UP (ref 0.2–1)
WBC # BLD: 4.3 K/UL — SIGNIFICANT CHANGE UP (ref 3.8–10.5)
WBC # FLD AUTO: 4.3 K/UL — SIGNIFICANT CHANGE UP (ref 3.8–10.5)
WBC UR QL: 0 /HPF — SIGNIFICANT CHANGE UP (ref 0–5)

## 2024-06-27 PROCEDURE — 70450 CT HEAD/BRAIN W/O DYE: CPT | Mod: 26,MC

## 2024-06-27 PROCEDURE — 72125 CT NECK SPINE W/O DYE: CPT | Mod: 26,MC

## 2024-06-27 PROCEDURE — 99223 1ST HOSP IP/OBS HIGH 75: CPT

## 2024-06-27 PROCEDURE — 70486 CT MAXILLOFACIAL W/O DYE: CPT | Mod: 26,MC

## 2024-06-27 PROCEDURE — 74176 CT ABD & PELVIS W/O CONTRAST: CPT | Mod: 26,MC

## 2024-06-27 PROCEDURE — 71250 CT THORAX DX C-: CPT | Mod: 26,MC

## 2024-06-27 RX ORDER — KETOROLAC TROMETHAMINE 30 MG/ML
15 INJECTION, SOLUTION INTRAMUSCULAR; INTRAVENOUS ONCE
Refills: 0 | Status: DISCONTINUED | OUTPATIENT
Start: 2024-06-27 | End: 2024-06-27

## 2024-06-27 RX ORDER — AMPICILLIN SODIUM AND SULBACTAM SODIUM 1; .5 G/1; G/1
3 INJECTION, POWDER, FOR SOLUTION INTRAMUSCULAR; INTRAVENOUS ONCE
Refills: 0 | Status: COMPLETED | OUTPATIENT
Start: 2024-06-27 | End: 2024-06-27

## 2024-06-27 RX ORDER — CEFTRIAXONE 500 MG/1
1000 INJECTION, POWDER, FOR SOLUTION INTRAMUSCULAR; INTRAVENOUS ONCE
Refills: 0 | Status: COMPLETED | OUTPATIENT
Start: 2024-06-27 | End: 2024-06-27

## 2024-06-28 VITALS
SYSTOLIC BLOOD PRESSURE: 110 MMHG | DIASTOLIC BLOOD PRESSURE: 75 MMHG | OXYGEN SATURATION: 96 % | TEMPERATURE: 99 F | RESPIRATION RATE: 18 BRPM | HEART RATE: 78 BPM

## 2024-06-28 LAB
-  COAGULASE NEGATIVE STAPHYLOCOCCUS: SIGNIFICANT CHANGE UP
GRAM STN FLD: ABNORMAL
METHOD TYPE: SIGNIFICANT CHANGE UP
SPECIMEN SOURCE: SIGNIFICANT CHANGE UP

## 2024-06-28 RX ORDER — IBUPROFEN 200 MG
1 TABLET ORAL
Qty: 28 | Refills: 0
Start: 2024-06-28 | End: 2024-07-04

## 2024-06-28 RX ORDER — AMOXICILLIN AND CLAVULANATE POTASSIUM 500; 125 MG/1; MG/1
1 TABLET, FILM COATED ORAL
Qty: 20 | Refills: 0
Start: 2024-06-28 | End: 2024-07-07

## 2024-06-29 LAB
CULTURE RESULTS: ABNORMAL
ORGANISM # SPEC MICROSCOPIC CNT: ABNORMAL
ORGANISM # SPEC MICROSCOPIC CNT: SIGNIFICANT CHANGE UP
SPECIMEN SOURCE: SIGNIFICANT CHANGE UP

## 2024-07-02 LAB
CULTURE RESULTS: SIGNIFICANT CHANGE UP
SPECIMEN SOURCE: SIGNIFICANT CHANGE UP

## 2024-08-12 NOTE — ED CDU PROVIDER DISPOSITION NOTE - CARE PLAN
[facility]  Tele-Vascular Neurology   Consult Note      Consult Information  Inpatient consult to Telemedicine-General Neurology  Consult performed by: Ariela Hogan NP  Consult ordered by: Andre Meade NP          Consulting Provider: PABLO FONSECA   [place]: Score: 0  [place] Coma Scale:     ABCD2 Score:    [id number] MI, tobacco dependency presented with HA PCA territory.  Encephalomalacia left PCA 1

## 2024-09-03 NOTE — PROGRESS NOTE ADULT - ASSESSMENT
63-year-old male, reluctant historian, recently admitted to [facility] on 10/2 for CHF exacerbation, signed out AMA, undomiciled, noncompliant with meds, hx of polysubstance abuse (cocaine/THC), PMHx of HIV/AIDs (noncompliant w/ HAART), Afib (noncompliant w/ Eliquis), severe MR/mod-severe TR, HFpEF, lung CA not on any treatment who [facility] for SOB/cough found to have CHF exacerbation. Now found to be bacteremic 2/2 MRSA. Notable labs found upon chart review include: positive Cryptococcal ag in 6/2023; CD4 count 115 and viral load of 104k in 7/2023. Pt is continuing to refuse IV abx. TTE without evidence on vegetation however with MRSA BSI, CLAUDIA is needed to r/o IE. Patient allowed exam today and was found to have tenderness of the midback concerning for seeding of his thoracic spine.     Intertrigo of groin noted on exam.     Suggest:   -Repeat bcxs   -f/u bcxs 10/31 --MRSA (vanc S2 associated with treatment failure; dapto s 1)  -f/u CD4 count   -obtain CLAUDIA to eval for endocarditis   -Obtain MRI of C/T/L spine with and without contrast    -given hx of positive cryptococcal ag, obtain lumbar puncture   -f/u repeat cryptococcal ag   -continue Bactrim 1 DS tablet daily for PCP and toxo ppx   -should stop Biktarvy. Patient has not been taking for unknown amount of time. i/s/o recent positive cryptococcal ag, restarting HAART can lead to IRIS   -Start Daptomycin 500mg IV daily. Check baseline CK      -If patient refusing IV abx: Discontinue Doxycycline. Start Linezolid 600 mg PO Q12 (has better bioavailability than doxy for BSI)  -Start Ketoconazole 2% topical cream applied to affected area(s) daily for 2 weeks for intertrigo       Team 2 will follow you.    Case d/w primary team.  Final recommendation pending attending note.    Tory Brewer, Infectious Diseases PA  Please reach out for any questions 9 am-5pm. For evenings and weekends, please call the ID physician on call.  Work cell: [phone]     63-year-old male, reluctant historian, recently admitted to [facility] on 10/2 for CHF exacerbation, signed out AMA, undomiciled, noncompliant with meds, hx of polysubstance abuse (cocaine/THC), PMHx of HIV/AIDs (noncompliant w/ HAART), Afib (noncompliant w/ Eliquis), severe MR/mod-severe TR, HFpEF, lung CA not on any treatment who [facility]/cough found to have CHF exacerbation. Now found to be bacteremic 2/2 MRSA. Notable labs found upon chart review include: positive Cryptococcal ag in 6/2023; CD4 count 115 and viral load of 104k in 7/2023. Pt is continuing to refuse IV abx. TTE without evidence on vegetation however with MRSA BSI, CLAUDIA is needed to r/o IE. Patient allowed exam today and was found to have tenderness of the midback concerning for seeding of his thoracic spine.     Intertrigo of groin noted on exam.     Suggest:   -Repeat bcxs   -f/u bcxs 10/31 --MRSA (vanc S2 associated with treatment failure; dapto s 1)  -f/u CD4 count   -obtain CLAUDIA to eval for endocarditis   -Obtain MRI of C/T/L spine with and without contrast    -given hx of positive cryptococcal ag, obtain lumbar puncture   -f/u repeat cryptococcal ag   -continue Bactrim 1 DS tablet daily for PCP and toxo ppx   -hold Biktarvy. Patient has not been taking for unknown amount of time. i/s/o recent positive cryptococcal ag, restarting HAART can lead to IRIS   -Start Daptomycin 500mg IV daily. Check baseline CK      -If patient refusing IV abx: Discontinue Doxycycline. Start Linezolid 600 mg PO Q12 (has better serum concentration than doxy, and better data for BSI)  -Start Ketoconazole 2% topical cream applied to affected area(s) daily for 2 weeks for intertrigo       Team 2 will follow you.    Case d/w primary team.    Tory Brewer, Infectious Diseases PA  Please reach out for any questions 9 am-5pm. For evenings and weekends, please call the ID physician on call.  Work cell: [phone]     4.5

## 2024-09-11 ENCOUNTER — INPATIENT (INPATIENT)
Facility: HOSPITAL | Age: 64
LOS: 0 days | Discharge: HOME | DRG: 202 | End: 2024-09-12
Attending: STUDENT IN AN ORGANIZED HEALTH CARE EDUCATION/TRAINING PROGRAM | Admitting: INTERNAL MEDICINE
Payer: COMMERCIAL

## 2024-09-11 VITALS
HEART RATE: 106 BPM | SYSTOLIC BLOOD PRESSURE: 131 MMHG | RESPIRATION RATE: 22 BRPM | HEIGHT: 66 IN | TEMPERATURE: 98 F | WEIGHT: 149.91 LBS | DIASTOLIC BLOOD PRESSURE: 77 MMHG | OXYGEN SATURATION: 95 %

## 2024-09-11 DIAGNOSIS — R65.10 SYSTEMIC INFLAMMATORY RESPONSE SYNDROME (SIRS) OF NON-INFECTIOUS ORIGIN WITHOUT ACUTE ORGAN DYSFUNCTION: ICD-10-CM

## 2024-09-11 DIAGNOSIS — B20 HUMAN IMMUNODEFICIENCY VIRUS [HIV] DISEASE: ICD-10-CM

## 2024-09-11 DIAGNOSIS — F19.10 OTHER PSYCHOACTIVE SUBSTANCE ABUSE, UNCOMPLICATED: ICD-10-CM

## 2024-09-11 DIAGNOSIS — K64.9 UNSPECIFIED HEMORRHOIDS: ICD-10-CM

## 2024-09-11 DIAGNOSIS — R06.02 SHORTNESS OF BREATH: ICD-10-CM

## 2024-09-11 DIAGNOSIS — B86 SCABIES: ICD-10-CM

## 2024-09-11 DIAGNOSIS — I50.30 UNSPECIFIED DIASTOLIC (CONGESTIVE) HEART FAILURE: ICD-10-CM

## 2024-09-11 DIAGNOSIS — Z59.00 HOMELESSNESS UNSPECIFIED: ICD-10-CM

## 2024-09-11 DIAGNOSIS — I48.91 UNSPECIFIED ATRIAL FIBRILLATION: ICD-10-CM

## 2024-09-11 DIAGNOSIS — B37.2 CANDIDIASIS OF SKIN AND NAIL: ICD-10-CM

## 2024-09-11 DIAGNOSIS — R63.8 OTHER SYMPTOMS AND SIGNS CONCERNING FOOD AND FLUID INTAKE: ICD-10-CM

## 2024-09-11 LAB
ALBUMIN SERPL ELPH-MCNC: 3.3 G/DL — LOW (ref 3.4–5)
ALBUMIN SERPL ELPH-MCNC: 3.3 G/DL — SIGNIFICANT CHANGE UP (ref 3.3–5)
ALP SERPL-CCNC: 122 U/L — HIGH (ref 40–120)
ALP SERPL-CCNC: 146 U/L — HIGH (ref 40–120)
ALT FLD-CCNC: 16 U/L — SIGNIFICANT CHANGE UP (ref 10–45)
ALT FLD-CCNC: 25 U/L — SIGNIFICANT CHANGE UP (ref 12–42)
ANION GAP SERPL CALC-SCNC: 11 MMOL/L — SIGNIFICANT CHANGE UP (ref 5–17)
ANION GAP SERPL CALC-SCNC: 5 MMOL/L — LOW (ref 9–16)
AST SERPL-CCNC: 44 U/L — HIGH (ref 10–40)
AST SERPL-CCNC: 44 U/L — HIGH (ref 15–37)
BASOPHILS # BLD AUTO: 0.03 K/UL — SIGNIFICANT CHANGE UP (ref 0–0.2)
BASOPHILS NFR BLD AUTO: 0.8 % — SIGNIFICANT CHANGE UP (ref 0–2)
BILIRUB SERPL-MCNC: 0.4 MG/DL — SIGNIFICANT CHANGE UP (ref 0.2–1.2)
BILIRUB SERPL-MCNC: 0.5 MG/DL — SIGNIFICANT CHANGE UP (ref 0.2–1.2)
BUN SERPL-MCNC: 15 MG/DL — SIGNIFICANT CHANGE UP (ref 7–23)
BUN SERPL-MCNC: 20 MG/DL — SIGNIFICANT CHANGE UP (ref 7–23)
CALCIUM SERPL-MCNC: 9.2 MG/DL — SIGNIFICANT CHANGE UP (ref 8.4–10.5)
CALCIUM SERPL-MCNC: 9.4 MG/DL — SIGNIFICANT CHANGE UP (ref 8.5–10.5)
CHLORIDE SERPL-SCNC: 101 MMOL/L — SIGNIFICANT CHANGE UP (ref 96–108)
CHLORIDE SERPL-SCNC: 101 MMOL/L — SIGNIFICANT CHANGE UP (ref 96–108)
CO2 SERPL-SCNC: 25 MMOL/L — SIGNIFICANT CHANGE UP (ref 22–31)
CO2 SERPL-SCNC: 29 MMOL/L — SIGNIFICANT CHANGE UP (ref 22–31)
CREAT SERPL-MCNC: 0.7 MG/DL — SIGNIFICANT CHANGE UP (ref 0.5–1.3)
CREAT SERPL-MCNC: 0.81 MG/DL — SIGNIFICANT CHANGE UP (ref 0.5–1.3)
CRP SERPL-MCNC: 2.3 MG/L — SIGNIFICANT CHANGE UP (ref 0.5–3)
EGFR: 102 ML/MIN/1.73M2 — SIGNIFICANT CHANGE UP
EGFR: 98 ML/MIN/1.73M2 — SIGNIFICANT CHANGE UP
EOSINOPHIL # BLD AUTO: 0.09 K/UL — SIGNIFICANT CHANGE UP (ref 0–0.5)
EOSINOPHIL NFR BLD AUTO: 2.4 % — SIGNIFICANT CHANGE UP (ref 0–6)
FLUAV AG NPH QL: SIGNIFICANT CHANGE UP
FLUBV AG NPH QL: SIGNIFICANT CHANGE UP
GLUCOSE BLDC GLUCOMTR-MCNC: 228 MG/DL — HIGH (ref 70–99)
GLUCOSE SERPL-MCNC: 148 MG/DL — HIGH (ref 70–99)
GLUCOSE SERPL-MCNC: 66 MG/DL — LOW (ref 70–99)
HCT VFR BLD CALC: 31 % — LOW (ref 39–50)
HGB BLD-MCNC: 9.8 G/DL — LOW (ref 13–17)
IMM GRANULOCYTES NFR BLD AUTO: 0.5 % — SIGNIFICANT CHANGE UP (ref 0–0.9)
LACTATE BLDV-MCNC: 1.1 MMOL/L — SIGNIFICANT CHANGE UP (ref 0.5–2)
LYMPHOCYTES # BLD AUTO: 0.94 K/UL — LOW (ref 1–3.3)
LYMPHOCYTES # BLD AUTO: 24.7 % — SIGNIFICANT CHANGE UP (ref 13–44)
MAGNESIUM SERPL-MCNC: 1.2 MG/DL — LOW (ref 1.6–2.6)
MAGNESIUM SERPL-MCNC: 1.5 MG/DL — LOW (ref 1.6–2.6)
MAGNESIUM SERPL-MCNC: 1.9 MG/DL — SIGNIFICANT CHANGE UP (ref 1.6–2.6)
MANUAL SMEAR VERIFICATION: SIGNIFICANT CHANGE UP
MCHC RBC-ENTMCNC: 29.9 PG — SIGNIFICANT CHANGE UP (ref 27–34)
MCHC RBC-ENTMCNC: 31.6 GM/DL — LOW (ref 32–36)
MCV RBC AUTO: 94.5 FL — SIGNIFICANT CHANGE UP (ref 80–100)
MONOCYTES # BLD AUTO: 0.78 K/UL — SIGNIFICANT CHANGE UP (ref 0–0.9)
MONOCYTES NFR BLD AUTO: 20.5 % — HIGH (ref 2–14)
NEUTROPHILS # BLD AUTO: 1.95 K/UL — SIGNIFICANT CHANGE UP (ref 1.8–7.4)
NEUTROPHILS NFR BLD AUTO: 51.1 % — SIGNIFICANT CHANGE UP (ref 43–77)
NRBC # BLD: 0 /100 WBCS — SIGNIFICANT CHANGE UP (ref 0–0)
NT-PROBNP SERPL-SCNC: 6085 PG/ML — HIGH
PCO2 BLDV: 51 MMHG — SIGNIFICANT CHANGE UP (ref 42–55)
PH BLDV: 7.38 — SIGNIFICANT CHANGE UP (ref 7.32–7.43)
PHOSPHATE SERPL-MCNC: 2.7 MG/DL — SIGNIFICANT CHANGE UP (ref 2.5–4.5)
PLAT MORPH BLD: NORMAL — SIGNIFICANT CHANGE UP
PLATELET # BLD AUTO: 259 K/UL — SIGNIFICANT CHANGE UP (ref 150–400)
PO2 BLDV: <35 MMHG — LOW (ref 25–45)
POTASSIUM SERPL-MCNC: 3.9 MMOL/L — SIGNIFICANT CHANGE UP (ref 3.5–5.3)
POTASSIUM SERPL-MCNC: 4 MMOL/L — SIGNIFICANT CHANGE UP (ref 3.5–5.3)
POTASSIUM SERPL-SCNC: 3.9 MMOL/L — SIGNIFICANT CHANGE UP (ref 3.5–5.3)
POTASSIUM SERPL-SCNC: 4 MMOL/L — SIGNIFICANT CHANGE UP (ref 3.5–5.3)
PROT SERPL-MCNC: 7.9 G/DL — SIGNIFICANT CHANGE UP (ref 6–8.3)
PROT SERPL-MCNC: 8.4 G/DL — HIGH (ref 6.4–8.2)
RAPID RVP RESULT: SIGNIFICANT CHANGE UP
RBC # BLD: 3.28 M/UL — LOW (ref 4.2–5.8)
RBC # FLD: 15.8 % — HIGH (ref 10.3–14.5)
RBC BLD AUTO: NORMAL — SIGNIFICANT CHANGE UP
RSV RNA NPH QL NAA+NON-PROBE: SIGNIFICANT CHANGE UP
SAO2 % BLDV: 33.2 % — LOW (ref 67–88)
SARS-COV-2 RNA SPEC QL NAA+PROBE: SIGNIFICANT CHANGE UP
SARS-COV-2 RNA SPEC QL NAA+PROBE: SIGNIFICANT CHANGE UP
SODIUM SERPL-SCNC: 135 MMOL/L — SIGNIFICANT CHANGE UP (ref 132–145)
SODIUM SERPL-SCNC: 137 MMOL/L — SIGNIFICANT CHANGE UP (ref 135–145)
TROPONIN I, HIGH SENSITIVITY RESULT: 24.4 NG/L — SIGNIFICANT CHANGE UP
TSH SERPL-MCNC: 2.04 UIU/ML — SIGNIFICANT CHANGE UP (ref 0.36–3.74)
WBC # BLD: 3.81 K/UL — SIGNIFICANT CHANGE UP (ref 3.8–10.5)
WBC # FLD AUTO: 3.81 K/UL — SIGNIFICANT CHANGE UP (ref 3.8–10.5)

## 2024-09-11 PROCEDURE — 99223 1ST HOSP IP/OBS HIGH 75: CPT

## 2024-09-11 PROCEDURE — 99223 1ST HOSP IP/OBS HIGH 75: CPT | Mod: GC

## 2024-09-11 PROCEDURE — 71045 X-RAY EXAM CHEST 1 VIEW: CPT | Mod: 26

## 2024-09-11 PROCEDURE — 99053 MED SERV 10PM-8AM 24 HR FAC: CPT

## 2024-09-11 PROCEDURE — 71275 CT ANGIOGRAPHY CHEST: CPT | Mod: 26,MC

## 2024-09-11 RX ORDER — IPRATROPIUM BROMIDE AND ALBUTEROL SULFATE .5; 3 MG/3ML; MG/3ML
3 SOLUTION RESPIRATORY (INHALATION) ONCE
Refills: 0 | Status: COMPLETED | OUTPATIENT
Start: 2024-09-11 | End: 2024-09-11

## 2024-09-11 RX ORDER — METHYLPREDNISOLONE 4 MG
60 TABLET ORAL ONCE
Refills: 0 | Status: COMPLETED | OUTPATIENT
Start: 2024-09-11 | End: 2024-09-11

## 2024-09-11 RX ORDER — HYDROXYZINE HCL 25 MG
25 TABLET ORAL ONCE
Refills: 0 | Status: COMPLETED | OUTPATIENT
Start: 2024-09-11 | End: 2024-09-11

## 2024-09-11 RX ORDER — DEXAMETHASONE 0.75 MG
10 TABLET ORAL ONCE
Refills: 0 | Status: COMPLETED | OUTPATIENT
Start: 2024-09-11 | End: 2024-09-11

## 2024-09-11 RX ORDER — DOXYCYCLINE MONOHYDRATE 100 MG
100 TABLET ORAL ONCE
Refills: 0 | Status: COMPLETED | OUTPATIENT
Start: 2024-09-11 | End: 2024-09-11

## 2024-09-11 RX ORDER — FLU VACCINE TS 2012-2013(5YR+) 45MCG/.5ML
0.5 VIAL (ML) INTRAMUSCULAR ONCE
Refills: 0 | Status: DISCONTINUED | OUTPATIENT
Start: 2024-09-11 | End: 2024-09-12

## 2024-09-11 RX ORDER — FUROSEMIDE 40 MG
40 TABLET ORAL ONCE
Refills: 0 | Status: COMPLETED | OUTPATIENT
Start: 2024-09-11 | End: 2024-09-11

## 2024-09-11 RX ORDER — ACETAMINOPHEN 325 MG/1
650 TABLET ORAL EVERY 6 HOURS
Refills: 0 | Status: DISCONTINUED | OUTPATIENT
Start: 2024-09-11 | End: 2024-09-12

## 2024-09-11 RX ADMIN — IPRATROPIUM BROMIDE AND ALBUTEROL SULFATE 3 MILLILITER(S): .5; 3 SOLUTION RESPIRATORY (INHALATION) at 07:53

## 2024-09-11 RX ADMIN — Medication 1 APPLICATION(S): at 17:58

## 2024-09-11 RX ADMIN — Medication 25 MILLIGRAM(S): at 07:53

## 2024-09-11 RX ADMIN — Medication 25 GRAM(S): at 10:55

## 2024-09-11 RX ADMIN — IPRATROPIUM BROMIDE AND ALBUTEROL SULFATE 3 MILLILITER(S): .5; 3 SOLUTION RESPIRATORY (INHALATION) at 09:33

## 2024-09-11 RX ADMIN — Medication 1 APPLICATION(S): at 07:53

## 2024-09-11 RX ADMIN — Medication 60 MILLIGRAM(S): at 09:33

## 2024-09-11 RX ADMIN — Medication 40 MILLIGRAM(S): at 09:33

## 2024-09-11 RX ADMIN — Medication 25 GRAM(S): at 20:42

## 2024-09-11 RX ADMIN — Medication 10 MILLIGRAM(S): at 07:53

## 2024-09-11 RX ADMIN — Medication 100 MILLIGRAM(S): at 08:30

## 2024-09-11 SDOH — ECONOMIC STABILITY - HOUSING INSECURITY: HOMELESSNESS UNSPECIFIED: Z59.00

## 2024-09-11 NOTE — H&P ADULT - PROBLEM SELECTOR PLAN 10
Patient undomiciled and interested in long-term accomodation.     Plan:  - Social work consult  - Case management consult

## 2024-09-11 NOTE — H&P ADULT - PROBLEM SELECTOR PLAN 3
Patient prescribed Biktarvy but non-compliant; latest VL/CD4 in  Nov '23 ~10K/82. Previous ID notes documented that patient had a previous positive cryptococcal agent but results from Nov '23 and Aug '22 negative. Low concern for active opportunistic or AIDS defining illnesses at this time however patient will benefit from PCP ppx given previous CD4 count and poor medical adherence since.     Plan:  - Hold Biktarvy; consult ID in AM  - Bactrim DS PO daily for PCP ppx Patient prescribed Biktarvy but non-compliant; latest VL/CD4 in  Nov '23 ~10K/82. Previous ID notes documented that patient had a previous positive cryptococcal agent but results from Nov '23 and Aug '22 negative. Low concern for active opportunistic or AIDS defining illnesses at this time however patient will benefit from PCP ppx given previous CD4 count and poor medical adherence since.     Plan:  - consult ID in AM  - non complaint w/ biktarvy   - Bactrim DS PO daily for PCP ppx

## 2024-09-11 NOTE — ED PROVIDER NOTE - IV ALTEPLASE INCLUSION HIDDEN
The patient location is: [place]  The chief complaint leading to consultation is: depression  Visit type: Virtual visit with synchronous audio and video  Total time spent with patient: 45 minutes  Each patient to whom he or she provides medical services by telemedicine is:  (1) informed of the relationship between the physician and patient and the respective role of any other health care provider with respect to management of the patient; and (2) notified that he or she may decline to receive medical services by telemedicine and may withdraw from such care at any time.    Notes: Individual Psychotherapy (PhD/LCSW)    11/2/2020    Site: telemed    Therapeutic Intervention: Met with patient alone.  Outpatient - Insight oriented psychotherapy 45 min - CPT code 96539    Chief complaint/reason for encounter: depression, distractibility     Interval history and content of current session: Pt was helping disorganized friend complete packing for move out of state, found she was able to help friend organize herself, and saw herself in the friend. Pt has begun going through papers, found credit cards she was missing and put them in her wallet. Use this as metaphor for satisfaction of self-empowerment. Pt has clear plans for one project at a time over the next few days, setting her own priorities. Discuss self-praise as a key element in self-motivation.    Treatment plan:  · Target symptoms: depression, distractibility  · Why chosen therapy is appropriate versus another modality: relevant to diagnosis  · Outcome monitoring methods: self-report  · Therapeutic intervention type: insight oriented psychotherapy    Risk parameters:  Patient reports no suicidal ideation  Patient reports no homicidal ideation  Patient reports no self-injurious behavior  Patient reports no violent behavior    Verbal deficits: None    Patient's response to intervention:  The patient's response to intervention is motivated    Progress toward goals and  show other mental status changes:  The patient's progress toward goals is limited    Diagnosis: Major depression, recurrent, in partial remission,  ADD in adult     Plan:  individual psychotherapy    Return to clinic: f/u as scheduled

## 2024-09-11 NOTE — ED ADULT NURSE REASSESSMENT NOTE - NS ED NURSE REASSESS COMMENT FT1
Pt is A&Ox4 and reports no additional complaints at this time. Respirations are equal and unlabored. Pt offered food. Plan of care discussed with pt. Denies chest pain, SOB, dizziness.

## 2024-09-11 NOTE — ED CDU PROVIDER INITIAL DAY NOTE - CLINICAL SUMMARY MEDICAL DECISION MAKING FREE TEXT BOX
64M undomiciled, noncompliant with meds, hx of polysubstance abuse (cocaine/THC), PMHx of HIV/AIDs (noncompliant w/ HAART), Afib (noncompliant w/ Eliquis), severe MR/mod-severe TR, HFpEF (EF 55% by TTE 11/2022), lung CA not on any treatment,   Brought in ambulance after being discharged from [facility] emergency department where he was evaluated for shortness of breath; although patient cannot say what they did.  Patient states that they originally wanted to admit him, but then later states that they discharged him with a refill of his medications, but sent them to the wrong pharmacy.  Patient is a poor historian and keeps stating that he has generalized body itching, a rash in his groin that been present for a few weeks, painful hemorrhoids, and wants to place to stay "long-term".  Currently patient is unhoused.  He denies chest pain, abdominal pain, nausea/vomiting, fever/chills, bowel/bladder habit changes.    PE: Thin, alert and oriented x 3.  None labored respirations with mild expiratory wheezes bilaterally.  Abdomen soft nontender/nondistended.  Heart regular rate and rhythm.  Skin exam with diffuse papular rash across torso and extremities; intertriginous rash to the groin with sharp demarcation and raised skin.  Rectum with drainage of foul-smelling discharge.    MDM: Patient presents for multiple complaints and concerns.  He has been off of all of his medications for prolonged period of time and is at risk for having AIDS in addition to improperly manage chronic concerns such as his COPD and lung cancer.  Rash concerning for possible scabies infection and fungal infection of the groin.  Rectum with foul-smelling discharge concerning for infectious causes.  Respiratory exam with wheezing consistent with likely COPD exacerbation as patient has not taking medications and actively smoking. Will obtain labs, x-ray, and give medications to attempt symptom control.

## 2024-09-11 NOTE — H&P ADULT - NSICDXPASTMEDICALHX_GEN_ALL_CORE_FT
PAST MEDICAL HISTORY:  Asthma     Atrial fibrillation     COPD (chronic obstructive pulmonary disease)     H/O amenorrhea     Heart failure     HIV (human immunodeficiency virus infection)     HIV (human immunodeficiency virus infection)     HTN (hypertension)     Lung cancer

## 2024-09-11 NOTE — H&P ADULT - PROBLEM SELECTOR PLAN 6
Patient complaining of several hemorrhoids which he cites are non-bleeding however would like eval from GI for possible removal. Could not assess on physical exam as patient refused.     Plan:   - Consider GI consult

## 2024-09-11 NOTE — SBIRT NOTE ADULT - NSSBIRTALCPASSREFTXDET_GEN_A_CORE
Patient was not provided with a referral to substance use treatment because Patient currently receiving other medical care

## 2024-09-11 NOTE — ED PROVIDER NOTE - PROGRESS NOTE DETAILS
Patient resting comfortably, feels moderately improved. Wheezing resolved. Patient diuresed large amount  after lasix, Receiving magnesium. Patient told HIV counsellor he is linked to care at [facility]. Declined resources. Patient told me [facility] sent prescriptions to a pharmacy in the [place], and he no longer lives in the [place]. needs a pharmacy nearby. Patient still with significant expiratory wheezing. Will give another duoneb and steroids. Given history of lung CA and PE, will get CTA chest for possible PE. KEESHA: Patient signed out to me by Dr. Abad pending lab results.

## 2024-09-11 NOTE — ED ADULT NURSE REASSESSMENT NOTE - NS ED NURSE REASSESS COMMENT FT1
Received handoff from Deric Cai. patient in no acute distress. patient comfortable and updated on plan of care. all needs met at this time. care continues.

## 2024-09-11 NOTE — H&P ADULT - NSTOBACCOSCREENHP_GEN_A_NCS
Please send pended medication to pharmacy on file. Also please result labs and advise. Patient declined to answer

## 2024-09-11 NOTE — H&P ADULT - ATTENDING COMMENTS
64M undomiciled, noncompliant with meds, hx of polysubstance abuse (cocaine/THC), PMHx of HIV/AIDs (noncompliant w/ HAART; reported + cryptococcal antigen in the past), Afib/flutter (noncompliant w/ Eliquis), severe MR/mod-severe TR, HFpEF (EF 55% by TTE 11/2022), lung CA not on any treatment presenting to ED w/ SOB of indeterminate onset likely 2/2 acute post-viral bronchitis vs asthma exacerbation.     #Asthma exacerbation    #Acute Bronchitis    #HIV/AIDs   #HFmrEF   #Pulm HTN  #Moderate TR   #afib     Plan   -c/w DuoNeb's q6hrs, prednisone 40mg QD x5d    -trend peak flow    -outpt pulm f/up    -f/up blood cx     -ID consult in am   -f/up VL/CD4     -strict I/Os    -restart gdmt w/hold parameters    -f/up TTE

## 2024-09-11 NOTE — PATIENT PROFILE ADULT - NSPROPASSIVESMOKEEXPOSURE_GEN_A_NUR
Action 4: Continue Continue Regimen: Metronidazole twice daily\\nOracea\\nRhofade Detail Level: Zone No

## 2024-09-11 NOTE — H&P ADULT - PROBLEM SELECTOR PLAN 7
Patient w/ EF 55% on 2022. Prescribed Lasix 20mg QD, Toprol XL 25mg QD, Valsartan 40mg QD,  Farxiga 10 mg QD however patient non-compliant w/ meds. ProBNP on admission 6K however patient appears euvolemic on exam w/o evidence of pulmonary edema/effusions on chest imaging; low clinical suspicion for HFpEF exacerbation at this time.     Plan:   - Restart valsartan 40mg PO daily and toprol 25mg PO daily Patient w/ EF 55% on 2022. Prescribed Lasix 20mg QD, Toprol XL 25mg QD, Valsartan 40mg QD,  Farxiga 10 mg QD however patient non-compliant w/ meds. ProBNP on admission 6K however patient appears euvolemic on exam w/o evidence of pulmonary edema/effusions on chest imaging; low clinical suspicion for HFpEF exacerbation at this time.     Plan:   - Restart valsartan 40mg PO daily and toprol 25mg PO daily  - F/u TTE

## 2024-09-11 NOTE — H&P ADULT - PROBLEM SELECTOR PLAN 4
Patient presenting w/ excoriations suggestive of scabies. S/p permetrin 5% x2 in ED.    Plan:   - C/w permethrin 5%  - Contact precautions

## 2024-09-11 NOTE — ED CDU PROVIDER INITIAL DAY NOTE - PROGRESS NOTE DETAILS
Patient is resting comfortably, NAD. Spoke to the  and told her he wants to go to a drug rehab facility. SBIRT counsellor will see patient. Patient more tachypneic. Faint wheezing again. I consulted with Dr. Huynh, hospitalist attending, who accepted patient to regional medicine.

## 2024-09-11 NOTE — H&P ADULT - PROBLEM SELECTOR PLAN 1
Patient meeting SIRS 2/4 on admission (HR and RR). No clear source on admission however given respiratory symptoms w/ associated chills likely source viral URI given lack of consolidates appreciated on CT and CXR; UA negative on admission. Alternatively patients RR could be explain by suspected chronic bronchitis exacerbation while HR is likely 2/2 Afib/flutter for which patient has never been on appropriate rate/rhythm control strategy. Although suspected infectious etiology likely viral patient would benefit from BCx given previous history of gram + bacteremia without adequate treatment. S/p CTX 1g in ED.     Plan:   - Management as below  - Obtain BCx

## 2024-09-11 NOTE — H&P ADULT - NSHPPHYSICALEXAM_GEN_ALL_CORE
Constitutional: NAD, comfortable in bed.  Respiratory: B/l mild expiratory wheezing and ronchi appreciated; non-productive cough appreciated throughout exam   Extremities: wwp; no cyanosis, clubbing or edema.   Vascular: Pulses equal and strong throughout. Varicose veins noted on b/l LE   Neurological: AAOx3, no CN deficits, strength and sensation intact throughout.   Skin: Multiple excoriations noted on b/l UE however skin exam limited by patient refusal

## 2024-09-11 NOTE — H&P ADULT - PROBLEM SELECTOR PLAN 8
Patient w/ CHADs-VASc ot at least 2 however non-compliant w/ eliquis or toprol 25mg PO daily. Admission EKG significant for Aflutter w/ variable block vs rate controlled coarse afib.     Plan:  - Restart eliquis 5mg PO BID  - Restart toprol 25mg PO daily

## 2024-09-11 NOTE — H&P ADULT - HISTORY OF PRESENT ILLNESS
64M undomiciled, noncompliant with meds, hx of polysubstance abuse (cocaine/THC), PMHx of HIV/AIDs (noncompliant w/ HAART), Afib (noncompliant w/ Eliquis), severe MR/mod-severe TR, HFpEF (EF 55% by TTE 11/2022), lung CA not on any treatment,   Brought in ambulance after being discharged from [facility] emergency department where he was evaluated for shortness of breath; although patient cannot say what they did.  Patient states that they originally wanted to admit him, but then later states that they discharged him with a refill of his medications, but sent them to the wrong pharmacy.  Patient is a poor historian and keeps stating that he has generalized body itching, a rash in his groin that been present for a few weeks, painful hemorrhoids, and wants to place to stay "long-term".  Currently patient is unhoused.  He denies chest pain, abdominal pain, nausea/vomiting, fever/chills, bowel/bladder habit changes.    amoxicillin-clavulanate 875 mg-125 mg oral tablet: 1 tab(s) orally 2 times a day  · 	ibuprofen 600 mg oral tablet: 1 tab(s) orally every 6 hours  · 	Biktarvy 50 mg-200 mg-25 mg oral tablet: 1 tab(s) orally once a day  · 	doxycycline hyclate 100 mg oral tablet: 1 tab(s) orally 2 times a day  · 	Lasix 20 mg oral tablet: 1 tab(s) orally once a day  · 	clotrimazole 1% topical cream: Apply topically to affected area 2 times a day  · 	sulfamethoxazole-trimethoprim 800 mg-160 mg oral tablet: 1 tab(s) orally once a day  · 	valsartan 40 mg oral tablet: 1 tab(s) orally once a day  · 	Eliquis 5 mg oral tablet: 1 tab(s) orally 2 times a day  · 	Farxiga 10 mg oral tablet: 1 tab(s) orally once a day  · 	MiraLax oral powder for reconstitution: 17 gram(s) orally once a day  · 	sulfamethoxazole-trimethoprim 800 mg-160 mg oral tablet: 1 tab(s) orally every 24 hours  · 	furosemide 20 mg oral tablet: 1 tab(s) orally once a day  · 	ipratropium-albuterol 0.5 mg-2.5 mg/3 mL inhalation solution: 3 milliliter(s) inhaled every 6 hours, As needed, Shortness of Breath and/or Wheezing  · 	metoprolol succinate 25 mg oral tablet, extended release: 1 tab(s) orally once a day  · 	bictegravir/emtricitabine/tenofovir 50 mg-200 mg-25 mg oral tablet: 1 tab(s) orally every 24 hours  · 	dapagliflozin 10 mg oral tablet: 1 tab(s) orally every 24 hours  · 	apixaban 5 mg oral tablet: 1 tab(s) orally every 12 hours  · 	valsartan 40 mg oral tablet: 1 tab(s) orally once a day 64M undomiciled, noncompliant with meds, hx of polysubstance abuse (cocaine/THC), PMHx of HIV/AIDs (noncompliant w/ HAART), Afib (noncompliant w/ Eliquis), severe MR/mod-severe TR, HFpEF (EF 55% by TTE 11/2022), lung CA not on any treatment presenting to ED w/ SOB of indeterminate onset. Patient is a reluctant historian but from what history could be gathered he was brought in ambulance after being discharged from [facility] emergency department where he was evaluated for shortness of breath; although patient cannot say what they did.  Patient states that they originally wanted to admit him, but then later states that they discharged him with a refill of his medications, but sent them to the wrong pharmacy. Patient does not report  chest pain, abdominal pain, nausea/vomiting, fever/chills, bowel/bladder habit changes. In addition patient reported that he has generalized body itching, a rash in his groin that been present for a few weeks, painful hemorrhoids that we would like removed, and wants to place to stay "long-term"; however unable to corroborate at bedside as patient resistant to provide history.     ED course:  Vitals: Tmax 99F, HR , //77, RR 16-22, SpO2 95-98% RA  Labs: WBC 3.81 (ANC 1.95), Hgb 9.8 (MCV 94.5), Mag 1.2->1.5, Glu 66->228, Pro-BNP 6085, RVP (-), VBG-> 7.38 and pCO2 51, UA-> bacteria (-), LE (-), WBC 0  EKG: Likely aflutter w/ variable block vs rate control coarse afib; QTc 468  Imaging: No pulmonary embolism. Atelectasis right lung base. Mild bronchial wall thickening due to bronchitis or CHF. Marked cardiomegaly. Evidence of increased right heart pressure. Small ascites.  Interventions:   Consults: none      64M undomiciled, noncompliant with meds, hx of polysubstance abuse (cocaine/THC), PMHx of HIV/AIDs (noncompliant w/ HAART; reported + cryptococcal antigen in the past), Afib/flutter (noncompliant w/ Eliquis), severe MR/mod-severe TR, HFpEF (EF 55% by TTE 11/2022), lung CA not on any treatment presenting to ED w/ SOB of indeterminate onset. Patient is a reluctant historian but from what history could be gathered he was brought in ambulance after being discharged from [facility] emergency department where he was evaluated for shortness of breath; although patient cannot say what they did. Patient states that they originally wanted to admit him, but then later states that they discharged him with a refill of his medications, but sent them to the wrong pharmacy. He subsequently got in a bus where he asked the  to call an ambulance because of his SOB. Patient reports associated chills and cough iso recent exposure to elements however does not report  chest pain, abdominal pain, nausea/vomiting, fever, bowel/bladder habit changes. In addition patient reported that he has generalized body itching, a rash in his groin that been present for a few weeks, painful hemorrhoids that we would like removed, and wants to place to stay "long-term"; however unable to corroborate at bedside as patient resistant to provide history. Patient has had previous admissions to [facility] w/ multiple AMA's.     ED course:  Vitals: Tmax 99F, HR , //77, RR 16-22, SpO2 95-98% RA  Labs: WBC 3.81 (ANC 1.95), Hgb 9.8 (MCV 94.5), Mag 1.2->1.5, Glu 66->228, Pro-BNP 6085, RVP (-), VBG-> 7.38 and pCO2 51, UA-> bacteria (-), LE (-), WBC 0  EKG: Likely aflutter w/ variable block vs rate control coarse afib; QTc 468  Imaging: No pulmonary embolism. Atelectasis right lung base. Mild bronchial wall thickening due to bronchitis or CHF. Marked cardiomegaly. Evidence of increased right heart pressure. Small ascites; CXR-> No infiltrate consolidation or pleural effusion. Multiple subacute/chronic rib fractures have appeared since prior chest x-ray 10/30/2023.  Interventions: permethrin 5% x2, CTX 1g x1, decadron 10mg PO x1, doxy 100mg PO x1, lasix 40mg IVP x1, hydroxyzine 25mg PO x1, magnesium sulfate 2g x1, solumedrol 60mg IVP x1, duonebs x3  Consults: none

## 2024-09-11 NOTE — H&P ADULT - PROBLEM SELECTOR PLAN 5
Patient complaining of inguinal rash present for several months. On previous admission patient noted for similar rash and treated for candidal intertrigo however patient AMA'd before treatment completed.    Plan:  - C/w clotrimazole

## 2024-09-11 NOTE — ED ADULT TRIAGE NOTE - CHIEF COMPLAINT QUOTE
BIBA [facility]. Pt c/o SOB and cough (chronic) worsening tonight. Pt denies CP, fever/chills, loose BM, or further associated complaints.

## 2024-09-11 NOTE — H&P ADULT - PROBLEM SELECTOR PLAN 9
History of cocaine, THC and alcohol abuse in past; could not obtained further history due to patient refusal however patient does not grossly appear in withdrawal of any type.     Plan:  - SBIRT consult History of cocaine, THC and alcohol abuse in past; could not obtained further history due to patient refusal however patient does not grossly appear in withdrawal of any type.     Plan:  - SBIRT consult  -UTOX

## 2024-09-11 NOTE — H&P ADULT - ASSESSMENT
64M undomiciled, noncompliant with meds, hx of polysubstance abuse (cocaine/THC), PMHx of HIV/AIDs (noncompliant w/ HAART; reported + cryptococcal antigen in the past), Afib/flutter (noncompliant w/ Eliquis), severe MR/mod-severe TR, HFpEF (EF 55% by TTE 11/2022), lung CA not on any treatment presenting to ED w/ SOB of indeterminate onset likely 2/2 acute post-viral bronchitis vs chronic bronchitis/asthma exacerbation. Will admitted to [facility] for further workup, supportive care, and potential social placement.

## 2024-09-11 NOTE — ED PROVIDER NOTE - CLINICAL SUMMARY MEDICAL DECISION MAKING FREE TEXT BOX
64M undomiciled, noncompliant with meds, hx of polysubstance abuse (cocaine/THC), PMHx of HIV/AIDs (noncompliant w/ HAART), Afib (noncompliant w/ Eliquis), severe MR/mod-severe TR, HFpEF (EF 55% by TTE 11/2022), lung CA not on any treatment,   Brought in ambulance after being discharged from [facility] emergency department where he was evaluated for shortness of breath; although patient cannot say what they did.  Patient states that they originally wanted to admit him, but then later states that they discharged him with a refill of his medications, but sent them to the wrong pharmacy.  Patient is a poor historian and keeps stating that he has generalized body itching, a rash in his groin that been present for a few weeks, painful hemorrhoids, and wants to place to stay "long-term".  Currently patient is unhoused.  He denies chest pain, abdominal pain, nausea/vomiting, fever/chills, bowel/bladder habit changes.    PE: Thin, alert and oriented x 3.  None labored respirations with mild expiratory wheezes bilaterally.  Abdomen soft nontender/nondistended.  Heart regular rate and rhythm.  Skin exam with diffuse papular rash across torso and extremities; intertriginous rash to the groin with sharp demarcation and raised skin.  Rectum with drainage of foul-smelling discharge.    MDM: Patient presents for multiple complaints and concerns.  He has been off of all of his medications for prolonged period of time and is at risk for having AIDS in addition to improperly manage chronic concerns such as his COPD and lung cancer.  Rash concerning for possible scabies infection and fungal infection of the groin.  Rectum with foul-smelling discharge concerning for infectious causes.  Respiratory exam with wheezing consistent with likely COPD exacerbation as patient has not taking medications and actively smoking. Will obtain labs, x-ray, and give medications to attempt symptom control.

## 2024-09-11 NOTE — ED CDU PROVIDER DISPOSITION NOTE - CLINICAL COURSE
Patient initially improved with COPD and CHF treatment, then became tachypneic again. Admitted for COPD exacerbation

## 2024-09-11 NOTE — H&P ADULT - PROBLEM SELECTOR PLAN 2
Patient presenting to ED w/ SOB of indeterminate onset w/ reports of associated chills and non-productive cough however patient afebrile on presentation w/o leukocytosis and no CT/CXR findings suggestive of infiltrates or consolidations; low clinical concern for PCP PNA at this time. RVP negative in ED but this was not a full panel. As per non-infectious causes patient has history of HFpEF however low clinical suspicion for exacerbation at this time given physical exam and imaging findings but poor medical follow-up and has never been formally evaluated for emphysema/chronic bronchitis w/ previous pulmonology evaluations documenting asthma history likely post-viral bronchitis vs chronic bronchitis/asthma exacerbation. S/p CTX 1g, solumedrol 60mg IVP x1, duonebs x3, and decadron 10mg PO x1.    Plan:  - F/u full RVP  - C/w duonebs q6  - Pulmonology consult in AM Patient presenting to ED w/ SOB of indeterminate onset w/ reports of associated chills and non-productive cough however patient afebrile on presentation w/o leukocytosis and no CT/CXR findings suggestive of infiltrates or consolidations; low clinical concern for PCP PNA at this time. RVP negative in ED but this was not a full panel. As per non-infectious causes patient has history of HFpEF however low clinical suspicion for exacerbation at this time given physical exam and imaging findings but poor medical follow-up and has never been formally evaluated for emphysema/chronic bronchitis w/ previous pulmonology evaluations documenting asthma history likely post-viral bronchitis vs chronic bronchitis/asthma exacerbation. S/p CTX 1g, solumedrol 60mg IVP x1, duonebs x3, and decadron 10mg PO x1.    Plan:  - F/u full RVP  - Start prednisone 40mg PO daily   - C/w duonebs q6  - Pulmonology consult in AM

## 2024-09-11 NOTE — H&P ADULT - NSHPLABSRESULTS_GEN_ALL_CORE
LABS:                        9.8    3.81  )-----------( 259      ( 11 Sep 2024 07:41 )             31.0     09-11    135  |  101  |  15  ----------------------------<  66<L>  4.0   |  29  |  0.81    Ca    9.4      11 Sep 2024 07:41  Mg     1.5     09-11    TPro  8.4<H>  /  Alb  3.3<L>  /  TBili  0.5  /  DBili  x   /  AST  44<H>  /  ALT  25  /  AlkPhos  122<H>  09-11      Urinalysis Basic - ( 11 Sep 2024 07:41 )    Color: x / Appearance: x / SG: x / pH: x  Gluc: 66 mg/dL / Ketone: x  / Bili: x / Urobili: x   Blood: x / Protein: x / Nitrite: x   Leuk Esterase: x / RBC: x / WBC x   Sq Epi: x / Non Sq Epi: x / Bacteria: x

## 2024-09-12 ENCOUNTER — TRANSCRIPTION ENCOUNTER (OUTPATIENT)
Age: 64
End: 2024-09-12

## 2024-09-12 VITALS
OXYGEN SATURATION: 95 % | DIASTOLIC BLOOD PRESSURE: 79 MMHG | SYSTOLIC BLOOD PRESSURE: 114 MMHG | HEART RATE: 83 BPM | TEMPERATURE: 98 F | RESPIRATION RATE: 18 BRPM

## 2024-09-12 DIAGNOSIS — J45.901 UNSPECIFIED ASTHMA WITH (ACUTE) EXACERBATION: ICD-10-CM

## 2024-09-12 LAB
ALBUMIN SERPL ELPH-MCNC: 3.8 G/DL — SIGNIFICANT CHANGE UP (ref 3.3–5)
ALP SERPL-CCNC: 175 U/L — HIGH (ref 40–120)
ALT FLD-CCNC: 19 U/L — SIGNIFICANT CHANGE UP (ref 10–45)
AMPHET UR-MCNC: NEGATIVE — SIGNIFICANT CHANGE UP
ANION GAP SERPL CALC-SCNC: 10 MMOL/L — SIGNIFICANT CHANGE UP (ref 5–17)
AST SERPL-CCNC: 43 U/L — HIGH (ref 10–40)
BARBITURATES UR SCN-MCNC: NEGATIVE — SIGNIFICANT CHANGE UP
BASOPHILS # BLD AUTO: 0.01 K/UL — SIGNIFICANT CHANGE UP (ref 0–0.2)
BASOPHILS NFR BLD AUTO: 0.2 % — SIGNIFICANT CHANGE UP (ref 0–2)
BENZODIAZ UR-MCNC: NEGATIVE — SIGNIFICANT CHANGE UP
BILIRUB SERPL-MCNC: 0.4 MG/DL — SIGNIFICANT CHANGE UP (ref 0.2–1.2)
BLD GP AB SCN SERPL QL: NEGATIVE — SIGNIFICANT CHANGE UP
BUN SERPL-MCNC: 21 MG/DL — SIGNIFICANT CHANGE UP (ref 7–23)
C TRACH RRNA SPEC QL NAA+PROBE: SIGNIFICANT CHANGE UP
CALCIUM SERPL-MCNC: 9.3 MG/DL — SIGNIFICANT CHANGE UP (ref 8.4–10.5)
CHLORIDE SERPL-SCNC: 100 MMOL/L — SIGNIFICANT CHANGE UP (ref 96–108)
CO2 SERPL-SCNC: 25 MMOL/L — SIGNIFICANT CHANGE UP (ref 22–31)
COCAINE METAB.OTHER UR-MCNC: POSITIVE
CREAT SERPL-MCNC: 0.63 MG/DL — SIGNIFICANT CHANGE UP (ref 0.5–1.3)
CRYPTOC AG FLD QL: NEGATIVE — SIGNIFICANT CHANGE UP
EGFR: 106 ML/MIN/1.73M2 — SIGNIFICANT CHANGE UP
EOSINOPHIL # BLD AUTO: 0 K/UL — SIGNIFICANT CHANGE UP (ref 0–0.5)
EOSINOPHIL NFR BLD AUTO: 0 % — SIGNIFICANT CHANGE UP (ref 0–6)
FENTANYL UR QL SCN: NEGATIVE — SIGNIFICANT CHANGE UP
GLUCOSE SERPL-MCNC: 115 MG/DL — HIGH (ref 70–99)
HCT VFR BLD CALC: 32.5 % — LOW (ref 39–50)
HCV RNA FLD QL NAA+PROBE: SIGNIFICANT CHANGE UP
HCV RNA SPEC QL PROBE+SIG AMP: DETECTED
HGB BLD-MCNC: 10.2 G/DL — LOW (ref 13–17)
IMM GRANULOCYTES NFR BLD AUTO: 0.3 % — SIGNIFICANT CHANGE UP (ref 0–0.9)
LYMPHOCYTES # BLD AUTO: 0.79 K/UL — LOW (ref 1–3.3)
LYMPHOCYTES # BLD AUTO: 12.7 % — LOW (ref 13–44)
MAGNESIUM SERPL-MCNC: 2.1 MG/DL — SIGNIFICANT CHANGE UP (ref 1.6–2.6)
MCHC RBC-ENTMCNC: 29.1 PG — SIGNIFICANT CHANGE UP (ref 27–34)
MCHC RBC-ENTMCNC: 31.4 GM/DL — LOW (ref 32–36)
MCV RBC AUTO: 92.9 FL — SIGNIFICANT CHANGE UP (ref 80–100)
METHADONE UR-MCNC: NEGATIVE — SIGNIFICANT CHANGE UP
MONOCYTES # BLD AUTO: 0.88 K/UL — SIGNIFICANT CHANGE UP (ref 0–0.9)
MONOCYTES NFR BLD AUTO: 14.2 % — HIGH (ref 2–14)
N GONORRHOEA RRNA SPEC QL NAA+PROBE: SIGNIFICANT CHANGE UP
NEUTROPHILS # BLD AUTO: 4.5 K/UL — SIGNIFICANT CHANGE UP (ref 1.8–7.4)
NEUTROPHILS NFR BLD AUTO: 72.6 % — SIGNIFICANT CHANGE UP (ref 43–77)
NRBC # BLD: 0 /100 WBCS — SIGNIFICANT CHANGE UP (ref 0–0)
OPIATES UR-MCNC: NEGATIVE — SIGNIFICANT CHANGE UP
PCP SPEC-MCNC: SIGNIFICANT CHANGE UP
PCP UR-MCNC: NEGATIVE — SIGNIFICANT CHANGE UP
PHOSPHATE SERPL-MCNC: 2.1 MG/DL — LOW (ref 2.5–4.5)
PLATELET # BLD AUTO: 310 K/UL — SIGNIFICANT CHANGE UP (ref 150–400)
POTASSIUM SERPL-MCNC: 3.8 MMOL/L — SIGNIFICANT CHANGE UP (ref 3.5–5.3)
POTASSIUM SERPL-SCNC: 3.8 MMOL/L — SIGNIFICANT CHANGE UP (ref 3.5–5.3)
PROT SERPL-MCNC: 8.4 G/DL — HIGH (ref 6–8.3)
RBC # BLD: 3.5 M/UL — LOW (ref 4.2–5.8)
RBC # FLD: 15.8 % — HIGH (ref 10.3–14.5)
RH IG SCN BLD-IMP: POSITIVE — SIGNIFICANT CHANGE UP
SODIUM SERPL-SCNC: 135 MMOL/L — SIGNIFICANT CHANGE UP (ref 135–145)
THC UR QL: NEGATIVE — SIGNIFICANT CHANGE UP
WBC # BLD: 6.2 K/UL — SIGNIFICANT CHANGE UP (ref 3.8–10.5)
WBC # FLD AUTO: 6.2 K/UL — SIGNIFICANT CHANGE UP (ref 3.8–10.5)

## 2024-09-12 PROCEDURE — 99233 SBSQ HOSP IP/OBS HIGH 50: CPT | Mod: GC

## 2024-09-12 RX ORDER — POTASSIUM PHOSPHATE 236; 224 MG/ML; MG/ML
15 INJECTION, SOLUTION INTRAVENOUS ONCE
Refills: 0 | Status: COMPLETED | OUTPATIENT
Start: 2024-09-12 | End: 2024-09-12

## 2024-09-12 RX ORDER — IPRATROPIUM BROMIDE AND ALBUTEROL SULFATE .5; 3 MG/3ML; MG/3ML
3 SOLUTION RESPIRATORY (INHALATION) EVERY 6 HOURS
Refills: 0 | Status: DISCONTINUED | OUTPATIENT
Start: 2024-09-12 | End: 2024-09-12

## 2024-09-12 RX ORDER — CLOTRIMAZOLE 1 %
1 CREAM (GRAM) TOPICAL
Refills: 0 | Status: DISCONTINUED | OUTPATIENT
Start: 2024-09-12 | End: 2024-09-12

## 2024-09-12 RX ORDER — SULFAMETHOXAZOLE AND TRIMETHOPRIM 800; 160 MG/1; MG/1
1 TABLET ORAL
Qty: 30 | Refills: 0
Start: 2024-09-12 | End: 2024-10-11

## 2024-09-12 RX ORDER — PREDNISONE 10 MG
40 TABLET, DOSE PACK ORAL DAILY
Refills: 0 | Status: DISCONTINUED | OUTPATIENT
Start: 2024-09-12 | End: 2024-09-12

## 2024-09-12 RX ORDER — SULFAMETHOXAZOLE AND TRIMETHOPRIM 800; 160 MG/1; MG/1
1 TABLET ORAL DAILY
Refills: 0 | Status: DISCONTINUED | OUTPATIENT
Start: 2024-09-12 | End: 2024-09-12

## 2024-09-12 RX ORDER — APIXABAN 5 MG/1
5 TABLET, FILM COATED ORAL EVERY 12 HOURS
Refills: 0 | Status: DISCONTINUED | OUTPATIENT
Start: 2024-09-12 | End: 2024-09-12

## 2024-09-12 RX ORDER — VALSARTAN 40 MG/1
40 TABLET ORAL DAILY
Refills: 0 | Status: DISCONTINUED | OUTPATIENT
Start: 2024-09-12 | End: 2024-09-12

## 2024-09-12 RX ORDER — APIXABAN 5 MG/1
1 TABLET, FILM COATED ORAL
Qty: 60 | Refills: 0
Start: 2024-09-12 | End: 2024-10-11

## 2024-09-12 RX ORDER — BUDESONIDE AND FORMOTEROL FUMARATE 80; 4.5 UG/1; UG/1
2 AEROSOL, METERED RESPIRATORY (INHALATION)
Qty: 1 | Refills: 0
Start: 2024-09-12

## 2024-09-12 RX ORDER — METOPROLOL TARTRATE 100 MG/1
25 TABLET ORAL DAILY
Refills: 0 | Status: DISCONTINUED | OUTPATIENT
Start: 2024-09-12 | End: 2024-09-12

## 2024-09-12 RX ORDER — PREDNISONE 10 MG
2 TABLET, DOSE PACK ORAL
Qty: 8 | Refills: 0
Start: 2024-09-12 | End: 2024-09-15

## 2024-09-12 RX ADMIN — Medication 40 MILLIGRAM(S): at 06:08

## 2024-09-12 RX ADMIN — IPRATROPIUM BROMIDE AND ALBUTEROL SULFATE 3 MILLILITER(S): .5; 3 SOLUTION RESPIRATORY (INHALATION) at 03:28

## 2024-09-12 RX ADMIN — METOPROLOL TARTRATE 25 MILLIGRAM(S): 100 TABLET ORAL at 06:08

## 2024-09-12 RX ADMIN — VALSARTAN 40 MILLIGRAM(S): 40 TABLET ORAL at 06:08

## 2024-09-12 RX ADMIN — APIXABAN 5 MILLIGRAM(S): 5 TABLET, FILM COATED ORAL at 06:08

## 2024-09-12 RX ADMIN — Medication 1 APPLICATION(S): at 06:08

## 2024-09-12 NOTE — PROGRESS NOTE ADULT - PROBLEM SELECTOR PLAN 7
Patient w/ EF 55% on 2022. Prescribed Lasix 20mg QD, Toprol XL 25mg QD, Valsartan 40mg QD,  Farxiga 10 mg QD however patient non-compliant w/ meds. ProBNP on admission 6K however patient appears euvolemic on exam w/o evidence of pulmonary edema/effusions on chest imaging; low clinical suspicion for HFpEF exacerbation at this time.     Plan:   - Restart valsartan 40mg PO daily and toprol 25mg PO daily  - F/u TTE Patient w/ CHADs-VASc ot at least 2 however non-compliant w/ eliquis or toprol 25mg PO daily. Admission EKG significant for Aflutter w/ variable block vs rate controlled coarse afib.     Plan:  - C/w eliquis 5mg PO BID  - C/w toprol 25mg PO daily Patient w/ CHADs-VASc ot at least 2 however non-compliant w/ eliquis or toprol 25mg PO daily. Admission EKG significant for Aflutter w/ variable block vs rate controlled coarse afib.     Plan:  - C/w eliquis 5mg PO BID  - Discontinued toprol due to +cocaine on UDS

## 2024-09-12 NOTE — PROGRESS NOTE ADULT - PROBLEM SELECTOR PLAN 9
History of cocaine, THC and alcohol abuse in past; could not obtained further history due to patient refusal however patient does not grossly appear in withdrawal of any type.     Plan:  - SBIRT consult  -UTOX Patient undomiciled and interested in long-term accomodation.     Plan:  - Social work consult  - Case management consult

## 2024-09-12 NOTE — DISCHARGE NOTE PROVIDER - CARE PROVIDER_API CALL
Derrick Stewart [facility] [place]  [address]  [place] [address]  Phone: [phone]  Fax: [phone]  Follow Up Time: 1-3 days   Derrick Stewart  [facility]  [address], [address]  [place] [address]  Phone: [phone]  Fax: [phone]  Follow Up Time: 1-3 days    Nilo Pulliam  [facility]  [address]  [place] [address]  Phone: [phone]  Fax: [phone]  Scheduled Appointment: 09/19/2024 01:30 PM

## 2024-09-12 NOTE — DISCHARGE NOTE NURSING/CASE MANAGEMENT/SOCIAL WORK - NSDCVIVACCINE_GEN_ALL_CORE_FT
Tdap; 26-Jun-2017 00:59; Oj Rowley (RN); [employer]; [id number]; IntraMuscular; Deltoid Left.; 0.5 milliLiter(s); VIS (VIS Published: 09-May-2013, VIS Presented: 26-Jun-2017);   Tdap; 27-Jun-2024 11:29; Mohsen Freeman); [employer]; [id number] (Exp. Date: 10-Feb-2026); IntraMuscular; Deltoid Left.; 0.5 milliLiter(s); VIS (VIS Published: 09-May-2013, VIS Presented: 27-Jun-2024);

## 2024-09-12 NOTE — PROGRESS NOTE ADULT - ATTENDING COMMENTS
acute post-viral bronchitis vs chronic bronchitis/asthma exacerbation  chronic atrial fib   dc on pred 40 x 5 days , symbicort and fu outpt provider   rvp negative  CTA negative, rll atelectasis   on room air       homeless   undomiciled   SW fu and shelter referral     HFpEF  Chronic atrial fibrillation   continue with home medications eliquis - holding bbk for recent cocaine use     polysubstance use disorder   utox positive - cocaine   cocaine, THC and etoh use disorder   SBIRT , SW fu   hiv   fu ID outpt   will fu viral load and cw ppx for now as pt is not on haart     dvt ppx

## 2024-09-12 NOTE — DISCHARGE NOTE PROVIDER - PROVIDER TOKENS
PROVIDER:[TOKEN:[[id number]],FOLLOWUP:[1-3 days]] PROVIDER:[TOKEN:[[id number]],FOLLOWUP:[1-3 days]],PROVIDER:[TOKEN:[[id number]],SCHEDULEDAPPT:[09/19/2024],SCHEDULEDAPPTTIME:[01:30 PM]]

## 2024-09-12 NOTE — DISCHARGE NOTE PROVIDER - CARE PROVIDERS DIRECT ADDRESSES
[email or web address]

## 2024-09-12 NOTE — DISCHARGE NOTE PROVIDER - NSDCMRMEDTOKEN_GEN_ALL_CORE_FT
amoxicillin-clavulanate 875 mg-125 mg oral tablet: 1 tab(s) orally 2 times a day  apixaban 5 mg oral tablet: 1 tab(s) orally every 12 hours  bictegravir/emtricitabine/tenofovir 50 mg-200 mg-25 mg oral tablet: 1 tab(s) orally every 24 hours  Biktarvy 50 mg-200 mg-25 mg oral tablet: 1 tab(s) orally once a day  clotrimazole 1% topical cream: Apply topically to affected area 2 times a day  dapagliflozin 10 mg oral tablet: 1 tab(s) orally every 24 hours  doxycycline hyclate 100 mg oral tablet: 1 tab(s) orally 2 times a day  Eliquis 5 mg oral tablet: 1 tab(s) orally 2 times a day  Farxiga 10 mg oral tablet: 1 tab(s) orally once a day  furosemide 20 mg oral tablet: 1 tab(s) orally once a day  ibuprofen 600 mg oral tablet: 1 tab(s) orally every 6 hours  ipratropium-albuterol 0.5 mg-2.5 mg/3 mL inhalation solution: 3 milliliter(s) inhaled every 6 hours, As needed, Shortness of Breath and/or Wheezing  Lasix 20 mg oral tablet: 1 tab(s) orally once a day  metoprolol succinate 25 mg oral tablet, extended release: 1 tab(s) orally once a day  MiraLax oral powder for reconstitution: 17 gram(s) orally once a day  predniSONE 20 mg oral tablet: 2 tab(s) orally once a day  sulfamethoxazole-trimethoprim 800 mg-160 mg oral tablet: 1 tab(s) orally once a day  sulfamethoxazole-trimethoprim 800 mg-160 mg oral tablet: 1 tab(s) orally every 24 hours  Symbicort 160 mcg-4.5 mcg/inh inhalation aerosol: 2 puff(s) inhaled once a day  valsartan 40 mg oral tablet: 1 tab(s) orally once a day  valsartan 40 mg oral tablet: 1 tab(s) orally once a day   apixaban 5 mg oral tablet: 1 tab(s) orally every 12 hours  Biktarvy 50 mg-200 mg-25 mg oral tablet: 1 tab(s) orally once a day  clotrimazole 1% topical cream: Apply topically to affected area 2 times a day  dapagliflozin 10 mg oral tablet: 1 tab(s) orally every 24 hours  Farxiga 10 mg oral tablet: 1 tab(s) orally once a day  ibuprofen 600 mg oral tablet: 1 tab(s) orally every 6 hours  ipratropium-albuterol 0.5 mg-2.5 mg/3 mL inhalation solution: 3 milliliter(s) inhaled every 6 hours, As needed, Shortness of Breath and/or Wheezing  Lasix 20 mg oral tablet: 1 tab(s) orally once a day  MiraLax oral powder for reconstitution: 17 gram(s) orally once a day  predniSONE 20 mg oral tablet: 2 tab(s) orally once a day  sulfamethoxazole-trimethoprim 800 mg-160 mg oral tablet: 1 tab(s) orally once a day  Symbicort 160 mcg-4.5 mcg/inh inhalation aerosol: 2 puff(s) inhaled once a day  valsartan 40 mg oral tablet: 1 tab(s) orally once a day  valsartan 40 mg oral tablet: 1 tab(s) orally once a day

## 2024-09-12 NOTE — DISCHARGE NOTE NURSING/CASE MANAGEMENT/SOCIAL WORK - PATIENT PORTAL LINK FT
You can access the FollowMyHealth Patient Portal offered by [facility] by registering at the following website: [email or web address]. By joining [facility]’s FollowMyHealth portal, you will also be able to view your health information using other applications (apps) compatible with our system.

## 2024-09-12 NOTE — DISCHARGE NOTE PROVIDER - HOSPITAL COURSE
HOSPITAL COURSE: 64M undomiciled, noncompliant with meds, hx of polysubstance abuse (cocaine/THC), PMHx of HIV/AIDs (noncompliant w/ HAART; reported + cryptococcal antigen in the past), Afib/flutter (noncompliant w/ Eliquis), severe MR/mod-severe TR, HFpEF (EF 55% by TTE 11/2022), lung CA not on any treatment presenting to ED w/ SOB of indeterminate onset likely 2/2 acute post-viral bronchitis vs chronic bronchitis/asthma exacerbation admitted for further workup.    Problem List/Main Diagnoses:     #SIRS (systemic inflammatory response syndrome).   Patient meeting SIRS 2/4 on admission (HR and RR). No consolidations seen on CXR/CT. No PE. UA neg. S/p CTX 1g, solumedrol 60mg IVP x1, duonebs x3, and decadron 10mg PO x1 in ED. Full RVP neg. Low c/f infection at this time.     #Acute asthma exacerbation.   Patient presenting to ED w/ SOB of indeterminate onset w/ reports of associated chills and non-productive cough however patient afebrile on presentation w/o leukocytosis and negative CXR/CT. Poor medical follow-up and has never been formally evaluated for emphysema/chronic bronchitis w/ previous pulmonology evaluations documenting asthma history likely post-viral bronchitis vs chronic bronchitis/asthma exacerbation.   Plan:  - C/w prednisone 40mg PO daily   - C/w duonebs q6  -Sent prescriptions for symbicort, prednisone, bactrim prophylaxis    #AIDS.   Patient prescribed Biktarvy but non-compliant; latest VL/CD4 in Nov '23 ~10K/82. Low concern for active opportunistic infection however patient will benefit from PCP ppx given previous CD4 count and poor medical adherence since.   Plan:  - Bactrim DS PO daily for PCP ppx  -Will not start HARRT at this time due to noncompliance and c/f resistance.    #Scabies.   Patient presenting w/ excoriations suggestive of scabies. S/p permetrin 5% x2 in ED.  Plan:   - C/w permethrin 5%    #Candidal intertrigo.   Patient complaining of inguinal rash present for several months. On previous admission patient noted for similar rash and treated for candidal intertrigo however patient AMA'd before treatment completed.  Plan:  - C/w clotrimazole.    #(HFpEF) heart failure with preserved ejection fraction.   Patient w/ EF 55% on 2022. Prescribed Lasix 20mg QD, Toprol XL 25mg QD, Valsartan 40mg QD,  Farxiga 10 mg QD however patient non-compliant w/ meds. ProBNP on admission 6K however patient appears euvolemic on exam w/o evidence of pulmonary edema/effusions on chest imaging; low clinical suspicion for HFpEF exacerbation.   Plan:   - C/w valsartan 40mg PO daily  - Discontinued toprol due to +cocaine on UDS.    # Atrial fibrillation and flutter.   Patient w/ CHADs-VASc ot at least 2 however non-compliant w/ eliquis or toprol 25mg PO daily. Admission EKG significant for Aflutter w/ variable block vs rate controlled coarse afib.  Plan:  - C/w eliquis 5mg PO BID  - Discontinued toprol due to +cocaine on UDS.    #Polysubstance abuse.   History of cocaine, THC and alcohol abuse in past; could not obtained further history due to patient refusal however patient does not grossly appear in withdrawal of any type.   Plan:  - SBIRT consult  - Utox + cocaine.      New medications/therapies: Symbicort, prednisone, bactrim DS, eliquis  Labs to be followed outpatient: None  Exam to be followed outpatient: None       HOSPITAL COURSE: 64M undomiciled, noncompliant with meds, hx of polysubstance abuse (cocaine/THC), PMHx of HIV/AIDs (noncompliant w/ HAART; reported + cryptococcal antigen in the past), Afib/flutter (noncompliant w/ Eliquis), severe MR/mod-severe TR, HFpEF (EF 55% by TTE 11/2022), lung CA not on any treatment presenting to ED w/ SOB of indeterminate onset likely 2/2 acute post-viral bronchitis vs chronic bronchitis/asthma exacerbation admitted for further workup.    Problem List/Main Diagnoses:     #SIRS (systemic inflammatory response syndrome).   Patient meeting SIRS 2/4 on admission (HR and RR). No consolidations seen on CXR/CT. No PE. UA neg. S/p CTX 1g, solumedrol 60mg IVP x1, duonebs x3, and decadron 10mg PO x1 in ED. Full RVP neg. Low c/f infection at this time.     #Acute asthma exacerbation.   Patient presenting to ED w/ SOB of indeterminate onset w/ reports of associated chills and non-productive cough however patient afebrile on presentation w/o leukocytosis and negative CXR/CT. Poor medical follow-up and has never been formally evaluated for emphysema/chronic bronchitis w/ previous pulmonology evaluations documenting asthma history likely post-viral bronchitis vs chronic bronchitis/asthma exacerbation.   Plan:  - C/w prednisone 40mg PO daily   - C/w duonebs q6  -Sent prescriptions for symbicort, prednisone, bactrim prophylaxis    #AIDS.   Patient prescribed Biktarvy but non-compliant; latest VL/CD4 in Nov '23 ~10K/82. Low concern for active opportunistic infection however patient will benefit from PCP ppx given previous CD4 count and poor medical adherence since.   Plan:  - Bactrim DS PO daily for PCP ppx  -Will not restart HARRT at this time due to noncompliance and c/f resistance.- fu with outpt HIV specialist     #Scabies.   Patient presenting w/ excoriations suggestive of scabies. S/p permetrin 5% x2 in ED.  Plan:   - C/w permethrin 5%    #Candidal intertrigo.   Patient complaining of inguinal rash present for several months. On previous admission patient noted for similar rash and treated for candidal intertrigo however patient AMA'd before treatment completed.  Plan:  - C/w clotrimazole.    #(HFpEF) heart failure with preserved ejection fraction.   Patient w/ EF 55% on 2022. Prescribed Lasix 20mg QD, Toprol XL 25mg QD, Valsartan 40mg QD,  Farxiga 10 mg QD however patient non-compliant w/ meds. ProBNP on admission 6K however patient appears euvolemic on exam w/o evidence of pulmonary edema/effusions on chest imaging; low clinical suspicion for HFpEF exacerbation.   Plan:   - C/w valsartan 40mg PO daily  - Discontinued toprol due to +cocaine on UDS.    # Atrial fibrillation and flutter.   Patient w/ CHADs-VASc ot at least 2 however non-compliant w/ eliquis or toprol 25mg PO daily. Admission EKG significant for Aflutter w/ variable block vs rate controlled coarse afib.  Plan:  - C/w eliquis 5mg PO BID  - Discontinued toprol due to +cocaine on UDS.    #Polysubstance abuse.   History of cocaine, THC and alcohol abuse in past; could not obtained further history due to patient refusal however patient does not grossly appear in withdrawal of any type.   Plan:  - SBIRT consult  - Utox + cocaine.      New medications/therapies: Symbicort, prednisone, bactrim DS, eliquis  Labs to be followed outpatient: None  Exam to be followed outpatient: None

## 2024-09-12 NOTE — PROGRESS NOTE ADULT - ASSESSMENT
64M undomiciled, noncompliant with meds, hx of polysubstance abuse (cocaine/THC), PMHx of HIV/AIDs (noncompliant w/ HAART; reported + cryptococcal antigen in the past), Afib/flutter (noncompliant w/ Eliquis), severe MR/mod-severe TR, HFpEF (EF 55% by TTE 11/2022), lung CA not on any treatment presenting to ED w/ SOB of indeterminate onset likely 2/2 acute post-viral bronchitis vs chronic bronchitis/asthma exacerbation. Will admitted to [facility] for further workup, supportive care, and potential social placement.  64M undomiciled, noncompliant with meds, hx of polysubstance abuse (cocaine/THC), PMHx of HIV/AIDs (noncompliant w/ HAART; reported + cryptococcal antigen in the past), Afib/flutter (noncompliant w/ Eliquis), severe MR/mod-severe TR, HFpEF (EF 55% by TTE 11/2022), lung CA not on any treatment presenting to ED w/ SOB of indeterminate onset likely 2/2 acute post-viral bronchitis vs chronic bronchitis/asthma exacerbation admitted for further workup.

## 2024-09-12 NOTE — PROGRESS NOTE ADULT - PROBLEM SELECTOR PLAN 3
Patient prescribed Biktarvy but non-compliant; latest VL/CD4 in  Nov '23 ~10K/82. Previous ID notes documented that patient had a previous positive cryptococcal agent but results from Nov '23 and Aug '22 negative. Low concern for active opportunistic or AIDS defining illnesses at this time however patient will benefit from PCP ppx given previous CD4 count and poor medical adherence since.     Plan:  - consult ID in AM  - non complaint w/ biktarvy   - Bactrim DS PO daily for PCP ppx Patient prescribed Biktarvy but non-compliant; latest VL/CD4 in Nov '23 ~10K/82. Low concern for active opportunistic infection however patient will benefit from PCP ppx given previous CD4 count and poor medical adherence since.     Plan:  - consult ID in AM  - Bactrim DS PO daily for PCP ppx Patient prescribed Biktarvy but non-compliant; latest VL/CD4 in Nov '23 ~10K/82. Low concern for active opportunistic infection however patient will benefit from PCP ppx given previous CD4 count and poor medical adherence since.     Plan:  - Bactrim DS PO daily for PCP ppx  -Set up f/u with [facility] outpatient, will not start HARRT at this time due to noncompliance and c/f resistance

## 2024-09-12 NOTE — PROGRESS NOTE ADULT - PROBLEM SELECTOR PLAN 2
Patient presenting to ED w/ SOB of indeterminate onset w/ reports of associated chills and non-productive cough however patient afebrile on presentation w/o leukocytosis and no CT/CXR findings suggestive of infiltrates or consolidations; low clinical concern for PCP PNA at this time. RVP negative in ED but this was not a full panel. As per non-infectious causes patient has history of HFpEF however low clinical suspicion for exacerbation at this time given physical exam and imaging findings but poor medical follow-up and has never been formally evaluated for emphysema/chronic bronchitis w/ previous pulmonology evaluations documenting asthma history likely post-viral bronchitis vs chronic bronchitis/asthma exacerbation. S/p CTX 1g, solumedrol 60mg IVP x1, duonebs x3, and decadron 10mg PO x1.    Plan:  - F/u full RVP  - Start prednisone 40mg PO daily   - C/w duonebs q6  - Pulmonology consult in AM Patient presenting to ED w/ SOB of indeterminate onset w/ reports of associated chills and non-productive cough however patient afebrile on presentation w/o leukocytosis and negative CXR/CT. Poor medical follow-up and has never been formally evaluated for emphysema/chronic bronchitis w/ previous pulmonology evaluations documenting asthma history likely post-viral bronchitis vs chronic bronchitis/asthma exacerbation.   Plan:  - F/u full RVP  - C/w prednisone 40mg PO daily   - C/w duonebs q6 Patient presenting to ED w/ SOB of indeterminate onset w/ reports of associated chills and non-productive cough however patient afebrile on presentation w/o leukocytosis and negative CXR/CT. Poor medical follow-up and has never been formally evaluated for emphysema/chronic bronchitis w/ previous pulmonology evaluations documenting asthma history likely post-viral bronchitis vs chronic bronchitis/asthma exacerbation.   Plan:  - C/w prednisone 40mg PO daily   - C/w duonebs q6  -Start symbicort

## 2024-09-12 NOTE — PROGRESS NOTE ADULT - PROBLEM SELECTOR PLAN 1
Patient meeting SIRS 2/4 on admission (HR and RR). No clear source on admission however given respiratory symptoms w/ associated chills likely source viral URI given lack of consolidates appreciated on CT and CXR; UA negative on admission. Alternatively patients RR could be explain by suspected chronic bronchitis exacerbation while HR is likely 2/2 Afib/flutter for which patient has never been on appropriate rate/rhythm control strategy. Although suspected infectious etiology likely viral patient would benefit from BCx given previous history of gram + bacteremia without adequate treatment. S/p CTX 1g in ED.     Plan:   - Management as below  - Obtain BCx Patient meeting SIRS 2/4 on admission (HR and RR). No consolidations seen on CXR/CT. No PE. UA neg. Possible source viral URI. S/p CTX 1g, solumedrol 60mg IVP x1, duonebs x3, and decadron 10mg PO x1 in ED.      Plan:   - f/u Bcx  - f/u full RVP Patient meeting SIRS 2/4 on admission (HR and RR). No consolidations seen on CXR/CT. No PE. UA neg. S/p CTX 1g, solumedrol 60mg IVP x1, duonebs x3, and decadron 10mg PO x1 in ED. Full RVP neg. Low c/f infection at this time.    Plan:   - f/u Bcx

## 2024-09-12 NOTE — PROGRESS NOTE ADULT - SUBJECTIVE AND OBJECTIVE BOX
HOSPITAL COURSE: 64M undomiciled, noncompliant with meds, hx of polysubstance abuse (cocaine/THC), PMHx of HIV/AIDs (noncompliant w/ HAART; reported + cryptococcal antigen in the past), Afib/flutter (noncompliant w/ Eliquis), severe MR/mod-severe TR, HFpEF (EF 55% by TTE 11/2022), lung CA not on any treatment presenting to ED w/ SOB of indeterminate onset likely 2/2 acute post-viral bronchitis vs chronic bronchitis/asthma exacerbation admitted for further workup.    INTERVAL HPI/OVERNIGHT EVENTS: LAMONT o/n    SUBJECTIVE: Patient seen and examined at bedside, resting comfortably in bed, in no acute distress. Patient reports    Vital Signs Last 24 Hrs  T(C): 36.6 (12 Sep 2024 06:04), Max: 37.2 (11 Sep 2024 08:58)  T(F): 97.9 (12 Sep 2024 06:04), Max: 99 (11 Sep 2024 08:58)  HR: 83 (12 Sep 2024 06:04) (81 - 86)  BP: 130/74 (12 Sep 2024 06:04) (109/71 - 130/74)  BP(mean): --  RR: 18 (12 Sep 2024 06:04) (16 - 18)  SpO2: 98% (12 Sep 2024 06:04) (95% - 98%)    Parameters below as of 12 Sep 2024 06:04  Patient On (Oxygen Delivery Method): room air        PHYSICAL EXAM:  General: in no acute distress  Eyes: PERRL  HENT: Moist mucous membranes  Lungs: CTA B/L. No wheezes, rales, or rhonchi  Cardiovascular: RRR. No murmurs, rubs, or gallops  Abdomen: Soft, non-tender non-distended; No rebound or guarding  Extremities: WWP, no edema  Neurological: Alert and oriented  Skin: Warm and dry, no obvious rash    LABS:                        9.8    3.81  )-----------( 259      ( 11 Sep 2024 07:41 )             31.0     09-11    137  |  101  |  20  ----------------------------<  148<H>  3.9   |  25  |  0.70    Ca    9.2      11 Sep 2024 23:11  Phos  2.7     09-11  Mg     1.9     09-11    TPro  7.9  /  Alb  3.3  /  TBili  0.4  /  DBili  x   /  AST  44<H>  /  ALT  16  /  AlkPhos  146<H>  09-11   HOSPITAL COURSE: 64M undomiciled, noncompliant with meds, hx of polysubstance abuse (cocaine/THC), PMHx of HIV/AIDs (noncompliant w/ HAART; reported + cryptococcal antigen in the past), Afib/flutter (noncompliant w/ Eliquis), severe MR/mod-severe TR, HFpEF (EF 55% by TTE 11/2022), lung CA not on any treatment presenting to ED w/ SOB of indeterminate onset likely 2/2 acute post-viral bronchitis vs chronic bronchitis/asthma exacerbation admitted for further workup.    INTERVAL HPI/OVERNIGHT EVENTS: LAMONT o/n    SUBJECTIVE: Patient seen and examined at bedside, resting comfortably in bed. Patient is uncooperative with history taking and exam, but confirms that he had SOB and was discharged from the [facility] ED, then experienced continued SOB so he came to [facility]. States that his dyspnea is at about a 9/10 severity. When asked about other symptoms including headache, nausea, vomiting, pt responded "Yes, I have all of it" and would not continue with interview.    Vital Signs Last 24 Hrs  T(C): 36.6 (12 Sep 2024 06:04), Max: 37.2 (11 Sep 2024 08:58)  T(F): 97.9 (12 Sep 2024 06:04), Max: 99 (11 Sep 2024 08:58)  HR: 83 (12 Sep 2024 06:04) (81 - 86)  BP: 130/74 (12 Sep 2024 06:04) (109/71 - 130/74)  BP(mean): --  RR: 18 (12 Sep 2024 06:04) (16 - 18)  SpO2: 98% (12 Sep 2024 06:04) (95% - 98%)    Parameters below as of 12 Sep 2024 06:04  Patient On (Oxygen Delivery Method): room air        PHYSICAL EXAM:  General: in no acute distress, uncooperative with exam  Respiratory: No increased WOB, tachypnea, nasal flaring, or accessory muscle use.  Neurological: AAOx3    LABS:                        9.8    3.81  )-----------( 259      ( 11 Sep 2024 07:41 )             31.0     09-11    137  |  101  |  20  ----------------------------<  148<H>  3.9   |  25  |  0.70    Ca    9.2      11 Sep 2024 23:11  Phos  2.7     09-11  Mg     1.9     09-11    TPro  7.9  /  Alb  3.3  /  TBili  0.4  /  DBili  x   /  AST  44<H>  /  ALT  16  /  AlkPhos  146<H>  09-11

## 2024-09-12 NOTE — DISCHARGE NOTE PROVIDER - NSDCFUSCHEDAPPT_GEN_ALL_CORE_FT
[facility]  INT[facility]  [facility] [address]  Scheduled Appointment: 09/19/2024

## 2024-09-12 NOTE — PROGRESS NOTE ADULT - PROBLEM SELECTOR PLAN 6
Patient complaining of several hemorrhoids which he cites are non-bleeding however would like eval from GI for possible removal. Could not assess on physical exam as patient refused.     Plan:   - Consider GI consult Patient w/ EF 55% on 2022. Prescribed Lasix 20mg QD, Toprol XL 25mg QD, Valsartan 40mg QD,  Farxiga 10 mg QD however patient non-compliant w/ meds. ProBNP on admission 6K however patient appears euvolemic on exam w/o evidence of pulmonary edema/effusions on chest imaging; low clinical suspicion for HFpEF exacerbation at this time.     Plan:   - C/w valsartan 40mg PO daily and toprol 25mg PO daily  - F/u TTE Patient w/ EF 55% on 2022. Prescribed Lasix 20mg QD, Toprol XL 25mg QD, Valsartan 40mg QD,  Farxiga 10 mg QD however patient non-compliant w/ meds. ProBNP on admission 6K however patient appears euvolemic on exam w/o evidence of pulmonary edema/effusions on chest imaging; low clinical suspicion for HFpEF exacerbation.     Plan:   - C/w valsartan 40mg PO daily  - Discontinued toprol due to +cocaine on UDS

## 2024-09-12 NOTE — PROGRESS NOTE ADULT - PROBLEM SELECTOR PLAN 8
Patient w/ CHADs-VASc ot at least 2 however non-compliant w/ eliquis or toprol 25mg PO daily. Admission EKG significant for Aflutter w/ variable block vs rate controlled coarse afib.     Plan:  - Restart eliquis 5mg PO BID  - Restart toprol 25mg PO daily History of cocaine, THC and alcohol abuse in past; could not obtained further history due to patient refusal however patient does not grossly appear in withdrawal of any type.     Plan:  - SBIRT consult  - Utox + cocaine

## 2024-09-12 NOTE — DISCHARGE NOTE PROVIDER - NSDCFUADDAPPT_GEN_ALL_CORE_FT
Please bring your Insurance card, Photo ID and Discharge paperwork to the following appointment:    Appointment was scheduled by Ms. FLORENCE Arellano, Referral Coordinator.   Please bring your Insurance card, Photo ID and Discharge paperwork to the following appointment:    (1) Please follow up with your Family Medicine Provider, Dr. Nilo [facility] at [address] [place] [address], [address], [place] [address] on 9/19/2024 at 1:30pm.    Appointment was scheduled by Ms. FLORENCE Arellano, Referral Coordinator.

## 2024-09-12 NOTE — DISCHARGE NOTE PROVIDER - NSDCCPCAREPLAN_GEN_ALL_CORE_FT
PRINCIPAL DISCHARGE DIAGNOSIS  Diagnosis: Acute asthma exacerbation  Assessment and Plan of Treatment: You came in to the hospital with difficulty breathing and were suspected to be in an acute asthma exacerbation. There is low concern for infection as your lab testing and imaging was negative. You are being sent home on prednisone and symbicort inhaler for your asthma. Please continue to take these medications as prescribed and follow up with your PCP outpatient.      SECONDARY DISCHARGE DIAGNOSES  Diagnosis: (HFpEF) heart failure with preserved ejection fraction  Assessment and Plan of Treatment: You have a history of heart failure. You take valsartan for this at home. Please continue to take this medication and follow up with your PCP.    Diagnosis: Atrial fibrillation and flutter  Assessment and Plan of Treatment: You have a history of atrial fibrillation, which we saw on your EKG while you were in the hospital. This condition can put you at risk for blood clots and stroke. Please take Eliquis to prevent blood clots and follow up with your PCP outpatient.    Diagnosis: Candidal intertrigo  Assessment and Plan of Treatment: You have a rash in your groin that looks like a fungal infection called intertrigo. We prescribed a cream, clotrimazole, to treat this infection. Please continue to use this cream and follow up with your PCP outpatient.    Diagnosis: AIDS  Assessment and Plan of Treatment: You have a history of HIV and your infection is very serious and has progressed to AIDS, acquired immunodeficiency syndrome. It is very important that you follow up with our HIV clinic outpatient. This information is written in your discharge summary. HIV/AIDS can put you at risk for life-threatening infections, so you are being discharged on an antibiotic, Bactrim, to protect you from infections. Please continue to take this medication and follow up with the HIV clinic.

## 2024-09-12 NOTE — PROGRESS NOTE ADULT - PROBLEM SELECTOR PLAN 10
Patient undomiciled and interested in long-term accomodation.     Plan:  - Social work consult  - Case management consult N: DASH   E: replete as necessary  DVT: eliquis  PPI: none  Code: full code  Dispo: 7U N: Regular diet with ensure +protein  E: replete as necessary  DVT: eliquis  PPI: none  Code: full code  Dispo: EMILY

## 2024-09-12 NOTE — DISCHARGE NOTE PROVIDER - ATTENDING DISCHARGE PHYSICAL EXAMINATION:
acute post-viral bronchitis vs chronic bronchitis/asthma exacerbation  dc on pred 40 x 5 days , symbicort and fu outpt provider   rvp negative  CTA negative, rll atelectasis -- saturating well on room air     homeless   undomiciled   SW fu and shelter referral     HFpEF  Chronic atrial fibrillation   continue with home medications eliquis - holding bbk for recent cocaine use     polysubstance use disorder   utox positive - cocaine   cocaine, THC and etoh use disorder   SBIRT , SW fu   hiv   fu ID outpt to restart HAART - recommend to cont home meds   fu viral load outpt provider and cw ppx for now as pt is not compliant w haart     dvt ppx.  physical exam: reviewed resident/students exam.  Pt refused to communicate or  examined by attending. Pt was being verbally abusive and does not appear to be in any respiratory distress prior to dc

## 2024-09-13 LAB
HIV-1 VIRAL LOAD RESULT: ABNORMAL
HIV1 RNA # SERPL NAA+PROBE: SIGNIFICANT CHANGE UP
HIV1 RNA SER-IMP: SIGNIFICANT CHANGE UP
HIV1 RNA SERPL NAA+PROBE-ACNC: ABNORMAL
HIV1 RNA SERPL NAA+PROBE-LOG#: 4.95 — SIGNIFICANT CHANGE UP

## 2024-09-14 LAB
4/8 RATIO: 0.27 RATIO — LOW (ref 0.9–3.6)
ABS CD8: 338 CELLS/UL — SIGNIFICANT CHANGE UP (ref 142–740)
CD3 BLASTS SPEC-ACNC: 460 CELLS/UL — LOW (ref 672–1870)
CD3 BLASTS SPEC-ACNC: 74 % — SIGNIFICANT CHANGE UP (ref 59–83)
CD4 %: 15 % — LOW (ref 30–62)
CD8 %: 54 % — HIGH (ref 12–36)
T-CELL CD4 SUBSET PNL BLD: 93 CELLS/UL — LOW (ref 489–1457)

## 2024-09-18 DIAGNOSIS — I08.1 RHEUMATIC DISORDERS OF BOTH MITRAL AND TRICUSPID VALVES: ICD-10-CM

## 2024-09-18 DIAGNOSIS — I27.20 PULMONARY HYPERTENSION, UNSPECIFIED: ICD-10-CM

## 2024-09-18 DIAGNOSIS — Z59.00 HOMELESSNESS UNSPECIFIED: ICD-10-CM

## 2024-09-18 DIAGNOSIS — J45.901 UNSPECIFIED ASTHMA WITH (ACUTE) EXACERBATION: ICD-10-CM

## 2024-09-18 DIAGNOSIS — Z20.822 CONTACT WITH AND (SUSPECTED) EXPOSURE TO COVID-19: ICD-10-CM

## 2024-09-18 DIAGNOSIS — F14.10 COCAINE ABUSE, UNCOMPLICATED: ICD-10-CM

## 2024-09-18 DIAGNOSIS — B20 HUMAN IMMUNODEFICIENCY VIRUS [HIV] DISEASE: ICD-10-CM

## 2024-09-18 DIAGNOSIS — Z91.013 ALLERGY TO SEAFOOD: ICD-10-CM

## 2024-09-18 DIAGNOSIS — I48.92 UNSPECIFIED ATRIAL FLUTTER: ICD-10-CM

## 2024-09-18 DIAGNOSIS — F17.200 NICOTINE DEPENDENCE, UNSPECIFIED, UNCOMPLICATED: ICD-10-CM

## 2024-09-18 DIAGNOSIS — I50.32 CHRONIC DIASTOLIC (CONGESTIVE) HEART FAILURE: ICD-10-CM

## 2024-09-18 DIAGNOSIS — Z79.899 OTHER LONG TERM (CURRENT) DRUG THERAPY: ICD-10-CM

## 2024-09-18 DIAGNOSIS — K62.89 OTHER SPECIFIED DISEASES OF ANUS AND RECTUM: ICD-10-CM

## 2024-09-18 DIAGNOSIS — J44.89 OTHER SPECIFIED CHRONIC OBSTRUCTIVE PULMONARY DISEASE: ICD-10-CM

## 2024-09-18 DIAGNOSIS — B37.2 CANDIDIASIS OF SKIN AND NAIL: ICD-10-CM

## 2024-09-18 DIAGNOSIS — I48.20 CHRONIC ATRIAL FIBRILLATION, UNSPECIFIED: ICD-10-CM

## 2024-09-18 DIAGNOSIS — B86 SCABIES: ICD-10-CM

## 2024-09-18 DIAGNOSIS — Z79.01 LONG TERM (CURRENT) USE OF ANTICOAGULANTS: ICD-10-CM

## 2024-09-18 DIAGNOSIS — C34.90 MALIGNANT NEOPLASM OF UNSPECIFIED PART OF UNSPECIFIED BRONCHUS OR LUNG: ICD-10-CM

## 2024-09-18 DIAGNOSIS — E83.42 HYPOMAGNESEMIA: ICD-10-CM

## 2024-09-18 DIAGNOSIS — J20.9 ACUTE BRONCHITIS, UNSPECIFIED: ICD-10-CM

## 2024-09-18 DIAGNOSIS — R65.10 SYSTEMIC INFLAMMATORY RESPONSE SYNDROME (SIRS) OF NON-INFECTIOUS ORIGIN WITHOUT ACUTE ORGAN DYSFUNCTION: ICD-10-CM

## 2024-09-18 SDOH — ECONOMIC STABILITY - HOUSING INSECURITY: HOMELESSNESS UNSPECIFIED: Z59.00

## 2024-09-19 ENCOUNTER — APPOINTMENT (OUTPATIENT)
Facility: CLINIC | Age: 64
End: 2024-09-19

## 2024-09-29 ENCOUNTER — INPATIENT (INPATIENT)
Facility: HOSPITAL | Age: 64
LOS: 1 days | Discharge: HOME | End: 2024-10-01
Attending: STUDENT IN AN ORGANIZED HEALTH CARE EDUCATION/TRAINING PROGRAM | Admitting: STUDENT IN AN ORGANIZED HEALTH CARE EDUCATION/TRAINING PROGRAM
Payer: MEDICARE

## 2024-09-29 VITALS
OXYGEN SATURATION: 95 % | SYSTOLIC BLOOD PRESSURE: 120 MMHG | RESPIRATION RATE: 20 BRPM | HEIGHT: 66 IN | WEIGHT: 169.98 LBS | HEART RATE: 68 BPM | DIASTOLIC BLOOD PRESSURE: 80 MMHG | TEMPERATURE: 99 F

## 2024-09-29 DIAGNOSIS — B86 SCABIES: ICD-10-CM

## 2024-09-29 DIAGNOSIS — B20 HUMAN IMMUNODEFICIENCY VIRUS [HIV] DISEASE: ICD-10-CM

## 2024-09-29 DIAGNOSIS — I48.91 UNSPECIFIED ATRIAL FIBRILLATION: ICD-10-CM

## 2024-09-29 DIAGNOSIS — I50.9 HEART FAILURE, UNSPECIFIED: ICD-10-CM

## 2024-09-29 DIAGNOSIS — F19.10 OTHER PSYCHOACTIVE SUBSTANCE ABUSE, UNCOMPLICATED: ICD-10-CM

## 2024-09-29 DIAGNOSIS — Z59.00 HOMELESSNESS UNSPECIFIED: ICD-10-CM

## 2024-09-29 DIAGNOSIS — Z29.9 ENCOUNTER FOR PROPHYLACTIC MEASURES, UNSPECIFIED: ICD-10-CM

## 2024-09-29 DIAGNOSIS — L03.90 CELLULITIS, UNSPECIFIED: ICD-10-CM

## 2024-09-29 LAB
ALBUMIN SERPL ELPH-MCNC: 2.8 G/DL — LOW (ref 3.4–5)
ALP SERPL-CCNC: 167 U/L — HIGH (ref 40–120)
ALT FLD-CCNC: 48 U/L — HIGH (ref 12–42)
ANION GAP SERPL CALC-SCNC: 6 MMOL/L — LOW (ref 9–16)
APPEARANCE UR: ABNORMAL
AST SERPL-CCNC: 53 U/L — HIGH (ref 15–37)
BACTERIA # UR AUTO: ABNORMAL /HPF
BASOPHILS # BLD AUTO: 0.01 K/UL — SIGNIFICANT CHANGE UP (ref 0–0.2)
BASOPHILS NFR BLD AUTO: 0.3 % — SIGNIFICANT CHANGE UP (ref 0–2)
BILIRUB SERPL-MCNC: 0.8 MG/DL — SIGNIFICANT CHANGE UP (ref 0.2–1.2)
BILIRUB UR-MCNC: ABNORMAL
BUN SERPL-MCNC: 8 MG/DL — SIGNIFICANT CHANGE UP (ref 7–23)
CALCIUM SERPL-MCNC: 8.6 MG/DL — SIGNIFICANT CHANGE UP (ref 8.5–10.5)
CHLORIDE SERPL-SCNC: 106 MMOL/L — SIGNIFICANT CHANGE UP (ref 96–108)
CO2 SERPL-SCNC: 28 MMOL/L — SIGNIFICANT CHANGE UP (ref 22–31)
COLOR SPEC: SIGNIFICANT CHANGE UP
CREAT SERPL-MCNC: 0.9 MG/DL — SIGNIFICANT CHANGE UP (ref 0.5–1.3)
DIFF PNL FLD: NEGATIVE — SIGNIFICANT CHANGE UP
EGFR: 95 ML/MIN/1.73M2 — SIGNIFICANT CHANGE UP
EOSINOPHIL # BLD AUTO: 0.21 K/UL — SIGNIFICANT CHANGE UP (ref 0–0.5)
EOSINOPHIL NFR BLD AUTO: 5.3 % — SIGNIFICANT CHANGE UP (ref 0–6)
GLUCOSE SERPL-MCNC: 83 MG/DL — SIGNIFICANT CHANGE UP (ref 70–99)
GLUCOSE UR QL: NEGATIVE MG/DL — SIGNIFICANT CHANGE UP
HCT VFR BLD CALC: 29.8 % — LOW (ref 39–50)
HGB BLD-MCNC: 9.3 G/DL — LOW (ref 13–17)
IMM GRANULOCYTES NFR BLD AUTO: 0.3 % — SIGNIFICANT CHANGE UP (ref 0–0.9)
KETONES UR-MCNC: ABNORMAL MG/DL
LEUKOCYTE ESTERASE UR-ACNC: ABNORMAL
LYMPHOCYTES # BLD AUTO: 1.41 K/UL — SIGNIFICANT CHANGE UP (ref 1–3.3)
LYMPHOCYTES # BLD AUTO: 35.6 % — SIGNIFICANT CHANGE UP (ref 13–44)
MCHC RBC-ENTMCNC: 28.8 PG — SIGNIFICANT CHANGE UP (ref 27–34)
MCHC RBC-ENTMCNC: 31.2 GM/DL — LOW (ref 32–36)
MCV RBC AUTO: 92.3 FL — SIGNIFICANT CHANGE UP (ref 80–100)
MONOCYTES # BLD AUTO: 0.6 K/UL — SIGNIFICANT CHANGE UP (ref 0–0.9)
MONOCYTES NFR BLD AUTO: 15.2 % — HIGH (ref 2–14)
NEUTROPHILS # BLD AUTO: 1.72 K/UL — LOW (ref 1.8–7.4)
NEUTROPHILS NFR BLD AUTO: 43.3 % — SIGNIFICANT CHANGE UP (ref 43–77)
NITRITE UR-MCNC: NEGATIVE — SIGNIFICANT CHANGE UP
NRBC # BLD: 0 /100 WBCS — SIGNIFICANT CHANGE UP (ref 0–0)
NT-PROBNP SERPL-SCNC: 3818 PG/ML — HIGH
PH UR: 6.5 — SIGNIFICANT CHANGE UP (ref 5–8)
PLATELET # BLD AUTO: 220 K/UL — SIGNIFICANT CHANGE UP (ref 150–400)
POTASSIUM SERPL-MCNC: 3.7 MMOL/L — SIGNIFICANT CHANGE UP (ref 3.5–5.3)
POTASSIUM SERPL-SCNC: 3.7 MMOL/L — SIGNIFICANT CHANGE UP (ref 3.5–5.3)
PROT SERPL-MCNC: 7.4 G/DL — SIGNIFICANT CHANGE UP (ref 6.4–8.2)
PROT UR-MCNC: 100 MG/DL
RBC # BLD: 3.23 M/UL — LOW (ref 4.2–5.8)
RBC # FLD: 15.2 % — HIGH (ref 10.3–14.5)
RBC CASTS # UR COMP ASSIST: 4 /HPF — SIGNIFICANT CHANGE UP (ref 0–4)
SODIUM SERPL-SCNC: 140 MMOL/L — SIGNIFICANT CHANGE UP (ref 132–145)
SP GR SPEC: 1.02 — SIGNIFICANT CHANGE UP (ref 1–1.03)
TROPONIN I, HIGH SENSITIVITY RESULT: 195 NG/L — HIGH
TROPONIN T, HIGH SENSITIVITY RESULT: 48 NG/L — SIGNIFICANT CHANGE UP (ref 0–51)
UROBILINOGEN FLD QL: 2 MG/DL (ref 0.2–1)
WBC # BLD: 3.96 K/UL — SIGNIFICANT CHANGE UP (ref 3.8–10.5)
WBC # FLD AUTO: 3.96 K/UL — SIGNIFICANT CHANGE UP (ref 3.8–10.5)
WBC UR QL: 8 /HPF — HIGH (ref 0–5)

## 2024-09-29 PROCEDURE — 83605 ASSAY OF LACTIC ACID: CPT

## 2024-09-29 PROCEDURE — 71275 CT ANGIOGRAPHY CHEST: CPT | Mod: MC

## 2024-09-29 PROCEDURE — 83735 ASSAY OF MAGNESIUM: CPT

## 2024-09-29 PROCEDURE — 99223 1ST HOSP IP/OBS HIGH 75: CPT | Mod: GC

## 2024-09-29 PROCEDURE — 84484 ASSAY OF TROPONIN QUANT: CPT

## 2024-09-29 PROCEDURE — 71045 X-RAY EXAM CHEST 1 VIEW: CPT | Mod: 26

## 2024-09-29 PROCEDURE — 99285 EMERGENCY DEPT VISIT HI MDM: CPT | Mod: FS

## 2024-09-29 PROCEDURE — 86360 T CELL ABSOLUTE COUNT/RATIO: CPT

## 2024-09-29 PROCEDURE — 87536 HIV-1 QUANT&REVRSE TRNSCRPJ: CPT

## 2024-09-29 PROCEDURE — 83880 ASSAY OF NATRIURETIC PEPTIDE: CPT

## 2024-09-29 PROCEDURE — 82803 BLOOD GASES ANY COMBINATION: CPT

## 2024-09-29 PROCEDURE — 96374 THER/PROPH/DIAG INJ IV PUSH: CPT

## 2024-09-29 PROCEDURE — 85025 COMPLETE CBC W/AUTO DIFF WBC: CPT

## 2024-09-29 PROCEDURE — 80307 DRUG TEST PRSMV CHEM ANLYZR: CPT

## 2024-09-29 PROCEDURE — 86900 BLOOD TYPING SEROLOGIC ABO: CPT

## 2024-09-29 PROCEDURE — 94640 AIRWAY INHALATION TREATMENT: CPT

## 2024-09-29 PROCEDURE — 87591 N.GONORRHOEAE DNA AMP PROB: CPT

## 2024-09-29 PROCEDURE — 86359 T CELLS TOTAL COUNT: CPT

## 2024-09-29 PROCEDURE — 86403 PARTICLE AGGLUT ANTBDY SCRN: CPT

## 2024-09-29 PROCEDURE — 96376 TX/PRO/DX INJ SAME DRUG ADON: CPT

## 2024-09-29 PROCEDURE — 86901 BLOOD TYPING SEROLOGIC RH(D): CPT

## 2024-09-29 PROCEDURE — 96375 TX/PRO/DX INJ NEW DRUG ADDON: CPT

## 2024-09-29 PROCEDURE — 99285 EMERGENCY DEPT VISIT HI MDM: CPT | Mod: 25

## 2024-09-29 PROCEDURE — 80053 COMPREHEN METABOLIC PANEL: CPT

## 2024-09-29 PROCEDURE — 86850 RBC ANTIBODY SCREEN: CPT

## 2024-09-29 PROCEDURE — 87491 CHLMYD TRACH DNA AMP PROBE: CPT

## 2024-09-29 PROCEDURE — 84443 ASSAY THYROID STIM HORMONE: CPT

## 2024-09-29 PROCEDURE — 36415 COLL VENOUS BLD VENIPUNCTURE: CPT

## 2024-09-29 PROCEDURE — 71045 X-RAY EXAM CHEST 1 VIEW: CPT

## 2024-09-29 PROCEDURE — 0225U NFCT DS DNA&RNA 21 SARSCOV2: CPT

## 2024-09-29 PROCEDURE — 87521 HEPATITIS C PROBE&RVRS TRNSC: CPT

## 2024-09-29 PROCEDURE — 87637 SARSCOV2&INF A&B&RSV AMP PRB: CPT

## 2024-09-29 PROCEDURE — 84100 ASSAY OF PHOSPHORUS: CPT

## 2024-09-29 PROCEDURE — 82962 GLUCOSE BLOOD TEST: CPT

## 2024-09-29 PROCEDURE — G0378: CPT

## 2024-09-29 PROCEDURE — 86140 C-REACTIVE PROTEIN: CPT

## 2024-09-29 PROCEDURE — 74177 CT ABD & PELVIS W/CONTRAST: CPT | Mod: 26,MC

## 2024-09-29 RX ORDER — BENZONATATE 150 MG/1
100 CAPSULE ORAL EVERY 8 HOURS
Refills: 0 | Status: DISCONTINUED | OUTPATIENT
Start: 2024-09-29 | End: 2024-09-30

## 2024-09-29 RX ORDER — FUROSEMIDE 10 MG/ML
40 INJECTION INTRAVENOUS ONCE
Refills: 0 | Status: COMPLETED | OUTPATIENT
Start: 2024-09-29 | End: 2024-09-29

## 2024-09-29 RX ORDER — ACETAMINOPHEN 325 MG
650 TABLET ORAL EVERY 6 HOURS
Refills: 0 | Status: DISCONTINUED | OUTPATIENT
Start: 2024-09-29 | End: 2024-09-30

## 2024-09-29 RX ORDER — AMPICILLIN, SULBACTAM 250; 125 MG/ML; MG/ML
3 INJECTION, POWDER, FOR SOLUTION INTRAMUSCULAR; INTRAVENOUS ONCE
Refills: 0 | Status: COMPLETED | OUTPATIENT
Start: 2024-09-29 | End: 2024-09-29

## 2024-09-29 RX ORDER — VANCOMYCIN HCL-SODIUM CHLORIDE IV SOLN 1.5 GM/250ML-0.9% 1.5-0.9/25 GM/ML-%
1000 SOLUTION INTRAVENOUS ONCE
Refills: 0 | Status: COMPLETED | OUTPATIENT
Start: 2024-09-29 | End: 2024-09-29

## 2024-09-29 RX ORDER — PERMETHRIN 1 MG/100ML
1 LOTION TOPICAL ONCE
Refills: 0 | Status: COMPLETED | OUTPATIENT
Start: 2024-09-29 | End: 2024-09-29

## 2024-09-29 RX ADMIN — AMPICILLIN, SULBACTAM 200 GRAM(S): 250; 125 INJECTION, POWDER, FOR SOLUTION INTRAMUSCULAR; INTRAVENOUS at 16:45

## 2024-09-29 RX ADMIN — VANCOMYCIN HCL-SODIUM CHLORIDE IV SOLN 1.5 GM/250ML-0.9% 250 MILLIGRAM(S): 1.5-0.9/25 SOLUTION at 09:41

## 2024-09-29 RX ADMIN — PERMETHRIN 1 APPLICATION(S): 1 LOTION TOPICAL at 14:37

## 2024-09-29 RX ADMIN — FUROSEMIDE 40 MILLIGRAM(S): 10 INJECTION INTRAVENOUS at 10:42

## 2024-09-29 RX ADMIN — AMPICILLIN, SULBACTAM 200 GRAM(S): 250; 125 INJECTION, POWDER, FOR SOLUTION INTRAMUSCULAR; INTRAVENOUS at 09:07

## 2024-09-29 SDOH — ECONOMIC STABILITY - HOUSING INSECURITY: HOMELESSNESS UNSPECIFIED: Z59.00

## 2024-09-29 NOTE — ED ADULT TRIAGE NOTE - HEIGHT IN CM
----- Message from Nehemiah Neri sent at 5/31/2017  7:13 AM CDT -----  Regarding: RE: Benefits please.  she's has [employer] Medicare, she's good to proceed.     ----- Message -----  From: Rhoda Perez RN  Sent: 5/30/2017   6:47 PM  To: Rhoda Perez RN, Nehemiah Neri  Subject: Benefits please.                                 Thanks!     [phone]

## 2024-09-29 NOTE — H&P ADULT - PROBLEM SELECTOR PLAN 7
N: DASH   E: replete as necessary for K >4, Phos >3, Mg >2  DVT: Eliquis History of cocaine, THC and alcohol abuse; last drink 9/20/24, patient reluctant to corroborate or provide additional collateral; timeline/presentation not concerning for withdrawal at this time. Utox this admission positive for cocaine.     Plan:  - SBIRT consult

## 2024-09-29 NOTE — ED ADULT NURSE REASSESSMENT NOTE - NS ED NURSE REASSESS COMMENT FT1
Pt agrees to application of permethrin cream at this time. Cream applied to pt, pt belongings placed in two bags and changed into gown. [FreeTextEntry1] : Depo Provera given today\par Condoms for safe sex\par Follow up 1/26 - 2/6/2023 for next Depo

## 2024-09-29 NOTE — H&P ADULT - ATTENDING COMMENTS
64 y.o. M undomiciled, noncompliant with meds, hx of polysubstance abuse (cocaine/THC), PMHx of HIV/AIDs (noncompliant w/ HAART; reported + cryptococcal antigen in the past), Afib/flutter (noncompliant w/ Eliquis), severe MR/mod-severe TR, HFpEF (EF 55% by TTE 11/22), lung CA not on any treatment presenting with groin pain/rash worsening since prior admission likely 2/2 intertrigo, vs superimposed cellulitis     Pt is seen at bedside. visibly upset, yelling at provider that he does not want to talk anymore or be examined. Refused vitals. Pt is explained that in order to be treated properly, he would need to allow us to examine and not refuse medical care. failure to do so can result in worsening disease and death. Pt states "I don't care, im going to die anyway:, continues to refuse exam. Able to briefly visualize groin c/w intertrigo, possible superimposed cellulitis. moving all extremities, speaking in full sentence. unable to examine further. history/exam obtained from housestaff     #Intertrigo   #r/o superimposed cellulitis   #HIV/AIDS   #Scabies   #Severe MR    Plan   -c/w Ketoconazole 2% topical cream to affected area   -c/w abx as above for possible superimposed cellulitis  -CTAP w/o gas/abscess or fat stranding   -permethrin wash   -VL/CD4  -ID consult in am   -c/w bactrim for ppx   -s/p lasix 40mg IVP, consider additional diuresis prn   -strict I/Os, caution w/ fluids 64 y.o. M undomiciled, noncompliant with meds, hx of polysubstance abuse (cocaine/THC), PMHx of HIV/AIDs (noncompliant w/ HAART; reported + cryptococcal antigen in the past), Afib/flutter (noncompliant w/ Eliquis), severe MR/mod-severe TR, HFpEF (EF 55% by TTE 11/22), lung CA not on any treatment presenting with groin pain/rash worsening since prior admission likely 2/2 intertrigo, vs superimposed cellulitis     Pt is seen at bedside. visibly upset, yelling at provider that he does not want to talk anymore or be examined. Refused vitals. Pt is explained that in order to be treated properly, he would need to allow us to examine and not refuse medical care. failure to do so can result in worsening disease and death. Pt states "I don't care, im going to die anyway:, continues to refuse exam. Able to briefly visualize groin c/w intertrigo, possible superimposed cellulitis. moving all extremities, speaking in full sentence. unable to examine further. history/exam obtained from housestaff     #Candidal Intertrigo   #r/o superimposed cellulitis   #HIV/AIDS   #Scabies   #Severe MR    Plan   -c/w Ketoconazole 2% topical cream to affected area   -c/w abx as above for possible superimposed cellulitis  -CTAP w/o gas/abscess or fat stranding   -permethrin wash   -VL/CD4  -ID consult in am   -c/w bactrim for ppx   -s/p lasix 40mg IVP, consider additional diuresis prn   -strict I/Os, caution w/ fluids

## 2024-09-29 NOTE — ED PROVIDER NOTE - OBJECTIVE STATEMENT
64M undomiciled, noncompliant with meds, hx of polysubstance abuse (cocaine/THC), PMHx of HIV/AIDs (noncompliant w/ HAART; reported + cryptococcal antigen in the past), Afib/flutter (noncompliant w/ Eliquis), severe MR/mod-severe TR, HFpEF (EF 55% by TTE 11/2022), lung CA not on any treatment presenting to ED w/ c/o groin pain and swelling and rash. low grade fever noted. no dysuria, hematuria, or concern for STIs.

## 2024-09-29 NOTE — PATIENT PROFILE ADULT - HOW PATIENT ADDRESSED, PROFILE
Mr Elias Lewis ( patient is not a good historian. He refused to be checked. He covered himself completely from head to toe with a sheet )

## 2024-09-29 NOTE — ED PROVIDER NOTE - NS ED MD DISPO ADMITTING SERVICE
MED pt is a 74 y.o. female from home c/o sob that worsen the last few days, pt is ax4, on 6LNC, pt reports wearing supplemental oxygen, but for the past few days she has not been able to walk to the restroom due to labored breathing. pt was dx w/ lung cancer 2 months ago, and pt is scheduled to have a procedure tomorrow morning, family is at bedside, no other concerns are noted.

## 2024-09-29 NOTE — ED ADULT NURSE REASSESSMENT NOTE - NS ED NURSE REASSESS COMMENT FT1
Pt is A&Ox4 at this time and refusing EKG. Pt states "I want to sleep right now and I don't want anything done to me." Pt refusing cardiac monitoring and pulse oximetry. NICKI Ferraro made aware.

## 2024-09-29 NOTE — ED ADULT NURSE REASSESSMENT NOTE - NS ED NURSE REASSESS COMMENT FT1
Pt is A&Ox4 and continuing to refuse VS at this time. Pt is agreeing to transport to [facility]. No other medical complaints at this time.

## 2024-09-29 NOTE — ED PROVIDER NOTE - CLINICAL SUMMARY MEDICAL DECISION MAKING FREE TEXT BOX
Patient presents with complaint of swelling and pain to his groin. exam concerning for cellulitis of the groin and genitalia. no evidence of forniers at this time but given immunosupression of uncontrolled hiv/aids concern this could develop. ct shows signs of cellulitis. will admit. cased discussed with urology who are willing to consult if the clinical picture shifts to that of forniers but without evidence of gas on CT, Dr. Delcid (urology attending) recommends medicine admission. d/w Dr. Ford (medicine attending) who accepts admission.

## 2024-09-29 NOTE — ED ADULT NURSE REASSESSMENT NOTE - NS ED NURSE REASSESS COMMENT FT1
Pt is refusing VS at this time and states "Come back later. Not right now because I'm going to get mad." NICKI Ferraro made aware.

## 2024-09-29 NOTE — H&P ADULT - PROBLEM SELECTOR PLAN 6
History of cocaine, THC and alcohol abuse; patient reluctant to corroborate or provide additional collateral; presentation not concerning for withdrawal at this time. Utox this admission positive for cocaine.     Plan:  - SBIRT consult History of cocaine, THC and alcohol abuse; last drink 9/20/24, patient reluctant to corroborate or provide additional collateral; timeline/presentation not concerning for withdrawal at this time. Utox this admission positive for cocaine.     Plan:  - SBIRT consult Patient with LVEF 55% in 2022. Prescribed Lasix 20mg QD, Toprol XL 25mg QD, Valsartan 40mg QD,  Farxiga 10 mg QD however patient non-compliant w/ meds. ProBNP on admission 3K, no crackles although JVD and peripheral edema were apparent on exam, no evidence of pulmonary edema/effusions on CXR, overall low clinical suspicion for HFpEF exacerbation at this time.     Plan:   - Restart Lasix 20mg PO daily (consider re-doing IV Lasix if sob/hypoxic/evidence of vascular congestion on exam or repeat imaging), valsartan 40mg PO daily  - Hold toprol 25mg PO daily iso recent cocaine use Patient with LVEF 55% in 2022. Prescribed Lasix 20mg QD, Toprol XL 25mg QD, Valsartan 40mg QD,  Farxiga 10 mg QD however patient non-compliant w/ meds. ProBNP on admission 3K, no crackles although JVD and peripheral edema were apparent on exam, no evidence of pulmonary edema/effusions on CXR, overall low clinical suspicion for HFpEF exacerbation at this time.     Plan:   - Restart Lasix 20mg PO daily (consider re-doing IV Lasix if sob/hypoxic/evidence of vascular congestion on exam or repeat imaging), valsartan 40mg PO daily  - Hold toprol 25mg PO daily iso recent cocaine use  -Strict I/Os, caution w fluids given history of MR/TR

## 2024-09-29 NOTE — H&P ADULT - PROBLEM SELECTOR PLAN 2
Patient prescribed Biktarvy but non-compliant; most recent VL/Abs CD4 9/12/24 89K/93. Prior ID notes document positive cryptococcal agent but results from Sept '24, Nov '23 and Aug '22 negative. Low concern for active opportunistic or AIDS defining illnesses at this time however patient will benefit from PCP ppx given most recent CD4 count.     Plan:  - non complaint w/ biktarvy   - Bactrim DS PO daily for PCP ppx. History of multiple prior admissions for inguinal rash that began Oct '23. Has been treated with ketoconazole and clotrimazole for candidal intertrigo, however never completed course as patient AMA'd/non-compliant with medications upon dc. Now presenting again with groin pain, swelling and rash, CTAP with marked edema in the scrotal and penile region without discrete drainable fluid collections, c/f candidal intertrigo with superimposed cellulitis. No evidence of ledy's gangrene at this time, however given AIDS, high risk for developing. Does not meet SIRS or sepsis criteria. Case discussed with Dr. Delcid (urology) in the ED, will consult if the clinical picture becomes concerning for ledy's.    Plan:  - C/w IV Vanc (has hx of MRSA bacteremia) and CTX  - Start Ketoconazole 2% topical cream applied to affected areas daily for 2 weeks for intertrigo   - Consider ID consult in AM  - Consider stat Urology consult if concern arises for ledy's gangrene History of multiple prior admissions for inguinal rash that began Oct '23. Has been treated with ketoconazole and clotrimazole for candidal intertrigo, however never completed course as patient AMA'd/non-compliant with medications upon dc. Now presenting again with groin pain, swelling and rash.  - Start Ketoconazole 2% topical cream applied to affected areas daily for 2 weeks for intertrigo

## 2024-09-29 NOTE — ED ADULT NURSE NOTE - NSFALLRISKINTERV_ED_ALL_ED
Assistance OOB with selected safe patient handling equipment if applicable/Assistance with ambulation/Communicate fall risk and risk factors to all staff, patient, and family/Monitor gait and stability/Provide visual cue: yellow wristband, yellow gown, etc/Reinforce activity limits and safety measures with patient and family/Call bell, personal items and telephone in reach/Instruct patient to call for assistance before getting out of bed/chair/stretcher/Non-slip footwear applied when patient is off stretcher/[place] to call system/Physically safe environment - no spills, clutter or unnecessary equipment/Purposeful Proactive Rounding/Room/bathroom lighting operational, light cord in reach

## 2024-09-29 NOTE — H&P ADULT - PROBLEM SELECTOR PLAN 1
History of multiple prior admissions for inguinal rash x several months. Most recently, treated with clotrimazole for candidal intertrigo, however incomplete as patient AMA'd. Now presenting with groin pain, swelling and rash, c/f candidal intertrigo with superimposed cellulitis. No evidence of ledy's gangrene on CT at this time, however given AIDS, high risk for developing. Case discussed with Dr. Delcid (urology) in the ED, will consult if the clinical picture becomes concerning for ledy's.    Plan:  - C/w IV Vanc and Unasyn   - C/w clotrimazole  - Consider ID consult in AM History of multiple prior admissions for inguinal rash that began Oct '23. Has been treated with ketoconazole and clotrimazole for candidal intertrigo, however never completed course as patient AMA'd/non-compliant with medications upon dc. Now presenting again with groin pain, swelling and rash, CTAP with marked edema in the scrotal and penile region without discrete drainable fluid collections, c/f candidal intertrigo with superimposed cellulitis. No evidence of ledy's gangrene at this time, however given AIDS, high risk for developing. Does not meet SIRS or sepsis criteria. Case discussed with Dr. Delcid (urology) in the ED, will consult if the clinical picture becomes concerning for ledy's.    Plan:  - C/w IV Vanc (has hx of MRSA bacteremia) and Unasyn for broad spectrum coverage   - Start Ketoconazole 2% topical cream applied to affected areas daily for 2 weeks for intertrigo   - Consider ID consult in AM  - Consider stat Urology consult if concern arises for ledy's gangrene History of multiple prior admissions for inguinal rash that began Oct '23. Has been treated with ketoconazole and clotrimazole for candidal intertrigo, however never completed course as patient AMA'd/non-compliant with medications upon dc. Now presenting again with groin pain, swelling and rash.  - Start Ketoconazole 2% topical cream applied to affected areas daily for 2 weeks for intertrigo History of multiple prior admissions for inguinal rash that began Oct '23. Has been treated with ketoconazole and clotrimazole for candidal intertrigo, however never completed course as patient AMA'd/non-compliant with medications upon dc. Now presenting again with groin pain, swelling and rash, CTAP with marked edema in the scrotal and penile region without discrete drainable fluid collections, c/f candidal intertrigo with superimposed cellulitis. No evidence of ledy's gangrene at this time, however given AIDS, high risk for developing. Does not meet SIRS or sepsis criteria. Case discussed with Dr. Delcid (urology) in the ED, will consult if the clinical picture becomes concerning for ledy's.    Plan:  - C/w IV Vanc (has hx of MRSA bacteremia) and CTX  - Start Ketoconazole 2% topical cream applied to affected areas daily for 2 weeks for intertrigo   - Consider ID consult in AM  - Consider stat Urology consult if concern arises for ledy's gangrene

## 2024-09-29 NOTE — H&P ADULT - ASSESSMENT
Patient is a 64 y.o. M undomiciled, noncompliant with meds, hx of polysubstance abuse (cocaine/THC), PMHx of HIV/AIDs (noncompliant w/ HAART; reported + cryptococcal antigen in the past), Afib/flutter (noncompliant w/ Eliquis), severe MR/mod-severe TR, HFpEF (EF 55% by TTE 11/22), lung CA not on any treatment presenting with groin pain, swelling and rash c/f cellulitis.  Patient is a 64 y.o. M undomiciled, noncompliant with meds, hx of polysubstance abuse (cocaine/THC), PMHx of HIV/AIDs (noncompliant w/ HAART; reported + cryptococcal antigen in the past), Afib/flutter (noncompliant w/ Eliquis), severe MR/mod-severe TR, HFpEF (EF 55% by TTE 11/22), lung CA not on any treatment presenting with groin pain, swelling and rash c/f intertrigo with superimposed cellulitis.

## 2024-09-29 NOTE — ED ADULT NURSE REASSESSMENT NOTE - NS ED NURSE REASSESS COMMENT FT1
Pt received from OWEN Glynn. Pt is A&Ox4 and reports no additional complaints at this time. IV is dry, intact, and flushing well. Pt receiving IV abx as per orders at this time. Pt offered comfort items and plan of care discussed.

## 2024-09-29 NOTE — H&P ADULT - NSHPLABSRESULTS_GEN_ALL_CORE
9.3    3.96  )-----------( 220      ( 29 Sep 2024 08:29 )             29.8           140  |  106  |  8   ----------------------------<  83  3.7   |  28  |  0.90    Ca    8.6      29 Sep 2024 08:29    TPro  7.4  /  Alb  2.8[L]  /  TBili  0.8  /  DBili  x   /  AST  53[H]  /  ALT  48[H]  /  AlkPhos  167[H]                Urinalysis Basic - ( 29 Sep 2024 08:29 )    Color: Dark Yellow / Appearance: Cloudy / S.021 / pH: x  Gluc: 83 mg/dL / Ketone: Trace mg/dL  / Bili: Small / Urobili: 2.0 mg/dL   Blood: x / Protein: 100 mg/dL / Nitrite: Negative   Leuk Esterase: Trace / RBC: 4 /HPF / WBC 8 /HPF   Sq Epi: x / Non Sq Epi: x / Bacteria: Moderate /HPF

## 2024-09-29 NOTE — H&P ADULT - NSHPPHYSICALEXAM_GEN_ALL_CORE
VITAL SIGNS:  T(C): 37.7 (09-29-24 @ 18:53), Max: 37.7 (09-29-24 @ 18:53)  T(F): 99.9 (09-29-24 @ 18:53), Max: 99.9 (09-29-24 @ 18:53)  HR: 98 (09-29-24 @ 18:53) (68 - 98)  BP: 144/82 (09-29-24 @ 18:53) (120/80 - 144/82)  BP(mean): --  RR: 18 (09-29-24 @ 18:53) (18 - 20)  SpO2: 95% (09-29-24 @ 18:53) (95% - 95%)  Wt(kg): --    PHYSICAL EXAM:    Constitutional: resting comfortably in bed; NAD  Head: NC/AT  Eyes: PERRL, EOMI, anicteric sclera  Respiratory: bilateral course rhonchi; no wheezing, no retractions  Cardiac: +S1/S2; no M/R/G  Gastrointestinal: abdomen soft, NT/ND; no rebound or guarding  Extremities: WWP, no clubbing or cyanosis; no peripheral edema  Musculoskeletal: NROM x4; no joint swelling, tenderness or erythema  Vascular: 2+ radial, DP/PT pulses B/L  Dermatologic: Hyperpigmented macerated plaques over inguinal folds and penis   Neurologic: AAOx3; CNII-XII grossly intact; no focal deficits VITAL SIGNS:  T(C): 37.7 (09-29-24 @ 18:53), Max: 37.7 (09-29-24 @ 18:53)  T(F): 99.9 (09-29-24 @ 18:53), Max: 99.9 (09-29-24 @ 18:53)  HR: 98 (09-29-24 @ 18:53) (68 - 98)  BP: 144/82 (09-29-24 @ 18:53) (120/80 - 144/82)  BP(mean): --  RR: 18 (09-29-24 @ 18:53) (18 - 20)  SpO2: 95% (09-29-24 @ 18:53) (95% - 95%)  Wt(kg): --    PHYSICAL EXAM:    Constitutional: resting comfortably in bed; NAD  Respiratory: bilateral course rhonchi; no wheezing, no retractions  Cardiac: +S1/S2; murmur noted, no R/G  Gastrointestinal: abdomen soft, NT/ND; no rebound or guarding  Extremities: WWP, no clubbing or cyanosis; peripheral edema noted, pt refused palpation   Dermatologic: Hyperpigmented macerated plaques over inguinal folds and penis   Neurologic: AAOx3; no focal deficits

## 2024-09-29 NOTE — H&P ADULT - HISTORY OF PRESENT ILLNESS
Patient is a 64 y.o. M undomiciled, noncompliant with meds, hx of polysubstance abuse (cocaine/THC), PMHx of HIV/AIDs (noncompliant w/ HAART; reported + cryptococcal antigen in the past), Afib/flutter (noncompliant w/ Eliquis), severe MR/mod-severe TR, HFpEF (EF 55% by TTE 11/22), lung CA not on any treatment presenting to ED with groin pain, swelling and rash. Patient is a reluctant to provide any additional history at this time.    Patient is a 64 y.o. M undomiciled, noncompliant with meds, hx of polysubstance abuse (cocaine/THC), PMHx of HIV/AIDs (noncompliant w/ HAART; reported + cryptococcal antigen in the past), Afib (noncompliant w/ Eliquis), severe MR/mod-severe TR, HFpEF (EF 55% by TTE 11/22), lung CA not on any treatment presenting to ED with groin pain, swelling and rash. States onset was several months ago; per chart review onset appears to be 10/23. Reports associated mild burning and itchiness since initial onset. Denies spread of rash since initial onset as well. Has not completed antifungal treatment throughout multiple admissions, he tends to leave AMA. Currently denies any pain, fever, chills, chest pain, sob, abd pain.       ED course:  T 99.1, HR 68, /80, RR 20, SpO2 95 on RA  Labs: Trop I 195, albumin 2.8, , AST/ALT 53/48  EKG: Afib, T wave inversions V5-V6, unchanged from prior EKGs   UA trace leuk esterase, negative nitrite, WBC 8, moderate bacteria.  CTAP with extensive anasarca with marked edema in the scrotal and penile region without discrete drainable fluid collections. Marked cardiomegaly and findings consistent with third spacing.    Recieved IV Unasyn 3 g x2, IV vancomycin 1 g, IV lasix 40 mg x1, permethrin 5% cream x1

## 2024-09-29 NOTE — H&P ADULT - TIME BILLING
Bedside exam and interview      Reviewed vitals, labs      Discussed patient's plan of care with housestaff      Documentation of encounter     Excludes teaching time and/or separately reported services.

## 2024-09-29 NOTE — H&P ADULT - PROBLEM SELECTOR PLAN 5
Patient with LVEF 55% in 2022. Prescribed Lasix 20mg QD, Toprol XL 25mg QD, Valsartan 40mg QD,  Farxiga 10 mg QD however patient non-compliant w/ meds. ProBNP on admission 3K w/o evidence of pulmonary edema/effusions on chest imaging; low clinical suspicion for HFpEF exacerbation at this time.     Plan:   - Restart Lasix 20mg PO daily, valsartan 40mg PO daily  - Hold toprol 25mg PO daily iso recent cocaine use Patient with CHADs-VASc 2 however non-compliant w/ eliquis or toprol 25mg PO daily. EKG on admission significant for rate controlled afib.     Plan:  - Restart eliquis 5mg PO BID  - Hold toprol 25mg PO daily iso recent cocaine use

## 2024-09-29 NOTE — H&P ADULT - PROBLEM SELECTOR PLAN 3
Patient presenting with excoriations suggestive of scabies. S/p permethrin 5% x1 in ED.    Plan:   - C/w permethrin 5%  - Contact precautions. Patient prescribed Biktarvy but non-compliant; most recent VL/Abs CD4 9/12/24 89K/93. Prior ID notes document positive cryptococcal agent but results from Sept '24, Nov '23 and Aug '22 negative. Low concern for active opportunistic or AIDS defining illnesses at this time however patient will benefit from PCP ppx given most recent CD4 count.     Plan:  - non complaint w/ biktarvy; consider ID consult in AM for restarting HAART  - Bactrim DS PO daily for PCP ppx.

## 2024-09-29 NOTE — ED PROVIDER NOTE - ATTENDING APP SHARED VISIT CONTRIBUTION OF CARE
Patient presents with complaint of swelling and pain to his groin. exam concerning for cellulitis of the groin and genitalia. no evidence of forniers at this time but given immunosupression of uncontrolled hiv/aids concern this could develop. ct shows signs of cellulitis. will admit. cased discussed with urology who are willing to consult if the clinical picture shifts to that of forniers but without evidence of gas on CT, Dr. Delcid (urology attending) recommends medicine admission. d/w Dr. Ford (medicine attending) who accepts admission.  Seen with ACP Michelle CACERES.  Pt with prior scabies hx and permethrin and iso ordered.

## 2024-09-29 NOTE — H&P ADULT - PROBLEM SELECTOR PLAN 4
Patient with CHADs-VASc 2 however non-compliant w/ eliquis or toprol 25mg PO daily. Admission EKG significant for rate controlled afib.     Plan:  - Restart eliquis 5mg PO BID  - Hold toprol 25mg PO daily iso recent cocaine use Patient presenting with excoriations suggestive of scabies. S/p permethrin 5% x1 in ED.    Plan:   - C/w permethrin 5%  - Contact precautions.

## 2024-09-30 DIAGNOSIS — L30.4 ERYTHEMA INTERTRIGO: ICD-10-CM

## 2024-09-30 LAB
ADD ON TEST-SPECIMEN IN LAB: SIGNIFICANT CHANGE UP
ALBUMIN SERPL ELPH-MCNC: 3.2 G/DL — LOW (ref 3.3–5)
ALP SERPL-CCNC: 166 U/L — HIGH (ref 40–120)
ALT FLD-CCNC: 28 U/L — SIGNIFICANT CHANGE UP (ref 10–45)
ANION GAP SERPL CALC-SCNC: 10 MMOL/L — SIGNIFICANT CHANGE UP (ref 5–17)
AST SERPL-CCNC: 44 U/L — HIGH (ref 10–40)
BASOPHILS # BLD AUTO: 0.01 K/UL — SIGNIFICANT CHANGE UP (ref 0–0.2)
BASOPHILS NFR BLD AUTO: 0.3 % — SIGNIFICANT CHANGE UP (ref 0–2)
BILIRUB SERPL-MCNC: 0.6 MG/DL — SIGNIFICANT CHANGE UP (ref 0.2–1.2)
BUN SERPL-MCNC: 10 MG/DL — SIGNIFICANT CHANGE UP (ref 7–23)
CALCIUM SERPL-MCNC: 8.4 MG/DL — SIGNIFICANT CHANGE UP (ref 8.4–10.5)
CHLORIDE SERPL-SCNC: 100 MMOL/L — SIGNIFICANT CHANGE UP (ref 96–108)
CO2 SERPL-SCNC: 26 MMOL/L — SIGNIFICANT CHANGE UP (ref 22–31)
CREAT SERPL-MCNC: 0.84 MG/DL — SIGNIFICANT CHANGE UP (ref 0.5–1.3)
EGFR: 97 ML/MIN/1.73M2 — SIGNIFICANT CHANGE UP
EOSINOPHIL # BLD AUTO: 0.26 K/UL — SIGNIFICANT CHANGE UP (ref 0–0.5)
EOSINOPHIL NFR BLD AUTO: 7.4 % — HIGH (ref 0–6)
GLUCOSE SERPL-MCNC: 110 MG/DL — HIGH (ref 70–99)
HCT VFR BLD CALC: 31.7 % — LOW (ref 39–50)
HGB BLD-MCNC: 9.8 G/DL — LOW (ref 13–17)
IMM GRANULOCYTES NFR BLD AUTO: 0.3 % — SIGNIFICANT CHANGE UP (ref 0–0.9)
LYMPHOCYTES # BLD AUTO: 1.08 K/UL — SIGNIFICANT CHANGE UP (ref 1–3.3)
LYMPHOCYTES # BLD AUTO: 30.9 % — SIGNIFICANT CHANGE UP (ref 13–44)
MAGNESIUM SERPL-MCNC: 1.4 MG/DL — LOW (ref 1.6–2.6)
MCHC RBC-ENTMCNC: 29 PG — SIGNIFICANT CHANGE UP (ref 27–34)
MCHC RBC-ENTMCNC: 30.9 GM/DL — LOW (ref 32–36)
MCV RBC AUTO: 93.8 FL — SIGNIFICANT CHANGE UP (ref 80–100)
MONOCYTES # BLD AUTO: 0.58 K/UL — SIGNIFICANT CHANGE UP (ref 0–0.9)
MONOCYTES NFR BLD AUTO: 16.6 % — HIGH (ref 2–14)
NEUTROPHILS # BLD AUTO: 1.55 K/UL — LOW (ref 1.8–7.4)
NEUTROPHILS NFR BLD AUTO: 44.5 % — SIGNIFICANT CHANGE UP (ref 43–77)
NRBC # BLD: 0 /100 WBCS — SIGNIFICANT CHANGE UP (ref 0–0)
PHOSPHATE SERPL-MCNC: 3 MG/DL — SIGNIFICANT CHANGE UP (ref 2.5–4.5)
PLATELET # BLD AUTO: 214 K/UL — SIGNIFICANT CHANGE UP (ref 150–400)
POTASSIUM SERPL-MCNC: 3.8 MMOL/L — SIGNIFICANT CHANGE UP (ref 3.5–5.3)
POTASSIUM SERPL-SCNC: 3.8 MMOL/L — SIGNIFICANT CHANGE UP (ref 3.5–5.3)
PROT SERPL-MCNC: 7 G/DL — SIGNIFICANT CHANGE UP (ref 6–8.3)
RAPID RVP RESULT: SIGNIFICANT CHANGE UP
RBC # BLD: 3.38 M/UL — LOW (ref 4.2–5.8)
RBC # FLD: 15.3 % — HIGH (ref 10.3–14.5)
SARS-COV-2 RNA SPEC QL NAA+PROBE: SIGNIFICANT CHANGE UP
SODIUM SERPL-SCNC: 136 MMOL/L — SIGNIFICANT CHANGE UP (ref 135–145)
TROPONIN T, HIGH SENSITIVITY RESULT: 41 NG/L — SIGNIFICANT CHANGE UP (ref 0–51)
WBC # BLD: 3.49 K/UL — LOW (ref 3.8–10.5)
WBC # FLD AUTO: 3.49 K/UL — LOW (ref 3.8–10.5)

## 2024-09-30 PROCEDURE — 99233 SBSQ HOSP IP/OBS HIGH 50: CPT | Mod: GC

## 2024-09-30 RX ORDER — VANCOMYCIN HCL-SODIUM CHLORIDE IV SOLN 1.5 GM/250ML-0.9% 1.5-0.9/25 GM/ML-%
1250 SOLUTION INTRAVENOUS ONCE
Refills: 0 | Status: COMPLETED | OUTPATIENT
Start: 2024-09-30 | End: 2024-09-30

## 2024-09-30 RX ORDER — THIAMINE HYDROCHLORIDE 100 MG/ML
100 INJECTION, SOLUTION INTRAMUSCULAR; INTRAVENOUS ONCE
Refills: 0 | Status: COMPLETED | OUTPATIENT
Start: 2024-09-30 | End: 2024-09-30

## 2024-09-30 RX ORDER — TIOTROPIUM BROMIDE INHALATION SPRAY 3.12 UG/1
2 SPRAY, METERED RESPIRATORY (INHALATION) DAILY
Refills: 0 | Status: DISCONTINUED | OUTPATIENT
Start: 2024-09-30 | End: 2024-09-30

## 2024-09-30 RX ORDER — VANCOMYCIN HCL-SODIUM CHLORIDE IV SOLN 1.5 GM/250ML-0.9% 1.5-0.9/25 GM/ML-%
1000 SOLUTION INTRAVENOUS EVERY 12 HOURS
Refills: 0 | Status: DISCONTINUED | OUTPATIENT
Start: 2024-09-30 | End: 2024-09-30

## 2024-09-30 RX ORDER — ALBUTEROL 90 MCG
2 AEROSOL (GRAM) INHALATION EVERY 6 HOURS
Refills: 0 | Status: DISCONTINUED | OUTPATIENT
Start: 2024-09-30 | End: 2024-10-01

## 2024-09-30 RX ORDER — ALBUTEROL 90 MCG
2 AEROSOL (GRAM) INHALATION EVERY 6 HOURS
Refills: 0 | Status: DISCONTINUED | OUTPATIENT
Start: 2024-09-30 | End: 2024-09-30

## 2024-09-30 RX ORDER — PERMETHRIN 1 MG/100ML
1 LOTION TOPICAL EVERY 24 HOURS
Refills: 0 | Status: DISCONTINUED | OUTPATIENT
Start: 2024-09-30 | End: 2024-10-01

## 2024-09-30 RX ORDER — FLUTICASONE PROPION/SALMETEROL 100-50 MCG
1 BLISTER, WITH INHALATION DEVICE INHALATION EVERY 12 HOURS
Refills: 0 | Status: DISCONTINUED | OUTPATIENT
Start: 2024-09-30 | End: 2024-10-01

## 2024-09-30 RX ORDER — VANCOMYCIN HCL-SODIUM CHLORIDE IV SOLN 1.5 GM/250ML-0.9% 1.5-0.9/25 GM/ML-%
1000 SOLUTION INTRAVENOUS ONCE
Refills: 0 | Status: COMPLETED | OUTPATIENT
Start: 2024-09-30 | End: 2024-09-30

## 2024-09-30 RX ORDER — MULTI VITAMIN/MINERAL SUPPLEMENT WITH ASCORBIC ACID, NIACIN, PYRIDOXINE, PANTOTHENIC ACID, FOLIC ACID, RIBOFLAVIN, THIAMIN, BIOTIN, COBALAMIN AND ZINC. 60; 20; 12.5; 10; 10; 1.7; 1.5; 1; .3; .006 MG/1; MG/1; MG/1; MG/1; MG/1; MG/1; MG/1; MG/1; MG/1; MG/1
1 TABLET, COATED ORAL EVERY 24 HOURS
Refills: 0 | Status: DISCONTINUED | OUTPATIENT
Start: 2024-09-30 | End: 2024-10-01

## 2024-09-30 RX ORDER — ACETAMINOPHEN 325 MG
650 TABLET ORAL EVERY 6 HOURS
Refills: 0 | Status: DISCONTINUED | OUTPATIENT
Start: 2024-09-30 | End: 2024-10-01

## 2024-09-30 RX ORDER — CEFTRIAXONE SODIUM 1 G
1000 VIAL (EA) INJECTION EVERY 24 HOURS
Refills: 0 | Status: DISCONTINUED | OUTPATIENT
Start: 2024-09-30 | End: 2024-10-01

## 2024-09-30 RX ORDER — FLUTICASONE PROPION/SALMETEROL 100-50 MCG
1 BLISTER, WITH INHALATION DEVICE INHALATION
Refills: 0 | Status: DISCONTINUED | OUTPATIENT
Start: 2024-09-30 | End: 2024-09-30

## 2024-09-30 RX ORDER — VALSARTAN 320 MG/1
40 TABLET ORAL DAILY
Refills: 0 | Status: DISCONTINUED | OUTPATIENT
Start: 2024-09-30 | End: 2024-10-01

## 2024-09-30 RX ORDER — MAGNESIUM SULFATE 500 MG/ML
2 VIAL (ML) INJECTION ONCE
Refills: 0 | Status: COMPLETED | OUTPATIENT
Start: 2024-09-30 | End: 2024-09-30

## 2024-09-30 RX ORDER — BENZONATATE 150 MG/1
100 CAPSULE ORAL EVERY 8 HOURS
Refills: 0 | Status: DISCONTINUED | OUTPATIENT
Start: 2024-09-30 | End: 2024-10-01

## 2024-09-30 RX ORDER — FUROSEMIDE 10 MG/ML
20 INJECTION INTRAVENOUS DAILY
Refills: 0 | Status: DISCONTINUED | OUTPATIENT
Start: 2024-09-30 | End: 2024-10-01

## 2024-09-30 RX ORDER — APIXABAN 5 MG/1
5 TABLET, FILM COATED ORAL EVERY 12 HOURS
Refills: 0 | Status: DISCONTINUED | OUTPATIENT
Start: 2024-09-30 | End: 2024-10-01

## 2024-09-30 RX ORDER — CEFTRIAXONE SODIUM 1 G
1000 VIAL (EA) INJECTION EVERY 24 HOURS
Refills: 0 | Status: DISCONTINUED | OUTPATIENT
Start: 2024-09-30 | End: 2024-09-30

## 2024-09-30 RX ORDER — FOLIC ACID 1 MG/1
1 TABLET ORAL EVERY 24 HOURS
Refills: 0 | Status: DISCONTINUED | OUTPATIENT
Start: 2024-09-30 | End: 2024-10-01

## 2024-09-30 RX ADMIN — BENZONATATE 100 MILLIGRAM(S): 150 CAPSULE ORAL at 07:02

## 2024-09-30 RX ADMIN — APIXABAN 5 MILLIGRAM(S): 5 TABLET, FILM COATED ORAL at 16:21

## 2024-09-30 RX ADMIN — Medication 25 GRAM(S): at 16:21

## 2024-09-30 RX ADMIN — Medication 500 MICROGRAM(S): at 15:15

## 2024-09-30 RX ADMIN — VANCOMYCIN HCL-SODIUM CHLORIDE IV SOLN 1.5 GM/250ML-0.9% 250 MILLIGRAM(S): 1.5-0.9/25 SOLUTION at 09:43

## 2024-09-30 RX ADMIN — THIAMINE HYDROCHLORIDE 100 MILLIGRAM(S): 100 INJECTION, SOLUTION INTRAMUSCULAR; INTRAVENOUS at 16:21

## 2024-09-30 RX ADMIN — BENZONATATE 100 MILLIGRAM(S): 150 CAPSULE ORAL at 15:14

## 2024-09-30 RX ADMIN — Medication 1 DOSE(S): at 16:22

## 2024-09-30 RX ADMIN — Medication 2 PUFF(S): at 16:22

## 2024-09-30 RX ADMIN — Medication 1 APPLICATION(S): at 23:24

## 2024-09-30 RX ADMIN — FOLIC ACID 1 MILLIGRAM(S): 1 TABLET ORAL at 16:21

## 2024-09-30 RX ADMIN — MULTI VITAMIN/MINERAL SUPPLEMENT WITH ASCORBIC ACID, NIACIN, PYRIDOXINE, PANTOTHENIC ACID, FOLIC ACID, RIBOFLAVIN, THIAMIN, BIOTIN, COBALAMIN AND ZINC. 1 TABLET(S): 60; 20; 12.5; 10; 10; 1.7; 1.5; 1; .3; .006 TABLET, COATED ORAL at 16:21

## 2024-09-30 NOTE — DISCHARGE NOTE PROVIDER - HOSPITAL COURSE
#Discharge: do not delete    GRETCHEN MORALES is a 64y Male with a past medical history of _____    Presented with _____, found to have _____    Problem List/Main Diagnoses (system-based):   #     #     #    Inpatient treatment course:     New medications:   Labs to be followed outpatient:   Exam to be followed outpatient:       LABS & STUDIES:  SARS-CoV-2: NotDetec (11 Sep 2024 20:56)  SARS-CoV-2: NotDetec (27 Jun 2024 16:04)   #Discharge: do not delete    GRETCHEN MORALES is a 64 y.o. M undomiciled, noncompliant with meds, hx of polysubstance abuse (cocaine/THC), PMHx of HIV/AIDs (noncompliant w/ HAART; reported + cryptococcal antigen in the past), Afib (noncompliant w/ Eliquis), severe MR/mod-severe TR, HFpEF (EF 55% by TTE 11/22), lung CA not on any treatment presenting to ED with groin pain, swelling and rash. States onset was several months ago; per chart review onset appears to be 10/23. Reports associated mild burning and itchiness since initial onset. Denies spread of rash since initial onset as well. Has not completed antifungal treatment throughout multiple admissions, he tends to leave AMA. Currently denies any pain, fever, chills, chest pain, sob, abd pain.     Problem List/Main Diagnoses (system-based):   #     #     #    Inpatient treatment course:     New medications:   Labs to be followed outpatient:   Exam to be followed outpatient:       LABS & STUDIES:  SARS-CoV-2: NotDetec (11 Sep 2024 20:56)  SARS-CoV-2: NotDetec (27 Jun 2024 16:04)   #Discharge: do not delete    GRETCHEN MORALES is a 64 y.o. M undomiciled, noncompliant with meds, hx of polysubstance abuse (cocaine/THC), PMHx of HIV/AIDs (noncompliant w/ HAART; reported + cryptococcal antigen in the past), Afib (noncompliant w/ Eliquis), severe MR/mod-severe TR, HFpEF (EF 55% by TTE 11/22), lung CA not on any treatment presenting to ED with groin pain, swelling and rash. States onset was several months ago; per chart review onset appears to be 10/23. Reports associated mild burning and itchiness since initial onset. Denies spread of rash since initial onset as well. Has not completed antifungal treatment throughout multiple admissions, he tends to leave AMA. Currently denies any pain, fever, chills, chest pain, sob, abd pain. Pt was started on Ceftriaxone, Bactrim for PJP ppx and Ketoconazole shampoo. Pt also complained of cough, however was afebrile, Chest X ray was unremarkable, RVP was negative. His cough was managed with support care with benzonantate abd ICS-LABA inhaler. Pt CD4 would was 93 and HIV-1 viral load was 00396, HCV reactive. However, pt continued to be verbally aggressive and refused physical exam, testing and medications. He is being discharged since he is hemodynamically stable, has resolving dry cough most likely a URI which doesnot warrant hospital admission and his groin rash is hyperpigmented and lichenified on exam which denotes chronicity without any evidence of acute on chronic infection. Pt is being discharged on bactrim qd for PPx.    Problem List/Main Diagnoses (system-based):   #     #     #    Inpatient treatment course:     New medications:   Labs to be followed outpatient:   Exam to be followed outpatient:       LABS & STUDIES:  SARS-CoV-2: NotDetec (11 Sep 2024 20:56)  SARS-CoV-2: NotDetec (27 Jun 2024 16:04)   #Discharge: do not delete    GRETCHEN MORALES is a 64 y.o. M undomiciled, noncompliant with meds, hx of polysubstance abuse (cocaine/THC), PMHx of HIV/AIDs (noncompliant w/ HAART; reported + cryptococcal antigen in the past), Afib (noncompliant w/ Eliquis), severe MR/mod-severe TR, HFpEF (EF 55% by TTE 11/22), lung CA not on any treatment presenting to ED with groin pain, swelling and rash. States onset was several months ago; per chart review onset appears to be 10/23. Reports associated mild burning and itchiness since initial onset. Denies spread of rash since initial onset as well. Has not completed antifungal treatment throughout multiple admissions, he tends to leave AMA. Currently denies any pain, fever, chills, chest pain, sob, abd pain. US scrotum and CT AP showed with marked edema in the scrotal and penile region without discrete drainable fluid collections. Pt was started on Ceftriaxone, Bactrim for PJP ppx and Ketoconazole shampoo. Pt also complained of cough, however was afebrile, Chest X ray was unremarkable, RVP was negative. His cough was managed with support care with benzonantate abd ICS-LABA inhaler. Pt CD4 would was 93 and HIV-1 viral load was 85197, HCV reactive. However, pt continued to be verbally aggressive and refused physical exam, testing and medications. He is being discharged since he is hemodynamically stable, has resolving dry cough most likely a URI which doesnot warrant hospital admission and his groin rash is hyperpigmented and lichenified on exam which denotes chronicity without any evidence of acute on chronic infection. Pt is being discharged on bactrim qd for PPx.    Problem List/Main Diagnoses (system-based):   # Cellulitis.   ·  Plan: History of multiple prior admissions for inguinal rash that began Oct '23. Has been treated with ketoconazole and clotrimazole for candidal intertrigo, however never completed course as patient AMA'd/non-compliant with medications upon dc. Now presenting again with groin pain, swelling and rash,   CTAP with marked edema in the scrotal and penile region without discrete drainable fluid collections, c/f candidal intertrigo with superimposed cellulitis. No evidence of ledy's gangrene at this time, however given AIDS, high risk for developing. Does not meet SIRS or sepsis criteria. Case discussed with Dr. Delcid (urology) in the ED, will consult if the clinical picture becomes concerning for ledy's. Managed with IV Vanc (has hx of MRSA bacteremia) and CTX      # Intertrigo.   ·  Plan: History of multiple prior admissions for inguinal rash that began Oct '23. Has been treated with ketoconazole and clotrimazole for candidal intertrigo, however never completed course as patient AMA'd/non-compliant with medications upon dc. Now presenting again with groin pain, swelling and rash. Ketoconazole 2% topical cream applied to affected areas daily for 2 weeks for intertrigo.        .  LABS:                         9.2    3.11  )-----------( 235      ( 01 Oct 2024 05:30 )             30.0     10-01    135  |  102  |  10  ----------------------------<  89  3.9   |  26  |  0.82    Ca    8.8      01 Oct 2024 05:30  Phos  3.4     10-01  Mg     1.6     10-01    TPro  6.9  /  Alb  3.1[L]  /  TBili  0.5  /  DBili  x   /  AST  39  /  ALT  25  /  AlkPhos  172[H]  10-01

## 2024-09-30 NOTE — DISCHARGE NOTE PROVIDER - NSDCCPTREATMENT_GEN_ALL_CORE_FT
PRINCIPAL PROCEDURE  Procedure: CT abdomen pelvis  Findings and Treatment: IMPRESSION:  Extensive anasarca with marked edema in the scrotal and penile region   without discrete drainable fluid collections.  Marked cardiomegaly and findings consistent with third spacing.

## 2024-09-30 NOTE — PROGRESS NOTE ADULT - PROBLEM SELECTOR PLAN 8
N: DASH   E: Replete as necessary for K >4, Phos >3, Mg >2  DVT: Eliquis N: DASH   E: Replete as necessary for K >4, Phos >3, Mg >2  DVT: Eliquis  -Mg is 1.4, replete

## 2024-09-30 NOTE — PROGRESS NOTE ADULT - SUBJECTIVE AND OBJECTIVE BOX
*****INCOMPLETE NOTE*****    INTERVAL HPI/OVERNIGHT EVENTS:    SUBJECTIVE: Patient seen and examined at bedside, resting comfortably in bed, and does not appear to be in any acute distress. Patient reports    Vital Signs Last 24 Hrs  T(C): 37.2 (30 Sep 2024 04:50), Max: 37.7 (29 Sep 2024 18:53)  T(F): 98.9 (30 Sep 2024 04:50), Max: 99.9 (29 Sep 2024 18:53)  HR: 96 (30 Sep 2024 04:50) (96 - 98)  BP: 125/79 (30 Sep 2024 04:50) (125/79 - 144/82)  BP(mean): --  RR: 17 (30 Sep 2024 04:50) (17 - 18)  SpO2: 96% (30 Sep 2024 04:50) (95% - 96%)    Parameters below as of 30 Sep 2024 04:50  Patient On (Oxygen Delivery Method): room air        PHYSICAL EXAM:  General: in no acute distress  Eyes: EOMI intact bilaterally. Anicteric sclerae, moist conjunctivae  HENT: Moist mucous membranes  Neck: Trachea midline, supple  Lungs: CTA B/L. No wheezes, rales, or rhonchi  Cardiovascular: RRR. No murmurs, rubs, or gallops  Abdomen: Soft, non-tender non-distended; No rebound or guarding  Extremities: WWP, No clubbing, cyanosis or edema  Neurological: Alert and oriented  Skin: Warm and dry. No obvious rash     LABS:                        9.3    3.96  )-----------( 220      ( 29 Sep 2024 08:29 )             29.8     09-29    140  |  106  |  8   ----------------------------<  83  3.7   |  28  |  0.90    Ca    8.6      29 Sep 2024 08:29    TPro  7.4  /  Alb  2.8[L]  /  TBili  0.8  /  DBili  x   /  AST  53[H]  /  ALT  48[H]  /  AlkPhos  167[H]  09-29   *****INCOMPLETE NOTE*****    INTERVAL HPI/OVERNIGHT EVENTS:    SUBJECTIVE: Patient seen and examined at bedside, resting comfortably in bed, and does not appear to be in any acute distress. Patient declined to answer any questions or be examined.     Vital Signs Last 24 Hrs  T(C): 37.2 (30 Sep 2024 04:50), Max: 37.7 (29 Sep 2024 18:53)  T(F): 98.9 (30 Sep 2024 04:50), Max: 99.9 (29 Sep 2024 18:53)  HR: 96 (30 Sep 2024 04:50) (96 - 98)  BP: 125/79 (30 Sep 2024 04:50) (125/79 - 144/82)  BP(mean): --  RR: 17 (30 Sep 2024 04:50) (17 - 18)  SpO2: 96% (30 Sep 2024 04:50) (95% - 96%)    Parameters below as of 30 Sep 2024 04:50  Patient On (Oxygen Delivery Method): room air        PHYSICAL EXAM:  General: in no acute distress  Eyes: EOMI intact bilaterally. Anicteric sclerae, moist conjunctivae  HENT: Moist mucous membranes  Neck: Trachea midline, supple  Lungs: Breathing comfortably on room air   Cardiovascular: RRR. No murmurs, rubs, or gallops  Abdomen: Unable to assess  Extremities: WWP, No clubbing, cyanosis. Nurse reported edema in lower extremities   Neurological: Alert and oriented  Skin: Warm and dry. No obvious rash     LABS:                        9.3    3.96  )-----------( 220      ( 29 Sep 2024 08:29 )             29.8     09-29    140  |  106  |  8   ----------------------------<  83  3.7   |  28  |  0.90    Ca    8.6      29 Sep 2024 08:29    TPro  7.4  /  Alb  2.8[L]  /  TBili  0.8  /  DBili  x   /  AST  53[H]  /  ALT  48[H]  /  AlkPhos  167[H]  09-29   *****INCOMPLETE NOTE*****    INTERVAL HPI/OVERNIGHT EVENTS: Pt admitted last night, no acute events overnight.,     SUBJECTIVE: Patient seen and examined at bedside, resting comfortably in bed, and does not appear to be in any acute distress. The patient declined to answer any questions and refused to participate in the examination.     Vital Signs Last 24 Hrs  T(C): 37.2 (30 Sep 2024 04:50), Max: 37.7 (29 Sep 2024 18:53)  T(F): 98.9 (30 Sep 2024 04:50), Max: 99.9 (29 Sep 2024 18:53)  HR: 96 (30 Sep 2024 04:50) (96 - 98)  BP: 125/79 (30 Sep 2024 04:50) (125/79 - 144/82)  BP(mean): --  RR: 17 (30 Sep 2024 04:50) (17 - 18)  SpO2: 96% (30 Sep 2024 04:50) (95% - 96%)    Parameters below as of 30 Sep 2024 04:50  Patient On (Oxygen Delivery Method): room air        PHYSICAL EXAM:  General: in no acute distress  Eyes: EOMI intact bilaterally. Anicteric sclerae, moist conjunctivae  HENT: Moist mucous membranes  Neck: Trachea midline, supple  Lungs: Breathing comfortably on room air   Cardiovascular: Unable to assess  Abdomen: Unable to assess  Extremities: WWP, No clubbing, cyanosis. Nurse reported edema in lower extremities   Neurological: Alert and oriented  Skin: Warm and dry. No obvious rash     LABS:                        9.3    3.96  )-----------( 220      ( 29 Sep 2024 08:29 )             29.8     09-29    140  |  106  |  8   ----------------------------<  83  3.7   |  28  |  0.90    Ca    8.6      29 Sep 2024 08:29    TPro  7.4  /  Alb  2.8[L]  /  TBili  0.8  /  DBili  x   /  AST  53[H]  /  ALT  48[H]  /  AlkPhos  167[H]  09-29   INTERVAL HPI/OVERNIGHT EVENTS: Pt admitted last night, no acute events overnight.,     SUBJECTIVE: Patient seen and examined at bedside, resting comfortably in bed, and does not appear to be in any acute distress. The patient declined to answer any questions and refused to participate in the examination.     Vital Signs Last 24 Hrs  T(C): 37.2 (30 Sep 2024 04:50), Max: 37.7 (29 Sep 2024 18:53)  T(F): 98.9 (30 Sep 2024 04:50), Max: 99.9 (29 Sep 2024 18:53)  HR: 96 (30 Sep 2024 04:50) (96 - 98)  BP: 125/79 (30 Sep 2024 04:50) (125/79 - 144/82)  BP(mean): --  RR: 17 (30 Sep 2024 04:50) (17 - 18)  SpO2: 96% (30 Sep 2024 04:50) (95% - 96%)    Parameters below as of 30 Sep 2024 04:50  Patient On (Oxygen Delivery Method): room air        PHYSICAL EXAM:  General: in no acute distress  Eyes: EOMI intact bilaterally. Anicteric sclerae, moist conjunctivae  HENT: Moist mucous membranes  Neck: Trachea midline, supple  Lungs: Breathing comfortably on room air   Cardiovascular: Unable to assess  Abdomen: Unable to assess  Extremities: WWP, No clubbing, cyanosis. Nurse reported edema in lower extremities   Neurological: Alert and oriented  Skin: Warm and dry. No obvious rash     LABS:                        9.3    3.96  )-----------( 220      ( 29 Sep 2024 08:29 )             29.8     09-29    140  |  106  |  8   ----------------------------<  83  3.7   |  28  |  0.90    Ca    8.6      29 Sep 2024 08:29    TPro  7.4  /  Alb  2.8[L]  /  TBili  0.8  /  DBili  x   /  AST  53[H]  /  ALT  48[H]  /  AlkPhos  167[H]  09-29

## 2024-09-30 NOTE — DISCHARGE NOTE PROVIDER - NSDCMRMEDTOKEN_GEN_ALL_CORE_FT
apixaban 5 mg oral tablet: 1 tab(s) orally every 12 hours  Biktarvy 50 mg-200 mg-25 mg oral tablet: 1 tab(s) orally once a day  Farxiga 10 mg oral tablet: 1 tab(s) orally once a day  ipratropium-albuterol 0.5 mg-2.5 mg/3 mL inhalation solution: 3 milliliter(s) inhaled every 6 hours, As needed, Shortness of Breath and/or Wheezing  Lasix 20 mg oral tablet: 1 tab(s) orally once a day  MiraLax oral powder for reconstitution: 17 gram(s) orally once a day  sulfamethoxazole-trimethoprim 800 mg-160 mg oral tablet: 1 tab(s) orally once a day  valsartan 40 mg oral tablet: 1 tab(s) orally once a day   apixaban 5 mg oral tablet: 1 tab(s) orally every 12 hours  apixaban 5 mg oral tablet: 1 tab(s) orally every 12 hours  benzonatate 100 mg oral capsule: 1 cap(s) orally every 8 hours  camphor-menthol 0.5%-0.5% topical lotion: 1 Apply topically to affected area every 6 hours As needed pain  Farxiga 10 mg oral tablet: 1 tab(s) orally once a day  ipratropium-albuterol 0.5 mg-2.5 mg/3 mL inhalation solution: 3 milliliter(s) inhaled every 6 hours, As needed, Shortness of Breath and/or Wheezing  ketoconazole 2% topical shampoo: 1 Apply topically to affected area once a day  Lasix 20 mg oral tablet: 1 tab(s) orally once a day  MiraLax oral powder for reconstitution: 17 gram(s) orally once a day  permethrin 5% topical cream: 1 Apply topically to affected area every 24 hours  sulfamethoxazole-trimethoprim 800 mg-160 mg oral tablet: 1 tab(s) orally once a day 1 tablet once a day for 5 days  sulfamethoxazole-trimethoprim 800 mg-160 mg oral tablet: 1 tab(s) orally once a day  valsartan 40 mg oral tablet: 1 tab(s) orally once a day   apixaban 5 mg oral tablet: 1 tab(s) orally every 12 hours 1 tablet twice a day  benzonatate 100 mg oral capsule: 1 cap(s) orally every 8 hours  camphor-menthol 0.5%-0.5% topical lotion: 1 Apply topically to affected area every 6 hours As needed pain  Farxiga 10 mg oral tablet: 1 tab(s) orally once a day 1 tab once a day  ipratropium-albuterol 0.5 mg-2.5 mg/3 mL inhalation solution: 3 milliliter(s) inhaled every 6 hours, As needed, Shortness of Breath and/or Wheezing  ketoconazole 2% topical shampoo: 1 Apply topically to affected area once a day  Lasix 20 mg oral tablet: 1 tab(s) orally once a day 1 tablet once a day  MiraLax oral powder for reconstitution: 17 gram(s) orally once a day  permethrin 5% topical cream: 1 Apply topically to affected area every 24 hours  sulfamethoxazole-trimethoprim 800 mg-160 mg oral tablet: 1 tab(s) orally once a day 1 tablet once a day for 30 days  valsartan 40 mg oral tablet: 1 tab(s) orally once a day 1 tablet once a day   apixaban 5 mg oral tablet: 1 tab(s) orally every 12 hours 1 tablet twice a day  benzonatate 100 mg oral capsule: 1 cap(s) orally every 8 hours  camphor-menthol 0.5%-0.5% topical lotion: 1 Apply topically to affected area every 6 hours As needed pain  Farxiga 10 mg oral tablet: 1 tab(s) orally once a day 1 tab once a day  ipratropium-albuterol 0.5 mg-2.5 mg/3 mL inhalation solution: 3 milliliter(s) inhaled every 6 hours, As needed, Shortness of Breath and/or Wheezing  ketoconazole 2% topical shampoo: 1 Apply topically to affected area once a day  Lasix 20 mg oral tablet: 1 tab(s) orally once a day 1 tablet once a day  MiraLax oral powder for reconstitution: 17 gram(s) orally once a day  permethrin 5% topical cream: 1 Apply topically to affected area every 24 hours  sulfamethoxazole-trimethoprim 800 mg-160 mg oral tablet: 1 tab(s) orally once a day Take 1 tablet twice a day till 10/5 followed by 1 tablet once a day  valsartan 40 mg oral tablet: 1 tab(s) orally once a day 1 tablet once a day   apixaban 5 mg oral tablet: 1 tab(s) orally every 12 hours 1 tablet twice a day  benzonatate 100 mg oral capsule: 1 cap(s) orally every 8 hours  camphor-menthol 0.5%-0.5% topical lotion: 1 Apply topically to affected area every 6 hours As needed pain  ipratropium-albuterol 0.5 mg-2.5 mg/3 mL inhalation solution: 3 milliliter(s) inhaled every 6 hours, As needed, Shortness of Breath and/or Wheezing  Jardiance 10 mg oral tablet: 1 tab(s) orally once a day 1 tablet once a day  ketoconazole 2% topical shampoo: 1 Apply topically to affected area once a day  Lasix 20 mg oral tablet: 1 tab(s) orally once a day 1 tablet once a day  MiraLax oral powder for reconstitution: 17 gram(s) orally once a day  permethrin 5% topical cream: 1 Apply topically to affected area every 24 hours  sulfamethoxazole-trimethoprim 800 mg-160 mg oral tablet: 1 tab(s) orally once a day Take 1 tablet twice a day till 10/5 followed by 1 tablet once a day  valsartan 40 mg oral tablet: 1 tab(s) orally once a day 1 tablet once a day

## 2024-09-30 NOTE — PROGRESS NOTE ADULT - PROBLEM SELECTOR PLAN 3
Patient prescribed Biktarvy but non-compliant; most recent VL/Abs CD4 9/12/24 89K/93. Prior ID notes document positive cryptococcal agent but results from Sept '24, Nov '23 and Aug '22 negative. Low concern for active opportunistic or AIDS defining illnesses at this time however patient will benefit from PCP ppx given most recent CD4 count.     Plan:  - non complaint w/ biktarvy; consider ID consult in AM for restarting HAART  - Bactrim DS PO daily for PCP ppx. Patient prescribed Biktarvy but non-compliant; most recent VL/Abs CD4 9/12/24 89K/93. Prior ID notes document positive cryptococcal agent but results from Sept '24, Nov '23 and Aug '22 negative. Low concern for active opportunistic or AIDS defining illnesses at this time however patient will benefit from PCP ppx given most recent CD4 count.     Plan:  - non complaint w/ biktarvy; consider ID consult for restarting HAART  - Bactrim DS PO daily for PCP ppx.

## 2024-09-30 NOTE — DISCHARGE NOTE PROVIDER - NSDCCPCAREPLAN_GEN_ALL_CORE_FT
PRINCIPAL DISCHARGE DIAGNOSIS  Diagnosis: Cellulitis of groin  Assessment and Plan of Treatment: Cellulitis is an infection of the skin. This infection can cause redness, pain, and swelling. Cellulitis tends to affect deep layers of skin and sometimes the fat under the skin. This condition can happen when germs get into the skin. Normally, different types of germs live on a person's skin, and mostly these germs do not cause any problems. But if a person gets a cut or break in the skin, the germs can penetrate and cause an infection. Certain conditions can increase a person's chance of getting cellulitis. These include: having a cut (even a tiny one), having another type of skin infection or a long-term skin condition, swelling of the skin or swelling in the body, and being overweight.  ---------------------------------------------------------------------------  Please take Bactrim DS 1 tablet twice a day (10/1-10/5) followed by once a day till CD4 count >200        SECONDARY DISCHARGE DIAGNOSES  Diagnosis: HIV disease  Assessment and Plan of Treatment: You have a history of HIV and your infection is very serious and has progressed to AIDS, acquired immunodeficiency syndrome. It is very important that you follow up with our HIV clinic outpatient. This information is written in your discharge summary. HIV/AIDS can put you at risk for life-threatening infections, so you are being discharged on an antibiotic, Bactrim, to protect you from infections. Please continue to take this medication and follow up with the HIV clinic.  ----------------------------------------------------------------------------  Please take Bactrim DS 1 tablet twice a day (10/1-10/5) followed by once a day till CD4 count >200

## 2024-09-30 NOTE — DISCHARGE NOTE PROVIDER - ATTENDING DISCHARGE PHYSICAL EXAMINATION:
Pt resfused to speak with writer/doctor and did not want to be examined - he flashed the scrotum after multiple attempts were made to convince him.   scrotum w mild edema  and hyperpigmentation-- Pt refused to be properly examined and quickly covered the area   will cw bactrim on discharge to treat suspected cellulitis -- bactrim will be cw for pcp ppx as cd is low  pt refused TTE and scrotal US  pt was adviced to fu with pcp to restart haart and home medications .

## 2024-09-30 NOTE — PROGRESS NOTE ADULT - PROBLEM SELECTOR PLAN 1
History of multiple prior admissions for inguinal rash that began Oct '23. Has been treated with ketoconazole and clotrimazole for candidal intertrigo, however never completed course as patient AMA'd/non-compliant with medications upon dc. Now presenting again with groin pain, swelling and rash, CTAP with marked edema in the scrotal and penile region without discrete drainable fluid collections, c/f candidal intertrigo with superimposed cellulitis. No evidence of ledy's gangrene at this time, however given AIDS, high risk for developing. Does not meet SIRS or sepsis criteria. Case discussed with Dr. Delcid (urology) in the ED, will consult if the clinical picture becomes concerning for ledy's.    Plan:  - C/w IV Vanc (has hx of MRSA bacteremia) and CTX  - Start Ketoconazole 2% topical cream applied to affected areas daily for 2 weeks for intertrigo   - Consider ID consult in AM  - Consider stat Urology consult if concern arises for ledy's gangrene History of multiple prior admissions for inguinal rash that began Oct '23. Has been treated with ketoconazole and clotrimazole for candidal intertrigo, however never completed course as patient AMA'd/non-compliant with medications upon dc. Now presenting again with groin pain, swelling and rash, CTAP with marked edema in the scrotal and penile region without discrete drainable fluid collections, c/f candidal intertrigo with superimposed cellulitis. No evidence of ledy's gangrene at this time, however given AIDS, high risk for developing. Does not meet SIRS or sepsis criteria. Case discussed with Dr. Delcid (urology) in the ED, will consult if the clinical picture becomes concerning for ledy's.    Plan:  - C/w IV Vanc (has hx of MRSA bacteremia) and CTX  - Start Ketoconazole 2% topical cream applied to affected areas daily for 2 weeks for intertrigo   - Consider ID consult   - Consider stat Urology consult if concern arises for ledy's gangrene History of multiple prior admissions for inguinal rash that began Oct '23. Has been treated with ketoconazole and clotrimazole for candidal intertrigo, however never completed course as patient AMA'd/non-compliant with medications upon dc. Now presenting again with groin pain, swelling and rash,   CTAP with marked edema in the scrotal and penile region without discrete drainable fluid collections, c/f candidal intertrigo with superimposed cellulitis. No evidence of ledy's gangrene at this time, however given AIDS, high risk for developing. Does not meet SIRS or sepsis criteria. Case discussed with Dr. Delcid (urology) in the ED, will consult if the clinical picture becomes concerning for ledy's.    Plan:  - C/w IV Vanc (has hx of MRSA bacteremia) and CTX  - Start Ketoconazole 2% topical cream applied to affected areas daily for 2 weeks for intertrigo   - Consider ID consult   - Consider stat Urology consult if concern arises for ledy's gangrene

## 2024-09-30 NOTE — PROGRESS NOTE ADULT - ATTENDING COMMENTS
patient is refusing to speak or be examined   will order TTE and US of scrotum  will cw abx for now   if continues to refuse treatment pt will receive notice

## 2024-09-30 NOTE — PROGRESS NOTE ADULT - PROBLEM SELECTOR PLAN 6
Patient with LVEF 55% in 2022. Prescribed Lasix 20mg QD, Toprol XL 25mg QD, Valsartan 40mg QD,  Farxiga 10 mg QD however patient non-compliant w/ meds. ProBNP on admission 3K, no crackles although JVD and peripheral edema were apparent on exam, no evidence of pulmonary edema/effusions on CXR, overall low clinical suspicion for HFpEF exacerbation at this time.     Plan:   - Restart Lasix 20mg PO daily (consider re-doing IV Lasix if sob/hypoxic/evidence of vascular congestion on exam or repeat imaging), valsartan 40mg PO daily  - Hold toprol 25mg PO daily iso recent cocaine use  -Strict I/Os, caution w fluids given history of MR/TR

## 2024-09-30 NOTE — DISCHARGE NOTE PROVIDER - NSDCFUADDINST_GEN_ALL_CORE_FT
ANTICOAGULATION  MANAGEMENT: Discharge Review    Obed Haley chart reviewed for anticoagulation continuity of care    Outpatient surgery/procedure on  11/23/2020 for Cardioversion.   - persistent atrial fibrillation / flutter.   - Successful conversion to normal sinus rhythm/AV sequential pacing from atrial fibrillation    Discharge disposition: Home    INR Results:       Recent labs: (last 7 days)     11/19/20  0835 11/23/20  1309   INR 2.50* 3.10*       Warfarin inpatient management: home regimen continued    Warfarin discharge instructions: home regimen continued     Medication Changes Affecting Anticoagulation: No    Additional Factors Affecting Anticoagulation: No    Plan     No adjustment to anticoagulation plan needed      Patient not contacted - INR scheduled on 12/2/20    Anticoagulation calendar updated    Yolanda Krishna RN                  
Discharge Instructions:    Medications: Take Bactrim as prescribed. Continue any other medications as previously directed.  Symptoms to Watch: Seek immediate medical attention if you experience worsening groin pain, swelling, fever, chills, chest pain, shortness of breath, or any new or worsening symptoms.  Lifestyle: Avoid substance use and follow up with substance abuse counseling.  Hygiene: Maintain good personal hygiene, especially in the groin area, to prevent further skin issues.

## 2024-10-01 ENCOUNTER — TRANSCRIPTION ENCOUNTER (OUTPATIENT)
Age: 64
End: 2024-10-01

## 2024-10-01 VITALS — DIASTOLIC BLOOD PRESSURE: 78 MMHG | SYSTOLIC BLOOD PRESSURE: 124 MMHG

## 2024-10-01 LAB
4/8 RATIO: 0.13 RATIO — LOW (ref 0.9–3.6)
ABS CD8: 957 CELLS/UL — HIGH (ref 142–740)
ALBUMIN SERPL ELPH-MCNC: 3.1 G/DL — LOW (ref 3.3–5)
ALP SERPL-CCNC: 172 U/L — HIGH (ref 40–120)
ALT FLD-CCNC: 25 U/L — SIGNIFICANT CHANGE UP (ref 10–45)
ANION GAP SERPL CALC-SCNC: 7 MMOL/L — SIGNIFICANT CHANGE UP (ref 5–17)
AST SERPL-CCNC: 39 U/L — SIGNIFICANT CHANGE UP (ref 10–40)
BASOPHILS # BLD AUTO: 0.01 K/UL — SIGNIFICANT CHANGE UP (ref 0–0.2)
BASOPHILS NFR BLD AUTO: 0.3 % — SIGNIFICANT CHANGE UP (ref 0–2)
BILIRUB SERPL-MCNC: 0.5 MG/DL — SIGNIFICANT CHANGE UP (ref 0.2–1.2)
BUN SERPL-MCNC: 10 MG/DL — SIGNIFICANT CHANGE UP (ref 7–23)
C TRACH RRNA SPEC QL NAA+PROBE: SIGNIFICANT CHANGE UP
CALCIUM SERPL-MCNC: 8.8 MG/DL — SIGNIFICANT CHANGE UP (ref 8.4–10.5)
CD16+CD56+ CELLS NFR BLD: 8 % — SIGNIFICANT CHANGE UP (ref 5–23)
CD16+CD56+ CELLS NFR SPEC: 102 CELLS/UL — SIGNIFICANT CHANGE UP (ref 71–410)
CD19 BLASTS SPEC-ACNC: 1 % — LOW (ref 6–24)
CD19 BLASTS SPEC-ACNC: 13 CELLS/UL — LOW (ref 84–469)
CD3 BLASTS SPEC-ACNC: 1135 CELLS/UL — SIGNIFICANT CHANGE UP (ref 672–1870)
CD3 BLASTS SPEC-ACNC: 89 % — HIGH (ref 59–83)
CD4 %: 10 % — LOW (ref 30–62)
CD8 %: 76 % — HIGH (ref 12–36)
CHLORIDE SERPL-SCNC: 102 MMOL/L — SIGNIFICANT CHANGE UP (ref 96–108)
CO2 SERPL-SCNC: 26 MMOL/L — SIGNIFICANT CHANGE UP (ref 22–31)
CREAT SERPL-MCNC: 0.82 MG/DL — SIGNIFICANT CHANGE UP (ref 0.5–1.3)
EGFR: 97 ML/MIN/1.73M2 — SIGNIFICANT CHANGE UP
EOSINOPHIL # BLD AUTO: 0.34 K/UL — SIGNIFICANT CHANGE UP (ref 0–0.5)
EOSINOPHIL NFR BLD AUTO: 10.9 % — HIGH (ref 0–6)
GLUCOSE SERPL-MCNC: 89 MG/DL — SIGNIFICANT CHANGE UP (ref 70–99)
HCT VFR BLD CALC: 30 % — LOW (ref 39–50)
HGB BLD-MCNC: 9.2 G/DL — LOW (ref 13–17)
IMM GRANULOCYTES NFR BLD AUTO: 0.3 % — SIGNIFICANT CHANGE UP (ref 0–0.9)
LYMPHOCYTES # BLD AUTO: 1.01 K/UL — SIGNIFICANT CHANGE UP (ref 1–3.3)
LYMPHOCYTES # BLD AUTO: 32.5 % — SIGNIFICANT CHANGE UP (ref 13–44)
MAGNESIUM SERPL-MCNC: 1.6 MG/DL — SIGNIFICANT CHANGE UP (ref 1.6–2.6)
MCHC RBC-ENTMCNC: 27.6 PG — SIGNIFICANT CHANGE UP (ref 27–34)
MCHC RBC-ENTMCNC: 30.7 GM/DL — LOW (ref 32–36)
MCV RBC AUTO: 90.1 FL — SIGNIFICANT CHANGE UP (ref 80–100)
MONOCYTES # BLD AUTO: 0.34 K/UL — SIGNIFICANT CHANGE UP (ref 0–0.9)
MONOCYTES NFR BLD AUTO: 10.9 % — SIGNIFICANT CHANGE UP (ref 2–14)
N GONORRHOEA RRNA SPEC QL NAA+PROBE: SIGNIFICANT CHANGE UP
NEUTROPHILS # BLD AUTO: 1.4 K/UL — LOW (ref 1.8–7.4)
NEUTROPHILS NFR BLD AUTO: 45.1 % — SIGNIFICANT CHANGE UP (ref 43–77)
NRBC # BLD: 0 /100 WBCS — SIGNIFICANT CHANGE UP (ref 0–0)
PHOSPHATE SERPL-MCNC: 3.4 MG/DL — SIGNIFICANT CHANGE UP (ref 2.5–4.5)
PLATELET # BLD AUTO: 235 K/UL — SIGNIFICANT CHANGE UP (ref 150–400)
POTASSIUM SERPL-MCNC: 3.9 MMOL/L — SIGNIFICANT CHANGE UP (ref 3.5–5.3)
POTASSIUM SERPL-SCNC: 3.9 MMOL/L — SIGNIFICANT CHANGE UP (ref 3.5–5.3)
PROT SERPL-MCNC: 6.9 G/DL — SIGNIFICANT CHANGE UP (ref 6–8.3)
RBC # BLD: 3.33 M/UL — LOW (ref 4.2–5.8)
RBC # FLD: 14.9 % — HIGH (ref 10.3–14.5)
SODIUM SERPL-SCNC: 135 MMOL/L — SIGNIFICANT CHANGE UP (ref 135–145)
T-CELL CD4 SUBSET PNL BLD: 123 CELLS/UL — LOW (ref 489–1457)
VANCOMYCIN TROUGH SERPL-MCNC: <4 UG/ML — LOW (ref 10–20)
WBC # BLD: 3.11 K/UL — LOW (ref 3.8–10.5)
WBC # FLD AUTO: 3.11 K/UL — LOW (ref 3.8–10.5)

## 2024-10-01 PROCEDURE — 99233 SBSQ HOSP IP/OBS HIGH 50: CPT | Mod: GC

## 2024-10-01 RX ORDER — EMPAGLIFLOZIN 25 MG/1
1 TABLET, FILM COATED ORAL
Qty: 30 | Refills: 0
Start: 2024-10-01 | End: 2024-10-30

## 2024-10-01 RX ORDER — BENZONATATE 150 MG/1
1 CAPSULE ORAL
Qty: 0 | Refills: 0 | DISCHARGE
Start: 2024-10-01

## 2024-10-01 RX ORDER — APIXABAN 5 MG/1
1 TABLET, FILM COATED ORAL
Qty: 60 | Refills: 0
Start: 2024-10-01 | End: 2024-10-30

## 2024-10-01 RX ORDER — DAPAGLIFLOZIN 10 MG/1
1 TABLET, FILM COATED ORAL
Qty: 30 | Refills: 3
Start: 2024-10-01 | End: 2025-01-28

## 2024-10-01 RX ORDER — VALSARTAN 320 MG/1
1 TABLET ORAL
Qty: 30 | Refills: 0
Start: 2024-10-01 | End: 2024-10-30

## 2024-10-01 RX ORDER — APIXABAN 5 MG/1
1 TABLET, FILM COATED ORAL
Qty: 0 | Refills: 0 | DISCHARGE
Start: 2024-10-01

## 2024-10-01 RX ORDER — PERMETHRIN 1 MG/100ML
1 LOTION TOPICAL
Qty: 0 | Refills: 0 | DISCHARGE
Start: 2024-10-01

## 2024-10-01 RX ORDER — FUROSEMIDE 10 MG/ML
1 INJECTION INTRAVENOUS
Qty: 30 | Refills: 0
Start: 2024-10-01 | End: 2024-10-30

## 2024-10-01 RX ORDER — MAGNESIUM SULFATE 500 MG/ML
2 VIAL (ML) INJECTION ONCE
Refills: 0 | Status: COMPLETED | OUTPATIENT
Start: 2024-10-01 | End: 2024-10-01

## 2024-10-01 RX ADMIN — BENZONATATE 100 MILLIGRAM(S): 150 CAPSULE ORAL at 08:16

## 2024-10-01 RX ADMIN — Medication 1 TABLET(S): at 11:07

## 2024-10-01 RX ADMIN — APIXABAN 5 MILLIGRAM(S): 5 TABLET, FILM COATED ORAL at 08:17

## 2024-10-01 NOTE — PROGRESS NOTE ADULT - ATTENDING COMMENTS
Pt resfused to speak with writer/doctor and did not want to be examined - he flashed the scrotum after multiple attempts were made to convince him.   scrotum w mild edema  and hyperpigmentation-- Pt refused to be properly examined and quickly covered the area   will cw bactrim on discharge to treat suspected cellulitis -- bactrim will be cw for pcp ppx as cd is low  pt refused TTE and scrotal US  pt was adviced to fu with pcp to restart haart and home medications

## 2024-10-01 NOTE — PROGRESS NOTE ADULT - PROBLEM SELECTOR PLAN 8
N: DASH   E: Replete as necessary for K >4, Phos >3, Mg >2  DVT: Eliquis  -Mg is 1.4, replete N: DASH   E: Replete as necessary for K >4, Phos >3, Mg >2  DVT: Eliquis  -Mg is 1.4, repleted, now 1.6

## 2024-10-01 NOTE — PROGRESS NOTE ADULT - PROBLEM SELECTOR PLAN 2
History of multiple prior admissions for inguinal rash that began Oct '23. Has been treated with ketoconazole and clotrimazole for candidal intertrigo, however never completed course as patient AMA'd/non-compliant with medications upon dc. Now presenting again with groin pain, swelling and rash.  - Start Ketoconazole 2% topical cream applied to affected areas daily for 2 weeks for intertrigo
History of multiple prior admissions for inguinal rash that began Oct '23. Has been treated with ketoconazole and clotrimazole for candidal intertrigo, however never completed course as patient AMA'd/non-compliant with medications upon dc. Now presenting again with groin pain, swelling and rash.  - Start Ketoconazole 2% topical cream applied to affected areas daily for 2 weeks for intertrigo

## 2024-10-01 NOTE — PHYSICAL THERAPY INITIAL EVALUATION ADULT - BED MOBILITY LIMITATIONS, REHAB EVAL
Tolerated sitting EOB for ~3 minutes. Reporting (B) ankle swelling and pain; Patient stating he already ambulated to the bathroom earlier and requesting to defer further OOB mobility at this time./decreased ability to use legs for bridging/pushing/impaired ability to control trunk for mobility

## 2024-10-01 NOTE — PROGRESS NOTE ADULT - ASSESSMENT
Patient is a 64 y.o. M undomiciled, noncompliant with meds, hx of polysubstance abuse (cocaine/THC), PMHx of HIV/AIDs (noncompliant w/ HAART; reported + cryptococcal antigen in the past), Afib/flutter (noncompliant w/ Eliquis), severe MR/mod-severe TR, HFpEF (EF 55% by TTE 11/22), lung CA not on any treatment presenting with groin pain, swelling and rash c/f intertrigo with superimposed cellulitis. 
Patient is a 64 y.o. M undomiciled, noncompliant with meds, hx of polysubstance abuse (cocaine/THC), PMHx of HIV/AIDs (noncompliant w/ HAART; reported + cryptococcal antigen in the past), Afib/flutter (noncompliant w/ Eliquis), severe MR/mod-severe TR, HFpEF (EF 55% by TTE 11/22), lung CA not on any treatment presenting with groin pain, swelling and rash c/f intertrigo with superimposed cellulitis.

## 2024-10-01 NOTE — DISCHARGE NOTE NURSING/CASE MANAGEMENT/SOCIAL WORK - PATIENT PORTAL LINK FT
You can access the FollowMyHealth Patient Portal offered by [facility] by registering at the following website: [email or web address]. By joining [facility]’s FollowMyHealth portal, you will also be able to view your health information using other applications (apps) compatible with our system.

## 2024-10-01 NOTE — PROVIDER CONTACT NOTE (OTHER) - SITUATION
Continue to be verbally aggressive when staff trying to make sure patient is safe. Refused all medications/ antibiotics.

## 2024-10-01 NOTE — PROGRESS NOTE ADULT - PROBLEM SELECTOR PLAN 7
History of cocaine, THC and alcohol abuse; last drink 9/20/24, patient reluctant to corroborate or provide additional collateral; timeline/presentation not concerning for withdrawal at this time. Utox this admission positive for cocaine.     Plan:  - SBIRT consult
History of cocaine, THC and alcohol abuse; last drink 9/20/24, patient reluctant to corroborate or provide additional collateral; timeline/presentation not concerning for withdrawal at this time. Utox this admission positive for cocaine.     Plan:  - SBIRT consult

## 2024-10-01 NOTE — PROGRESS NOTE ADULT - PROBLEM SELECTOR PLAN 1
History of multiple prior admissions for inguinal rash that began Oct '23. Has been treated with ketoconazole and clotrimazole for candidal intertrigo, however never completed course as patient AMA'd/non-compliant with medications upon dc. Now presenting again with groin pain, swelling and rash,   CTAP with marked edema in the scrotal and penile region without discrete drainable fluid collections, c/f candidal intertrigo with superimposed cellulitis. No evidence of ledy's gangrene at this time, however given AIDS, high risk for developing. Does not meet SIRS or sepsis criteria. Case discussed with Dr. Delcid (urology) in the ED, will consult if the clinical picture becomes concerning for ledy's.    Plan:  - C/w IV Vanc (has hx of MRSA bacteremia) and CTX  - Start Ketoconazole 2% topical cream applied to affected areas daily for 2 weeks for intertrigo   - Consider ID consult   - Consider stat Urology consult if concern arises for ledy's gangrene History of multiple prior admissions for inguinal rash that began Oct '23. Has been treated with ketoconazole and clotrimazole for candidal intertrigo, however never completed course as patient AMA'd/non-compliant with medications upon dc. Now presenting again with groin pain, swelling and rash,   CTAP with marked edema in the scrotal and penile region without discrete drainable fluid collections, c/f candidal intertrigo with superimposed cellulitis. No evidence of ledy's gangrene at this time, however given AIDS, high risk for developing. Does not meet SIRS or sepsis criteria. Case discussed with Dr. Delcid (urology) in the ED, will consult if the clinical picture becomes concerning for ledy's. Pt accepted first dose of vancomycin, declined second dose.     Plan:  - C/w IV Vanc (has hx of MRSA bacteremia) and CTX  - Start Ketoconazole 2% topical cream applied to affected areas daily for 2 weeks for intertrigo   - Consider ID consult   - Consider stat Urology consult if concern arises for ledy's gangrene History of multiple prior admissions for inguinal rash that began Oct '23. Has been treated with ketoconazole and clotrimazole for candidal intertrigo, however never completed course as patient AMA'd/non-compliant with medications upon dc. Now presenting again with groin pain, swelling and rash,   CTAP with marked edema in the scrotal and penile region without discrete drainable fluid collections, c/f candidal intertrigo with superimposed cellulitis. No evidence of ledy's gangrene at this time, however given AIDS, high risk for developing. Does not meet SIRS or sepsis criteria. Case discussed with Dr. Dlecid (urology) in the ED, will consult if the clinical picture becomes concerning for ledy's. Pt accepted first dose of vancomycin, declined second dose.     Plan:  - C/w IV Vanc (has hx of MRSA bacteremia) and CTX  - Start Ketoconazole 2% topical cream applied to affected areas daily for 2 weeks for intertrigo   - refusing to be see or examined by docotrs in hospital - ID was not consulted

## 2024-10-01 NOTE — PHYSICAL THERAPY INITIAL EVALUATION ADULT - ADDITIONAL COMMENTS
Per chart review, patient is a undomiciled male. After social history, patient reporting he has been "hopping around" from his sons apt to a friends house. Independent with all ADLs. Community ambulator. Ambulates with rollator at baseline.

## 2024-10-01 NOTE — CHART NOTE - NSCHARTNOTEFT_GEN_A_CORE
Patient refusing all evening medications and vital signs check, repeatedly interrupting attempts to discuss risks, benefits, and reasoning by asking for bengay. Informed patient bengay is not available on formulary but other creams and lidocaine patches can be provided instead. Patient states he doesn't want to try anything other than bengay. Ordered sarna cream, which was provided to the patient in case he changed his mind. Patient continued refusing despite readdressing multiple times throughout the night by different staff members. Patient refusing all evening medications and vital signs check, repeatedly interrupting attempts to discuss risks, benefits, and reasoning by asking for bengay. Informed patient bengay is not available on formulary but other creams and lidocaine patches can be provided instead. Patient states he doesn't want to try anything other than bengay. Ordered sarna cream, which was provided to the patient in case he changed his mind. Patient continued refusing despite readdressing multiple times throughout the night by different staff members and repeatedly asks staff members to leave as soon as they enter his room.

## 2024-10-01 NOTE — PROGRESS NOTE ADULT - PROBLEM SELECTOR PLAN 4
Patient presenting with excoriations suggestive of scabies. S/p permethrin 5% x1 in ED.    Plan:   - C/w permethrin 5%  - Contact precautions.
Patient presenting with excoriations suggestive of scabies. S/p permethrin 5% x1 in ED.    Plan:   - C/w permethrin 5%  - Contact precautions.

## 2024-10-01 NOTE — DISCHARGE NOTE NURSING/CASE MANAGEMENT/SOCIAL WORK - NSDCVIVACCINE_GEN_ALL_CORE_FT
Tdap; 26-Jun-2017 00:59; Oj Rowley (RN); [employer]; [id number]; IntraMuscular; Deltoid Left.; 0.5 milliLiter(s); VIS (VIS Published: 09-May-2013, VIS Presented: 26-Jun-2017);   Tdap; 27-Jun-2024 11:29; Mohsen Freeman); [employer]; [id number] (Exp. Date: 10-Feb-2026); IntraMuscular; Deltoid Left.; 0.5 milliLiter(s); VIS (VIS Published: 09-May-2013, VIS Presented: 27-Jun-2024);

## 2024-10-01 NOTE — PROGRESS NOTE ADULT - PROBLEM SELECTOR PLAN 6
Patient with LVEF 55% in 2022. Prescribed Lasix 20mg QD, Toprol XL 25mg QD, Valsartan 40mg QD,  Farxiga 10 mg QD however patient non-compliant w/ meds. ProBNP on admission 3K, no crackles although JVD and peripheral edema were apparent on exam, no evidence of pulmonary edema/effusions on CXR, overall low clinical suspicion for HFpEF exacerbation at this time.     Plan:   - Restart Lasix 20mg PO daily (consider re-doing IV Lasix if sob/hypoxic/evidence of vascular congestion on exam or repeat imaging), valsartan 40mg PO daily  - Hold toprol 25mg PO daily iso recent cocaine use  -Strict I/Os, caution w fluids given history of MR/TR Patient with LVEF 55% in 2022. Prescribed Lasix 20mg QD, Toprol XL 25mg QD, Valsartan 40mg QD,  Farxiga 10 mg QD however patient non-compliant w/ meds. ProBNP on admission 3K, no crackles although JVD and peripheral edema were apparent on exam, no evidence of pulmonary edema/effusions on CXR, overall low clinical suspicion for HFpEF exacerbation at this time. At this time, pt does not want to take Lasix as it causes him increased urinary frequency.    Plan:   - Offer Lasix 20mg PO daily (consider re-doing IV Lasix if sob/hypoxic/evidence of vascular congestion on exam or repeat imaging),   - C/w valsartan 40mg PO daily  - Hold toprol 25mg PO daily iso recent cocaine use  -Strict I/Os, caution w fluids given history of MR/TR

## 2024-10-01 NOTE — PROVIDER CONTACT NOTE (OTHER) - RECOMMENDATIONS
Unable to educate patient successfully due to tendency verbal aggression.
Patient educated and instructed to call for help when needed. Medication offered. Provider notified and inform that patient also requesting "bangay for pain"

## 2024-10-01 NOTE — PHYSICAL THERAPY INITIAL EVALUATION ADULT - PERTINENT HX OF CURRENT PROBLEM, REHAB EVAL
Patient is a 64 y.o. M undomiciled, noncompliant with meds, hx of polysubstance abuse (cocaine/THC), PMHx of HIV/AIDs (noncompliant w/ HAART; reported + cryptococcal antigen in the past), Afib/flutter (noncompliant w/ Eliquis), severe MR/mod-severe TR, HFpEF (EF 55% by TTE 11/22), lung CA not on any treatment presenting with groin pain, swelling and rash c/f intertrigo with superimposed cellulitis.

## 2024-10-01 NOTE — PROGRESS NOTE ADULT - PROBLEM SELECTOR PLAN 3
Patient prescribed Biktarvy but non-compliant; most recent VL/Abs CD4 9/12/24 89K/93. Prior ID notes document positive cryptococcal agent but results from Sept '24, Nov '23 and Aug '22 negative. Low concern for active opportunistic or AIDS defining illnesses at this time however patient will benefit from PCP ppx given most recent CD4 count.     Plan:  - non complaint w/ biktarvy; consider ID consult for restarting HAART  - Bactrim DS PO daily for PCP ppx. Patient prescribed Biktarvy but non-compliant; most recent VL/Abs CD4 9/12/24 89K/93. Prior ID notes document positive cryptococcal agent but results from Sept '24, Nov '23 and Aug '22 negative. Low concern for active opportunistic or AIDS defining illnesses at this time however patient will benefit from PCP ppx given most recent CD4 count.     Plan:  - non complaint w/ biktarvy;  ID fu for restarting HAART  - Bactrim DS PO daily for PCP ppx.

## 2024-10-01 NOTE — PROGRESS NOTE ADULT - PROBLEM SELECTOR PLAN 5
Patient with CHADs-VASc 2 however non-compliant w/ eliquis or toprol 25mg PO daily. EKG on admission significant for rate controlled afib.     Plan:  - Restart eliquis 5mg PO BID  - Hold toprol 25mg PO daily iso recent cocaine use
Patient with CHADs-VASc 2 however non-compliant w/ eliquis or toprol 25mg PO daily. EKG on admission significant for rate controlled afib.     Plan:  - Restart eliquis 5mg PO BID  - Hold toprol 25mg PO daily iso recent cocaine use

## 2024-10-01 NOTE — PROGRESS NOTE ADULT - SUBJECTIVE AND OBJECTIVE BOX
*****INCOMPLETE NOTE*****    INTERVAL HPI/OVERNIGHT EVENTS:    SUBJECTIVE: Patient seen and examined at bedside, resting comfortably in bed, and does not appear to be in any acute distress. Patient reports    Vital Signs Last 24 Hrs  T(C): 37.3 (30 Sep 2024 23:23), Max: 37.3 (30 Sep 2024 23:23)  T(F): 99.2 (30 Sep 2024 23:23), Max: 99.2 (30 Sep 2024 23:23)  HR: 83 (30 Sep 2024 23:23) (83 - 91)  BP: 119/78 (30 Sep 2024 23:23) (119/78 - 129/79)  BP(mean): 92 (30 Sep 2024 23:23) (92 - 92)  RR: 18 (30 Sep 2024 23:23) (17 - 18)  SpO2: 97% (30 Sep 2024 23:23) (97% - 99%)    Parameters below as of 30 Sep 2024 23:23  Patient On (Oxygen Delivery Method): room air        PHYSICAL EXAM:  General: in no acute distress  Eyes: EOMI intact bilaterally. Anicteric sclerae, moist conjunctivae  HENT: Moist mucous membranes  Neck: Trachea midline, supple  Lungs: CTA B/L. No wheezes, rales, or rhonchi  Cardiovascular: RRR. No murmurs, rubs, or gallops  Abdomen: Soft, non-tender non-distended; No rebound or guarding  Extremities: WWP, No clubbing, cyanosis or edema  Neurological: Alert and oriented  Skin: Warm and dry. No obvious rash     LABS:                        9.8    3.49  )-----------( 214      ( 30 Sep 2024 05:30 )             31.7     09-30    136  |  100  |  10  ----------------------------<  110[H]  3.8   |  26  |  0.84    Ca    8.4      30 Sep 2024 05:30  Phos  3.0     09-30  Mg     1.4     09-30    TPro  7.0  /  Alb  3.2[L]  /  TBili  0.6  /  DBili  x   /  AST  44[H]  /  ALT  28  /  AlkPhos  166[H]  09-30   INTERVAL HPI/OVERNIGHT EVENTS: LAMONT    SUBJECTIVE: Patient seen and examined at bedside, resting comfortably in bed, and does not appear to be in any acute distress.     Vital Signs Last 24 Hrs  T(C): 37.3 (30 Sep 2024 23:23), Max: 37.3 (30 Sep 2024 23:23)  T(F): 99.2 (30 Sep 2024 23:23), Max: 99.2 (30 Sep 2024 23:23)  HR: 83 (30 Sep 2024 23:23) (83 - 91)  BP: 119/78 (30 Sep 2024 23:23) (119/78 - 129/79)  BP(mean): 92 (30 Sep 2024 23:23) (92 - 92)  RR: 18 (30 Sep 2024 23:23) (17 - 18)  SpO2: 97% (30 Sep 2024 23:23) (97% - 99%)    Parameters below as of 30 Sep 2024 23:23  Patient On (Oxygen Delivery Method): room air        PHYSICAL EXAM:  General: in no acute distress  Eyes: EOMI intact bilaterally. Anicteric sclerae, moist conjunctivae  HENT: Moist mucous membranes  Neck: Trachea midline, supple  Lungs: CTA B/L. No wheezes, rales, or rhonchi  Cardiovascular: RRR. No murmurs, rubs, or gallops  Abdomen: Soft, non-tender non-distended; No rebound or guarding  Extremities: WWP, No clubbing, cyanosis or edema  Neurological: Alert and oriented  Skin: Warm and dry. No obvious rash     LABS:                        9.8    3.49  )-----------( 214      ( 30 Sep 2024 05:30 )             31.7     09-30    136  |  100  |  10  ----------------------------<  110[H]  3.8   |  26  |  0.84    Ca    8.4      30 Sep 2024 05:30  Phos  3.0     09-30  Mg     1.4     09-30    TPro  7.0  /  Alb  3.2[L]  /  TBili  0.6  /  DBili  x   /  AST  44[H]  /  ALT  28  /  AlkPhos  166[H]  09-30   INTERVAL HPI/OVERNIGHT EVENTS: LAMONT    SUBJECTIVE: Patient seen and examined at bedside, resting comfortably in bed, and does not appear to be in any acute distress. Pt declined to speak or undergo physical exam.     Vital Signs Last 24 Hrs  T(C): 37.3 (30 Sep 2024 23:23), Max: 37.3 (30 Sep 2024 23:23)  T(F): 99.2 (30 Sep 2024 23:23), Max: 99.2 (30 Sep 2024 23:23)  HR: 83 (30 Sep 2024 23:23) (83 - 91)  BP: 119/78 (30 Sep 2024 23:23) (119/78 - 129/79)  BP(mean): 92 (30 Sep 2024 23:23) (92 - 92)  RR: 18 (30 Sep 2024 23:23) (17 - 18)  SpO2: 97% (30 Sep 2024 23:23) (97% - 99%)    Parameters below as of 30 Sep 2024 23:23  Patient On (Oxygen Delivery Method): room air        PHYSICAL EXAM:  General: in no acute distress  Eyes: EOMI intact bilaterally. Anicteric sclerae, moist conjunctivae  HENT: Moist mucous membranes  Neck: Trachea midline, supple  Lungs: Breathing comfortably on room air   Cardiovascular: Unable to assess  Abdomen: Unable to assess  Extremities: WWP, No clubbing, cyanosis. Nurse reported edema in lower extremities   Neurological: Alert and oriented  Skin: Warm and dry. No obvious rash   Psychiatric: Irritable affect    LABS:                        9.8    3.49  )-----------( 214      ( 30 Sep 2024 05:30 )             31.7     09-30    136  |  100  |  10  ----------------------------<  110[H]  3.8   |  26  |  0.84    Ca    8.4      30 Sep 2024 05:30  Phos  3.0     09-30  Mg     1.4     09-30    TPro  7.0  /  Alb  3.2[L]  /  TBili  0.6  /  DBili  x   /  AST  44[H]  /  ALT  28  /  AlkPhos  166[H]  09-30   INTERVAL HPI/OVERNIGHT EVENTS: LAMONT.     SUBJECTIVE: Patient seen and examined at bedside, resting comfortably in bed, and does not appear to be in any acute distress. Pt declined to speak or undergo physical exam. Pt declined vancomycin overnight, and declined Lasix.     Vital Signs Last 24 Hrs  T(C): 37.3 (30 Sep 2024 23:23), Max: 37.3 (30 Sep 2024 23:23)  T(F): 99.2 (30 Sep 2024 23:23), Max: 99.2 (30 Sep 2024 23:23)  HR: 83 (30 Sep 2024 23:23) (83 - 91)  BP: 119/78 (30 Sep 2024 23:23) (119/78 - 129/79)  BP(mean): 92 (30 Sep 2024 23:23) (92 - 92)  RR: 18 (30 Sep 2024 23:23) (17 - 18)  SpO2: 97% (30 Sep 2024 23:23) (97% - 99%)    Parameters below as of 30 Sep 2024 23:23  Patient On (Oxygen Delivery Method): room air        PHYSICAL EXAM:  General: in no acute distress  Eyes: EOMI intact bilaterally. Anicteric sclerae, moist conjunctivae  HENT: Moist mucous membranes  Neck: Trachea midline, supple  Lungs: Breathing comfortably on room air   Cardiovascular: Unable to assess  Abdomen: Unable to assess  Extremities: WWP, No clubbing, cyanosis. Nurse reported edema in lower extremities   Neurological: Alert and oriented  Skin: Warm and dry. No obvious rash   Psychiatric: Irritable affect    LABS:                        9.8    3.49  )-----------( 214      ( 30 Sep 2024 05:30 )             31.7     09-30    136  |  100  |  10  ----------------------------<  110[H]  3.8   |  26  |  0.84    Ca    8.4      30 Sep 2024 05:30  Phos  3.0     09-30  Mg     1.4     09-30    TPro  7.0  /  Alb  3.2[L]  /  TBili  0.6  /  DBili  x   /  AST  44[H]  /  ALT  28  /  AlkPhos  166[H]  09-30

## 2024-10-02 LAB
CULTURE RESULTS: SIGNIFICANT CHANGE UP
HIV-1 VIRAL LOAD RESULT: ABNORMAL
HIV1 RNA # SERPL NAA+PROBE: SIGNIFICANT CHANGE UP
HIV1 RNA SER-IMP: SIGNIFICANT CHANGE UP
HIV1 RNA SERPL NAA+PROBE-ACNC: ABNORMAL
HIV1 RNA SERPL NAA+PROBE-LOG#: 5.04 — SIGNIFICANT CHANGE UP
RPR SER-TITR: SIGNIFICANT CHANGE UP
RPR SERPL-ACNC: SIGNIFICANT CHANGE UP
SPECIMEN SOURCE: SIGNIFICANT CHANGE UP
T PALLIDUM AB TITR SER: POSITIVE
T PALLIDUM IGG SER QL IF: REACTIVE

## 2024-10-03 NOTE — CHART NOTE - NSCHARTNOTEFT_GEN_A_CORE
Syphilis came back positive.   Attempted to contact patient, no answer.   Discussed with epidemiology and they will attempt to contact him tomorrow, and let MANUELA know.

## 2024-10-04 ENCOUNTER — EMERGENCY (EMERGENCY)
Facility: HOSPITAL | Age: 64
LOS: 1 days | Discharge: HOME | End: 2024-10-04
Admitting: EMERGENCY MEDICINE
Payer: MEDICARE

## 2024-10-04 VITALS
OXYGEN SATURATION: 95 % | DIASTOLIC BLOOD PRESSURE: 92 MMHG | TEMPERATURE: 99 F | RESPIRATION RATE: 18 BRPM | HEART RATE: 102 BPM | HEIGHT: 66 IN | SYSTOLIC BLOOD PRESSURE: 135 MMHG

## 2024-10-04 DIAGNOSIS — Z85.118 PERSONAL HISTORY OF OTHER MALIGNANT NEOPLASM OF BRONCHUS AND LUNG: ICD-10-CM

## 2024-10-04 DIAGNOSIS — I48.91 UNSPECIFIED ATRIAL FIBRILLATION: ICD-10-CM

## 2024-10-04 DIAGNOSIS — A53.0 LATENT SYPHILIS, UNSPECIFIED AS EARLY OR LATE: ICD-10-CM

## 2024-10-04 DIAGNOSIS — Z21 ASYMPTOMATIC HUMAN IMMUNODEFICIENCY VIRUS [HIV] INFECTION STATUS: ICD-10-CM

## 2024-10-04 DIAGNOSIS — Z59.00 HOMELESSNESS UNSPECIFIED: ICD-10-CM

## 2024-10-04 DIAGNOSIS — R21 RASH AND OTHER NONSPECIFIC SKIN ERUPTION: ICD-10-CM

## 2024-10-04 DIAGNOSIS — Z79.01 LONG TERM (CURRENT) USE OF ANTICOAGULANTS: ICD-10-CM

## 2024-10-04 DIAGNOSIS — F17.200 NICOTINE DEPENDENCE, UNSPECIFIED, UNCOMPLICATED: ICD-10-CM

## 2024-10-04 DIAGNOSIS — I50.30 UNSPECIFIED DIASTOLIC (CONGESTIVE) HEART FAILURE: ICD-10-CM

## 2024-10-04 DIAGNOSIS — L03.314 CELLULITIS OF GROIN: ICD-10-CM

## 2024-10-04 PROCEDURE — 99283 EMERGENCY DEPT VISIT LOW MDM: CPT

## 2024-10-04 RX ORDER — PENICILLIN G BENZATHINE 1200000 [IU]/2ML
2.4 INJECTION, SUSPENSION INTRAMUSCULAR ONCE
Refills: 0 | Status: COMPLETED | OUTPATIENT
Start: 2024-10-04 | End: 2024-10-04

## 2024-10-04 RX ORDER — APIXABAN 5 MG/1
5 TABLET, FILM COATED ORAL ONCE
Refills: 0 | Status: COMPLETED | OUTPATIENT
Start: 2024-10-04 | End: 2024-10-04

## 2024-10-04 RX ORDER — ACETAMINOPHEN 325 MG
975 TABLET ORAL ONCE
Refills: 0 | Status: COMPLETED | OUTPATIENT
Start: 2024-10-04 | End: 2024-10-04

## 2024-10-04 RX ORDER — FUROSEMIDE 10 MG/ML
40 INJECTION INTRAVENOUS ONCE
Refills: 0 | Status: COMPLETED | OUTPATIENT
Start: 2024-10-04 | End: 2024-10-04

## 2024-10-04 RX ADMIN — FUROSEMIDE 40 MILLIGRAM(S): 10 INJECTION INTRAVENOUS at 04:30

## 2024-10-04 RX ADMIN — Medication 975 MILLIGRAM(S): at 04:30

## 2024-10-04 RX ADMIN — APIXABAN 5 MILLIGRAM(S): 5 TABLET, FILM COATED ORAL at 04:30

## 2024-10-04 RX ADMIN — PENICILLIN G BENZATHINE 2.4 MILLION UNIT(S): 1200000 INJECTION, SUSPENSION INTRAMUSCULAR at 04:30

## 2024-10-04 RX ADMIN — Medication 1 TABLET(S): at 04:30

## 2024-10-04 SDOH — ECONOMIC STABILITY - HOUSING INSECURITY: HOMELESSNESS UNSPECIFIED: Z59.00

## 2024-10-04 NOTE — ED ADULT TRIAGE NOTE - CHIEF COMPLAINT QUOTE
pt. c/o worsening groin pain and swelling, was recently discharged from [facility] due to cellulitis of his groin.

## 2024-10-04 NOTE — ED PROVIDER NOTE - CLINICAL SUMMARY MEDICAL DECISION MAKING FREE TEXT BOX
pt with multiple prior admissions for inguinal rash that began Oct '23. Has been treated with ketoconazole and clotrimazole for candidal intertrigo, however, non compliant with home meds and prescribed tx, recently dc'd from [facility] 2d ago s/p a course of IV meds, and pt continues to be non compliant. Chart review performed and exam suggestive of chronic  rash with cellulitis, afebrile and non toxic appearing, uncooperative and mentating at baseline otherwise. Given chronicity of issues and recent inpt w/u and tx, no indication for repeat imaging or labs at current time. Exam not suggestive of sepsis or ledy's at current time. Also noted recent +syphilis testing but untreated - s/p dose of bicillin IM in the ED along with a dose of pt's current meds (bactrim, eliquis, lasix). Encouraged med compliance and given proper outpt f/u. Medically stable for dc

## 2024-10-04 NOTE — ED PROVIDER NOTE - NSFOLLOWUPCLINICS_GEN_ALL_ED_FT
[facility]  HIV/AIDS Research & Treatment  [address] [place] [address]  Phone: [phone]  Fax:

## 2024-10-04 NOTE — ED PROVIDER NOTE - PATIENT PORTAL LINK FT
You can access the FollowMyHealth Patient Portal offered by [facility] by registering at the following website: [email or web address]. By joining [employer]’s FollowMyHealth portal, you will also be able to view your health information using other applications (apps) compatible with our system.

## 2024-10-04 NOTE — ED ADULT NURSE NOTE - NSFALLUNIVINTERV_ED_ALL_ED
Bed/Stretcher in lowest position, wheels locked, appropriate side rails in place/Call bell, personal items and telephone in reach/Instruct patient to call for assistance before getting out of bed/chair/stretcher/Non-slip footwear applied when patient is off stretcher/[place] to call system/Physically safe environment - no spills, clutter or unnecessary equipment/Purposeful proactive rounding/Room/bathroom lighting operational, light cord in reach

## 2024-10-04 NOTE — ED PROVIDER NOTE - OBJECTIVE STATEMENT
63 yo M, ndomiciled, noncompliant with meds, hx of polysubstance abuse (cocaine/THC), PMHx of HIV/AIDs (noncompliant w/ HAART; reported + cryptococcal antigen in the past), Afib (noncompliant w/ Eliquis), severe MR/mod-severe TR, HFpEF (EF 55% by TTE 11/22), lung CA not on any treatment, was recently admitted to [facility] for cellulitis, r/o ledy's, treated with a course of IV vanco/ceftriaxone, d/c'd 2d ago on po abx, BIBA presenting to ED with persistent groin pain, swelling and rash. Pt continues to be non compliant with meds s/p dc from hospital. Reports having pain and persistent swelling. Denies fever, chills, trauma, fall, CP, worsening SOB, palpitations, N/V, HA, dizziness, focal weakness, change in urinary/bowel function, hematuria, ulceration, LOC, and malaise.

## 2024-10-04 NOTE — ED PROVIDER NOTE - NSFOLLOWUPINSTRUCTIONS_ED_ALL_ED_FT
Follow up with your primary care doctor or clinics listed below if you do not have a doctor,    [facility]  [address] [place] [address]  To make an appointment, call [phone]    [facility]  Address: [address] [place] [address]  Appointment Center: [phone] ([phone])     [facility] is a good referral line for crisis and substance abuse help.   has drop in programs all over the [facility].    24-Hour Drop-In Centers For Adults:  [facility] (18+) - [address] ([address]: [address]    [facility] (25+) - [address] [facility] [address] (Subway: #6 to [facility])    [facility] (18+) - [address] [facility] (Subway: A to [facility])    Drop-In Centers for Adults:  [facility] (18+) - [address] ([facility] - Subway: 1/2/3/A/C/E to [address])     Return to the ER for Emergencies.  Return immediately for any new or worsening symptoms or any new concerns     Cellulitis    Cellulitis is a skin infection caused by bacteria. This condition occurs most often in the arms and lower legs but can occur anywhere over the body. Symptoms include redness, swelling, warm skin, tenderness, and chills/fever. If you were prescribed an antibiotic medicine, take it as told by your health care provider. Do not stop taking the antibiotic even if you start to feel better.    SEEK IMMEDIATE MEDICAL CARE IF YOU HAVE ANY OF THE FOLLOWING SYMPTOMS: worsening fever, red streaks coming from affected area, vomiting or diarrhea, or dizziness/lightheadedness.

## 2024-10-04 NOTE — ED PROVIDER NOTE - CARE PROVIDERS DIRECT ADDRESSES
[email or web address]

## 2024-10-04 NOTE — ED ADULT NURSE REASSESSMENT NOTE - NS ED NURSE REASSESS COMMENT FT1
Pt dc home. No acute distress observed. Pt dc home. No acute distress observed. Pt accepted dc papers. Did not want to sign.

## 2024-10-04 NOTE — ED ADULT NURSE NOTE - CHIEF COMPLAINT QUOTE
pt. c/o worsening groin pain and swelling, was recently discharged from [facility] due to cellulitis of his groin.

## 2024-10-04 NOTE — ED ADULT NURSE NOTE - OBJECTIVE STATEMENT
Pt is A&OX4. Presented with groin pain and RLE pain. Pt was recently d/c from [facility]. Pt denies being compliant with medications. Discoloration and swelling observed to RLE. Rash observed in groin area. No acute distress observed.

## 2024-10-04 NOTE — ED ADULT NURSE NOTE - CHPI ED NUR QUALITY2
Detail Level: Detailed
Add 26903 Cpt? (Important Note: In 2017 The Use Of 92445 Is Being Tracked By Cms To Determine Future Global Period Reimbursement For Global Periods): yes
aching

## 2024-10-04 NOTE — ED PROVIDER NOTE - PHYSICAL EXAMINATION
Exam chaperoned by OWEN Mondragon   Gen - Disheveled M, NAD, comfortable and non-toxic appearing, screaming at staff on arrival   Skin - warm, dry, chronic plaque like leathery appearing to b/l groin region with no erythema, edema, ecchymosis, crepitus or warmth, no desquamation of the skin    HEENT - AT/NC, PERRL, EOMI, pupil 3mm b/l, no nasal discharge, airway patent, neck supple and FROM  CV - S1S2, R/R/R, +systolic murmur   Resp - CTAB, no r/r/w  GI - soft, ND, NT, no CVAT b/l   MS - w/w/p, chronic PVD skin changes with mild overlying cellulitic skin changes, chronic appearing, 2+ BLE pitting edema, calves supple and NT, No midline spinal tenderness or step off on palpation  Neuro - AxOx3, no focal neuro deficits, ambulatory without gait disturbance

## 2024-10-14 DIAGNOSIS — J44.89 OTHER SPECIFIED CHRONIC OBSTRUCTIVE PULMONARY DISEASE: ICD-10-CM

## 2024-10-14 DIAGNOSIS — B86 SCABIES: ICD-10-CM

## 2024-10-14 DIAGNOSIS — B37.2 CANDIDIASIS OF SKIN AND NAIL: ICD-10-CM

## 2024-10-14 DIAGNOSIS — C34.90 MALIGNANT NEOPLASM OF UNSPECIFIED PART OF UNSPECIFIED BRONCHUS OR LUNG: ICD-10-CM

## 2024-10-14 DIAGNOSIS — I11.0 HYPERTENSIVE HEART DISEASE WITH HEART FAILURE: ICD-10-CM

## 2024-10-14 DIAGNOSIS — F14.10 COCAINE ABUSE, UNCOMPLICATED: ICD-10-CM

## 2024-10-14 DIAGNOSIS — Z86.14 PERSONAL HISTORY OF METHICILLIN RESISTANT STAPHYLOCOCCUS AUREUS INFECTION: ICD-10-CM

## 2024-10-14 DIAGNOSIS — I50.32 CHRONIC DIASTOLIC (CONGESTIVE) HEART FAILURE: ICD-10-CM

## 2024-10-14 DIAGNOSIS — Z91.148 PATIENT'S OTHER NONCOMPLIANCE WITH MEDICATION REGIMEN FOR OTHER REASON: ICD-10-CM

## 2024-10-14 DIAGNOSIS — L03.314 CELLULITIS OF GROIN: ICD-10-CM

## 2024-10-14 DIAGNOSIS — I48.91 UNSPECIFIED ATRIAL FIBRILLATION: ICD-10-CM

## 2024-10-14 DIAGNOSIS — F12.90 CANNABIS USE, UNSPECIFIED, UNCOMPLICATED: ICD-10-CM
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
## 2024-10-14 DIAGNOSIS — T50.996A UNDERDOSING OF OTHER DRUGS, MEDICAMENTS AND BIOLOGICAL SUBSTANCES, INITIAL ENCOUNTER: ICD-10-CM

## 2024-10-14 DIAGNOSIS — B20 HUMAN IMMUNODEFICIENCY VIRUS [HIV] DISEASE: ICD-10-CM

## 2024-10-14 DIAGNOSIS — Z91.013 ALLERGY TO SEAFOOD: ICD-10-CM

## 2024-10-14 DIAGNOSIS — T45.516A UNDERDOSING OF ANTICOAGULANTS, INITIAL ENCOUNTER: ICD-10-CM
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
## 2024-10-14 DIAGNOSIS — Z22.4 CARRIER OF INFECTIONS WITH A PREDOMINANTLY SEXUAL MODE OF TRANSMISSION: ICD-10-CM

## 2024-10-14 DIAGNOSIS — Z53.20 PROCEDURE AND TREATMENT NOT CARRIED OUT BECAUSE OF PATIENT'S DECISION FOR UNSPECIFIED REASONS: ICD-10-CM

## 2024-10-14 DIAGNOSIS — T37.5X6A UNDERDOSING OF ANTIVIRAL DRUGS, INITIAL ENCOUNTER: ICD-10-CM

## 2024-10-14 DIAGNOSIS — Z59.00 HOMELESSNESS UNSPECIFIED: ICD-10-CM

## 2024-10-14 SDOH — ECONOMIC STABILITY - HOUSING INSECURITY: HOMELESSNESS UNSPECIFIED: Z59.00

## 2024-10-16 ENCOUNTER — EMERGENCY (EMERGENCY)
Facility: HOSPITAL | Age: 64
LOS: 1 days | Discharge: HOME | End: 2024-10-16
Attending: EMERGENCY MEDICINE | Admitting: EMERGENCY MEDICINE
Payer: MEDICARE

## 2024-10-16 VITALS
OXYGEN SATURATION: 95 % | HEART RATE: 86 BPM | DIASTOLIC BLOOD PRESSURE: 69 MMHG | RESPIRATION RATE: 17 BRPM | SYSTOLIC BLOOD PRESSURE: 131 MMHG

## 2024-10-16 VITALS
HEART RATE: 94 BPM | TEMPERATURE: 98 F | WEIGHT: 165.35 LBS | OXYGEN SATURATION: 97 % | HEIGHT: 66 IN | DIASTOLIC BLOOD PRESSURE: 68 MMHG | SYSTOLIC BLOOD PRESSURE: 135 MMHG | RESPIRATION RATE: 18 BRPM

## 2024-10-16 DIAGNOSIS — I11.0 HYPERTENSIVE HEART DISEASE WITH HEART FAILURE: ICD-10-CM

## 2024-10-16 DIAGNOSIS — I50.9 HEART FAILURE, UNSPECIFIED: ICD-10-CM

## 2024-10-16 DIAGNOSIS — E11.9 TYPE 2 DIABETES MELLITUS WITHOUT COMPLICATIONS: ICD-10-CM

## 2024-10-16 LAB
ALBUMIN SERPL ELPH-MCNC: 3.8 G/DL — SIGNIFICANT CHANGE UP (ref 3.4–5)
ALP SERPL-CCNC: 150 U/L — HIGH (ref 40–120)
ALT FLD-CCNC: 39 U/L — SIGNIFICANT CHANGE UP (ref 12–42)
ANION GAP SERPL CALC-SCNC: 9 MMOL/L — SIGNIFICANT CHANGE UP (ref 9–16)
AST SERPL-CCNC: 64 U/L — HIGH (ref 15–37)
BASOPHILS # BLD AUTO: 0.04 K/UL — SIGNIFICANT CHANGE UP (ref 0–0.2)
BASOPHILS NFR BLD AUTO: 1.1 % — SIGNIFICANT CHANGE UP (ref 0–2)
BILIRUB SERPL-MCNC: 0.6 MG/DL — SIGNIFICANT CHANGE UP (ref 0.2–1.2)
BUN SERPL-MCNC: 22 MG/DL — SIGNIFICANT CHANGE UP (ref 7–23)
CALCIUM SERPL-MCNC: 9.6 MG/DL — SIGNIFICANT CHANGE UP (ref 8.5–10.5)
CHLORIDE SERPL-SCNC: 102 MMOL/L — SIGNIFICANT CHANGE UP (ref 96–108)
CO2 SERPL-SCNC: 25 MMOL/L — SIGNIFICANT CHANGE UP (ref 22–31)
CREAT SERPL-MCNC: 1.19 MG/DL — SIGNIFICANT CHANGE UP (ref 0.5–1.3)
EGFR: 68 ML/MIN/1.73M2 — SIGNIFICANT CHANGE UP
EOSINOPHIL # BLD AUTO: 0.02 K/UL — SIGNIFICANT CHANGE UP (ref 0–0.5)
EOSINOPHIL NFR BLD AUTO: 0.6 % — SIGNIFICANT CHANGE UP (ref 0–6)
GLUCOSE SERPL-MCNC: 92 MG/DL — SIGNIFICANT CHANGE UP (ref 70–99)
HCT VFR BLD CALC: 36.3 % — LOW (ref 39–50)
HGB BLD-MCNC: 11.5 G/DL — LOW (ref 13–17)
IMM GRANULOCYTES NFR BLD AUTO: 0.3 % — SIGNIFICANT CHANGE UP (ref 0–0.9)
LIDOCAIN IGE QN: 25 U/L — SIGNIFICANT CHANGE UP (ref 16–77)
LYMPHOCYTES # BLD AUTO: 1.43 K/UL — SIGNIFICANT CHANGE UP (ref 1–3.3)
LYMPHOCYTES # BLD AUTO: 41.1 % — SIGNIFICANT CHANGE UP (ref 13–44)
MAGNESIUM SERPL-MCNC: 1.6 MG/DL — SIGNIFICANT CHANGE UP (ref 1.6–2.6)
MCHC RBC-ENTMCNC: 28.3 PG — SIGNIFICANT CHANGE UP (ref 27–34)
MCHC RBC-ENTMCNC: 31.7 GM/DL — LOW (ref 32–36)
MCV RBC AUTO: 89.2 FL — SIGNIFICANT CHANGE UP (ref 80–100)
MONOCYTES # BLD AUTO: 0.54 K/UL — SIGNIFICANT CHANGE UP (ref 0–0.9)
MONOCYTES NFR BLD AUTO: 15.5 % — HIGH (ref 2–14)
NEUTROPHILS # BLD AUTO: 1.44 K/UL — LOW (ref 1.8–7.4)
NEUTROPHILS NFR BLD AUTO: 41.4 % — LOW (ref 43–77)
NRBC # BLD: 0 /100 WBCS — SIGNIFICANT CHANGE UP (ref 0–0)
NT-PROBNP SERPL-SCNC: 2373 PG/ML — HIGH
PLATELET # BLD AUTO: 409 K/UL — HIGH (ref 150–400)
POTASSIUM SERPL-MCNC: 4.9 MMOL/L — SIGNIFICANT CHANGE UP (ref 3.5–5.3)
POTASSIUM SERPL-SCNC: 4.9 MMOL/L — SIGNIFICANT CHANGE UP (ref 3.5–5.3)
PROT SERPL-MCNC: 9.2 G/DL — HIGH (ref 6.4–8.2)
RBC # BLD: 4.07 M/UL — LOW (ref 4.2–5.8)
RBC # FLD: 15.6 % — HIGH (ref 10.3–14.5)
SODIUM SERPL-SCNC: 136 MMOL/L — SIGNIFICANT CHANGE UP (ref 132–145)
TROPONIN I, HIGH SENSITIVITY RESULT: 36.8 NG/L — SIGNIFICANT CHANGE UP
WBC # BLD: 3.48 K/UL — LOW (ref 3.8–10.5)
WBC # FLD AUTO: 3.48 K/UL — LOW (ref 3.8–10.5)

## 2024-10-16 PROCEDURE — 99285 EMERGENCY DEPT VISIT HI MDM: CPT

## 2024-10-16 PROCEDURE — 71275 CT ANGIOGRAPHY CHEST: CPT | Mod: 26,MC

## 2024-10-16 RX ORDER — SODIUM CHLORIDE 0.9 % (FLUSH) 0.9 %
1000 SYRINGE (ML) INJECTION ONCE
Refills: 0 | Status: COMPLETED | OUTPATIENT
Start: 2024-10-16 | End: 2024-10-16

## 2024-10-16 RX ORDER — PERMETHRIN 1 MG/100ML
1 LOTION TOPICAL ONCE
Refills: 0 | Status: COMPLETED | OUTPATIENT
Start: 2024-10-16 | End: 2024-10-16

## 2024-10-16 RX ADMIN — Medication 1000 MILLILITER(S): at 10:34

## 2024-10-16 RX ADMIN — PERMETHRIN 1 APPLICATION(S): 1 LOTION TOPICAL at 10:34

## 2024-10-16 NOTE — ED ADULT TRIAGE NOTE - CHIEF COMPLAINT QUOTE
patient BIBA from [place]; discharged from [place] today; patient says he was admitted for fx ribs from fall; says he is now spitting up/coughing up blood this morning; also says he has scabies but alert is in the chart for scabies from 9/11 already

## 2024-10-16 NOTE — ED PROVIDER NOTE - OBJECTIVE STATEMENT
64-year-old male with history of HIV/ AIDS (CD4 123), polysubstance abuse, CHF, A-fib on Eliquis, hypertension, diabetes, chronic groin rash, lung CA not on treatment, recent admission to [place] for rib fractures, DC'd yesterday or today, presents with episode of coughing up small amount of blood just prior to arrival in ED.  Patient asking for something to eat.  No chest pain or shortness of breath.  No episodes of hemoptysis since first episode.  No abdominal pain, nausea, vomiting, or diarrhea.  Patient states he needs help filling his daily pillboxes as he has too many medications and he does not know when he is supposed to take what medication.

## 2024-10-16 NOTE — ED PROVIDER NOTE - CLINICAL SUMMARY MEDICAL DECISION MAKING FREE TEXT BOX
Pt w hx of AIDS, hx of AFib on eliquis, chronic groin rash, lung CA not on treatment, recent admission to [place] for rib fx Pt w hx of AIDS, hx of AFib on eliquis, chronic groin rash, lung CA not on treatment, recent admission to [place] for rib fx, presents with episode of coughing up blood.  Also asking for sandwich and help arranging his meds.  On exam afebrile, VSS, chronically ill appearing, with skin thickening in b/l groin which looks likely to be fungal.  Pt has antifungal cream with him which was rx'ed at [place].  Will check labs, CTA chest to r/o PE, reassess.    CTA neg for PE.  Pt observed for several hrs in ED without any further episodes of hemoptysis.  Tolerating PO in ED.  I helped pt arrange his meds in his pill box.  Pt was seen by ORTEGA Antoine for dc.

## 2024-10-16 NOTE — ED PROVIDER NOTE - NSFOLLOWUPINSTRUCTIONS_ED_ALL_ED_FT
Coughing Up Blood (Hemoptysis)    WHAT YOU NEED TO KNOW:    What do I need to know about coughing up blood? Coughing up blood may be a sign of a serious medical condition. Blood vessels in your lungs or airway weaken or break and begin to bleed. You may see small amounts in your sputum (mucus you cough up), or you may cough up large amounts.    What may cause me to cough up blood?    An infection, such as bronchitis, pneumonia, or tuberculosis (TB)    A medical condition such as lung cancer, COPD, or bronchiectasis    An injury to your throat or chest    A foreign object in your airway    Cigarette smoking, or the use of illegal drugs, such as cocaine  How is the cause of coughing up blood diagnosed? Your healthcare provider will ask about your symptoms and examine you. You may need any of the following:    Blood or sputum tests may show an infection and how much oxygen is in your blood.    A chest x-ray or CT may show a mass or buildup of fluid in your lungs. You may be given contrast liquid to help your lungs show up better in the pictures. Tell the healthcare provider if you have ever had an allergic reaction to contrast liquid. Do not enter the MRI room with anything metal. Metal can cause serious injury. Tell the healthcare provider if you have any metal in or on your body.    A bronchoscopy may show the cause of your symptoms. A scope is a flexible tube with a light and camera on the end. The scope is placed into your mouth or nose and moved down into your airway.  How is coughing up blood treated? Treatment depends on how much blood you are coughing up and what is causing your symptoms. You may need any of the following:    Medicines may be given to fight a bacterial infection or to control a cough. You may also need medicine to slow or stop the bleeding.    A saline rinse of your nose and throat may help decrease or stop the bleeding.    Bronchial artery embolization is a procedure to inject medicine into your damaged blood vessel. The medicine will help stop the bleeding.    Surgery may be needed to help stop severe bleeding if other treatments do not work. Surgery may also be done to look for and correct other problems with your airway.  How can I manage my symptoms?    Use caution with medicines. Certain medicines, such as NSAIDs, increase your risk for bleeding. Herbal supplements also increase your risk. Examples of herbal supplements are garlic, gingko, and ginseng. Ask your healthcare provider before you take any over-the-counter medicines.    Do not smoke, and avoid secondhand smoke. Nicotine and other chemicals in cigarettes and cigars can cause lung damage. Ask your healthcare provider for information if you currently smoke and need help to quit. E-cigarettes or smokeless tobacco still contain nicotine. Talk to your healthcare provider before you use these products.  Call your local emergency number (911 in the ) if:    You have new or worsening chest pain or shortness of breath.    When should I seek immediate care?    Your bleeding gets worse.    You cannot stop vomiting.    You are so dizzy that you think you may fall or faint.  When should I call my doctor?    You have bloody mucus that is getting worse.    You have a fever and night sweats.    You feel more weak and tired than usual.    You have questions or concerns about your condition or care.  CARE AGREEMENT:    You have the right to help plan your care. Learn about your health condition and how it may be treated. Discuss treatment options with your healthcare providers to decide what care you want to receive. You always have the right to refuse treatment.

## 2024-10-16 NOTE — ED PROVIDER NOTE - PATIENT PORTAL LINK FT
You can access the FollowMyHealth Patient Portal offered by [facility] by registering at the following website: [email or web address]. By joining [employer]’s FollowMyHealth portal, you will also be able to view your health information using other applications (apps) compatible with our system.

## 2024-10-16 NOTE — ED PROVIDER NOTE - PHYSICAL EXAMINATION
Constitutional: awake and alert, in no acute distress  HEENT: head normocephalic and atraumatic. moist mucous membranes  Eyes: extraocular movements intact, normal conjunctiva  Neck: supple, normal ROM  Cardiovascular: irregularly irregular rhythm   Pulmonary: no respiratory distress  Gastrointestinal: abdomen flat and nondistended  Skin: chronic appearing skin thickening in b/l groin, no erythema, no desquamation of skin  Musculoskeletal: no edema, no deformity  Neurological: oriented x4, no focal neurologic deficit.   Psychiatric: calm and cooperative

## 2024-10-16 NOTE — ED ADULT NURSE NOTE - CHIEF COMPLAINT QUOTE
patient BIBA from [place]; discharged from [place] today; patient says he was admitted for fx ribs from fall; says he is now spitting up/coughing up blood this morning; also says he has scabies but alert is in the chart for scabies from 9/11 already

## 2024-10-16 NOTE — ED ADULT NURSE NOTE - NSFALLUNIVINTERV_ED_ALL_ED
Bed/Stretcher in lowest position, wheels locked, appropriate side rails in place/Call bell, personal items and telephone in reach/Instruct patient to call for assistance before getting out of bed/chair/stretcher/Non-slip footwear applied when patient is off stretcher/[place] to call system/Physically safe environment - no spills, clutter or unnecessary equipment/Purposeful proactive rounding/Room/bathroom lighting operational, light cord in reach

## 2024-10-16 NOTE — ED CLERICAL - NS ED CLERK NOTE PRE-ARRIVAL INFORMATION; ADDITIONAL PRE-ARRIVAL INFORMATION
This patient is enrolled in the Heart Failure STARS readmission reduction initiative and has active care navigation. This patient can be followed up by the care navigation team within 24 hours. To arrange close follow-up or to obtain additional clinical information, please call the care navigation contact number above..  For in house Cardiology patients, please call the Cardiology consult fellow at [phone] for ALL PATIENTS with a cardiac issue PRIOR to disposition if you are considering admission.

## 2024-10-18 DIAGNOSIS — R05.9 COUGH, UNSPECIFIED: ICD-10-CM

## 2024-10-18 DIAGNOSIS — B20 HUMAN IMMUNODEFICIENCY VIRUS [HIV] DISEASE: ICD-10-CM

## 2024-10-18 DIAGNOSIS — I48.91 UNSPECIFIED ATRIAL FIBRILLATION: ICD-10-CM

## 2024-10-18 DIAGNOSIS — Z85.118 PERSONAL HISTORY OF OTHER MALIGNANT NEOPLASM OF BRONCHUS AND LUNG: ICD-10-CM

## 2024-10-18 DIAGNOSIS — Z79.01 LONG TERM (CURRENT) USE OF ANTICOAGULANTS: ICD-10-CM

## 2024-11-26 PROCEDURE — 84484 ASSAY OF TROPONIN QUANT: CPT

## 2024-11-26 PROCEDURE — 84100 ASSAY OF PHOSPHORUS: CPT

## 2024-11-26 PROCEDURE — 87491 CHLMYD TRACH DNA AMP PROBE: CPT

## 2024-11-26 PROCEDURE — 86780 TREPONEMA PALLIDUM: CPT

## 2024-11-26 PROCEDURE — 86355 B CELLS TOTAL COUNT: CPT

## 2024-11-26 PROCEDURE — 87591 N.GONORRHOEAE DNA AMP PROB: CPT

## 2024-11-26 PROCEDURE — 74177 CT ABD & PELVIS W/CONTRAST: CPT | Mod: MC

## 2024-11-26 PROCEDURE — 96375 TX/PRO/DX INJ NEW DRUG ADDON: CPT

## 2024-11-26 PROCEDURE — 87536 HIV-1 QUANT&REVRSE TRNSCRPJ: CPT

## 2024-11-26 PROCEDURE — 86360 T CELL ABSOLUTE COUNT/RATIO: CPT

## 2024-11-26 PROCEDURE — 87086 URINE CULTURE/COLONY COUNT: CPT

## 2024-11-26 PROCEDURE — 85025 COMPLETE CBC W/AUTO DIFF WBC: CPT

## 2024-11-26 PROCEDURE — 80053 COMPREHEN METABOLIC PANEL: CPT

## 2024-11-26 PROCEDURE — 97161 PT EVAL LOW COMPLEX 20 MIN: CPT

## 2024-11-26 PROCEDURE — 83880 ASSAY OF NATRIURETIC PEPTIDE: CPT

## 2024-11-26 PROCEDURE — 83735 ASSAY OF MAGNESIUM: CPT

## 2024-11-26 PROCEDURE — 87040 BLOOD CULTURE FOR BACTERIA: CPT

## 2024-11-26 PROCEDURE — 86357 NK CELLS TOTAL COUNT: CPT

## 2024-11-26 PROCEDURE — 0225U NFCT DS DNA&RNA 21 SARSCOV2: CPT

## 2024-11-26 PROCEDURE — 81001 URINALYSIS AUTO W/SCOPE: CPT

## 2024-11-26 PROCEDURE — 86592 SYPHILIS TEST NON-TREP QUAL: CPT

## 2024-11-26 PROCEDURE — 86359 T CELLS TOTAL COUNT: CPT

## 2024-11-26 PROCEDURE — 99285 EMERGENCY DEPT VISIT HI MDM: CPT | Mod: 25

## 2024-11-26 PROCEDURE — 71045 X-RAY EXAM CHEST 1 VIEW: CPT

## 2024-11-26 PROCEDURE — 80202 ASSAY OF VANCOMYCIN: CPT

## 2024-11-26 PROCEDURE — 86593 SYPHILIS TEST NON-TREP QUANT: CPT

## 2024-11-26 PROCEDURE — 94640 AIRWAY INHALATION TREATMENT: CPT

## 2024-11-26 PROCEDURE — 36415 COLL VENOUS BLD VENIPUNCTURE: CPT

## 2024-11-26 PROCEDURE — 96374 THER/PROPH/DIAG INJ IV PUSH: CPT

## 2024-12-09 NOTE — PATIENT PROFILE ADULT - ARE SIGNIFICANT INDICATORS COMPLETE.
Patient is scheduled to follow up with AVIS Wilkerson in [place] cardiology clinic on 12/30/24. Patient should have EKG done at the time of that clinic visit for Flecainide monitoring, order placed.    No

## 2025-02-26 NOTE — ED ADULT NURSE REASSESSMENT NOTE - NS ED NURSE REASSESS COMMENT FT1
Physical Therapy Visit    Visit Type: Daily Treatment Note  Visit: 6  Referring Provider: Alanis Mcmanus PA-C  Medical Diagnosis (from order): M54.50 - Acute left-sided low back pain without sciatica     SUBJECTIVE                                                                                                               Feeling slightly better in lower  Doing HEP as directed --improved confidence   Moving from hips as directed utilizing butt muscles which is helping pain  Spine in center feels heavy --not a significant discomfort intermittent     Functional Change: Able to sit with legs crossed no pain  No longer pain with cough    Pain / Symptoms  - Pain rating (out of 10): Current: 5       OBJECTIVE                                                                                                                                         Treatment     Therapeutic Exercise  Nustep x 6 minutes level 4 UE and LE at seat 9  Pelvic tilts x 10 5 second holds  Single knee to chest stretch 2 x 30-  Pelvic tilt BKFO x 10 x 2  Pelvic tilt with SLR x 10 bilateral--x 2  Bridge x 10 5 second holds  minimal cues for hold times, form with exercises           Manual Therapy   Soft tissue mobilization left and right ower back in sidelying with iliac crest clearing--minimal to moderate restrictions noted          Skilled input: as detailed above    Writer verbally educated and received verbal consent for hand placement, positioning of patient, and techniques to be performed today from patient for clothing adjustments for techniques, therapist position for techniques and hand placement and palpation for techniques as described above and how they are pertinent to the patient's plan of care.  Home Exercise Program  Access Code: [email or web address]  URL: [email or web address]  Date: 02/26/2025  Prepared by: Vani Nelson    Exercises  - Supine Posterior Pelvic Tilt  - 2 x daily - 7 x weekly - 10 reps - 5 hold  - Hip  Pt is A&Ox3, shortness of breath noted on exertion, nasal cannula maintained denies any chest pain, dizziness. US pending. Will continue to monitor, Safety and falls measures followed. Call bell placed within reach Flexion Stretch  - 1 x daily - 7 x weekly - 3 reps - 30 hold  - Supine Single Bent Knee Fallout  - 2 x daily - 7 x weekly - 2 sets - 10 reps  - Supine Pelvic Tilt with Straight Leg Raise  - 2 x daily - 7 x weekly - 2 sets - 10 reps  - Beginner Bridge  - 2 x daily - 7 x weekly - 10 reps - 5 hold      ASSESSMENT                                                                                                            Continuing to improve gradually as expected with daily function.  Much improved performance of HEP.    Pain centralizing to spine from left side   Education:   - Results of above outlined education: Verbalizes understanding and Demonstrates understanding       Therapy procedure time and total treatment time can be found documented on the Time Entry flowsheet

## 2025-06-23 NOTE — ED ADULT TRIAGE NOTE - BP NONINVASIVE DIASTOLIC (MM HG)
Spiritual Health History and Assessment/Progress Note  [facility]    Initial Encounter,  ,  ,      Name: Theron Sterling Jr. MRN: [id number]    Age: 74 y.o.     Sex: male   Language: English   Judaism: Congregation (DOC)   Sigmoid volvulus (HCC)     Date: 6/23/2025            Total Time Calculated: 10 min              Spiritual Assessment began in [facility]            Encounter Overview/Reason: Initial Encounter  Service Provided For: Family    Radha, Belief, Meaning:   Patient unable to assess at this time  Family/Friends identify as spiritual, are connected with a radha tradition or spiritual practice, and have beliefs or practices that help with coping during difficult times      Importance and Influence:  Patient unable to assess at this time  Family/Friends have spiritual/personal beliefs that influence decisions regarding the patient's health    Community:  Patient Other: Unable to assess  Family/Friends are connected with a spiritual community: and feel well-supported. Support system includes: Radha Community and Friends    Assessment and Plan of Care:     Patient Interventions include: Other: Patient unavailable  Family/Friends Interventions include: Facilitated expression of thoughts and feelings and Engaged in theological reflection    Patient Plan of Care: Other: Patient unavailable  Family/Friends Plan of Care: Spiritual Care available upon further referral    Electronically signed by Graciela Jj  Intern on 6/23/2025 at 4:23 PM    95

## 2025-08-10 ENCOUNTER — EMERGENCY (EMERGENCY)
Facility: HOSPITAL | Age: 65
LOS: 1 days | End: 2025-08-10
Attending: EMERGENCY MEDICINE | Admitting: EMERGENCY MEDICINE
Payer: MEDICARE

## 2025-08-10 VITALS
WEIGHT: 139.99 LBS | TEMPERATURE: 98 F | RESPIRATION RATE: 18 BRPM | HEART RATE: 95 BPM | OXYGEN SATURATION: 97 % | SYSTOLIC BLOOD PRESSURE: 124 MMHG | DIASTOLIC BLOOD PRESSURE: 65 MMHG

## 2025-08-10 VITALS — HEART RATE: 99 BPM

## 2025-08-10 DIAGNOSIS — I48.91 UNSPECIFIED ATRIAL FIBRILLATION: ICD-10-CM

## 2025-08-10 PROCEDURE — 99284 EMERGENCY DEPT VISIT MOD MDM: CPT

## 2025-08-11 RX ORDER — PERMETHRIN 50 MG/G
1 CREAM TOPICAL ONCE
Refills: 0 | Status: COMPLETED | OUTPATIENT
Start: 2025-08-11 | End: 2025-08-11

## 2025-08-11 RX ADMIN — PERMETHRIN 1 APPLICATION(S): 50 CREAM TOPICAL at 00:40
